# Patient Record
Sex: FEMALE | Race: BLACK OR AFRICAN AMERICAN | NOT HISPANIC OR LATINO | Employment: OTHER | URBAN - METROPOLITAN AREA
[De-identification: names, ages, dates, MRNs, and addresses within clinical notes are randomized per-mention and may not be internally consistent; named-entity substitution may affect disease eponyms.]

---

## 2017-09-10 ENCOUNTER — HOSPITAL ENCOUNTER (EMERGENCY)
Facility: HOSPITAL | Age: 77
Discharge: HOME | End: 2017-09-10
Attending: EMERGENCY MEDICINE
Payer: MEDICARE

## 2017-09-10 VITALS
TEMPERATURE: 99 F | RESPIRATION RATE: 18 BRPM | BODY MASS INDEX: 33.34 KG/M2 | HEART RATE: 75 BPM | SYSTOLIC BLOOD PRESSURE: 173 MMHG | WEIGHT: 220 LBS | DIASTOLIC BLOOD PRESSURE: 79 MMHG | HEIGHT: 68 IN | OXYGEN SATURATION: 97 %

## 2017-09-10 DIAGNOSIS — R09.81 SINUS CONGESTION: ICD-10-CM

## 2017-09-10 DIAGNOSIS — H92.01 ACUTE OTALGIA, RIGHT: Primary | ICD-10-CM

## 2017-09-10 DIAGNOSIS — R03.0 TRANSIENT HYPERTENSION: ICD-10-CM

## 2017-09-10 LAB
GLUCOSE SERPL-MCNC: 118 MG/DL (ref 70–110)
POCT GLUCOSE: 118 MG/DL (ref 70–110)

## 2017-09-10 PROCEDURE — 25000003 PHARM REV CODE 250: Performed by: EMERGENCY MEDICINE

## 2017-09-10 PROCEDURE — 99283 EMERGENCY DEPT VISIT LOW MDM: CPT | Mod: 25

## 2017-09-10 PROCEDURE — 82962 GLUCOSE BLOOD TEST: CPT

## 2017-09-10 PROCEDURE — 93010 ELECTROCARDIOGRAM REPORT: CPT | Mod: ,,, | Performed by: INTERNAL MEDICINE

## 2017-09-10 PROCEDURE — 93005 ELECTROCARDIOGRAM TRACING: CPT

## 2017-09-10 RX ORDER — HYDROCODONE BITARTRATE AND ACETAMINOPHEN 5; 325 MG/1; MG/1
1 TABLET ORAL
Status: DISCONTINUED | OUTPATIENT
Start: 2017-09-10 | End: 2017-09-11 | Stop reason: HOSPADM

## 2017-09-10 RX ORDER — AMOXICILLIN AND CLAVULANATE POTASSIUM 875; 125 MG/1; MG/1
1 TABLET, FILM COATED ORAL
Status: COMPLETED | OUTPATIENT
Start: 2017-09-10 | End: 2017-09-10

## 2017-09-10 RX ORDER — AMOXICILLIN AND CLAVULANATE POTASSIUM 875; 125 MG/1; MG/1
1 TABLET, FILM COATED ORAL 2 TIMES DAILY
Qty: 14 TABLET | Refills: 0 | Status: SHIPPED | OUTPATIENT
Start: 2017-09-10 | End: 2017-11-09

## 2017-09-10 RX ORDER — LOSARTAN POTASSIUM 50 MG/1
50 TABLET ORAL
Status: COMPLETED | OUTPATIENT
Start: 2017-09-10 | End: 2017-09-10

## 2017-09-10 RX ORDER — FLUTICASONE PROPIONATE 50 MCG
1 SPRAY, SUSPENSION (ML) NASAL 2 TIMES DAILY PRN
Qty: 15 G | Refills: 0 | Status: SHIPPED | OUTPATIENT
Start: 2017-09-10

## 2017-09-10 RX ADMIN — LOSARTAN POTASSIUM 50 MG: 50 TABLET, FILM COATED ORAL at 07:09

## 2017-09-10 RX ADMIN — AMOXICILLIN AND CLAVULANATE POTASSIUM 1 TABLET: 875; 125 TABLET, FILM COATED ORAL at 07:09

## 2017-09-10 NOTE — ED PROVIDER NOTES
Encounter Date: 9/10/2017       History     Chief Complaint   Patient presents with    Otalgia     right ear mainly but left ear is begining to hurt, began yesteray; hypertension -  states she took her BP med today     HPI   This is a 77 y.o. female who has a past medical history of Arthritis; Diabetes mellitus; and Hypertension.   The patient presents to the Emergency Department with bilateral ear pain x2 days, worse on the right.   Condition describes pain in the right ear, severe.  Symptoms are constant and worsening.  She also describes a whooshing sound in her right ear.  Patient previously had a tympanostomy tube removed a few weeks ago due to fluid in her ear.  Patient used some eardrops from previous condition today but did not help.  She has associated sinus congestion, mild dry cough.  Pt denies fever, chills, headache.   Symptoms are aggravated by laying down  Symptoms are relieved by nothing.   Patient has prior history of similar symptoms.     Pt also has noted high blood pressure. She took both BP meds this AM.  She is due for her p.m. dose.  She denies chest pain and shortness of breath, back pain, oliguria, hematuria.    Pt has a past surgical history that includes Hysterectomy and Eye surgery.    Review of patient's allergies indicates:  No Known Allergies  Past Medical History:   Diagnosis Date    Arthritis     Diabetes mellitus     Hypertension      Past Surgical History:   Procedure Laterality Date    EYE SURGERY      HYSTERECTOMY       Family History   Problem Relation Age of Onset    Cancer Sister     Breast cancer Sister     Cancer Brother     Hypertension Mother      Social History   Substance Use Topics    Smoking status: Never Smoker    Smokeless tobacco: Current User     Types: Snuff    Alcohol use No     Review of Systems   Constitutional: Negative for activity change and fever.   HENT: Positive for congestion, ear pain, rhinorrhea and sinus pressure. Negative for ear  discharge and sore throat.    Respiratory: Positive for cough (dry). Negative for shortness of breath.    Cardiovascular: Negative for chest pain.   Gastrointestinal: Negative for nausea and vomiting.   Genitourinary: Negative for decreased urine volume and dysuria.   Musculoskeletal: Negative for back pain.   Skin: Negative for rash.   Neurological: Negative for weakness and headaches.   Hematological: Does not bruise/bleed easily.       Physical Exam     Initial Vitals [09/10/17 1822]   BP Pulse Resp Temp SpO2   (!) 220/93 75 18 98.6 °F (37 °C) 97 %      MAP       135.33         Physical Exam    Nursing note and vitals reviewed.  Constitutional: She appears well-developed and well-nourished. She is not diaphoretic. No distress.   HENT:   Head: Normocephalic and atraumatic.   Mouth/Throat: Oropharynx is clear and moist.   Mild cerumen in the right external auditory meatus.  After cerumen removal, noted mild erythema to the right TM.  No air-fluid levels.  Mild opacity to the TM.  TM does not appear to be bulging.    Left external auditory meatus and TM appear to be within normal limits.   Eyes: Conjunctivae are normal. Pupils are equal, round, and reactive to light.   No pallor   Neck: Neck supple.   Cardiovascular: Normal rate, regular rhythm and intact distal pulses.   No murmur heard.  Pulmonary/Chest: Breath sounds normal. No respiratory distress. She has no wheezes. She has no rhonchi. She has no rales.   Abdominal: Soft. Bowel sounds are normal. She exhibits no distension. There is no tenderness. There is no guarding.   Musculoskeletal: Normal range of motion. She exhibits no edema or tenderness.   Neurological: She is alert and oriented to person, place, and time. She has normal strength.   Skin: Skin is warm and dry. No rash noted.   Psychiatric: She has a normal mood and affect.         ED Course   Procedures  Labs Reviewed   POCT GLUCOSE - Abnormal; Notable for the following:        Result Value    POCT  Glucose 118 (*)     All other components within normal limits   POCT GLUCOSE MONITORING CONTINUOUS             Medical Decision Making:   Initial Assessment:   This is an emergent evaluation of a 77 y.o.female patient with presentation of bilateral otalgia, worse on the right history of sinus infection.  It appears that the patient might have an early otitis media on the right versus serous otitis media.  I will try patient on an oral course of antibiotics.  We will also trial her on Flonase.     Patient's blood pressures also severely elevated.  We will recheck in the ED, assessed for any end organ damage including EKG and labwork as well as a screening chest x-ray.  I will give the patient's typical p.m. dose of losartan now.    Clinical Tests:   Lab Tests: Ordered and Reviewed  Radiological Study: Ordered and Reviewed  Medical Tests: Ordered and Reviewed              Attending Attestation:             Attending ED Notes:   Patient will be discharged to home without any further workup.  No need to assess for hypertensive emergency considering patient's blood pressure dropped without any intervention immediately upon arrival in the room.  Her initial elevated BP may have been due to whitecoat syndrome as well as pain.  Her pain resolved after cerumen removal from the right ear.    Patient will be discharged home on Augmentin and Flonase.  Follow-up with ENT in 1 week or return for any concerns.  Patient and family expressed understanding.          ED Course as of Sep 10 2115   Sun Sep 10, 2017   1843 BP: (!) 220/93 [NP]   1843 Temp: 98.6 °F (37 °C) [NP]   1843 Pulse: 75 [NP]   1843 Resp: 18 [NP]   1843 SpO2: 97 % [NP]   1921 Repeat BP 160s/70s.  Pain had resolved after I removed cerumen from the right ear.  Patient is declining Norco at this time.  [NP]      ED Course User Index  [NP] Alonzo Michelle MD     Clinical Impression:   The primary encounter diagnosis was Acute otalgia, right. Diagnoses of Transient  hypertension and Sinus congestion were also pertinent to this visit.    Disposition:   Disposition: Discharged  Condition: Stable                        Alonzo Michelle MD  09/10/17 0198

## 2017-09-11 NOTE — ED NOTES
Pt appears stable and in no apparent distress. Heart rate is stable and regular. All lung lobes are clear, equal, and audible.

## 2017-09-11 NOTE — ED NOTES
Pt states she still feels like she hears a thumping in her ear. States it has calmed down a little, but it is still present

## 2017-09-12 DIAGNOSIS — I10 ESSENTIAL HYPERTENSION, MALIGNANT: Primary | ICD-10-CM

## 2017-11-02 ENCOUNTER — TELEPHONE (OUTPATIENT)
Dept: PHARMACY | Facility: HOSPITAL | Age: 77
End: 2017-11-02

## 2017-11-02 NOTE — TELEPHONE ENCOUNTER
Ambulatory Care Clinical Pharmacist  Comprehensive Medication Review (CMR)/Medication Therapy Management (MTM)    Elina Mei is a 77 y.o. female who was contacted for completion of telephonic comprehensive medication review on 11/01/2017. The patient did not state any concerns regarding her therapy.    Recommendations to PCP for medication changes: None  Elina Mei's PMH consists of the following:   Past Medical History:   Diagnosis Date    Arthritis     Diabetes mellitus     Hypertension    .    Medications have been reviewed. Elina Mei is currently taking the following medications:  Current Outpatient Prescriptions   Medication Sig Dispense Refill    amlodipine (NORVASC) 10 MG tablet Take 10 mg by mouth once daily.      amoxicillin-clavulanate 875-125mg (AUGMENTIN) 875-125 mg per tablet Take 1 tablet by mouth 2 (two) times daily. 14 tablet 0    fluticasone (FLONASE) 50 mcg/actuation nasal spray 1 spray by Each Nare route 2 (two) times daily as needed (sinus congestion). 15 g 0    furosemide (LASIX) 40 MG tablet Take 40 mg by mouth once daily.       ibuprofen (ADVIL,MOTRIN) 600 MG tablet Take 1 tablet (600 mg total) by mouth every 8 (eight) hours as needed for Pain. 20 tablet 0    insulin glargine (LANTUS) 100 unit/mL injection Inject 35 Units into the skin every evening.      losartan (COZAAR) 50 MG tablet Take 50 mg by mouth once daily.      metformin (GLUCOPHAGE) 500 MG tablet Take 500 mg by mouth 2 (two) times daily with meals.      nitroGLYCERIN (NITROSTAT) 0.4 MG SL tablet Place 1 tablet (0.4 mg total) under the tongue every 5 (five) minutes as needed for Chest pain. 10 tablet 0    potassium chloride (KLOR-CON) 10 MEQ TbSR Take 10 mEq by mouth once daily.       No current facility-administered medications for this visit.      Of the medications above Ms. Mei is no longer taking: Augmentin, Motrin, or Flonase    Patient's most recent vital signs and labs are as follows:  Wt Readings from Last 3  Encounters:   09/10/17 99.8 kg (220 lb)   09/08/17 99.8 kg (220 lb 0.3 oz)   08/25/17 99.8 kg (220 lb 0.3 oz)     Temp Readings from Last 3 Encounters:   09/10/17 98.6 °F (37 °C)   07/19/15 98.2 °F (36.8 °C) (Oral)   12/24/13 98.9 °F (37.2 °C) (Oral)     BP Readings from Last 3 Encounters:   09/10/17 (!) 173/79   07/19/15 139/87   12/24/13 (!) 161/76     Pulse Readings from Last 3 Encounters:   09/10/17 75   07/19/15 78   12/24/13 74       Lab Results   Component Value Date    HGBA1C 9.0 (H) 04/02/2013     BMP  Lab Results   Component Value Date     12/23/2013    K 3.9 12/23/2013     12/23/2013    CO2 24 12/23/2013    BUN 14 12/23/2013    CREATININE 0.9 12/23/2013    CALCIUM 9.7 12/23/2013    ANIONGAP 11 12/23/2013    ESTGFRAFRICA >60 12/23/2013    EGFRNONAA >60 12/23/2013       Lab Results   Component Value Date    BUN 14 12/23/2013     Lab Results   Component Value Date    CHOL 249 (H) 04/01/2013     Lab Results   Component Value Date    HDL 37 (L) 04/01/2013     Lab Results   Component Value Date    LDLCALC 175.2 (H) 04/01/2013     Lab Results   Component Value Date    TRIG 184 (H) 04/01/2013     Lab Results   Component Value Date    CHOLHDL 14.9 (L) 04/01/2013       Medication Action Plan (MAP): Problems identified during CMR  1) Immunization(s) - Review immunizations required due to age and/or for chronic conditions based on latest ACIP guidelines, Tdap, Zoster, HPV, Influenza, and pneumococcal.  Elinaliza Mei   will follow-up with PCP to determine if Influenza, Pneumococcal and Zostavax immunizations are needed.    2) Goals - Provided Elina Mei with the following goals for management of chronic conditions.  Blood pressure (older than 61 yo) less than 150/90 mm Hg.  A1c (diabetes) less than 7%.  Total cholesterol less than 200 mg/dL.  LDL-C less than 100 mg/dL.  Triglycerides less than 150 mg/dL.    Andres Hardy, PharmD Candidate 2018 April LUIS ARMANDO Saleh, PharmD-Ambulatory Care Clinical Pharmacist  Population Health  11/02/2017

## 2017-11-09 ENCOUNTER — OFFICE VISIT (OUTPATIENT)
Dept: SURGERY | Facility: CLINIC | Age: 77
End: 2017-11-09
Payer: MEDICARE

## 2017-11-09 VITALS
HEIGHT: 68 IN | WEIGHT: 225.31 LBS | TEMPERATURE: 98 F | DIASTOLIC BLOOD PRESSURE: 72 MMHG | SYSTOLIC BLOOD PRESSURE: 140 MMHG | OXYGEN SATURATION: 96 % | BODY MASS INDEX: 34.15 KG/M2 | HEART RATE: 77 BPM

## 2017-11-09 DIAGNOSIS — N63.10 BREAST MASS, RIGHT: ICD-10-CM

## 2017-11-09 DIAGNOSIS — R92.1 BREAST CALCIFICATION, RIGHT: Primary | ICD-10-CM

## 2017-11-09 DIAGNOSIS — E66.9 OBESITY (BMI 30-39.9): ICD-10-CM

## 2017-11-09 DIAGNOSIS — R92.8 ABNORMAL MAMMOGRAM: ICD-10-CM

## 2017-11-09 PROCEDURE — 99205 OFFICE O/P NEW HI 60 MIN: CPT | Mod: S$GLB,,, | Performed by: SURGERY

## 2017-11-09 RX ORDER — TRAVOPROST 0.04 MG/ML
1 SOLUTION/ DROPS OPHTHALMIC NIGHTLY
Refills: 3 | COMMUNITY
Start: 2017-09-09 | End: 2018-07-20

## 2017-11-09 RX ORDER — INSULIN GLARGINE 100 [IU]/ML
INJECTION, SOLUTION SUBCUTANEOUS
Refills: 2 | COMMUNITY
Start: 2017-09-09

## 2017-11-09 RX ORDER — DORZOLAMIDE HYDROCHLORIDE AND TIMOLOL MALEATE 20; 5 MG/ML; MG/ML
1 SOLUTION/ DROPS OPHTHALMIC 2 TIMES DAILY
Refills: 6 | COMMUNITY
Start: 2017-09-09

## 2017-11-09 RX ORDER — ATORVASTATIN CALCIUM 10 MG/1
10 TABLET, FILM COATED ORAL DAILY
Refills: 0 | Status: ON HOLD | COMMUNITY
Start: 2017-08-09 | End: 2018-04-27 | Stop reason: HOSPADM

## 2017-11-09 NOTE — LETTER
November 10, 2017      Yoan Baron MD  [address]  [place] [address]           [facility]  [address], [address]  [facility] [address]  Phone: [phone]  Fax: [phone]          Patient: Elina Mei   MR Number: [id number]   YOB: 1940   Date of Visit: 11/9/2017       Dear Dr. Yoan Baron:    Thank you for referring Elina Mei to me for evaluation. Attached you will find relevant portions of my assessment and plan of care.    If you have questions, please do not hesitate to call me. I look forward to following Elina Mei along with you.    Sincerely,    Annette Medellin,     Enclosure  CC:  No Recipients    If you would like to receive this communication electronically, please contact [email or web address] or [phone] to request more information on [employer] access.    For providers and/or their staff who would like to refer a patient to [facility], please contact us through our one-stop-shop provider referral line, [facility] , at [phone].    If you feel you have received this communication in error or would no longer like to receive these types of communications, please e-mail [email or web address]

## 2017-11-09 NOTE — PROGRESS NOTES
Subjective:      Patient ID: Elina Mei is a 77 y.o. female.    Chief Complaint: Mass (Right Breast)  76yo female presents with her daughter for eval and treatmt of R breast calcs.  She underwent a screening mammo 2017 which was abN. A diagnostic mammo was subsequently ordered which revealed BIRADS 4 R breast calcs. Pt presents with her daughter and denies any sx. No pain, no palpable mass. No nipple retraction. She does perform regular self-breast exams. She denies nipple drainage. She has not noticed breast asymmetry. She has a sister that was in her 50's when dx'd with breast CA, sister had surgery (unknown if chemo/XRT) and subsequently  pt believes from the breast CA. Pt does not know that details.  No other c/o.  Age of menarche - 12  Age of menopause - surgical 41yo  Gavida 6 Para 5  Age at first pregnancy - 17yo  OCP use - 2yrs  Postmenopausal HRT - no  Breastfeeding - minimal      Past Medical History:   Diagnosis Date    Arthritis     Diabetes mellitus     Hypertension      Past Surgical History:   Procedure Laterality Date    EYE SURGERY      HYSTERECTOMY       Family History   Problem Relation Age of Onset    Cancer Sister     Breast cancer Sister     Cancer Brother     Hypertension Mother      Social History     Social History    Marital status:      Spouse name: N/A    Number of children: N/A    Years of education: N/A     Social History Main Topics    Smoking status: Never Smoker    Smokeless tobacco: Current User     Types: Snuff    Alcohol use No    Drug use: No    Sexual activity: Not Currently     Other Topics Concern    None     Social History Narrative    None       Current Outpatient Prescriptions   Medication Sig Dispense Refill    amlodipine (NORVASC) 10 MG tablet Take 10 mg by mouth once daily.      atorvastatin (LIPITOR) 10 MG tablet Take 10 mg by mouth once daily.  0    dorzolamide-timolol 2-0.5% (COSOPT) 22.3-6.8 mg/mL ophthalmic solution Place 1 drop  "into both eyes 2 (two) times daily.  6    fluticasone (FLONASE) 50 mcg/actuation nasal spray 1 spray by Each Nare route 2 (two) times daily as needed (sinus congestion). 15 g 0    furosemide (LASIX) 40 MG tablet Take 40 mg by mouth once daily.       ibuprofen (ADVIL,MOTRIN) 600 MG tablet Take 1 tablet (600 mg total) by mouth every 8 (eight) hours as needed for Pain. 20 tablet 0    insulin glargine (LANTUS) 100 unit/mL injection Inject 35 Units into the skin every evening.      KLOR-CON SPRINKLE 10 mEq CpSR Take 10 mEq by mouth once daily.  0    LANTUS SOLOSTAR 100 unit/mL (3 mL) InPn pen INJECT 35 UNIT(S) EVERY DAY BY SUBCUTANEOUS ROUTE.  2    losartan (COZAAR) 50 MG tablet Take 50 mg by mouth once daily.      metformin (GLUCOPHAGE) 500 MG tablet Take 500 mg by mouth 2 (two) times daily with meals.      potassium chloride (KLOR-CON) 10 MEQ TbSR Take 10 mEq by mouth once daily.      TRAVATAN Z 0.004 % Drop Place 1 drop into both eyes nightly.  3    nitroGLYCERIN (NITROSTAT) 0.4 MG SL tablet Place 1 tablet (0.4 mg total) under the tongue every 5 (five) minutes as needed for Chest pain. 10 tablet 0     No current facility-administered medications for this visit.      Review of patient's allergies indicates:  No Known Allergies    ROS:  All systems were reviewed and are negative, except that mentioned in the HPI.    Objective:     Vitals:    11/09/17 1144   BP: (!) 140/72   BP Location: Left arm   Patient Position: Sitting   BP Method: Large (Manual)   Pulse: 77   Temp: 98 °F (36.7 °C)   SpO2: 96%   Weight: 102.2 kg (225 lb 4.8 oz)   Height: 5' 8" (1.727 m)     Physical Exam   Constitutional: She is oriented to person, place, and time. She appears well-developed and well-nourished. No distress.   HENT:   Head: Normocephalic and atraumatic.   Eyes: EOM are normal. Pupils are equal, round, and reactive to light. No scleral icterus.   Neck: Normal range of motion. No JVD present.   Cardiovascular: Normal rate " and regular rhythm.    Pulmonary/Chest: Effort normal and breath sounds normal. No respiratory distress. Right breast exhibits no inverted nipple, no mass, no nipple discharge, no skin change and no tenderness. Left breast exhibits no inverted nipple, no mass, no nipple discharge, no skin change and no tenderness.   Dense breast tissue palpated in the 9 o'clock region of the R breast   Abdominal: Soft. Bowel sounds are normal. She exhibits no distension.   Musculoskeletal: Normal range of motion.   Lymphadenopathy:     She has no cervical adenopathy.     She has no axillary adenopathy.        Right: No supraclavicular adenopathy present.        Left: No supraclavicular adenopathy present.   Neurological: She is alert and oriented to person, place, and time. No cranial nerve deficit.   Skin: Skin is warm and dry. She is not diaphoretic.   Psychiatric: She has a normal mood and affect.       Lab Review: CBC:   Lab Results   Component Value Date    WBC 7.75 12/23/2013    RBC 4.08 12/23/2013    HGB 11.8 (L) 12/23/2013    HCT 37.4 12/23/2013     12/23/2013     BMP:   Lab Results   Component Value Date    GLU 87 12/23/2013     12/23/2013    K 3.9 12/23/2013     12/23/2013    CO2 24 12/23/2013    BUN 14 12/23/2013    CREATININE 0.9 12/23/2013    CALCIUM 9.7 12/23/2013     Lab Results   Component Value Date    ALT 5 (L) 12/23/2013    AST 13 12/23/2013    ALKPHOS 84 12/23/2013    BILITOT 0.4 12/23/2013       Diagnostics Review:  Mammo/US 9/8/17 images and reports were personally reviewed.  The report states:  Impression:  Right  Calcifications: Right breast calcifications at the 9 o'clock position.   BI-RADS Category:   Right: 4 - Suspicious  Overall: 4 - Suspicious  Recommendation:  Biopsy is recommended for the right breast.  U/S R breast - US Breast Right Limited  There are calcifications in a mass seen in the right breast at 9 o'clock.   Assessment:     1. Breast calcification, right    2. Abnormal  mammogram    3. Obesity (BMI 30-39.9)    4. Breast mass, right      Plan:   Discussed a variety of potential breast pathology with the patient and daughter. Next step is biopsy. It appears that this lesion is amendable to U/S guided bx as amass with calcs was noted on recent imaging.  Will discuss with IR. Will arrange that and f/u with pt after results are received. All of the patient's and her daughter's questions were answered.

## 2017-11-13 ENCOUNTER — TELEPHONE (OUTPATIENT)
Dept: RADIOLOGY | Facility: HOSPITAL | Age: 77
End: 2017-11-13

## 2017-11-15 ENCOUNTER — HOSPITAL ENCOUNTER (OUTPATIENT)
Dept: RADIOLOGY | Facility: HOSPITAL | Age: 77
Discharge: HOME | End: 2017-11-15
Attending: SURGERY
Payer: MEDICARE

## 2017-11-15 DIAGNOSIS — E66.9 OBESITY (BMI 30-39.9): ICD-10-CM

## 2017-11-15 DIAGNOSIS — R92.8 ABNORMAL FINDING ON BREAST IMAGING: ICD-10-CM

## 2017-11-15 DIAGNOSIS — N63.10 BREAST MASS, RIGHT: ICD-10-CM

## 2017-11-15 DIAGNOSIS — R92.1 BREAST CALCIFICATION, RIGHT: ICD-10-CM

## 2017-11-15 DIAGNOSIS — R92.8 ABNORMAL MAMMOGRAM: ICD-10-CM

## 2017-11-15 PROCEDURE — 88305 TISSUE EXAM BY PATHOLOGIST: CPT | Performed by: PATHOLOGY

## 2017-11-15 PROCEDURE — 19083 BX BREAST 1ST LESION US IMAG: CPT | Mod: RT,,, | Performed by: RADIOLOGY

## 2017-11-15 PROCEDURE — 88360 TUMOR IMMUNOHISTOCHEM/MANUAL: CPT | Mod: 26,,, | Performed by: PATHOLOGY

## 2017-11-15 PROCEDURE — 77065 DX MAMMO INCL CAD UNI: CPT | Mod: 26,RT,, | Performed by: RADIOLOGY

## 2017-11-15 PROCEDURE — 88305 TISSUE EXAM BY PATHOLOGIST: CPT | Mod: 26,,, | Performed by: PATHOLOGY

## 2017-11-15 PROCEDURE — 77065 DX MAMMO INCL CAD UNI: CPT | Mod: TC,RT

## 2017-11-15 PROCEDURE — A4648 IMPLANTABLE TISSUE MARKER: HCPCS

## 2017-11-15 PROCEDURE — 88360 TUMOR IMMUNOHISTOCHEM/MANUAL: CPT | Performed by: PATHOLOGY

## 2017-11-15 NOTE — DISCHARGE SUMMARY
Interventional Radiology Procedure Note    Procedure: right breast biopsy    Pre procedure diagnosis: right breast mass    Post procedure diagnosis: same    : MD Micah    Specimens removed: 5 14 gauge samples right breast    Estimated Blood Loss: 0 ml     Complications: none     Findings: right breast mass    Radiology Discharge Summary      Admit date: 11/15/2017 12:44 PM  Discharge date: November 15, 2017    Instructions Given to patient: YesVerbal    Diet: Regular    Activity:NO Restrictions    Medications on discharge (List): Refer to Discharge Medication List    Hospital Course: uneventul    Description of Condition on Discharge: Chief Complaint Resolved    Discharge Disposition: Home    Discharge Diagnosis: right breast mass

## 2017-11-15 NOTE — H&P
[facility]  History & Physical - Short Stay  Interventional Radiology    SUBJECTIVE:     Chief Complaint/Reason for Admission: right breast mass    History of Present Illness:  Elina Mei is a 77 y.o. female with a history of right breast mass.      OBJECTIVE:       Physical Exam:  Awake, alert and oriented  In no acute distress  Pe, eomi  No labored breathing  Moves extremities spontaneously      ASSESSMENT/PLAN:     Right breast mass.    Patient will undergo right breast biopsy.    Sedation Plan: lidocaine

## 2017-11-22 ENCOUNTER — OFFICE VISIT (OUTPATIENT)
Dept: SURGERY | Facility: CLINIC | Age: 77
End: 2017-11-22
Payer: MEDICARE

## 2017-11-22 VITALS
TEMPERATURE: 99 F | HEART RATE: 79 BPM | DIASTOLIC BLOOD PRESSURE: 64 MMHG | WEIGHT: 223.31 LBS | BODY MASS INDEX: 33.84 KG/M2 | HEIGHT: 68 IN | OXYGEN SATURATION: 97 % | SYSTOLIC BLOOD PRESSURE: 150 MMHG

## 2017-11-22 DIAGNOSIS — C50.811 MALIGNANT NEOPLASM OF OVERLAPPING SITES OF RIGHT BREAST IN FEMALE, ESTROGEN RECEPTOR POSITIVE: Primary | ICD-10-CM

## 2017-11-22 DIAGNOSIS — I10 ESSENTIAL HYPERTENSION: ICD-10-CM

## 2017-11-22 DIAGNOSIS — E66.9 OBESITY (BMI 30-39.9): ICD-10-CM

## 2017-11-22 DIAGNOSIS — Z17.0 MALIGNANT NEOPLASM OF OVERLAPPING SITES OF RIGHT BREAST IN FEMALE, ESTROGEN RECEPTOR POSITIVE: Primary | ICD-10-CM

## 2017-11-22 PROCEDURE — 99999 PR PBB SHADOW E&M-EST. PATIENT-LVL V: CPT | Mod: PBBFAC,,, | Performed by: SURGERY

## 2017-11-22 PROCEDURE — 99215 OFFICE O/P EST HI 40 MIN: CPT | Mod: S$GLB,,, | Performed by: SURGERY

## 2017-11-22 NOTE — PROGRESS NOTES
"Subjective:       Patient ID: Elina Mei is a 77 y.o. female.    Chief Complaint: Follow-up    HPI   76yo female presented 11/10/17 with her daughter for eval and treatmt of R breast calcs.  She underwent a screening mammo 2017 which was abN. A diagnostic mammo was subsequently ordered which revealed BIRADS 4 R breast calcs. Pt presents with her daughter and denies any sx. No pain, no palpable mass. No nipple retraction. She does perform regular self-breast exams. She denies nipple drainage. She has not noticed breast asymmetry. She has a sister that was in her 50's when dx'd with breast CA, sister had surgery (unknown if chemo/XRT) and subsequently  pt believes from the breast CA. Pt does not know that details.  No other c/o.  Age of menarche - 12  Age of menopause - surgical 41yo  Gavida 6 Para 5  Age at first pregnancy - 17yo  OCP use - 2yrs  Postmenopausal HRT - no  Breastfeeding - minimal    Interval history:  Pt presents for path results.  Pt underwent U/S guided bx of R breast mass 11/15/17. Path revealed Invasive mammary carcinoma, ductal NOS, Grade 2.  Estrogen receptor: Positive; strong nuclear staining over 95% tumor cells.  Progesterone receptor: Positive; weak, moderate and strong staining seen in over 90% tumor cells.  HER-2/juan: Negative (Stain score = 1+).  After review of her images, it appears that there was a 3yr delay in mammo btw  and .  Pt had diagnostic mammo and U/S 2017.  Biopsy was recommended.  Pt could not comment on the time lapse b/t her mammo and her clinic visit with me 2017.  There is a certified receipt in the media file confirming that mammo results sent 2017.  Pt presents with her daughter, Rochelle.  We discussed her newly diagnosed right breast CA. Pt became angry and tearful and states that this was a "death sentence." and that she wasn't interested in any treatment.  We subsequently had a very long conversation highlighting the many treatment options and " that they were usually well-tolerated. We discussed that a staging work-up is needed to answer more questions. Pt not currently interested in any additional work-up.     Review of Systems    All systems were reviewed and are negative, except that mentioned in the HPI.    Objective:      Physical Exam   Constitutional: She is oriented to person, place, and time. She appears well-developed and well-nourished.   Tearful after diagnosis discussed   HENT:   Head: Normocephalic and atraumatic.   Eyes: EOM are normal. Pupils are equal, round, and reactive to light.   Cardiovascular: Normal rate.    Pulmonary/Chest: Effort normal. No respiratory distress.   Neurological: She is alert and oriented to person, place, and time.   Psychiatric:   Tearful and angry at diagnosis         Findings:   Computer-aided detection was utilized in the interpretation of this examination.  Mammo Digital Diagnostic Right with CAD  The breast has scattered areas of fibroglandular density.   There are amorphous calcifications seen in the right breast at 9 o'clock.   US Breast Right Limited  There are calcifications in a mass seen in the right breast at 9 o'clock.   Impression:  Right  Calcifications: Right breast calcifications at the 9 o'clock position.   BI-RADS Category:   Right: 4 - Suspicious  Overall: 4 - Suspicious     11/15/17 Path:  Supplemental Diagnosis  Immunohistochemical stains for hormonal markers are completed on the breast tumor and reveal the following:  Estrogen receptor: Positive; strong nuclear staining over 95% tumor cells.  Progesterone receptor: Positive; weak, moderate and strong staining seen in over 90% tumor cells.  HER-2/juan: Negative (Stain score = 1+).  All immunohistochemical stains have satisfactory positive and negative controls.  [id number],ER,PGR  (Electronically Signed: 2017-11-21 09:29:18 )  Diagnosed by: Luciana Alvarado M.D.  FINAL PATHOLOGIC DIAGNOSIS  Right breast, core biopsy:  -Invasive mammary carcinoma,  "ductal NOS, Grade 2  Tubule score: 3  Nuclear score: 2  Mitotic score: 1 (5 per 10 HPF; field diameter: 0.5 mm)  -Greatest linear dimension of tumor: Approximately 10 mm  [id number],ER,PGR  Report    Lab Results   Component Value Date    WBC 7.75 12/23/2013    HGB 11.8 (L) 12/23/2013    HCT 37.4 12/23/2013    MCV 92 12/23/2013     12/23/2013     BMP  Lab Results   Component Value Date     12/23/2013    K 3.9 12/23/2013     12/23/2013    CO2 24 12/23/2013    BUN 14 12/23/2013    CREATININE 0.9 12/23/2013    CALCIUM 9.7 12/23/2013    ANIONGAP 11 12/23/2013    ESTGFRAFRICA >60 12/23/2013    EGFRNONAA >60 12/23/2013     Lab Results   Component Value Date    ALT 5 (L) 12/23/2013    AST 13 12/23/2013    ALKPHOS 84 12/23/2013    BILITOT 0.4 12/23/2013     Lab Results   Component Value Date    HGBA1C 9.0 (H) 04/02/2013       Assessment:       1. Malignant neoplasm of overlapping sites of right breast in female, estrogen receptor positive    2. Uncontrolled type 2 diabetes mellitus without complication, with long-term current use of insulin    3. Essential hypertension    4. Obesity (BMI 30-39.9)        Plan:   The pathology of the patient's disease, newly diagnosed breast cancer, was discussed at length. Written handouts were explained and given to the patient and her daughter.  Many options were discussed.  Positive encouragement was given and potential cure for her breast cancer was described as the goal, but staging would be needed to determine her treatment options and prognosis.  The prevalence of breast Ca was discussed.  The many treatment options for taylored to each patient was discussed.  The many advancements in the treatment of breast CA was discussed.  We discussed that most treatments are well-tolerated.  The patient was angry with the diagnosis and said this was a "death sentence," she was tearful and said that she was not interested in any treatment.  She declined physical exam.  Her daughter " JOE Byrd was present and very encouraging.  We had coordinated an appt with Dr. Solorio (heme/onc) for this afternoon but pt/daughter not ready for this so it was cancelled.  Pt not ready/interested in staging at this time, declines lab work, XR, US.  Labs, CXR, U/S right axilla will be ordered and available if/when pt is ready.  We will call her on Monday to schedule a f/u appt to discuss further.  The breast CA binder and multiple pt ed handouts were included re: breast CA treatment and surgeries.  All questions were answered.  At least one hour was spent in face-to-face education and counseling with patient and daughter.

## 2017-11-29 ENCOUNTER — TELEPHONE (OUTPATIENT)
Dept: SURGERY | Facility: CLINIC | Age: 77
End: 2017-11-29

## 2017-11-29 NOTE — TELEPHONE ENCOUNTER
Returned call to daughter, discussed her mother's diagnosis. Daughter states they would like a second opinion regarding her treatment options. She does not have a preference in who she sees. Scheduled for Wednesday 12/6 with Dr. Melo. Reviewed location of [facility] [place], gave daughter my direct number and encouraged her to call with any questions or concerns.

## 2017-11-29 NOTE — TELEPHONE ENCOUNTER
----- Message from Jesse Hernandez sent at 11/29/2017  3:05 PM CST -----  Contact: sarwat/daughter  [phone]//caller states that she needs to speak with nurse in ref to pt recently being diagnosed with cancer and needs to know what to do as far as what to do to set up something//please call/thank you

## 2017-11-29 NOTE — TELEPHONE ENCOUNTER
----- Message from Lisseth Shirley sent at 11/29/2017 12:38 PM CST -----  Contact: Self [phone]  Patient is calling to talk to nurse has personal questions. Please advice   11-29-17 5  Returned call, no answer, left message to call clinic.

## 2017-12-05 ENCOUNTER — OFFICE VISIT (OUTPATIENT)
Dept: SURGERY | Facility: CLINIC | Age: 77
End: 2017-12-05
Payer: MEDICARE

## 2017-12-05 VITALS — HEIGHT: 68 IN | BODY MASS INDEX: 33.88 KG/M2 | TEMPERATURE: 99 F | WEIGHT: 223.56 LBS

## 2017-12-05 DIAGNOSIS — C50.411 MALIGNANT NEOPLASM OF UPPER-OUTER QUADRANT OF RIGHT BREAST IN FEMALE, ESTROGEN RECEPTOR POSITIVE: Primary | ICD-10-CM

## 2017-12-05 DIAGNOSIS — Z17.0 MALIGNANT NEOPLASM OF UPPER-OUTER QUADRANT OF RIGHT BREAST IN FEMALE, ESTROGEN RECEPTOR POSITIVE: Primary | ICD-10-CM

## 2017-12-05 PROCEDURE — 99999 PR PBB SHADOW E&M-EST. PATIENT-LVL II: CPT | Mod: PBBFAC,,, | Performed by: SURGERY

## 2017-12-05 PROCEDURE — 99215 OFFICE O/P EST HI 40 MIN: CPT | Mod: S$GLB,,, | Performed by: SURGERY

## 2017-12-05 NOTE — PROGRESS NOTES
Breast Surgery  [facility]  Department of Surgery      REFERRING PROVIDER: Annette Medellin, DO  [address]   [address]  [place] [address]    Chief Complaint: Right breast cancer    Subjective:      Patient ID: Elina Mei is a 77 y.o. female who presents with a new diagnosis of right breast IDC grade II, ER+MS+H2N- at the 9OC position.  She denies a palpable mass.  She has undergone mammograms sporadically, not yearly.      Patient does not routinely do self breast exams.  Patient has not noted a change on breast exam.  Patient denies nipple discharge. Patient denies to previous breast biopsy. Patient denies a personal history of breast cancer.    Findings at that time were the following:   Tumor size: 2.5 cm   Tumor grade: II   Estrogen Receptor: +   Progesterone Receptor: +   Her-2 juan: -   Lymph node status: unknown   Lymphatic invasion: unknown     GYN History:  Age of menarche was 10. Age of menopause was 40s with hysterectomy.   Patient denies hormonal therapy. Patient is . Age of first live birth was 17. Patient did breast feed.    Past Medical History:   Diagnosis Date    Arthritis     Diabetes mellitus     Hypertension     Malignant neoplasm of upper-outer quadrant of right breast in female, estrogen receptor positive 2017     Past Surgical History:   Procedure Laterality Date    EYE SURGERY      HYSTERECTOMY       Current Outpatient Prescriptions on File Prior to Visit   Medication Sig Dispense Refill    amlodipine (NORVASC) 10 MG tablet Take 10 mg by mouth once daily.      atorvastatin (LIPITOR) 10 MG tablet Take 10 mg by mouth once daily.  0    dorzolamide-timolol 2-0.5% (COSOPT) 22.3-6.8 mg/mL ophthalmic solution Place 1 drop into both eyes 2 (two) times daily.  6    fluticasone (FLONASE) 50 mcg/actuation nasal spray 1 spray by Each Nare route 2 (two) times daily as needed (sinus congestion). 15 g 0    furosemide (LASIX) 40 MG tablet Take 40 mg by mouth once  daily.       ibuprofen (ADVIL,MOTRIN) 600 MG tablet Take 1 tablet (600 mg total) by mouth every 8 (eight) hours as needed for Pain. 20 tablet 0    insulin glargine (LANTUS) 100 unit/mL injection Inject 35 Units into the skin every evening.      LANTUS SOLOSTAR 100 unit/mL (3 mL) InPn pen INJECT 35 UNIT(S) EVERY DAY BY SUBCUTANEOUS ROUTE.  2    losartan (COZAAR) 50 MG tablet Take 50 mg by mouth once daily.      metformin (GLUCOPHAGE) 500 MG tablet Take 500 mg by mouth 2 (two) times daily with meals.      potassium chloride (KLOR-CON) 10 MEQ TbSR Take 10 mEq by mouth once daily.      TRAVATAN Z 0.004 % Drop Place 1 drop into both eyes nightly.  3    KLOR-CON SPRINKLE 10 mEq CpSR Take 10 mEq by mouth once daily.  0    nitroGLYCERIN (NITROSTAT) 0.4 MG SL tablet Place 1 tablet (0.4 mg total) under the tongue every 5 (five) minutes as needed for Chest pain. 10 tablet 0     No current facility-administered medications on file prior to visit.      Social History     Social History    Marital status:      Spouse name: N/A    Number of children: N/A    Years of education: N/A     Occupational History    Not on file.     Social History Main Topics    Smoking status: Never Smoker    Smokeless tobacco: Current User     Types: Snuff    Alcohol use No    Drug use: No    Sexual activity: Not Currently     Other Topics Concern    Not on file     Social History Narrative    No narrative on file     Family History   Problem Relation Age of Onset    Cancer Sister 75     Colon CA    Breast cancer Sister 55    Cancer Brother     Hypertension Mother         Review of Systems   Constitutional: Negative for appetite change, chills, fever and unexpected weight change.   HENT: Negative for facial swelling, postnasal drip and sore throat.    Eyes: Negative for redness and itching.   Respiratory: Negative for chest tightness and shortness of breath.    Cardiovascular: Negative for chest pain and palpitations.  "  Gastrointestinal: Negative for blood in stool, diarrhea, nausea and vomiting.   Genitourinary: Negative for difficulty urinating and dysuria.   Musculoskeletal: Negative for arthralgias and joint swelling.   Skin: Negative for rash and wound.   Neurological: Negative for dizziness and syncope.   Hematological: Negative for adenopathy.   Psychiatric/Behavioral: Negative for agitation. The patient is not nervous/anxious.      Objective:   Temp 98.6 °F (37 °C) (Oral)   Ht 5' 8" (1.727 m)   Wt 101.4 kg (223 lb 8.7 oz)   BMI 33.99 kg/m²     Physical Exam   Constitutional: She appears well-developed and well-nourished.   HENT:   Head: Normocephalic.   Eyes: No scleral icterus.   Neck: Neck supple. No tracheal deviation present.   Cardiovascular: Normal rate and regular rhythm.    Pulmonary/Chest: Breath sounds normal. No respiratory distress. Right breast exhibits no inverted nipple, no nipple discharge and no skin change. Left breast exhibits no inverted nipple, no mass, no nipple discharge and no skin change.       Abdominal: Soft. She exhibits no mass. There is no tenderness.   Musculoskeletal: She exhibits no edema.   Lymphadenopathy:     She has no cervical adenopathy.   Neurological: She is alert.   Skin: No rash noted. No erythema.     Psychiatric: She has a normal mood and affect.       Radiology review: Images personally reviewed by me in the clinic.   Mammogram:  The mass measures 2.5 x 1 cm in greatest dimension      Signed by Jigar Galdamez MD on 11/27/2017 10:01 AM   Narrative & Impression     Result:   Mammo Digital Diagnostic Right with CAD  US Breast Right Limited     History:  Patient is 77 y.o. and is seen for abnormal mammogram.           Films Compared:  Prior images (if available) were compared.     Findings:   Computer-aided detection was utilized in the interpretation of this examination.  Mammo Digital Diagnostic Right with CAD  The breast has scattered areas of fibroglandular " density.     There are amorphous calcifications seen in the right breast at 9 o'clock.      US Breast Right Limited  There are calcifications in a mass seen in the right breast at 9 o'clock.      Impression:  Right  Calcifications: Right breast calcifications at the 9 o'clock position.      BI-RADS Category:   Right: 4 - Suspicious  Overall: 4 - Suspicious     Recommendation:  Biopsy is recommended for the right breast.     The patient's estimated lifetime risk of breast cancer (to age 85) based on Tyrer-Cuzick - 7 risk assessment model is: Tyrer-Cuzick: 6.92 %. According to the American Cancer Society,  patients with a lifetime breast cancer risk of 20% or higher might benefit from supplemental screening tests.     PATH:    Estrogen receptor: Positive; strong nuclear staining over 95% tumor cells.  Progesterone receptor: Positive; weak, moderate and strong staining seen in over 90% tumor cells.  HER-2/juan: Negative (Stain score = 1+).  All immunohistochemical stains have satisfactory positive and negative controls.  [id number],ER,PGR  (Electronically Signed: 2017-11-21 09:29:18 )  Diagnosed by: Luciana Alvarado M.D.  FINAL PATHOLOGIC DIAGNOSIS  Right breast, core biopsy:  -Invasive mammary carcinoma, ductal NOS, Grade 2  Tubule score: 3  Nuclear score: 2  Mitotic score: 1 (5 per 10 HPF; field diameter: 0.5 mm)  -Greatest linear dimension of tumor: Approximately 10 mm    -DCIS also present  -Microcalcifications identified  -Special studies pending; results will be issued in an addendum    Assessment:       1. Malignant neoplasm of upper-outer quadrant of right breast in female, estrogen receptor positive        Plan:     Options for management were discussed with the patient and her family. We reviewed the existing data noting the equivalency of breast conserving surgery with radiation therapy and mastectomy. We also reviewed the guidelines of the National Comprehensive Cancer Network for Stage IIA breast carcinoma. We  discussed the need for lumpectomy margins to be negative for carcinoma, the necessity for postoperative radiation therapy after breast conservation in most cases, the possibility of a failed or false negative sentinel lymph node biopsy and the potential need for complete lymphadenectomy for a failed or positive sentinel lymph node biopsy were fully discussed. In the setting of mastectomy, delayed or immediate reconstruction options are available and were discussed.     In the setting of lumpectomy, radiation therapy would be recommended majority of the time.  The duration and treatment side effects were discussed with the patient.  This will coordinated with the radiation oncologist pending final pathology.    We also discussed the role of systemic therapy in the treatment of early stage breast cancer.  We discussed that this is based on tumor biology and denise status and will be determined based on final pathology.  We discussed that if the cancer is hormone positive, endocrine therapy would be recommended in most cases and its use can reduce the risk of recurrence as well as improve survival. Side effects of treatment were briefly discussed. We also discussed the potential role for chemotherapy based on a number of factors such as tumor phenotype (ER+ vs. triple negative vs. Rdk4nrx+) and this would be determined in coordination with the medical oncologist.    The patient, in consultation with her family, has agreed to undergo R axillary ultrasound and obtain the necessary pre-operative testing.  We will contact her PCP for clearance.  She is leaning towards partial mastectomy.  I am able to visualize her mass with ultrasound in clinic and could proceed with USG partial mastectomy with SLNB when she is ready.  She still needs a little more time to let her diagnosis sink in.  She is strongly opposed to mastectomy and to chemotherapy.  The operative risks of bleeding, infection, recurrence, scarring, and anesthetic  complications and the possibility of requiring further surgery were all noted and informed consent obtained.  Will schedule surgery at her convenience.  I will let Dr. Medellin know what we discussed and that the patient still has not committed to treatment, but seems to be becoming more alert to her situation.    Patient was educated on breast cancer, receptors, ultrasound localization lumpectomy, mastectomy, sentinel lymph node mapping and biopsy, axillary lymph node dissection, reconstruction, breast prosthesis with post-mastectomy bra and radiation therapy. Patient was given patient information binder including [facility] breast cancer treatment brochure.  All her questions were answered.    Total time spent with the patient: 65 minutes.  50 minutes of face to face consultation and 15 minutes of chart review and coordination of care.

## 2017-12-05 NOTE — Clinical Note
I saw Ms. Mei today.  She is still slow to accept her diagnosis, but I think will proceed with the axillary ultrasound and a pre-op workup.  She said she would call when she was ready to move forward, so still has no definitive plan.  I think it will just take her a little more time to have it sink in.  Thanks for giving me a heads up that she was coming!  I'll keep you posted if I hear back.  Thanks, Yumi

## 2017-12-05 NOTE — LETTER
December 5, 2017      Annette Medellin, DO  [address]   [address]  [facility] [address]           [facility]  [address]  [facility] [address]  Phone: [phone]  Fax: [phone]          Patient: Elina Mei   MR Number: [id number]   YOB: 1940   Date of Visit: 12/5/2017       Dear Dr. Annette Medellin:    Thank you for referring Elina Mei to me for evaluation. Attached you will find relevant portions of my assessment and plan of care.    If you have questions, please do not hesitate to call me. I look forward to following Elina Mei along with you.    Sincerely,    Yumi Melo MD    Enclosure  CC:  No Recipients    If you would like to receive this communication electronically, please contact [email or web address] or [phone] to request more information on thePlatform Link access.    For providers and/or their staff who would like to refer a patient to [facility], please contact us through our one-stop-shop provider referral line, [facility], at [phone].    If you feel you have received this communication in error or would no longer like to receive these types of communications, please e-mail [email or web address]

## 2018-01-09 ENCOUNTER — TELEPHONE (OUTPATIENT)
Dept: SURGERY | Facility: CLINIC | Age: 78
End: 2018-01-09

## 2018-01-09 NOTE — TELEPHONE ENCOUNTER
Call to pt to f/u with her to see what decision she has made for her surgery. Pt states she did not know we were waiting for her to call us. Appt for f/u scheduled 1/16/18 to schedule surgery with Dr Melo. Appt reminder mailed to pt.

## 2018-01-12 ENCOUNTER — TELEPHONE (OUTPATIENT)
Dept: SURGERY | Facility: CLINIC | Age: 78
End: 2018-01-12

## 2018-01-12 DIAGNOSIS — Z91.89 AT RISK FOR LYMPHEDEMA: Primary | ICD-10-CM

## 2018-01-12 NOTE — TELEPHONE ENCOUNTER
Called patient to see if she was interested in seeing PT on Tuesday when she comes for her Dr. Melo appointment. Patient is interested, she will have to make sure her ride can stay for the additional appointment. Scheduled for 3pm following her pm with Dr. Melo. Told patient I would check with her prior to her appointment with Dr. Melo to see if she can stay. Verbalized understanding

## 2018-01-16 ENCOUNTER — OFFICE VISIT (OUTPATIENT)
Dept: SURGERY | Facility: CLINIC | Age: 78
End: 2018-01-16
Payer: MEDICARE

## 2018-01-16 VITALS
HEIGHT: 68 IN | WEIGHT: 227.06 LBS | DIASTOLIC BLOOD PRESSURE: 84 MMHG | SYSTOLIC BLOOD PRESSURE: 179 MMHG | HEART RATE: 85 BPM | TEMPERATURE: 99 F | BODY MASS INDEX: 34.41 KG/M2

## 2018-01-16 DIAGNOSIS — Z17.0 MALIGNANT NEOPLASM OF UPPER-OUTER QUADRANT OF RIGHT BREAST IN FEMALE, ESTROGEN RECEPTOR POSITIVE: Primary | ICD-10-CM

## 2018-01-16 DIAGNOSIS — C50.411 MALIGNANT NEOPLASM OF UPPER-OUTER QUADRANT OF RIGHT BREAST IN FEMALE, ESTROGEN RECEPTOR POSITIVE: Primary | ICD-10-CM

## 2018-01-16 PROBLEM — Z91.89 AT RISK FOR LYMPHEDEMA: Status: ACTIVE | Noted: 2018-01-16

## 2018-01-16 PROCEDURE — 99215 OFFICE O/P EST HI 40 MIN: CPT | Mod: S$GLB,,, | Performed by: SURGERY

## 2018-01-16 PROCEDURE — 99999 PR PBB SHADOW E&M-EST. PATIENT-LVL III: CPT | Mod: PBBFAC,,, | Performed by: SURGERY

## 2018-01-16 NOTE — PROGRESS NOTES
Breast Surgery  [facility]  Department of Surgery      REFERRING PROVIDER: Dr. Medellin  Chief Complaint: Right breast cancer    Subjective:      Patient ID: Elina Mei is a 77 y.o. female who re-presents to clinic today for further discussion of surgical management of her right breast IDC. She previously presented to our clinic in early December with newly diagnosed right breast IDC grade II, ER+NH+H2N- at the 9OC position.  She denies a palpable mass.  She has undergone mammograms sporadically, not yearly.  She has been avoiding treatment up to this point.    Patient does not routinely do self breast exams.  Patient has not noted a change on breast exam.  Patient denies nipple discharge. Patient denies to previous breast biopsy. Patient denies a personal history of breast cancer.    Findings at that time were the following:   Tumor size: 2.5 cm   Tumor grade: II   Estrogen Receptor: +   Progesterone Receptor: +   Her-2 juan: -   Lymph node status: unknown   Lymphatic invasion: unknown     Since her prior presentation to clinic she denies any changes to her medical history. She denies any hospitalizations or changes in her medications. She reports she has met with her primary care physician as requested and obtained lab work and pre-op CXR and EKG prior to her visit today.     GYN History:  Age of menarche was 10. Age of menopause was 40s with hysterectomy.   Patient denies hormonal therapy. Patient is . Age of first live birth was 17. Patient did breast feed.    Past Medical History:   Diagnosis Date    Arthritis     Diabetes mellitus     Hypertension     Malignant neoplasm of upper-outer quadrant of right breast in female, estrogen receptor positive 2017     Past Surgical History:   Procedure Laterality Date    EYE SURGERY      HYSTERECTOMY       Current Outpatient Prescriptions on File Prior to Visit   Medication Sig Dispense Refill    amlodipine (NORVASC) 10 MG tablet Take 10 mg by mouth  once daily.      atorvastatin (LIPITOR) 10 MG tablet Take 10 mg by mouth once daily.  0    dorzolamide-timolol 2-0.5% (COSOPT) 22.3-6.8 mg/mL ophthalmic solution Place 1 drop into both eyes 2 (two) times daily.  6    fluticasone (FLONASE) 50 mcg/actuation nasal spray 1 spray by Each Nare route 2 (two) times daily as needed (sinus congestion). 15 g 0    furosemide (LASIX) 40 MG tablet Take 40 mg by mouth once daily.       ibuprofen (ADVIL,MOTRIN) 600 MG tablet Take 1 tablet (600 mg total) by mouth every 8 (eight) hours as needed for Pain. 20 tablet 0    insulin glargine (LANTUS) 100 unit/mL injection Inject 35 Units into the skin every evening.      KLOR-CON SPRINKLE 10 mEq CpSR Take 10 mEq by mouth once daily.  0    LANTUS SOLOSTAR 100 unit/mL (3 mL) InPn pen INJECT 35 UNIT(S) EVERY DAY BY SUBCUTANEOUS ROUTE.  2    losartan (COZAAR) 50 MG tablet Take 50 mg by mouth once daily.      metformin (GLUCOPHAGE) 500 MG tablet Take 500 mg by mouth 2 (two) times daily with meals.      potassium chloride (KLOR-CON) 10 MEQ TbSR Take 10 mEq by mouth once daily.      TRAVATAN Z 0.004 % Drop Place 1 drop into both eyes nightly.  3    nitroGLYCERIN (NITROSTAT) 0.4 MG SL tablet Place 1 tablet (0.4 mg total) under the tongue every 5 (five) minutes as needed for Chest pain. 10 tablet 0     No current facility-administered medications on file prior to visit.      Social History     Social History    Marital status:      Spouse name: N/A    Number of children: N/A    Years of education: N/A     Occupational History    Not on file.     Social History Main Topics    Smoking status: Never Smoker    Smokeless tobacco: Current User     Types: Snuff    Alcohol use No    Drug use: No    Sexual activity: Not Currently     Other Topics Concern    Not on file     Social History Narrative    No narrative on file     Family History   Problem Relation Age of Onset    Cancer Sister 75     Colon CA    Breast cancer  "Sister 55    Cancer Brother     Hypertension Mother         Review of Systems   Constitutional: Negative for appetite change, chills, fever and unexpected weight change.   HENT: Negative for facial swelling, postnasal drip and sore throat.    Eyes: Negative for redness and itching.   Respiratory: Negative for chest tightness and shortness of breath.    Cardiovascular: Negative for chest pain and palpitations.   Gastrointestinal: Negative for blood in stool, diarrhea, nausea and vomiting.   Genitourinary: Negative for difficulty urinating and dysuria.   Musculoskeletal: Negative for arthralgias and joint swelling.   Skin: Negative for rash and wound.   Neurological: Negative for dizziness and syncope.   Hematological: Negative for adenopathy.   Psychiatric/Behavioral: Negative for agitation. The patient is not nervous/anxious.      Objective:   BP (!) 179/84 (BP Location: Right arm, Patient Position: Sitting, BP Method: Small (Automatic))   Pulse 85   Temp 99 °F (37.2 °C)   Ht 5' 8" (1.727 m)   Wt 103 kg (227 lb 1.2 oz)   BMI 34.53 kg/m²     Physical Exam   Constitutional: She appears well-developed and well-nourished.   HENT:   Head: Normocephalic.   Eyes: No scleral icterus.   Neck: Neck supple. No tracheal deviation present.   Cardiovascular: Normal rate and regular rhythm.    Pulmonary/Chest: Breath sounds normal. No respiratory distress. Right breast exhibits no inverted nipple, no nipple discharge and no skin change. Left breast exhibits no inverted nipple, no mass, no nipple discharge and no skin change.       Abdominal: Soft. She exhibits no mass. There is no tenderness.   Musculoskeletal: She exhibits no edema.   Lymphadenopathy:     She has no cervical adenopathy.   Neurological: She is alert.   Skin: No rash noted. No erythema.     Psychiatric: She has a normal mood and affect.       Radiology review: Images personally reviewed by me in the clinic.   Mammogram:  The mass measures 2.5 x 1 cm in " greatest dimension      Signed by Jigar Galdamez MD on 11/27/2017 10:01 AM   Narrative & Impression     Result:   Mammo Digital Diagnostic Right with CAD  US Breast Right Limited     History:  Patient is 77 y.o. and is seen for abnormal mammogram.           Films Compared:  Prior images (if available) were compared.     Findings:   Computer-aided detection was utilized in the interpretation of this examination.  Mammo Digital Diagnostic Right with CAD  The breast has scattered areas of fibroglandular density.     There are amorphous calcifications seen in the right breast at 9 o'clock.      US Breast Right Limited  There are calcifications in a mass seen in the right breast at 9 o'clock.      Impression:  Right  Calcifications: Right breast calcifications at the 9 o'clock position.      BI-RADS Category:   Right: 4 - Suspicious  Overall: 4 - Suspicious     Recommendation:  Biopsy is recommended for the right breast.     The patient's estimated lifetime risk of breast cancer (to age 85) based on Tyrer-Cuzick - 7 risk assessment model is: Tyrer-Cuzick: 6.92 %. According to the American Cancer Society,  patients with a lifetime breast cancer risk of 20% or higher might benefit from supplemental screening tests.     PATH:    Estrogen receptor: Positive; strong nuclear staining over 95% tumor cells.  Progesterone receptor: Positive; weak, moderate and strong staining seen in over 90% tumor cells.  HER-2/juan: Negative (Stain score = 1+).  All immunohistochemical stains have satisfactory positive and negative controls.  [id number],ER,PGR  (Electronically Signed: 2017-11-21 09:29:18 )  Diagnosed by: Luciana Alvarado M.D.  FINAL PATHOLOGIC DIAGNOSIS  Right breast, core biopsy:  -Invasive mammary carcinoma, ductal NOS, Grade 2  Tubule score: 3  Nuclear score: 2  Mitotic score: 1 (5 per 10 HPF; field diameter: 0.5 mm)  -Greatest linear dimension of tumor: Approximately 10 mm    -DCIS also present  -Microcalcifications  identified  -Special studies pending; results will be issued in an addendum    Assessment:       76 y/o female with R breast cancer  Plan:     Options for management were discussed with the patient and her family. We reviewed the existing data noting the equivalency of breast conserving surgery with radiation therapy and mastectomy. We also reviewed the guidelines of the National Comprehensive Cancer Network for Stage IIA breast carcinoma. We discussed the need for lumpectomy margins to be negative for carcinoma, the necessity for postoperative radiation therapy after breast conservation in most cases, the possibility of a failed or false negative sentinel lymph node biopsy and the potential need for complete lymphadenectomy for a failed or positive sentinel lymph node biopsy were fully discussed. In the setting of mastectomy, delayed or immediate reconstruction options are available and were discussed.     In the setting of lumpectomy, radiation therapy would be recommended majority of the time.  The duration and treatment side effects were discussed with the patient.  This will coordinated with the radiation oncologist pending final pathology.    We also discussed the role of systemic therapy in the treatment of early stage breast cancer.  We discussed that this is based on tumor biology and denise status and will be determined based on final pathology.  We discussed that if the cancer is hormone positive, endocrine therapy would be recommended in most cases and its use can reduce the risk of recurrence as well as improve survival. Side effects of treatment were briefly discussed. We also discussed the potential role for chemotherapy based on a number of factors such as tumor phenotype (ER+ vs. triple negative vs. Ihr1mhi+) and this would be determined in coordination with the medical oncologist.    The patient, in consultation with her family, has agreed to undergo R axillary ultrasound and obtain the necessary  pre-operative testing.  We will contact her PCP for clearance.  She would like to proceed with partial mastectomy.  At her 12/5/17 clinic appointment the mass was visualized with ultrasound in clinic and thought that we could could proceed with USG partial mastectomy with SLNB when she is ready.  She is strongly opposed to mastectomy and to chemotherapy.  The operative risks of bleeding, infection, recurrence, scarring, and anesthetic complications and the possibility of requiring further surgery were all noted and informed consent obtained.  Will schedule surgery at her convenience.      Patient was educated on breast cancer, receptors, ultrasound localization lumpectomy, mastectomy, sentinel lymph node mapping and biopsy, axillary lymph node dissection, reconstruction, breast prosthesis with post-mastectomy bra and radiation therapy. Patient was given patient information binder including [facility] breast cancer treatment brochure.  All her questions were answered.    Total time spent with the patient: 65 minutes.  50 minutes of face to face consultation and 15 minutes of chart review and coordination of care.

## 2018-01-16 NOTE — Clinical Note
Hi!  Just wanted to let you know that Ms. Elina Mei is FINALLY going to have surgery.  We will just do a lumpectomy and SLNB.  She may not ever come for the radiation but at least the tumor will be out.    Thanks for sending her over! Yumi

## 2018-01-17 DIAGNOSIS — Z17.0 MALIGNANT NEOPLASM OF RIGHT BREAST IN FEMALE, ESTROGEN RECEPTOR POSITIVE, UNSPECIFIED SITE OF BREAST: Primary | ICD-10-CM

## 2018-01-17 DIAGNOSIS — C50.911 MALIGNANT NEOPLASM OF RIGHT BREAST IN FEMALE, ESTROGEN RECEPTOR POSITIVE, UNSPECIFIED SITE OF BREAST: Primary | ICD-10-CM

## 2018-01-24 ENCOUNTER — TELEPHONE (OUTPATIENT)
Dept: SURGERY | Facility: CLINIC | Age: 78
End: 2018-01-24

## 2018-01-24 NOTE — TELEPHONE ENCOUNTER
Reached pt this am after receiving message that voice mail box was full and unable to leave message. Daughter requests an arrival time for pt's surgery tomorrow 11:00 am or later due to an appt that the daughter has tomorrow as well. Will try to accommodate according to the OR schedule.

## 2018-01-24 NOTE — PRE-PROCEDURE INSTRUCTIONS
PreOp Instructions given:     - Verbal medication information (what to hold and what to take)   - NPO guidelines   - Arrival place directions given; time to be given the day before procedure by the   Surgeon's Office   - Bathing with antibacterial soap   - Don't wear any jewelry or bring any valuables AM of surgery   - No makeup or moisturizer to face   - No perfume/cologne, powder, lotions or aftershave     Pt. verbalized understanding.     Denies any PERSONAL  history of side effects or issues with anesthesia or sedation.    INSTRUCTIONS ALSO GIVEN PT'S DAUGHTER - MATILDE JON

## 2018-01-24 NOTE — TELEPHONE ENCOUNTER
----- Message from Yumi Melo MD sent at 1/23/2018  7:05 PM CST -----  Contact: Pt.'s Daughter        ----- Message -----  From: Brigido Baez MA  Sent: 1/23/2018   2:01 PM  To: Yumi Melo MD        ----- Message -----  From: Ivy Montgomery  Sent: 1/23/2018   1:55 PM  To: Christiana DECKER Staff    Caller states that she needs a call returned by a nurse in reference to rescheduling surgery date and time for her mother and also had some general questions. Please call Pt.'s Daughter  back @ [phone] Thanks :)

## 2018-01-24 NOTE — TELEPHONE ENCOUNTER
----- Message from Jennifer Wong sent at 1/24/2018  2:02 PM CST -----  Contact: maynor/daughter  Rochelle States that she needs a call returned by a nurse in reference to her mother being not being able to eat after 12 for surgery ,  Please call Rochelle @ [phone]. Thanks :)

## 2018-01-24 NOTE — TELEPHONE ENCOUNTER
Return call to pt's daughter Rochelle. Asks if pt still has to fast past midnight even though her arrival time is not until 1315. [employer] Rochelle that pt does need to be fasting past midnight. Rochelle says pt is a diabetic. Informed that case can be put to an earlier time, but Rochelle had requested to report no earlier than 11:00 am due to an appt Rochelle has tomorrow. Instructed Rochelle to let me know if she would like to change the surgery time. Rochelle to call back shortly with an answer.

## 2018-01-25 ENCOUNTER — ANESTHESIA (OUTPATIENT)
Dept: SURGERY | Facility: HOSPITAL | Age: 78
End: 2018-01-25
Payer: MEDICARE

## 2018-01-25 ENCOUNTER — HOSPITAL ENCOUNTER (OUTPATIENT)
Facility: HOSPITAL | Age: 78
Discharge: HOME | End: 2018-01-25
Attending: SURGERY | Admitting: SURGERY
Payer: MEDICARE

## 2018-01-25 ENCOUNTER — HOSPITAL ENCOUNTER (OUTPATIENT)
Dept: RADIOLOGY | Facility: HOSPITAL | Age: 78
Discharge: HOME | End: 2018-01-25
Attending: SURGERY | Admitting: SURGERY
Payer: MEDICARE

## 2018-01-25 ENCOUNTER — SURGERY (OUTPATIENT)
Age: 78
End: 2018-01-25

## 2018-01-25 ENCOUNTER — ANESTHESIA EVENT (OUTPATIENT)
Dept: SURGERY | Facility: HOSPITAL | Age: 78
End: 2018-01-25
Payer: MEDICARE

## 2018-01-25 VITALS
DIASTOLIC BLOOD PRESSURE: 62 MMHG | HEART RATE: 96 BPM | HEIGHT: 68 IN | TEMPERATURE: 99 F | SYSTOLIC BLOOD PRESSURE: 147 MMHG | RESPIRATION RATE: 16 BRPM | BODY MASS INDEX: 34.4 KG/M2 | OXYGEN SATURATION: 96 % | WEIGHT: 227 LBS

## 2018-01-25 DIAGNOSIS — Z17.0 MALIGNANT NEOPLASM OF RIGHT BREAST IN FEMALE, ESTROGEN RECEPTOR POSITIVE, UNSPECIFIED SITE OF BREAST: ICD-10-CM

## 2018-01-25 DIAGNOSIS — C50.911 MALIGNANT NEOPLASM OF RIGHT BREAST IN FEMALE, ESTROGEN RECEPTOR POSITIVE, UNSPECIFIED SITE OF BREAST: ICD-10-CM

## 2018-01-25 DIAGNOSIS — C50.911 BREAST CANCER, RIGHT: Primary | ICD-10-CM

## 2018-01-25 LAB
POCT GLUCOSE: 79 MG/DL (ref 70–110)
POCT GLUCOSE: 87 MG/DL (ref 70–110)

## 2018-01-25 PROCEDURE — 76098 X-RAY EXAM SURGICAL SPECIMEN: CPT | Mod: 26,,, | Performed by: SURGERY

## 2018-01-25 PROCEDURE — 71000033 HC RECOVERY, INTIAL HOUR: Performed by: SURGERY

## 2018-01-25 PROCEDURE — 64461 PVB THORACIC SINGLE INJ SITE: CPT | Mod: 59,RT,, | Performed by: ANESTHESIOLOGY

## 2018-01-25 PROCEDURE — 38525 BIOPSY/REMOVAL LYMPH NODES: CPT | Mod: 51,,, | Performed by: SURGERY

## 2018-01-25 PROCEDURE — 27200651 HC AIRWAY, LMA: Performed by: NURSE ANESTHETIST, CERTIFIED REGISTERED

## 2018-01-25 PROCEDURE — 76998 US GUIDE INTRAOP: CPT | Mod: 26,,, | Performed by: SURGERY

## 2018-01-25 PROCEDURE — D9220A PRA ANESTHESIA: Mod: ,,, | Performed by: ANESTHESIOLOGY

## 2018-01-25 PROCEDURE — 71000044 HC DOSC ROUTINE RECOVERY FIRST HOUR: Performed by: SURGERY

## 2018-01-25 PROCEDURE — 25000003 PHARM REV CODE 250: Performed by: SURGERY

## 2018-01-25 PROCEDURE — 38792 RA TRACER ID OF SENTINL NODE: CPT | Mod: TC

## 2018-01-25 PROCEDURE — 36000707: Performed by: SURGERY

## 2018-01-25 PROCEDURE — 19301 PARTIAL MASTECTOMY: CPT | Mod: RT,,, | Performed by: SURGERY

## 2018-01-25 PROCEDURE — 88342 IMHCHEM/IMCYTCHM 1ST ANTB: CPT | Mod: 59 | Performed by: PATHOLOGY

## 2018-01-25 PROCEDURE — 38792 RA TRACER ID OF SENTINL NODE: CPT | Mod: RT,,, | Performed by: RADIOLOGY

## 2018-01-25 PROCEDURE — 27200671 HC STIMUCATH NEEDLE/ CATHETER: Performed by: STUDENT IN AN ORGANIZED HEALTH CARE EDUCATION/TRAINING PROGRAM

## 2018-01-25 PROCEDURE — 63600175 PHARM REV CODE 636 W HCPCS: Performed by: NURSE ANESTHETIST, CERTIFIED REGISTERED

## 2018-01-25 PROCEDURE — 37000008 HC ANESTHESIA 1ST 15 MINUTES: Performed by: SURGERY

## 2018-01-25 PROCEDURE — 82962 GLUCOSE BLOOD TEST: CPT | Performed by: SURGERY

## 2018-01-25 PROCEDURE — 63600175 PHARM REV CODE 636 W HCPCS: Performed by: STUDENT IN AN ORGANIZED HEALTH CARE EDUCATION/TRAINING PROGRAM

## 2018-01-25 PROCEDURE — 76942 ECHO GUIDE FOR BIOPSY: CPT | Performed by: ANESTHESIOLOGY

## 2018-01-25 PROCEDURE — 37000009 HC ANESTHESIA EA ADD 15 MINS: Performed by: SURGERY

## 2018-01-25 PROCEDURE — 25000003 PHARM REV CODE 250: Performed by: NURSE ANESTHETIST, CERTIFIED REGISTERED

## 2018-01-25 PROCEDURE — C1729 CATH, DRAINAGE: HCPCS | Performed by: SURGERY

## 2018-01-25 PROCEDURE — 88305 TISSUE EXAM BY PATHOLOGIST: CPT | Mod: 59 | Performed by: PATHOLOGY

## 2018-01-25 PROCEDURE — 71000015 HC POSTOP RECOV 1ST HR: Performed by: SURGERY

## 2018-01-25 PROCEDURE — 63600175 PHARM REV CODE 636 W HCPCS: Performed by: SURGERY

## 2018-01-25 PROCEDURE — 36000706: Performed by: SURGERY

## 2018-01-25 RX ORDER — MEPERIDINE HYDROCHLORIDE 50 MG/ML
12.5 INJECTION INTRAMUSCULAR; INTRAVENOUS; SUBCUTANEOUS ONCE AS NEEDED
Status: DISCONTINUED | OUTPATIENT
Start: 2018-01-25 | End: 2018-01-25 | Stop reason: HOSPADM

## 2018-01-25 RX ORDER — FENTANYL CITRATE 50 UG/ML
25 INJECTION, SOLUTION INTRAMUSCULAR; INTRAVENOUS EVERY 5 MIN PRN
Status: DISCONTINUED | OUTPATIENT
Start: 2018-01-25 | End: 2018-01-25 | Stop reason: HOSPADM

## 2018-01-25 RX ORDER — CEFAZOLIN SODIUM 1 G/3ML
2 INJECTION, POWDER, FOR SOLUTION INTRAMUSCULAR; INTRAVENOUS
Status: COMPLETED | OUTPATIENT
Start: 2018-01-25 | End: 2018-01-25

## 2018-01-25 RX ORDER — PHENYLEPHRINE HYDROCHLORIDE 10 MG/ML
INJECTION INTRAVENOUS
Status: DISCONTINUED | OUTPATIENT
Start: 2018-01-25 | End: 2018-01-25

## 2018-01-25 RX ORDER — SODIUM CHLORIDE 9 MG/ML
INJECTION, SOLUTION INTRAVENOUS CONTINUOUS PRN
Status: DISCONTINUED | OUTPATIENT
Start: 2018-01-25 | End: 2018-01-25

## 2018-01-25 RX ORDER — SODIUM CHLORIDE 0.9 % (FLUSH) 0.9 %
3 SYRINGE (ML) INJECTION
Status: DISCONTINUED | OUTPATIENT
Start: 2018-01-25 | End: 2018-01-25 | Stop reason: HOSPADM

## 2018-01-25 RX ORDER — OXYCODONE AND ACETAMINOPHEN 5; 325 MG/1; MG/1
1 TABLET ORAL EVERY 4 HOURS PRN
Status: DISCONTINUED | OUTPATIENT
Start: 2018-01-25 | End: 2018-01-25 | Stop reason: HOSPADM

## 2018-01-25 RX ORDER — FENTANYL CITRATE 50 UG/ML
INJECTION, SOLUTION INTRAMUSCULAR; INTRAVENOUS
Status: DISCONTINUED | OUTPATIENT
Start: 2018-01-25 | End: 2018-01-25

## 2018-01-25 RX ORDER — LIDOCAINE HCL/PF 100 MG/5ML
SYRINGE (ML) INTRAVENOUS
Status: DISCONTINUED | OUTPATIENT
Start: 2018-01-25 | End: 2018-01-25

## 2018-01-25 RX ORDER — OXYCODONE AND ACETAMINOPHEN 5; 325 MG/1; MG/1
1 TABLET ORAL EVERY 4 HOURS PRN
Qty: 41 TABLET | Refills: 0 | Status: SHIPPED | OUTPATIENT
Start: 2018-01-25 | End: 2018-03-20

## 2018-01-25 RX ORDER — AMOXICILLIN 250 MG
1 CAPSULE ORAL DAILY
COMMUNITY
Start: 2018-01-25 | End: 2018-03-20

## 2018-01-25 RX ORDER — MIDAZOLAM HYDROCHLORIDE 1 MG/ML
INJECTION, SOLUTION INTRAMUSCULAR; INTRAVENOUS
Status: DISCONTINUED | OUTPATIENT
Start: 2018-01-25 | End: 2018-01-25

## 2018-01-25 RX ORDER — PROPOFOL 10 MG/ML
VIAL (ML) INTRAVENOUS
Status: DISCONTINUED | OUTPATIENT
Start: 2018-01-25 | End: 2018-01-25

## 2018-01-25 RX ORDER — DEXAMETHASONE SODIUM PHOSPHATE 4 MG/ML
INJECTION, SOLUTION INTRA-ARTICULAR; INTRALESIONAL; INTRAMUSCULAR; INTRAVENOUS; SOFT TISSUE
Status: DISCONTINUED | OUTPATIENT
Start: 2018-01-25 | End: 2018-01-25

## 2018-01-25 RX ORDER — LIDOCAINE HYDROCHLORIDE 10 MG/ML
1 INJECTION, SOLUTION EPIDURAL; INFILTRATION; INTRACAUDAL; PERINEURAL ONCE
Status: DISCONTINUED | OUTPATIENT
Start: 2018-01-25 | End: 2018-01-25 | Stop reason: HOSPADM

## 2018-01-25 RX ADMIN — CEFAZOLIN 2 G: 330 INJECTION, POWDER, FOR SOLUTION INTRAMUSCULAR; INTRAVENOUS at 04:01

## 2018-01-25 RX ADMIN — FENTANYL CITRATE 25 MCG: 50 INJECTION, SOLUTION INTRAMUSCULAR; INTRAVENOUS at 02:01

## 2018-01-25 RX ADMIN — LIDOCAINE HYDROCHLORIDE 100 MG: 20 INJECTION, SOLUTION INTRAVENOUS at 04:01

## 2018-01-25 RX ADMIN — OXYCODONE HYDROCHLORIDE AND ACETAMINOPHEN 1 TABLET: 5; 325 TABLET ORAL at 07:01

## 2018-01-25 RX ADMIN — SODIUM CHLORIDE: 0.9 INJECTION, SOLUTION INTRAVENOUS at 03:01

## 2018-01-25 RX ADMIN — PHENYLEPHRINE HYDROCHLORIDE 200 MCG: 10 INJECTION INTRAVENOUS at 04:01

## 2018-01-25 RX ADMIN — SODIUM CHLORIDE, SODIUM GLUCONATE, SODIUM ACETATE, POTASSIUM CHLORIDE, MAGNESIUM CHLORIDE, SODIUM PHOSPHATE, DIBASIC, AND POTASSIUM PHOSPHATE: .53; .5; .37; .037; .03; .012; .00082 INJECTION, SOLUTION INTRAVENOUS at 04:01

## 2018-01-25 RX ADMIN — MIDAZOLAM HYDROCHLORIDE 1 MG: 1 INJECTION, SOLUTION INTRAMUSCULAR; INTRAVENOUS at 03:01

## 2018-01-25 RX ADMIN — DEXAMETHASONE SODIUM PHOSPHATE 4 MG: 4 INJECTION, SOLUTION INTRAMUSCULAR; INTRAVENOUS at 04:01

## 2018-01-25 RX ADMIN — FENTANYL CITRATE 25 MCG: 50 INJECTION, SOLUTION INTRAMUSCULAR; INTRAVENOUS at 04:01

## 2018-01-25 RX ADMIN — PROPOFOL 170 MG: 10 INJECTION, EMULSION INTRAVENOUS at 04:01

## 2018-01-25 RX ADMIN — PHENYLEPHRINE HYDROCHLORIDE 100 MCG: 10 INJECTION INTRAVENOUS at 04:01

## 2018-01-25 RX ADMIN — PHENYLEPHRINE HYDROCHLORIDE 200 MCG: 10 INJECTION INTRAVENOUS at 05:01

## 2018-01-25 NOTE — ANESTHESIA PREPROCEDURE EVALUATION
01/25/2018  Pre-operative evaluation for Procedure(s) (LRB):  MASTECTOMY-PARTIAL U/S guided (Right)  INJECTION-NODE-SENTINEL (Right)  BIOPSY-LYMPH NODE-SENTINEL (Right)  DISSECTION-LYMPH NODE-AXILLARY (Right)    Elina Mei is a 77 y.o. female with PMHx significant for HTN, Type II DM, and breast cancer who will undergo the above procedure.    Patient Active Problem List   Diagnosis    HTN (hypertension)    DM (diabetes mellitus)    Glaucoma (increased eye pressure)    Chest pain syndrome    Anemia    Malignant neoplasm of upper-outer quadrant of right breast in female, estrogen receptor positive    At risk for lymphedema    Breast cancer, right       Review of patient's allergies indicates:  No Known Allergies    No current facility-administered medications on file prior to encounter.      Current Outpatient Prescriptions on File Prior to Encounter   Medication Sig Dispense Refill    amlodipine (NORVASC) 10 MG tablet Take 10 mg by mouth once daily.      dorzolamide-timolol 2-0.5% (COSOPT) 22.3-6.8 mg/mL ophthalmic solution Place 1 drop into both eyes 2 (two) times daily.  6    fluticasone (FLONASE) 50 mcg/actuation nasal spray 1 spray by Each Nare route 2 (two) times daily as needed (sinus congestion). 15 g 0    furosemide (LASIX) 40 MG tablet Take 40 mg by mouth once daily.       KLOR-CON SPRINKLE 10 mEq CpSR Take 10 mEq by mouth once daily.  0    LANTUS SOLOSTAR 100 unit/mL (3 mL) InPn pen INJECT 35 UNIT(S) EVERY EVENING BY SUBCUTANEOUS ROUTE.  2    losartan (COZAAR) 50 MG tablet Take 50 mg by mouth once daily.      metformin (GLUCOPHAGE) 500 MG tablet Take 500 mg by mouth 2 (two) times daily with meals.      TRAVATAN Z 0.004 % Drop Place 1 drop into both eyes nightly.  3    atorvastatin (LIPITOR) 10 MG tablet Take 10 mg by mouth once daily.  0    nitroGLYCERIN (NITROSTAT) 0.4 MG SL tablet  Place 1 tablet (0.4 mg total) under the tongue every 5 (five) minutes as needed for Chest pain. 10 tablet 0    potassium chloride (KLOR-CON) 10 MEQ TbSR Take 10 mEq by mouth once daily.         Past Surgical History:   Procedure Laterality Date    EYE SURGERY      HYSTERECTOMY         Social History     Social History    Marital status:      Spouse name: N/A    Number of children: N/A    Years of education: N/A     Occupational History    Not on file.     Social History Main Topics    Smoking status: Never Smoker    Smokeless tobacco: Current User     Types: Snuff    Alcohol use No    Drug use: No    Sexual activity: Not Currently     Other Topics Concern    Not on file     Social History Narrative    No narrative on file         Vital Signs Range (Last 24H):  Temp:  [37.1 °C (98.7 °F)]   Pulse:  [70-78]   Resp:  [12-16]   BP: (104-187)/(38-76)   SpO2:  [98 %]         Diagnostic Studies:      EKG:  Sinus rhythm with 1st degree A-V block  Right bundle branch block  Left axis deviation  Moderate voltage criteria for LVH, may be normal variant  Possible Lateral infarct ,age undetermined  Abnormal ECG  When compared with ECG of 23-DEC-2013 13:54,  Borderline criteria for Lateral infarct are now Present  Confirmed by Brett CYR, [facility] ([id number]) on 9/12/2017 2:53:06     2D Echo:  CONCLUSIONS     1 - Concentric hypertrophy.     2 - Hyperdynamic left ventricular function (EF 66%).       Anesthesia Evaluation    I have reviewed the Patient Summary Reports.    I have reviewed the Nursing Notes.   I have reviewed the Medications.     Review of Systems  Anesthesia Hx:  Denies Family Hx of Anesthesia complications.   Denies Personal Hx of Anesthesia complications.   Hematology/Oncology:        Current/Recent Cancer. Breast   Cardiovascular:   Hypertension    Pulmonary:   Denies Asthma.  Denies Shortness of breath.    Hepatic/GI:  Hepatic/GI Normal    Neurological:  Neurology Normal    Endocrine:   Diabetes, type  2        Physical Exam  General:  Well nourished    Airway/Jaw/Neck:  Airway Findings: Mouth Opening: Normal Tongue: Normal  General Airway Assessment: Adult  Mallampati: III  Improves to II with phonation.  TM Distance: Normal, at least 6 cm  Jaw/Neck Findings:  Neck ROM: Normal ROM      Dental:  Dental Findings: In tact   Chest/Lungs:  Chest/Lungs Findings: Clear to auscultation, Normal Respiratory Rate     Heart/Vascular:  Heart Findings: Rate: Normal  Rhythm: Regular Rhythm        Mental Status:  Mental Status Findings:  Cooperative, Alert and Oriented         Anesthesia Plan  Type of Anesthesia, risks & benefits discussed:  Anesthesia Type:  general, regional  Patient's Preference:   Intra-op Monitoring Plan: standard ASA monitors  Intra-op Monitoring Plan Comments:   Post Op Pain Control Plan: multimodal analgesia, IV/PO Opioids PRN and per primary service following discharge from PACU  Post Op Pain Control Plan Comments:   Induction:   IV  Beta Blocker:  Patient is not currently on a Beta-Blocker (No further documentation required).       Informed Consent: Patient understands risks and agrees with Anesthesia plan.  Questions answered. Anesthesia consent signed with patient.  ASA Score: 2     Day of Surgery Review of History & Physical:    H&P update referred to the surgeon.         Ready For Surgery From Anesthesia Perspective.

## 2018-01-25 NOTE — ANESTHESIA PROCEDURE NOTES
RIGHT Erector Spinae Plane Single Injection Block    Patient location during procedure: pre-op   Block not for primary anesthetic.  Reason for block: at surgeon's request and post-op pain management   Post-op Pain Location: RIGHT partial mastectomy  Start time: 1/25/2018 2:43 PM  Timeout: 1/25/2018 2:42 PM   End time: 1/25/2018 2:53 PM  Staffing  Anesthesiologist: LIGIA SOTO  Resident/CRNA: YONI WOODRUFF  Performed: anesthesiologist   Preanesthetic Checklist  Completed: patient identified, site marked, surgical consent, pre-op evaluation, timeout performed, IV checked, risks and benefits discussed and monitors and equipment checked  Peripheral Block  Patient position: sitting  Prep: ChloraPrep  Patient monitoring: heart rate, cardiac monitor, continuous pulse ox, continuous capnometry and frequent blood pressure checks  Anesthesia block type: Erector Spinae Plane Block.  Laterality: right  Injection technique: single shot  Needle  Needle type: Tuohy   Needle gauge: 17 G  Needle length: 3.5 in  Needle localization: anatomical landmarks and ultrasound guidance   -ultrasound image captured on disc.  Assessment  Injection assessment: negative aspiration, negative parasthesia and local visualized surrounding nerve  Paresthesia pain: none  Heart rate change: no  Slow fractionated injection: yes  Medications:  Bolus administered: 40 mL of 0.375 ropivacaine  Additional Notes  Patient tolerated well.  See [facility] RN record for vitals.

## 2018-01-25 NOTE — H&P (VIEW-ONLY)
Breast Surgery  [facility]  Department of Surgery      REFERRING PROVIDER: Dr. Medellin  Chief Complaint: Right breast cancer    Subjective:      Patient ID: Elina Mei is a 77 y.o. female who re-presents to clinic today for further discussion of surgical management of her right breast IDC. 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  She denies a palpable mass.  She has undergone mammograms sporadically, not yearly.  She has been avoiding treatment up to this point.    Patient does not routinely do self breast exams.  Patient has not noted a change on breast exam.  Patient denies nipple discharge. Patient denies to previous breast biopsy. Patient denies a personal history of breast cancer.    Findings at that time were the following:   Tumor size: 2.5 cm   Tumor grade: II   Estrogen Receptor: +   Progesterone Receptor: +   Her-2 juan: -   Lymph node status: unknown   Lymphatic invasion: unknown     Since her prior presentation to clinic she denies any changes to her medical history. She denies any hospitalizations or changes in her medications. She reports she has met with her primary care physician as requested and obtained lab work and pre-op CXR and EKG prior to her visit today.     GYN History:  Age of menarche was 10. Age of menopause was 40s with hysterectomy.   Patient denies hormonal therapy. Patient is . Age of first live birth was 17. Patient did breast feed.    Past Medical History:   Diagnosis Date    Arthritis     Diabetes mellitus     Hypertension     Malignant neoplasm of upper-outer quadrant of right breast in female, estrogen receptor positive 2017     Past Surgical History:   Procedure Laterality Date    EYE SURGERY      HYSTERECTOMY       Current Outpatient Prescriptions on File Prior to Visit   Medication Sig Dispense Refill    amlodipine (NORVASC) 10 MG tablet Take 10 mg by mouth  once daily.      atorvastatin (LIPITOR) 10 MG tablet Take 10 mg by mouth once daily.  0    dorzolamide-timolol 2-0.5% (COSOPT) 22.3-6.8 mg/mL ophthalmic solution Place 1 drop into both eyes 2 (two) times daily.  6    fluticasone (FLONASE) 50 mcg/actuation nasal spray 1 spray by Each Nare route 2 (two) times daily as needed (sinus congestion). 15 g 0    furosemide (LASIX) 40 MG tablet Take 40 mg by mouth once daily.       ibuprofen (ADVIL,MOTRIN) 600 MG tablet Take 1 tablet (600 mg total) by mouth every 8 (eight) hours as needed for Pain. 20 tablet 0    insulin glargine (LANTUS) 100 unit/mL injection Inject 35 Units into the skin every evening.      KLOR-CON SPRINKLE 10 mEq CpSR Take 10 mEq by mouth once daily.  0    LANTUS SOLOSTAR 100 unit/mL (3 mL) InPn pen INJECT 35 UNIT(S) EVERY DAY BY SUBCUTANEOUS ROUTE.  2    losartan (COZAAR) 50 MG tablet Take 50 mg by mouth once daily.      metformin (GLUCOPHAGE) 500 MG tablet Take 500 mg by mouth 2 (two) times daily with meals.      potassium chloride (KLOR-CON) 10 MEQ TbSR Take 10 mEq by mouth once daily.      TRAVATAN Z 0.004 % Drop Place 1 drop into both eyes nightly.  3    nitroGLYCERIN (NITROSTAT) 0.4 MG SL tablet Place 1 tablet (0.4 mg total) under the tongue every 5 (five) minutes as needed for Chest pain. 10 tablet 0     No current facility-administered medications on file prior to visit.      Social History     Social History    Marital status:      Spouse name: N/A    Number of children: N/A    Years of education: N/A     Occupational History    Not on file.     Social History Main Topics    Smoking status: Never Smoker    Smokeless tobacco: Current User     Types: Snuff    Alcohol use No    Drug use: No    Sexual activity: Not Currently     Other Topics Concern    Not on file     Social History Narrative    No narrative on file     Family History   Problem Relation Age of Onset    Cancer Sister 75     Colon CA    Breast cancer  "Sister 55    Cancer Brother     Hypertension Mother         Review of Systems   Constitutional: Negative for appetite change, chills, fever and unexpected weight change.   HENT: Negative for facial swelling, postnasal drip and sore throat.    Eyes: Negative for redness and itching.   Respiratory: Negative for chest tightness and shortness of breath.    Cardiovascular: Negative for chest pain and palpitations.   Gastrointestinal: Negative for blood in stool, diarrhea, nausea and vomiting.   Genitourinary: Negative for difficulty urinating and dysuria.   Musculoskeletal: Negative for arthralgias and joint swelling.   Skin: Negative for rash and wound.   Neurological: Negative for dizziness and syncope.   Hematological: Negative for adenopathy.   Psychiatric/Behavioral: Negative for agitation. The patient is not nervous/anxious.      Objective:   BP (!) 179/84 (BP Location: Right arm, Patient Position: Sitting, BP Method: Small (Automatic))   Pulse 85   Temp 99 °F (37.2 °C)   Ht 5' 8" (1.727 m)   Wt 103 kg (227 lb 1.2 oz)   BMI 34.53 kg/m²     Physical Exam   Constitutional: She appears well-developed and well-nourished.   HENT:   Head: Normocephalic.   Eyes: No scleral icterus.   Neck: Neck supple. No tracheal deviation present.   Cardiovascular: Normal rate and regular rhythm.    Pulmonary/Chest: Breath sounds normal. No respiratory distress. Right breast exhibits no inverted nipple, no nipple discharge and no skin change. Left breast exhibits no inverted nipple, no mass, no nipple discharge and no skin change.       Abdominal: Soft. She exhibits no mass. There is no tenderness.   Musculoskeletal: She exhibits no edema.   Lymphadenopathy:     She has no cervical adenopathy.   Neurological: She is alert.   Skin: No rash noted. No erythema.     Psychiatric: She has a normal mood and affect.       Radiology review: Images personally reviewed by me in the clinic.   Mammogram:  The mass measures 2.5 x 1 cm in " greatest dimension      Signed by Jigar Galdamez MD on 11/27/2017 10:01 AM   Narrative & Impression     Result:   Mammo Digital Diagnostic Right with CAD  US Breast Right Limited     History:  Patient is 77 y.o. and is seen for abnormal mammogram.           Films Compared:  Prior images (if available) were compared.     Findings:   Computer-aided detection was utilized in the interpretation of this examination.  Mammo Digital Diagnostic Right with CAD  The breast has scattered areas of fibroglandular density.     There are amorphous calcifications seen in the right breast at 9 o'clock.      US Breast Right Limited  There are calcifications in a mass seen in the right breast at 9 o'clock.      Impression:  Right  Calcifications: Right breast calcifications at the 9 o'clock position.      BI-RADS Category:   Right: 4 - Suspicious  Overall: 4 - Suspicious     Recommendation:  Biopsy is recommended for the right breast.     The patient's estimated lifetime risk of breast cancer (to age 85) based on Tyrer-Cuzick - 7 risk assessment model is: Tyrer-Cuzick: 6.92 %. According to the American Cancer Society,  patients with a lifetime breast cancer risk of 20% or higher might benefit from supplemental screening tests.     PATH:    Estrogen receptor: Positive; strong nuclear staining over 95% tumor cells.  Progesterone receptor: Positive; weak, moderate and strong staining seen in over 90% tumor cells.  HER-2/juan: Negative (Stain score = 1+).  All immunohistochemical stains have satisfactory positive and negative controls.  [id number],PGR  (Electronically Signed: 2017-11-21 09:29:18 )  Diagnosed by: Luciana Alvarado M.D.  FINAL PATHOLOGIC DIAGNOSIS  Right breast, core biopsy:  -Invasive mammary carcinoma, ductal NOS, Grade 2  Tubule score: 3  Nuclear score: 2  Mitotic score: 1 (5 per 10 HPF; field diameter: 0.5 mm)  -Greatest linear dimension of tumor: Approximately 10 mm    -DCIS also present  -Microcalcifications  identified  -Special studies pending; results will be issued in an addendum    Assessment:       76 y/o female with R breast cancer  Plan:     Options for management were discussed with the patient and her family. We reviewed the existing data noting the equivalency of breast conserving surgery with radiation therapy and mastectomy. We also reviewed the guidelines of the National Comprehensive Cancer Network for Stage IIA breast carcinoma. We discussed the need for lumpectomy margins to be negative for carcinoma, the necessity for postoperative radiation therapy after breast conservation in most cases, the possibility of a failed or false negative sentinel lymph node biopsy and the potential need for complete lymphadenectomy for a failed or positive sentinel lymph node biopsy were fully discussed. In the setting of mastectomy, delayed or immediate reconstruction options are available and were discussed.     In the setting of lumpectomy, radiation therapy would be recommended majority of the time.  The duration and treatment side effects were discussed with the patient.  This will coordinated with the radiation oncologist pending final pathology.    We also discussed the role of systemic therapy in the treatment of early stage breast cancer.  We discussed that this is based on tumor biology and denise status and will be determined based on final pathology.  We discussed that if the cancer is hormone positive, endocrine therapy would be recommended in most cases and its use can reduce the risk of recurrence as well as improve survival. Side effects of treatment were briefly discussed. We also discussed the potential role for chemotherapy based on a number of factors such as tumor phenotype (ER+ vs. triple negative vs. Ejl8orq+) and this would be determined in coordination with the medical oncologist.    The patient, in consultation with her family, has agreed to undergo R axillary ultrasound and obtain the necessary  pre-operative testing.  We will contact her PCP for clearance.  She would like to proceed with partial mastectomy.  At her 12/5/17 clinic appointment the mass was visualized with ultrasound in clinic and thought that we could could proceed with USG partial mastectomy with SLNB when she is ready.  She is strongly opposed to mastectomy and to chemotherapy.  The operative risks of bleeding, infection, recurrence, scarring, and anesthetic complications and the possibility of requiring further surgery were all noted and informed consent obtained.  Will schedule surgery at her convenience.      Patient was educated on breast cancer, receptors, ultrasound localization lumpectomy, mastectomy, sentinel lymph node mapping and biopsy, axillary lymph node dissection, reconstruction, breast prosthesis with post-mastectomy bra and radiation therapy. Patient was given patient information binder including [facility] breast cancer treatment brochure.  All her questions were answered.    Total time spent with the patient: 65 minutes.  50 minutes of face to face consultation and 15 minutes of chart review and coordination of care.

## 2018-01-25 NOTE — PLAN OF CARE
1.) Right Ultrasound Guided Lumpectomy; Green=Inferior; Blue=Superior; Orange=Lateral; Yellow=Anterior; Black=Posterior; Red=Medial-to Pathology-Permanent.  2.) New Posterior Margin; Ink marks the new margin-Permanent.  3.) New Inferior Margin; Ink marks the new margin-Permanent.  4.) New Medial Margin; Ink marks the new margin-Permanent.  5.) Right Axillary [place] Node; Hot; 1100-Permanent.

## 2018-01-26 PROCEDURE — 88307 TISSUE EXAM BY PATHOLOGIST: CPT | Mod: 26,,, | Performed by: PATHOLOGY

## 2018-01-26 PROCEDURE — 88342 IMHCHEM/IMCYTCHM 1ST ANTB: CPT | Performed by: PATHOLOGY

## 2018-01-26 PROCEDURE — 88305 TISSUE EXAM BY PATHOLOGIST: CPT | Mod: 26,,, | Performed by: PATHOLOGY

## 2018-01-26 PROCEDURE — 88305 TISSUE EXAM BY PATHOLOGIST: CPT | Performed by: PATHOLOGY

## 2018-01-26 PROCEDURE — 88342 IMHCHEM/IMCYTCHM 1ST ANTB: CPT | Mod: 26,,, | Performed by: PATHOLOGY

## 2018-01-26 NOTE — ANESTHESIA POSTPROCEDURE EVALUATION
"Anesthesia Post Evaluation    Patient: Elina Mei    Procedure(s) Performed: Procedure(s) (LRB):  MASTECTOMY-PARTIAL U/S guided (Right)  INJECTION-NODE-SENTINEL (Right)  BIOPSY-LYMPH NODE-SENTINEL (Right)  DISSECTION-LYMPH NODE-AXILLARY (Right)    Final Anesthesia Type: general  Patient location during evaluation: PACU  Patient participation: Yes- Able to Participate  Level of consciousness: awake and alert  Post-procedure vital signs: reviewed and stable  Pain management: adequate  Airway patency: patent  PONV status at discharge: No PONV  Anesthetic complications: no      Cardiovascular status: blood pressure returned to baseline  Respiratory status: unassisted and spontaneous ventilation  Hydration status: euvolemic  Follow-up not needed.  Comments: I evaluated the patient prior to PACU d/c        Visit Vitals  BP (!) 147/62   Pulse 96   Temp 37.4 °C (99.3 °F) (Skin)   Resp 16   Ht 5' 8" (1.727 m)   Wt 103 kg (227 lb)   SpO2 96%   BMI 34.52 kg/m²       Pain/Ronny Score: Pain Assessment Performed: Yes (1/25/2018  7:30 PM)  Presence of Pain: denies (1/25/2018  7:30 PM)  Pain Rating Prior to Med Admin: 5 (1/25/2018  7:10 PM)  Ronny Score: 10 (1/25/2018  7:30 PM)      "

## 2018-01-26 NOTE — PLAN OF CARE
Discharge instructions reviewed with pt, and daughters. Paper prescriptions given. Understanding verbalized. Complaints of pain relieved with PRN medication. Able to tolerate PO intake. Transported by RN to car.

## 2018-01-26 NOTE — DISCHARGE INSTRUCTIONS
The following are post-operative instructions that will help you to recover from your surgery.  Please read over these instructions carefully and contact us if we can answer any of your questions or concerns.    Dressing/breast binder (surgi-bra)  A surgical bra may be placed around your chest after your surgery.  If you are given the bra, please wear it as close to 24 hours a day as possible until your post-operative clinic appointment.  If the elastic around the bra irritates your skin, you may wear a soft t-shirt underneath the bra.  You may go without wearing the bra long enough to bath, to launder and dry the bra.  If the bra is extremely uncomfortable, you may wear a supportive sports bra instead after 2 days.  You may shower after 2 days.  Do not take a tub bath and do not soak the surgical site. Please do not remove the white strips of tape (steri-strips) that cover your incision.  They will be removed at your clinic visit.    Activity   You should be able to return to your regular activities 2 days after your surgery.  However, do not engage in strenuous activities in which you use your upper body such as:  golf, tennis, aerobics, washing windows, raking the yard, mopping, vacuuming, heavy lifting (e.g children) until you are seen for your follow-up appointment in clinic.    Medication for pain  You may find that over the counter pain medications may be sufficient for your pain.  You will be given a prescription for pain medication for more severe pain.  You should not drive or operate machinery while taking these.  Please take narcotics with food.  Narcotics can cause, or worse, constipation.  You will need to increase your fluid intake, eat high fiber foods (such as fruits and bran) and make sure that you are up and walking. You may need to take an over the counter stool softener for constipation.    Please report the following:   Temperature greater than 101 degrees   Discharge or bad odor from the  wound   Excessive bleeding, such as bloody dressing or extreme bruising   Redness at incision and/or drain sites   Swelling or buildup of fluid around incision  

## 2018-01-26 NOTE — DISCHARGE SUMMARY
[facility]  Brief Operative Note     SUMMARY     Surgery Date: 1/25/2018     Surgeon(s) and Role:     * Yumi Melo MD - Primary     * Maty Vergara MD - Resident - Assisting        Pre-op Diagnosis:  Malignant neoplasm of right breast in female, estrogen receptor positive, unspecified site of breast [C50.911, Z17.0]    Post-op Diagnosis:  Post-Op Diagnosis Codes:     * Malignant neoplasm of right breast in female, estrogen receptor positive, unspecified site of breast [C50.911, Z17.0]    Procedure(s) (LRB):  MASTECTOMY-PARTIAL U/S guided (Right)  INJECTION-NODE-SENTINEL (Right)  BIOPSY-LYMPH NODE-SENTINEL (Right)  Ultrasound guidance of right breast mass  Surgeon interpretation of specimen radiograph    Anesthesia: General LMA and Regional    Description of the findings of the procedure: right US guided lumptectomy with additional posterior, inferior, and medial margins, right sentinel lymph node biopsy    Findings/Key Components: see op note    Estimated Blood Loss:minimal         Specimens:   Specimen (12h ago through future)    Start     Ordered    01/25/18 1731  Specimen to Pathology - Surgery  Once     Comments:  1.) Right Ultrasound Guided Lumpectomy; Green=Inferior; Blue=Superior; Orange=Lateral; Yellow=Anterior; Black=Posterior; Red=Medial-to Pathology-Permanent.2.) New Posterior Margin; Ink marks the new margin-Permanent.3.) New Inferior Margin; Ink marks the new margin-Permanent.4.) New Medial Margin; Ink marks the new margin-Permanent.5.) Right Axillary [place] Node; Hot; 1100-Permanent.      01/25/18 1731          Discharge Note    SUMMARY     Admit Date: 1/25/2018    Discharge Date and Time:  01/25/2018 6:02 PM    Hospital Course (synopsis of major diagnoses, care, treatment, and services provided during the course of the hospital stay): 76 yo F here for outpatient procedure. She tolerated the procedure well and was discharged to home.     Final Diagnosis: Post-Op Diagnosis  Codes:     * Malignant neoplasm of right breast in female, estrogen receptor positive, unspecified site of breast [C50.911, Z17.0]    Disposition: Home or Self Care    Follow Up/Patient Instructions:     Medications:  Reconciled Home Medications:   Current Discharge Medication List      START taking these medications    Details   oxyCODONE-acetaminophen (PERCOCET) 5-325 mg per tablet Take 1 tablet by mouth every 4 (four) hours as needed.  Qty: 41 tablet, Refills: 0      senna-docusate 8.6-50 mg (PERICOLACE) 8.6-50 mg per tablet Take 1 tablet by mouth once daily.         CONTINUE these medications which have NOT CHANGED    Details   amlodipine (NORVASC) 10 MG tablet Take 10 mg by mouth once daily.      dorzolamide-timolol 2-0.5% (COSOPT) 22.3-6.8 mg/mL ophthalmic solution Place 1 drop into both eyes 2 (two) times daily.  Refills: 6      fluticasone (FLONASE) 50 mcg/actuation nasal spray 1 spray by Each Nare route 2 (two) times daily as needed (sinus congestion).  Qty: 15 g, Refills: 0      furosemide (LASIX) 40 MG tablet Take 40 mg by mouth once daily.       KLOR-CON SPRINKLE 10 mEq CpSR Take 10 mEq by mouth once daily.  Refills: 0      LANTUS SOLOSTAR 100 unit/mL (3 mL) InPn pen INJECT 35 UNIT(S) EVERY EVENING BY SUBCUTANEOUS ROUTE.  Refills: 2      losartan (COZAAR) 50 MG tablet Take 50 mg by mouth once daily.      metformin (GLUCOPHAGE) 500 MG tablet Take 500 mg by mouth 2 (two) times daily with meals.      TRAVATAN Z 0.004 % Drop Place 1 drop into both eyes nightly.  Refills: 3      atorvastatin (LIPITOR) 10 MG tablet Take 10 mg by mouth once daily.  Refills: 0      nitroGLYCERIN (NITROSTAT) 0.4 MG SL tablet Place 1 tablet (0.4 mg total) under the tongue every 5 (five) minutes as needed for Chest pain.  Qty: 10 tablet, Refills: 0      potassium chloride (KLOR-CON) 10 MEQ TbSR Take 10 mEq by mouth once daily.             Discharge Procedure Orders  Other restrictions (specify):   Order Comments: Wear surgical bra  or other compressive bra without padding or wire, with fluffs against incision.   Ok to shower tomorrow (1/26).  No driving while still taking pain medications daily.   Take a stool softener while taking pain medications daily.     Notify your health care provider if you experience any of the following:  temperature >100.4     Notify your health care provider if you experience any of the following:  persistent nausea and vomiting or diarrhea     Notify your health care provider if you experience any of the following:  severe uncontrolled pain     Notify your health care provider if you experience any of the following:  redness, tenderness, or signs of infection (pain, swelling, redness, odor or green/yellow discharge around incision site)     Notify your health care provider if you experience any of the following:  worsening rash     Notify your health care provider if you experience any of the following:  increased confusion or weakness     No dressing needed       Follow-up Information     Yumi Melo MD In 2 weeks.    Specialties:  General Surgery, Breast Surgery  Contact information:  [facility] [address]  [facility] [address]  [phone]

## 2018-01-26 NOTE — TRANSFER OF CARE
"Anesthesia Transfer of Care Note    Patient: Elina Mei    Procedure(s) Performed: Procedure(s) (LRB):  MASTECTOMY-PARTIAL U/S guided (Right)  INJECTION-NODE-SENTINEL (Right)  BIOPSY-LYMPH NODE-SENTINEL (Right)  DISSECTION-LYMPH NODE-AXILLARY (Right)    Patient location: [facility]    Anesthesia Type: general    Transport from OR: Transported from OR on 6-10 L/min O2 by face mask with adequate spontaneous ventilation    Post pain: adequate analgesia    Post assessment: no apparent anesthetic complications    Post vital signs: stable    Level of consciousness: awake, alert and lethargic    Nausea/Vomiting: no nausea/vomiting    Complications: none    Transfer of care protocol was followed      Last vitals:   Visit Vitals  BP (!) 163/69   Pulse 73   Temp 36.7 °C (98.1 °F) (Skin)   Resp 16   Ht 5' 8" (1.727 m)   Wt 103 kg (227 lb)   SpO2 100%   BMI 34.52 kg/m²     "

## 2018-01-27 NOTE — OP NOTE
DATE OF PROCEDURE: 01/25/2018    SURGEON: Yumi Melo M.D.    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 right breast ultrasound localization partial mastectomy (lumpectomy) with excision for clear margins   2. right axillary deep sentinel lymph node biopsy   3. injection of right breast with technetium-labeled radiocolloid for sentinel lymph node identification  4. Identification of deep sentinel lymph node   5. Ultrasound localization of breast mass  6. Surgeon interpretation of specimen radiograph    PROCEDURE IN DETAIL:   The patient underwent informed consent.  The films were reviewed.    The patient then underwent paravertebral block in the preop holding area. Following the block, the right breast was injected in the subareolar region with the technetium-labeled radiocolloid.     She was brought to the Operating Room and placed in the supine position. Anesthesia with general was administered.   The right breast, anterior chest, right arm and axilla were then prepped and draped in a sterile fashion.     Using the gamma probe, activity was noted and localized in the right axilla.  We made a small transverse inferior axillary incision over the area of activity. We then dissected down through the clavipectoral fascia using electrocautery, identifying a hot afferent lymphatic channel coming from the upper outer quadrant axillary tail of [place] breast tissue to a level 1 sentinel node, which was excised. It was dissected circumferentially with electrocautery. The lymph node was labeled as specimen #1 for permanent sectioning, hot and not blue with an ex vivo count of 1100. A total of 1 axillary sentinel lymph nodes were removed.  The probe was placed in the cavity and there was no significant residual background radioactivity or blue dye  noted in the axilla indicating adequate and appropriate sentinel lymph node biopsy. The cavity was then palpated and 0 further palpable nodes were noted. Any additional palpable nodes were sent to pathology for permanent section.       We then achieved hemostasis and irrigation was performed.  The incision was then closed with an interuppted 3-0 vicryl deep dermal followed by a running 4-0 vicryl subcuticular.    Next, we turned our attention to the right breast itself. Ultrasound was used to identify the area of interest with the biopsy clip noted within.  This area was localized and marked using ultrasound guidance.  An incision was made in the lower outer quadrant of the right breast over the anticipated tumor. The specimen was dissected circumferentially around the cancer.  We did not  dissect all the way down to the underlying pectoralis fascia. The ultrasound localization lumpectomy specimen was inked on the back table using green ink inferiorly, blue ink superiorly, orange ink laterally, yellow ink anteriorly, black ink posteriorly, and the red ink medially.  It was then fixed with acetic acid and submitted for specimen radiograph, which confirmed the clip and area of interest within the specimen. Given the appearance and location of the lesion, additional margins were taken including posterior, inferior, and medial.       Within the lumpectomy cavity, hemostasis was achieved with cautery. The wound was irrigated until clear. There was no evidence of bleeding. It was closed in multiple layers with deep dermal and subcutaneous interrupted Vicryl sutures and a running 4-0 vicryl subcuticular skin closure.    Dermabond was applied. Sterile fluff gauze was placed and a post-procedure bra was placed. She tolerated the procedure well without complication and was turned over to Anesthesia for transport to the recovery area in a satisfactory condition. All specimens were sent to Pathology for permanent  sectioning.    ESTIMATED BLOOD LOSS: 15ml    SPECIMENS:   1.) Right Ultrasound Guided Lumpectomy; Green=Inferior; Blue=Superior; Orange=Lateral; Yellow=Anterior; Black=Posterior; Red=Medial-to Pathology-Permanent.  2.) New Posterior Margin; Ink marks the new margin-Permanent.  3.) New Inferior Margin; Ink marks the new margin-Permanent.  4.) New Medial Margin; Ink marks the new margin-Permanent.  5.) Right Axillary [place] Node; Hot; 1100-Permanent.      COMPLICATIONS: none    DISPOSITION:  PACU--hemodynamically stable    ATTESTATION:  I was present and scrubbed for the entire procedure.

## 2018-02-06 ENCOUNTER — OFFICE VISIT (OUTPATIENT)
Dept: SURGERY | Facility: CLINIC | Age: 78
End: 2018-02-06
Payer: MEDICARE

## 2018-02-06 ENCOUNTER — TELEPHONE (OUTPATIENT)
Dept: HEMATOLOGY/ONCOLOGY | Facility: CLINIC | Age: 78
End: 2018-02-06

## 2018-02-06 VITALS
TEMPERATURE: 99 F | HEIGHT: 68 IN | SYSTOLIC BLOOD PRESSURE: 155 MMHG | DIASTOLIC BLOOD PRESSURE: 72 MMHG | BODY MASS INDEX: 34.48 KG/M2 | HEART RATE: 72 BPM | WEIGHT: 227.5 LBS

## 2018-02-06 DIAGNOSIS — Z17.0 MALIGNANT NEOPLASM OF UPPER-OUTER QUADRANT OF RIGHT BREAST IN FEMALE, ESTROGEN RECEPTOR POSITIVE: Primary | ICD-10-CM

## 2018-02-06 DIAGNOSIS — C50.411 MALIGNANT NEOPLASM OF UPPER-OUTER QUADRANT OF RIGHT BREAST IN FEMALE, ESTROGEN RECEPTOR POSITIVE: Primary | ICD-10-CM

## 2018-02-06 PROCEDURE — 99024 POSTOP FOLLOW-UP VISIT: CPT | Mod: S$GLB,,, | Performed by: SURGERY

## 2018-02-06 PROCEDURE — 99999 PR PBB SHADOW E&M-EST. PATIENT-LVL III: CPT | Mod: PBBFAC,,, | Performed by: SURGERY

## 2018-02-06 NOTE — TELEPHONE ENCOUNTER
[2/6/2018 2:35 PM] Yuan Huynh:     Dr Melo has a pt that wants to see Dr Espinal for her breast cancer. MRN [id number]. Thanks,Yuan  Pt wanted female doc

## 2018-02-10 NOTE — PROGRESS NOTES
Breast Surgery  [facility]  Department of Surgery      REFERRING PROVIDER: Dr. Medellin  Chief Complaint: Right breast cancer    Subjective:      Patient ID: Elina Mei is a 77 y.o. female who returns after R USG lumpectomy and SLNB on 18    She presented to clinic right breast IDC. She previously presented to our clinic in early December with newly diagnosed right breast IDC grade II, ER+NH+H2N- at the 9OC position.  She denies a palpable mass.  She has undergone mammograms sporadically, not yearly.  She has been avoiding treatment up to this point.  She had refused treatment up to this point.    Patient does not routinely do self breast exams.  Patient has not noted a change on breast exam.  Patient denies nipple discharge. Patient denies to previous breast biopsy. Patient denies a personal history of breast cancer.    Findings at that time were the following:   Tumor size: 2.5 cm   Tumor grade: II   Estrogen Receptor: +   Progesterone Receptor: +   Her-2 juan: -   Lymph node status: unknown   Lymphatic invasion: unknown     Since her prior presentation to clinic she denies any changes to her medical history. She denies any hospitalizations or changes in her medications. She reports she has met with her primary care physician as requested and obtained lab work and pre-op CXR and EKG prior to her visit today.     GYN History:  Age of menarche was 10. Age of menopause was 40s with hysterectomy.   Patient denies hormonal therapy. Patient is . Age of first live birth was 17. Patient did breast feed.    Past Medical History:   Diagnosis Date    Arthritis     Diabetes mellitus     Hypertension     Malignant neoplasm of upper-outer quadrant of right breast in female, estrogen receptor positive 2017     Past Surgical History:   Procedure Laterality Date    EYE SURGERY      HYSTERECTOMY       Current Outpatient Prescriptions on File Prior to Visit   Medication Sig Dispense Refill    amlodipine  (NORVASC) 10 MG tablet Take 10 mg by mouth once daily.      atorvastatin (LIPITOR) 10 MG tablet Take 10 mg by mouth once daily.  0    dorzolamide-timolol 2-0.5% (COSOPT) 22.3-6.8 mg/mL ophthalmic solution Place 1 drop into both eyes 2 (two) times daily.  6    fluticasone (FLONASE) 50 mcg/actuation nasal spray 1 spray by Each Nare route 2 (two) times daily as needed (sinus congestion). 15 g 0    furosemide (LASIX) 40 MG tablet Take 40 mg by mouth once daily.       KLOR-CON SPRINKLE 10 mEq CpSR Take 10 mEq by mouth once daily.  0    LANTUS SOLOSTAR 100 unit/mL (3 mL) InPn pen INJECT 35 UNIT(S) EVERY EVENING BY SUBCUTANEOUS ROUTE.  2    losartan (COZAAR) 50 MG tablet Take 50 mg by mouth once daily.      metformin (GLUCOPHAGE) 500 MG tablet Take 500 mg by mouth 2 (two) times daily with meals.      potassium chloride (KLOR-CON) 10 MEQ TbSR Take 10 mEq by mouth once daily.      senna-docusate 8.6-50 mg (PERICOLACE) 8.6-50 mg per tablet Take 1 tablet by mouth once daily.      TRAVATAN Z 0.004 % Drop Place 1 drop into both eyes nightly.  3    nitroGLYCERIN (NITROSTAT) 0.4 MG SL tablet Place 1 tablet (0.4 mg total) under the tongue every 5 (five) minutes as needed for Chest pain. 10 tablet 0    oxyCODONE-acetaminophen (PERCOCET) 5-325 mg per tablet Take 1 tablet by mouth every 4 (four) hours as needed. 41 tablet 0     No current facility-administered medications on file prior to visit.      Social History     Social History    Marital status:      Spouse name: N/A    Number of children: N/A    Years of education: N/A     Occupational History    Not on file.     Social History Main Topics    Smoking status: Never Smoker    Smokeless tobacco: Current User     Types: Snuff    Alcohol use No    Drug use: No    Sexual activity: Not Currently     Other Topics Concern    Not on file     Social History Narrative    No narrative on file     Family History   Problem Relation Age of Onset    Cancer  "Sister 75     Colon CA    Breast cancer Sister 55    Cancer Brother     Hypertension Mother         Review of Systems   Constitutional: Negative for appetite change, chills, fever and unexpected weight change.   HENT: Negative for facial swelling, postnasal drip and sore throat.    Eyes: Negative for redness and itching.   Respiratory: Negative for chest tightness and shortness of breath.    Cardiovascular: Negative for chest pain and palpitations.   Gastrointestinal: Negative for blood in stool, diarrhea, nausea and vomiting.   Genitourinary: Negative for difficulty urinating and dysuria.   Musculoskeletal: Negative for arthralgias and joint swelling.   Skin: Negative for rash and wound.   Neurological: Negative for dizziness and syncope.   Hematological: Negative for adenopathy.   Psychiatric/Behavioral: Negative for agitation. The patient is not nervous/anxious.      Objective:   BP (!) 155/72 (BP Location: Left arm, Patient Position: Sitting, BP Method: Small (Automatic))   Pulse 72   Temp 98.8 °F (37.1 °C)   Ht 5' 8" (1.727 m)   Wt 103.2 kg (227 lb 8.2 oz)   BMI 34.59 kg/m²     Physical Exam   Constitutional: She appears well-developed and well-nourished.   HENT:   Head: Normocephalic.   Eyes: No scleral icterus.   Neck: Neck supple. No tracheal deviation present.   Cardiovascular: Normal rate and regular rhythm.    Pulmonary/Chest: Breath sounds normal. No respiratory distress. Right breast exhibits no inverted nipple, no nipple discharge and no skin change. Left breast exhibits no inverted nipple, no mass, no nipple discharge and no skin change.       Abdominal: Soft. She exhibits no mass. There is no tenderness.   Musculoskeletal: She exhibits no edema.   Lymphadenopathy:     She has no cervical adenopathy.   Neurological: She is alert.   Skin: No rash noted. No erythema.     Psychiatric: She has a normal mood and affect.           PATH:  FINAL PATHOLOGIC DIAGNOSIS  1. Right breast, ultrasound-guided " lumpectomy:  - Invasive ductal carcinoma, moderately differentiated, 1.5 cm.  - DCIS.  - Please see CAP synoptic report below.  2. New posterior margin, excision:  - Benign breast tissue with focal microcalcifications.  - Negative for malignancy or atypia.  3. New inferior margin, excision:  - Ductal carcinoma in situ (DCIS), low to intermediate nuclear grade, cribriform and micropapillary type, at  least 1.6 cm.  - Margin uninvolved, < 0.1 mm from the new margin.  4. New medial margin, excision:  - Benign breast tissue.  - Negative for malignancy or atypia.  5. Right axillary sentinel node, hot, 1100, dissection:  - Two lymph nodes, negative for carcinoma (0/2).    Note: All sentinel lymph node tissue was examined at three levels with routine (H&E) stains and with three epithelial  immunostains (AE1/AE3, Cam5.2, WSK). Positive and negative controls reacted appropriately.  SURGICAL PATHOLOGY CANCER CASE SUMMARY- Breast  Procedure: Excision (less than a total mastectomy).  Specimen laterality: Right.  Tumor size: 15 mm (greatest dimension).  Histologic type: Invasive ductal carcinoma.  Histiologic grade: Grade 2 (glandular/tubular differentiation- 3, nuclear pleomorphism- 2, mitotic rate- 1)  Tumor focality: Single focus  Ductal carcinoma in situ: Present, negative for extensive intraductal component (EIC). See note.  Size of DCIS: ~4.0 cm.  - Number of blocks with DCIS: 10.  - Number of blocks examined: 16.  Architectural patterns: Micropapillary and cribriform types.  Nuclear grade: Low to intermediate.  Necrosis: Not present.  Note: The size (extent) of DCIS is an estimation of the volume of breast tissue occupied by DCIS. DCIS is  surrounding the carcinoma but is not within the invasive component. Though it does not strictly fulfill the criteria for  EIC, DCIS spans nearly the entire lumpectomy specimen.  Margins (including new margins)-  Uninvolved by invasive carcinoma, distance from closest margin (superior)  is 5 mm (part 1).  Uninvolved by DCIS, distance from closest margins are < 0.1 mm (part 3, inferior) and < 1 mm (part 1,  anterior).  Treatment effect: No known presurgical therapy.  Number of sentinel lymph nodes examined: 2.  Total number of lymph nodes examined (sentinel and non-sentinel): 2.  Number of lymph nodes involved: 0.  Pathologic staging (pTNM, AJCC 8th edition): pT1c pN0.  Ancillary studies- (Performed on biopsy specimen [id number])  ER: Positive (strong, >95% of tumor cell nuclei).  NJ: Positive (spectrum of weak, moderate and strong, >90% of tumor cell nuclei)  HER2: Negative (1+).  All stains have satisfactory positive and negative controls.    Assessment:       78 y/o female with R breast cancer  Plan:     We discussed re-excision due to close DCIS margins, but her invasive component is clear by 5mm.    She would really like to avoid additional surgery if possible  We will discuss at tumor board.  She will also need referral to med on and rad onc for adjuvant treatment.      All her questions were answered.

## 2018-02-20 ENCOUNTER — TUMOR BOARD CONFERENCE (OUTPATIENT)
Dept: SURGERY | Facility: CLINIC | Age: 78
End: 2018-02-20

## 2018-02-20 NOTE — PROGRESS NOTES
Interdisciplinary Breast Cancer Conference    Elina Mei    Female    Date Presented to Tumor Board: 02/20/18    HOSPTIAL/CLINIC PRESENTING: [facility] [address]    TUMOR SITE: RIGHT    TUMOR SITE: UOQ (9:00 position)    Presenter: Maty Ordonez Eric Luk (on behalf of Yumi Melo MD)    Reason For Consultation: Initial Presentation    Specialties Present: Medical Oncology;Surgical Oncology;Radiation Oncology;Navigation;Research;Radiology;Physical / Occupational Therapy    Patient Status: a current patient    Treatment to Date: Surgical Intervention(s) (lumpectomy with SLNb)    Clinical Trial Eligibility: None available    Estrogen Receptor Status: Positive    Progesterone Status: Positive    Her2/JENNIFER Status: Negative    Invasive ductal carcinoma, grade 2, pT1cN0, 2 negative lymph nodes  DCIS is <0.1mm from inferior and <1mm from anterior margin  Stage IA per AJCC 8th ed. pathologic prognostic staging system      RECOMMENDED PLAN: Surgery;Radiation;Endocrine Therapy   Patient was not interested in second surgery, but would likely go back if it was strongly recommended. She has been difficult to bring back for follow up.     Optimal treatment would be re-excision, radiation, and endocrine therapy.     UNSTAGEABLE: No         PRESENTATION AT CANCER CONFERENCE: [facility]

## 2018-02-22 ENCOUNTER — OFFICE VISIT (OUTPATIENT)
Dept: SURGERY | Facility: CLINIC | Age: 78
End: 2018-02-22
Payer: MEDICARE

## 2018-02-22 VITALS
HEIGHT: 68 IN | BODY MASS INDEX: 34.08 KG/M2 | RESPIRATION RATE: 18 BRPM | TEMPERATURE: 98 F | HEART RATE: 76 BPM | WEIGHT: 224.88 LBS | DIASTOLIC BLOOD PRESSURE: 73 MMHG | SYSTOLIC BLOOD PRESSURE: 149 MMHG

## 2018-02-22 DIAGNOSIS — Z17.0 MALIGNANT NEOPLASM OF UPPER-INNER QUADRANT OF RIGHT BREAST IN FEMALE, ESTROGEN RECEPTOR POSITIVE: ICD-10-CM

## 2018-02-22 DIAGNOSIS — C50.211 MALIGNANT NEOPLASM OF UPPER-INNER QUADRANT OF RIGHT BREAST IN FEMALE, ESTROGEN RECEPTOR POSITIVE: ICD-10-CM

## 2018-02-22 DIAGNOSIS — Z17.0 MALIGNANT NEOPLASM OF UPPER-OUTER QUADRANT OF RIGHT BREAST IN FEMALE, ESTROGEN RECEPTOR POSITIVE: Primary | ICD-10-CM

## 2018-02-22 DIAGNOSIS — C50.411 MALIGNANT NEOPLASM OF UPPER-OUTER QUADRANT OF RIGHT BREAST IN FEMALE, ESTROGEN RECEPTOR POSITIVE: Primary | ICD-10-CM

## 2018-02-22 PROCEDURE — 1159F MED LIST DOCD IN RCRD: CPT | Mod: S$GLB,,, | Performed by: RADIOLOGY

## 2018-02-22 PROCEDURE — 3008F BODY MASS INDEX DOCD: CPT | Mod: S$GLB,,, | Performed by: RADIOLOGY

## 2018-02-22 PROCEDURE — 99999 PR PBB SHADOW E&M-EST. PATIENT-LVL III: CPT | Mod: PBBFAC,,, | Performed by: RADIOLOGY

## 2018-02-22 PROCEDURE — 99204 OFFICE O/P NEW MOD 45 MIN: CPT | Mod: S$GLB,,, | Performed by: RADIOLOGY

## 2018-02-22 PROCEDURE — 1126F AMNT PAIN NOTED NONE PRSNT: CPT | Mod: S$GLB,,, | Performed by: RADIOLOGY

## 2018-02-22 NOTE — PROGRESS NOTES
REFERRING PHYSICIAN:   Yumi Melo M.D.    DIAGNOSIS:    p T1 CN 0 M0, stage IA, infiltrating ductal carcinoma of the right breast    HISTORY OF PRESENT ILLNESS:   Ms. Mei is a 77-year-old female with multiple comorbidities who was recently diagnosed with right breast cancer after an abnormal mammogram in 2017.  This revealed amorphous calcifications in the right breast at 9:00 measuring approximately 2.5 x 1 cm.  A biopsy on November 15, 2017 revealed grade 2, invasive mammary carcinoma with associated DCIS.  This lesion is ER positive (over 95%), TN positive (over 90%), and HER-2 negative.  On 2018, she underwent lumpectomy and sentinel node biopsy.  The pathology revealed the right breast with 1.5 cm invasive ductal carcinoma, grade 2, with approximately 4 cm DCIS, with extensive intraductal component.  The closest margin is inferiorly at less than 0.1 mm from DCIS and anteriorly at 1 mm.  The closest margin from invasive carcinoma is superiorly at 5 mm.  2 out of 2 sentinel lymph nodes are negative for involvement.  She is here today for recommendations regarding further treatment.    Ms. Mei has multiple comorbidities including diabetes and coronary artery disease.  She has hearing loss and lives with her daughter.  She is here today with her granddaughter.  She does not want to undergo further surgery.  She has mild tenderness of the right breast at the incision site.  She denies fever or skin erythema.  She also denies weight loss or night sweats.    REVIEW OF SYSTEMS:  As above.  In addition, patient denies headaches, visual problems, dizziness, chest pain, shortness of breath, cough, nausea, vomiting, diarrhea, or any new bony pains. Patient also denies easy bruising, skin rashes, or numbness or tingling.    GYN HISTORY:   Menarche at age 10.  In her 40s after hysterectomy.  .  She denies hormone replacement therapy.    ECO-2    PAST MEDICAL HISTORY:  Past Medical History:    Diagnosis Date    Arthritis     Diabetes mellitus     Hypertension     Malignant neoplasm of upper-outer quadrant of right breast in female, estrogen receptor positive 12/5/2017       PAST SURGICAL HISTORY:  Past Surgical History:   Procedure Laterality Date    EYE SURGERY      HYSTERECTOMY         ALLERGIES:   Review of patient's allergies indicates:  No Known Allergies    MEDICATIONS:  Current Outpatient Prescriptions   Medication Sig    amlodipine (NORVASC) 10 MG tablet Take 10 mg by mouth once daily.    atorvastatin (LIPITOR) 10 MG tablet Take 10 mg by mouth once daily.    dorzolamide-timolol 2-0.5% (COSOPT) 22.3-6.8 mg/mL ophthalmic solution Place 1 drop into both eyes 2 (two) times daily.    fluticasone (FLONASE) 50 mcg/actuation nasal spray 1 spray by Each Nare route 2 (two) times daily as needed (sinus congestion).    furosemide (LASIX) 40 MG tablet Take 40 mg by mouth once daily.     KLOR-CON SPRINKLE 10 mEq CpSR Take 10 mEq by mouth once daily.    LANTUS SOLOSTAR 100 unit/mL (3 mL) InPn pen INJECT 35 UNIT(S) EVERY EVENING BY SUBCUTANEOUS ROUTE.    losartan (COZAAR) 50 MG tablet Take 50 mg by mouth once daily.    metformin (GLUCOPHAGE) 500 MG tablet Take 500 mg by mouth 2 (two) times daily with meals.    potassium chloride (KLOR-CON) 10 MEQ TbSR Take 10 mEq by mouth once daily.    senna-docusate 8.6-50 mg (PERICOLACE) 8.6-50 mg per tablet Take 1 tablet by mouth once daily.    TRAVATAN Z 0.004 % Drop Place 1 drop into both eyes nightly.    nitroGLYCERIN (NITROSTAT) 0.4 MG SL tablet Place 1 tablet (0.4 mg total) under the tongue every 5 (five) minutes as needed for Chest pain.    oxyCODONE-acetaminophen (PERCOCET) 5-325 mg per tablet Take 1 tablet by mouth every 4 (four) hours as needed.     No current facility-administered medications for this visit.        SOCIAL HISTORY:  Social History     Social History    Marital status:      Spouse name: N/A    Number of children: N/A     Years of education: N/A     Occupational History    Not on file.     Social History Main Topics    Smoking status: Never Smoker    Smokeless tobacco: Current User     Types: Snuff    Alcohol use No    Drug use: No    Sexual activity: Not Currently     Other Topics Concern    Not on file     Social History Narrative    No narrative on file       FAMILY HISTORY:  Family History   Problem Relation Age of Onset    Cancer Sister 75     Colon CA    Breast cancer Sister 55    Cancer Brother     Hypertension Mother          PHYSICAL EXAMINATION:  Vitals:    02/22/18 0859   BP: (!) 149/73   Pulse: 76   Resp: 18   Temp: 97.9 °F (36.6 °C)     GENERAL: Patient is alert and oriented, in no acute distress.  HEENT:Extraocular muscles are intact.  Oropharynx is clear without lesions.  There is no cervical or supraclavicular lymphadenopathy palpated.  No thyromegaly noted.  HEART: Regular rate and rhythm.  LUNGS: Clear to auscultation bilaterally.  BREAST EXAM: The scar secondary to lumpectomy is noted in the 9:00 position of the right breast.  There is also a scar in the right axilla secondary to sentinel node biopsy.  No abnormal masses palpated in the right breast or right axilla.  The left breast and left axilla are also without palpable masses.  ABDOMEN:Soft, nontender, nondistended, without hepatosplenomegaly.  Normoactive bowel sounds.  EXTREMITIES: No clubbing, cyanosis, or edema.  NEUROLOGICAL: Cranial nerve II through XII grossly intact.  Sensation is intact.  Strength is 5 out of 5 in the upper and lower extremities bilaterally.       ASSESSMENT:   This is a 77-year-old female with p T1 CN 0 M0, stage IA, grade 2 infiltrating ductal carcinoma of the right breast whonderwent lumpectomy and sentinel node biopsy on January 26, 2018 with a 1.5 cm invasive ductal carcinoma in the setting of 4 cm of DCIS,  with inferior margin at less than 0.1 mm and 1 mm anteriorly from DCIS.  This lesion is ER/AZ positive and  HER-2 negative.    PLAN:   After discussion the multidisciplinary breast conference and review of the available pathology and radiological studies, Ms. Mei is noted to have invasive carcinoma in the setting of  DCIS with presence of extensive intraductal component over an area of 4 cm.  She is noted to have very close margins from the DCIS inferiorly and anteriorly.  The recommendation from the tumor board is to undergo reexcision to obtain negative margins followed by postoperative radiation to reduce her chance of recurrence.  This was discussed in detail with the patient and her granddaughter who is here today.  The risks, benefits, and side effects of each were also discussed in detail.  She does not want to undergo further surgery.  Therefore, I recommend whole breast irradiation for a total dose of 4200 cGy +1000 cGy boost.  She is also recommended to follow-up with medical oncology regarding endocrine therapy.      The risks, benefits, and side effects of radiation were explained in detail to the patient and her granddaughter.  All questions were answered and informed consent was signed.  I plan to see the patient back for radiation planning CTIn approximately 3 weeks.    Psychosocial Distress screening score of Distress Score: 5 noted and reviewed. No intervention indicated.    I spent approximately 60 minutes reviewing the available records and evaluating the patient, out of which over 50% of the time was spent face to face with the patient in counseling and coordinating this patient's care.

## 2018-02-22 NOTE — PATIENT INSTRUCTIONS
Radiation Therapy Treatment  Radiation therapy can help you in your fight against cancer. It begins with a session to discuss treatment with your doctor. If you and your doctor decide on radiation, you will return for a simulation. The simulation is a planning session that helps the doctor target your cancer. He or she will design a radiation plan to protect normal tissues. When the simulation and plan are completed, you will begin your daily treatments. Treatment is usually once daily Monday to Friday. It takes less than a half an hour. Sometimes you may need radiation twice a day, with usually 6 hours between treatments. After the course of radiation is complete, you will be scheduled for follow-up appointments. This is to make sure the cancer is under control. The follow-ups will also make sure that any side effects from the treatment are taken care of.  Radiation therapy uses high-energy X-rays to kill cancer cells.   Your treatment planning visit: The simulation  Your radiation therapy team uses a special machine called a simulator to map out your treatment. The simulator is usually an X-ray machine (fluoroscopy), CT scanner, MRI scanner, or PET-CT scanner machine. Laser lights act as guides to help position your body accurately. During this visit:  · The team figures out the best position for your body. They make notes in your chart so youll be placed the same way each time.  · You may use special devices to keep your body correctly positioned and still during treatment. These may include molds, masks, rests, and blocks.  · The team makes ink marks on your skin. These will help you get in the same position for each treatment. Tiny permanent tattoos may also be used.   · Markers such as metal balls or wires may be put on or in your body. Sometime these are taped to the skin to help with the imaging process. These work with the X-rays to position your body. The markers are removed when the visit is  over.  After the team has the imaging and data, the information is sent into the computer planning system. Your doctor and the team of physicists and dosimetrists design a treatment field. The field will best target your cancer and how it might spread. It will also help limit radiation to nearby normal tissues.  Your treatments  Each treatment usually takes 10 to 30 minutes. You may need to change into a hospital gown. The radiation therapist puts you in the correct position on the treatment table, then leaves the room. Sometimes you may need more imaging before each treatment. The machine may take digital X-rays or a CT scan to help make sure you are lined up correctly. During treatment, lie as still as you can and breathe normally. You will hear noises coming from the machine. You can talk to the radiation therapist, who watches you from the control room on a TV monitor. After treatment, the therapist will help you off the table. You can then get dressed and go back to your normal activities.  Date Last Reviewed: 1/14/2016 © 2000-2017 [employer]. [address] [place] [address]. All rights reserved. This information is not intended as a substitute for professional medical care. Always follow your healthcare professional's instructions.        Discharge Instructions for Radiation Therapy  Radiation therapy uses high-energy X-rays to kill cancer cells and help you in your fight against cancer. Radiation destroys cancer cells gradually, over time. The goal of therapy is to focus on and kill as many cancer cells as possible. Radiation can also damage or kill some of the normal cells that are closest to the tumor. Damaged normal cells can repair themselves, often within a few days.  Caring for your skin  You should ask your healthcare provider for specific products that he or she recommends for washing and bathing. In general, use a mild nondetergent soap and warm (not hot) water to clean the  "area receiving radiation. Pat the region dry rather than rubbing.  Your healthcare provider may give you products to moisturize the skin and prevent infection. The goal is to prevent cracks or breaks in the skin that may be sensitive from treatment:   · Dont be surprised if your treatment causes skin redness, and a type of "sunburn" over time. Some radiation treatments can cause this.   · Ask your therapy team what lotion to use. Also ask for directions about when and how to apply it.  · Avoid prolonged or direct sunlight on the treated area. Ask your therapy team about using a sunscreen. You do not have to avoid going outside altogether, but must take appropriate precautions.  · Dont remove ink marks unless your radiation therapist says its OK. Dont scrub or use soap on the marks when you wash. Let water run over them and pat them dry.  · Protect your skin from heat or cold. Avoid hot tubs, saunas, heating pads, or ice packs.  · Avoid clothing that causes friction or rubbing on the skin.  Fighting fatigue  Radiation therapy may cause you to feel tired. Your body is working hard to heal and repair itself. To feel better, try these things:  · Do light exercise each day. Take short walks.  · Plan tasks for the times when you tend to have the most energy. Ask for help when you need it.  · Relax before you go to bed. This will help you sleep better. Try reading or listening to soothing music.  Coping with appetite changes  Here are ways to cope:  · Tell your therapy team if you find it hard to eat or you have no appetite. You may be referred to a nutritionist to help you with meal planning.  · Radiation to certain internal sites can cause nausea, depending on the location of treatment. This can affect your appetite. Think of healthy eating as part of your treatment. Try these tips:  ¨ Eat slowly.  ¨ Eat small meals several times a day.  ¨ Eat more food when youre feeling better.  ¨ Ask others to keep you company " when you eat.  ¨ Stock up on easy-to-prepare foods.  ¨ Eat foods high in protein and calories. Your healthcare provider may recommend liquid meal supplements.  ¨ Drink plenty of water and other fluids.  ¨ Ask your healthcare provider before taking any vitamins or over-the-counter supplements. Such products are not regulated by the FDA and can sometimes interfere with your treatments.   Dealing with other side effects  Here are suggestions to deal with other side effects:   · Be prepared for hair loss in the area being treated. The hair loss may be permanent. Be sure to discuss this with your healthcare provider.  · Sip cool water if your mouth or throat becomes dry or sore. Ice chips may also help.  · Tell your healthcare provider if you have diarrhea or constipation. You may be given a special diet.  · If you have trouble swallowing liquids, tell your healthcare provider.  Follow-up  Make a follow-up appointment as directed by your healthcare provider.     When to call your healthcare provider  Call your healthcare provider right away if you have any of the following:  · Unexpected headaches  · Trouble concentrating  · Ongoing fatigue  · Wheezing, shortness of breath, or trouble breathing  · Pain that doesnt go away, especially if its always in the same place  · New or unusual lumps, bumps, or swelling  · Dizziness or lightheadedness  · Unusual rashes, bruises, or bleeding  · Fever of 100.4°F (38°C) or higher, or chills  · Nausea and vomiting  · Diarrhea that doesnt improve with time  · Skin breakdown; significant pain due to skin irritation   Date Last Reviewed: 1/13/2016  © 6732-2900 [employer]. [address] [place] [address], [place] [address]. All rights reserved. This information is not intended as a substitute for professional medical care. Always follow your healthcare professional's instructions.        Radiation Therapy: Managing Short-Term Side Effects     Take short walks daily.   Radiation  therapy uses high-energy X-rays or particles to kill cancer cells. Some normal cells can also be affected. This causes side effects such as dry skin, tiredness (fatigue), or changes in your appetite. Most side effects go away when your radiation therapy is over.  Having side effects of radiation therapy does not mean that your cancer is getting worse or that therapy isnt working.   Caring for your skin  Skin problems may happen where your body gets radiation. Your skin may become dry, itchy, red, and peeling. It may darken in that spot, like a tan. To care for your skin:  · Dont scrub on the treatment area. Clean that area of the skin every day. Use warm water and mild soap, or as your healthcare provider advises. Pat the skin afterward or let it air dry.  · Ask your therapy team what lotion to use and when to use it.  · Keep the treated area out of the sun. Ask your team about using a sunscreen.  · Don't remove ink marks unless your radiation therapist says you can. Dont scrub the marks when you wash. Let water run over them and pat them dry.  · Protect your skin from heat or cold. Avoid hot tubs, saunas, hot pads, and ice packs.  · Wear soft, loose clothing to avoid rubbing skin.  Fighting tiredness  The cancer itself or the radiation therapy may cause you to feel tired. Your body is working hard to heal and repair itself. To feel better:  · Try light exercise each day. Take short walks.  · Plan tasks for the times when you tend to have the most energy. Ask for help when you need it.  · Relax before you go to bed to sleep better. Try reading or listening to soothing music.  · Be sure to let your cancer care team know if you continue to have fatigue that is not getting better. They may be able to offer ways to help.   Coping with appetite changes  Tell your therapy team if you find it hard to eat or have no appetite. You may need to see a nutritionist. This is a healthcare provider with special training in meal  planning. To keep your strength up, you need to eat well and maintain your weight. Think of healthy eating as part of your treatment. Try these tips:  · Eat slowly.  · Eat small meals several times a day.  · Eat more food when youre feeling better, even if it is not mealtime.  · Ask others to keep you company when you eat.  · Stock up on easy-to-prepare foods.  · Eat foods high in protein and calories.  · Drink plenty of water and other fluids.  · Ask your healthcare provider before taking any vitamins.  Site-specific side effects  These side effects include the following:   · You may lose hair in the area being treated. The hair often grows back after treatment.  · Your mouth or throat can become dry or sore if your head or neck is being treated. Sip cool water to help ease discomfort.  · Nausea and bowel changes can happen with radiation to the pelvic region. Tell your healthcare provider if you have nausea, diarrhea, or constipation. You may need to take medicine or follow a special diet.  Talk with your healthcare team  Radiation therapy can also have other side effects, including some that might not show up until years later. Be sure to talk with your healthcare team about what to expect with the type of radiation therapy you are getting, including when you should call them with concerns.   Date Last Reviewed: 5/1/2016  © 2864-4748 [employer]. [address] [place] [address], [place] [address]. All rights reserved. This information is not intended as a substitute for professional medical care. Always follow your healthcare professional's instructions.      Return for ct/sim in 2-3 weeks.

## 2018-02-23 ENCOUNTER — TELEPHONE (OUTPATIENT)
Dept: RADIATION ONCOLOGY | Facility: CLINIC | Age: 78
End: 2018-02-23

## 2018-02-23 NOTE — TELEPHONE ENCOUNTER
Spoke to the patient about transportation to her Radiation treatments on request from Sandrita Lin RN in Radiation Oncology: Provided the patient with the contact information to the transportation services through her insurance ([employer], [phone]). She was aware of their services and informed that they instructed  her to make arrangements 3 days in advance. The patient's first appointment for radiation is scheduled for 3/19/18. She agreed to call the  and to make arrangements. The patient also have contact information for me and agreed to call as needed.

## 2018-02-26 ENCOUNTER — DOCUMENTATION ONLY (OUTPATIENT)
Dept: RADIATION ONCOLOGY | Facility: CLINIC | Age: 78
End: 2018-02-26

## 2018-02-26 ENCOUNTER — TELEPHONE (OUTPATIENT)
Dept: RADIATION ONCOLOGY | Facility: CLINIC | Age: 78
End: 2018-02-26

## 2018-02-26 NOTE — TREATMENT PLAN
Return phone call to Ms. Vi Mei ([phone]), patient's daughter-in-law, who requested further clarification of recommendations for further treatment.  As discussed with the patient and her granddaughter during consultation, given the multiple very close margins, the tumor board recommendation is to undergo further reexcision to obtain negative margins which may be lumpectomy or mastectomy.  If she undergoes further reexcision, she will be recommended to undergo postoperative radiation to the whole breast as discussed during the consultation.  If she requires mastectomy, depending on pathology, a final recommendation can be made regarding postoperative radiation.  The risks, benefits, and side effects of each were also discussed in detail with the daughter-in-law.  All of her questions were answered.  She plans to discuss further with Dr. Melo prior to making a final decision.  She would like to keep the appointment for treatment planning CT as is.  She will call back if there is any change in the treatment plan.

## 2018-03-13 ENCOUNTER — TELEPHONE (OUTPATIENT)
Dept: SURGERY | Facility: CLINIC | Age: 78
End: 2018-03-13

## 2018-03-13 NOTE — TELEPHONE ENCOUNTER
----- Message from Jessie Crain sent at 3/13/2018  1:04 PM CDT -----  Contact: pt   Pt would like to be called back regarding speaking with nurse about upcoming and previous radiation appt.     Please call pt daughter Kenzie Padilla back because pt is not able to hear the phone ring every time. Pt daughter can be reached at [phone].

## 2018-03-13 NOTE — TELEPHONE ENCOUNTER
Returned the patient's daughter call regarding the message below.  She did not answer, message left for the patient to contact our office regarding the message.  Yuan Huynh RN notified of this information.

## 2018-03-20 ENCOUNTER — OFFICE VISIT (OUTPATIENT)
Dept: SURGERY | Facility: CLINIC | Age: 78
End: 2018-03-20
Payer: MEDICARE

## 2018-03-20 VITALS
TEMPERATURE: 98 F | DIASTOLIC BLOOD PRESSURE: 91 MMHG | WEIGHT: 224.63 LBS | HEART RATE: 79 BPM | BODY MASS INDEX: 34.04 KG/M2 | HEIGHT: 68 IN | SYSTOLIC BLOOD PRESSURE: 201 MMHG

## 2018-03-20 DIAGNOSIS — Z17.0 MALIGNANT NEOPLASM OF UPPER-OUTER QUADRANT OF RIGHT BREAST IN FEMALE, ESTROGEN RECEPTOR POSITIVE: Primary | ICD-10-CM

## 2018-03-20 DIAGNOSIS — C50.411 MALIGNANT NEOPLASM OF UPPER-OUTER QUADRANT OF RIGHT BREAST IN FEMALE, ESTROGEN RECEPTOR POSITIVE: Primary | ICD-10-CM

## 2018-03-20 PROCEDURE — 99024 POSTOP FOLLOW-UP VISIT: CPT | Mod: S$GLB,,, | Performed by: SURGERY

## 2018-03-20 PROCEDURE — 99999 PR PBB SHADOW E&M-EST. PATIENT-LVL III: CPT | Mod: PBBFAC,,, | Performed by: SURGERY

## 2018-03-20 NOTE — PROGRESS NOTES
Breast Surgery  [facility]  Department of Surgery      REFERRING PROVIDER: Dr. Medellin  Chief Complaint: Right breast cancer    Subjective:      Patient ID: Elina Mei is a 77 y.o. female who returns after R USG lumpectomy and SLNB on 1/26/18.     She presented to clinic right breast IDC. She previously presented to our clinic in early December with newly diagnosed right breast IDC grade II, ER+NY+H2N- at the 9OC position.  She denies a palpable mass.  She has undergone mammograms sporadically, not yearly.  She has been avoiding treatment up to this point.  She had refused treatment up to this point.    Interval history: Pathology reported Invasive ductal carcinoma, moderately differentiated, 1.5 cm, and DCIS. 2/2 lymph nodes negative for metastasis. Inferior margin with DCIS < 0.1 mm. Anterior margin with <1mm. Case was discussed at tumor board on 2/20 and was recommended for patient to have re-excision followed by radiation and endocrine therapy. Patient has met with Dr. Ko recently and she recommended re-excision followed by whole breast irradiation for a total dose of 4200 cGy +1000 cGy boost. Patient and family wanted to discuss options prior to making decision.    Patient does not routinely do self breast exams.  Patient has not noted a change on breast exam.  Patient denies nipple discharge. Patient denies to previous breast biopsy. Patient denies a personal history of breast cancer.    Findings at that time were the following:   Tumor size: 2.5 cm   Tumor grade: II   Estrogen Receptor: +   Progesterone Receptor: +   Her-2 juan: -   Lymph node status: unknown   Lymphatic invasion: unknown     Since her prior presentation to clinic she denies any changes to her medical history. She denies any hospitalizations or changes in her medications. She reports she has met with her primary care physician as requested and obtained lab work and pre-op CXR and EKG prior to her visit today.     GYN  History:  Age of menarche was 10. Age of menopause was 40s with hysterectomy.   Patient denies hormonal therapy. Patient is . Age of first live birth was 17. Patient did breast feed.    Past Medical History:   Diagnosis Date    Arthritis     Diabetes mellitus     Hypertension     Malignant neoplasm of upper-outer quadrant of right breast in female, estrogen receptor positive 2017     Past Surgical History:   Procedure Laterality Date    EYE SURGERY      HYSTERECTOMY       Current Outpatient Prescriptions on File Prior to Visit   Medication Sig Dispense Refill    amlodipine (NORVASC) 10 MG tablet Take 10 mg by mouth once daily.      atorvastatin (LIPITOR) 10 MG tablet Take 10 mg by mouth once daily.  0    dorzolamide-timolol 2-0.5% (COSOPT) 22.3-6.8 mg/mL ophthalmic solution Place 1 drop into both eyes 2 (two) times daily.  6    fluticasone (FLONASE) 50 mcg/actuation nasal spray 1 spray by Each Nare route 2 (two) times daily as needed (sinus congestion). 15 g 0    furosemide (LASIX) 40 MG tablet Take 40 mg by mouth once daily.       KLOR-CON SPRINKLE 10 mEq CpSR Take 10 mEq by mouth once daily.  0    LANTUS SOLOSTAR 100 unit/mL (3 mL) InPn pen INJECT 35 UNIT(S) EVERY EVENING BY SUBCUTANEOUS ROUTE.  2    losartan (COZAAR) 50 MG tablet Take 50 mg by mouth once daily.      metformin (GLUCOPHAGE) 500 MG tablet Take 500 mg by mouth 2 (two) times daily with meals.      potassium chloride (KLOR-CON) 10 MEQ TbSR Take 10 mEq by mouth once daily.      TRAVATAN Z 0.004 % Drop Place 1 drop into both eyes nightly.  3    nitroGLYCERIN (NITROSTAT) 0.4 MG SL tablet Place 1 tablet (0.4 mg total) under the tongue every 5 (five) minutes as needed for Chest pain. 10 tablet 0    [DISCONTINUED] oxyCODONE-acetaminophen (PERCOCET) 5-325 mg per tablet Take 1 tablet by mouth every 4 (four) hours as needed. 41 tablet 0    [DISCONTINUED] senna-docusate 8.6-50 mg (PERICOLACE) 8.6-50 mg per tablet Take 1 tablet by  "mouth once daily.       No current facility-administered medications on file prior to visit.      Social History     Social History    Marital status:      Spouse name: N/A    Number of children: N/A    Years of education: N/A     Occupational History    Not on file.     Social History Main Topics    Smoking status: Never Smoker    Smokeless tobacco: Current User     Types: Snuff    Alcohol use No    Drug use: No    Sexual activity: Not Currently     Other Topics Concern    Not on file     Social History Narrative    No narrative on file     Family History   Problem Relation Age of Onset    Cancer Sister 75     Colon CA    Breast cancer Sister 55    Cancer Brother     Hypertension Mother         Review of Systems   Constitutional: Negative for appetite change, chills, fever and unexpected weight change.   HENT: Negative for facial swelling, postnasal drip and sore throat.    Eyes: Negative for redness and itching.   Respiratory: Negative for chest tightness and shortness of breath.    Cardiovascular: Negative for chest pain and palpitations.   Gastrointestinal: Negative for blood in stool, diarrhea, nausea and vomiting.   Genitourinary: Negative for difficulty urinating and dysuria.   Musculoskeletal: Negative for arthralgias and joint swelling.   Skin: Negative for rash and wound.   Neurological: Negative for dizziness and syncope.   Hematological: Negative for adenopathy.   Psychiatric/Behavioral: Negative for agitation. The patient is not nervous/anxious.      Objective:   BP (!) 201/91 (BP Location: Left arm, Patient Position: Sitting, BP Method: Large (Automatic))   Pulse 79   Temp 98 °F (36.7 °C) (Oral)   Ht 5' 8" (1.727 m)   Wt 101.9 kg (224 lb 9.6 oz)   BMI 34.15 kg/m²     Physical Exam   Constitutional: She appears well-developed and well-nourished.   HENT:   Head: Normocephalic.   Eyes: No scleral icterus.   Neck: Neck supple. No tracheal deviation present.   Cardiovascular: " Normal rate and regular rhythm.    Pulmonary/Chest: Breath sounds normal. No respiratory distress. Right breast exhibits no inverted nipple, no nipple discharge and no skin change. Left breast exhibits no inverted nipple, no mass, no nipple discharge and no skin change.       Abdominal: Soft. She exhibits no mass. There is no tenderness.   Musculoskeletal: She exhibits no edema.   Lymphadenopathy:     She has no cervical adenopathy.   Neurological: She is alert.   Skin: No rash noted. No erythema.     Psychiatric: She has a normal mood and affect.           PATH:  FINAL PATHOLOGIC DIAGNOSIS  1. Right breast, ultrasound-guided lumpectomy:  - Invasive ductal carcinoma, moderately differentiated, 1.5 cm.  - DCIS.  - Please see CAP synoptic report below.  2. New posterior margin, excision:  - Benign breast tissue with focal microcalcifications.  - Negative for malignancy or atypia.  3. New inferior margin, excision:  - Ductal carcinoma in situ (DCIS), low to intermediate nuclear grade, cribriform and micropapillary type, at  least 1.6 cm.  - Margin uninvolved, < 0.1 mm from the new margin.  4. New medial margin, excision:  - Benign breast tissue.  - Negative for malignancy or atypia.  5. Right axillary sentinel node, hot, 1100, dissection:  - Two lymph nodes, negative for carcinoma (0/2).    Note: All sentinel lymph node tissue was examined at three levels with routine (H&E) stains and with three epithelial  immunostains (AE1/AE3, Cam5.2, WSK). Positive and negative controls reacted appropriately.  SURGICAL PATHOLOGY CANCER CASE SUMMARY- Breast  Procedure: Excision (less than a total mastectomy).  Specimen laterality: Right.  Tumor size: 15 mm (greatest dimension).  Histologic type: Invasive ductal carcinoma.  Histiologic grade: Grade 2 (glandular/tubular differentiation- 3, nuclear pleomorphism- 2, mitotic rate- 1)  Tumor focality: Single focus  Ductal carcinoma in situ: Present, negative for extensive intraductal  component (EIC). See note.  Size of DCIS: ~4.0 cm.  - Number of blocks with DCIS: 10.  - Number of blocks examined: 16.  Architectural patterns: Micropapillary and cribriform types.  Nuclear grade: Low to intermediate.  Necrosis: Not present.  Note: The size (extent) of DCIS is an estimation of the volume of breast tissue occupied by DCIS. DCIS is  surrounding the carcinoma but is not within the invasive component. Though it does not strictly fulfill the criteria for  EIC, DCIS spans nearly the entire lumpectomy specimen.  Margins (including new margins)-  Uninvolved by invasive carcinoma, distance from closest margin (superior) is 5 mm (part 1).  Uninvolved by DCIS, distance from closest margins are < 0.1 mm (part 3, inferior) and < 1 mm (part 1,  anterior).  Treatment effect: No known presurgical therapy.  Number of sentinel lymph nodes examined: 2.  Total number of lymph nodes examined (sentinel and non-sentinel): 2.  Number of lymph nodes involved: 0.  Pathologic staging (pTNM, AJCC 8th edition): pT1c pN0.  Ancillary studies- (Performed on biopsy specimen [id number])  ER: Positive (strong, >95% of tumor cell nuclei).  UT: Positive (spectrum of weak, moderate and strong, >90% of tumor cell nuclei)  HER2: Negative (1+).  All stains have satisfactory positive and negative controls.    Assessment:       78 y/o female with R breast cancer, completely excised but margins close for DCIS  Plan:     We had a long discussion about re-excision vs mastectomy vs proceeding with radiation.  She is having a difficult time with this decision.  After much debate, she would rather avoid radiation altogether if possible.    She and her family had time to discuss these options and have decided to proceed with mastectomy.  She understands that with no further treatment, she has a higher risk of recurrence.

## 2018-03-20 NOTE — LETTER
March 26, 2018        Lolis Ko MD  [address]  [place] [address]             [facility]  [address]  [facility] [address]  Phone: [phone]  Fax: [phone]   Patient: Elina Mei   MR Number: [id number]   YOB: 1940   Date of Visit: 3/20/2018       Dear Dr. Ko:    Thank you for referring Elina Mei to me for evaluation. Attached you will find relevant portions of my assessment and plan of care.    If you have questions, please do not hesitate to call me. I look forward to following Elina Mei along with you.    Sincerely,      Yumi Melo MD            CC  No Recipients    Enclosure

## 2018-03-28 ENCOUNTER — ANESTHESIA EVENT (OUTPATIENT)
Dept: SURGERY | Facility: HOSPITAL | Age: 78
End: 2018-03-28
Payer: MEDICARE

## 2018-03-28 ENCOUNTER — TELEPHONE (OUTPATIENT)
Dept: SURGERY | Facility: CLINIC | Age: 78
End: 2018-03-28

## 2018-03-29 ENCOUNTER — SURGERY (OUTPATIENT)
Age: 78
End: 2018-03-29

## 2018-03-29 ENCOUNTER — ANESTHESIA (OUTPATIENT)
Dept: SURGERY | Facility: HOSPITAL | Age: 78
End: 2018-03-29
Payer: MEDICARE

## 2018-03-29 ENCOUNTER — HOSPITAL ENCOUNTER (OUTPATIENT)
Facility: HOSPITAL | Age: 78
Discharge: HOME | End: 2018-03-30
Attending: SURGERY | Admitting: SURGERY
Payer: MEDICARE

## 2018-03-29 DIAGNOSIS — C50.919 BREAST CANCER: ICD-10-CM

## 2018-03-29 LAB
POCT GLUCOSE: 142 MG/DL (ref 70–110)
POCT GLUCOSE: 157 MG/DL (ref 70–110)

## 2018-03-29 PROCEDURE — 63600175 PHARM REV CODE 636 W HCPCS: Performed by: ANESTHESIOLOGY

## 2018-03-29 PROCEDURE — 25000003 PHARM REV CODE 250: Performed by: ANESTHESIOLOGY

## 2018-03-29 PROCEDURE — 25000003 PHARM REV CODE 250

## 2018-03-29 PROCEDURE — 88307 TISSUE EXAM BY PATHOLOGIST: CPT | Mod: 26,,, | Performed by: PATHOLOGY

## 2018-03-29 PROCEDURE — 37000009 HC ANESTHESIA EA ADD 15 MINS: Performed by: SURGERY

## 2018-03-29 PROCEDURE — 88307 TISSUE EXAM BY PATHOLOGIST: CPT | Performed by: PATHOLOGY

## 2018-03-29 PROCEDURE — 27200750 HC INSULATED NEEDLE/ STIMUPLEX: Performed by: ANESTHESIOLOGY

## 2018-03-29 PROCEDURE — 37000008 HC ANESTHESIA 1ST 15 MINUTES: Performed by: SURGERY

## 2018-03-29 PROCEDURE — 94761 N-INVAS EAR/PLS OXIMETRY MLT: CPT

## 2018-03-29 PROCEDURE — 19303 MAST SIMPLE COMPLETE: CPT | Mod: 58,RT,, | Performed by: SURGERY

## 2018-03-29 PROCEDURE — 76942 ECHO GUIDE FOR BIOPSY: CPT | Performed by: ANESTHESIOLOGY

## 2018-03-29 PROCEDURE — 63600175 PHARM REV CODE 636 W HCPCS: Performed by: STUDENT IN AN ORGANIZED HEALTH CARE EDUCATION/TRAINING PROGRAM

## 2018-03-29 PROCEDURE — 27000221 HC OXYGEN, UP TO 24 HOURS

## 2018-03-29 PROCEDURE — 82962 GLUCOSE BLOOD TEST: CPT | Performed by: SURGERY

## 2018-03-29 PROCEDURE — 71000033 HC RECOVERY, INTIAL HOUR: Performed by: SURGERY

## 2018-03-29 PROCEDURE — 25000003 PHARM REV CODE 250: Performed by: STUDENT IN AN ORGANIZED HEALTH CARE EDUCATION/TRAINING PROGRAM

## 2018-03-29 PROCEDURE — 36000707: Performed by: SURGERY

## 2018-03-29 PROCEDURE — D9220A PRA ANESTHESIA: Mod: ,,, | Performed by: ANESTHESIOLOGY

## 2018-03-29 PROCEDURE — 71000039 HC RECOVERY, EACH ADD'L HOUR: Performed by: SURGERY

## 2018-03-29 PROCEDURE — 36000706: Performed by: SURGERY

## 2018-03-29 PROCEDURE — 64461 PVB THORACIC SINGLE INJ SITE: CPT | Mod: 59,RT,, | Performed by: ANESTHESIOLOGY

## 2018-03-29 RX ORDER — MIDAZOLAM HYDROCHLORIDE 1 MG/ML
2 INJECTION INTRAMUSCULAR; INTRAVENOUS EVERY 5 MIN PRN
Status: DISCONTINUED | OUTPATIENT
Start: 2018-03-29 | End: 2018-03-29

## 2018-03-29 RX ORDER — FENTANYL CITRATE 50 UG/ML
25 INJECTION, SOLUTION INTRAMUSCULAR; INTRAVENOUS EVERY 5 MIN PRN
Status: DISCONTINUED | OUTPATIENT
Start: 2018-03-29 | End: 2018-03-29 | Stop reason: HOSPADM

## 2018-03-29 RX ORDER — GLYCOPYRROLATE 0.2 MG/ML
INJECTION INTRAMUSCULAR; INTRAVENOUS
Status: DISCONTINUED | OUTPATIENT
Start: 2018-03-29 | End: 2018-03-29

## 2018-03-29 RX ORDER — DIPHENHYDRAMINE HYDROCHLORIDE 50 MG/ML
25 INJECTION INTRAMUSCULAR; INTRAVENOUS EVERY 4 HOURS PRN
Status: DISCONTINUED | OUTPATIENT
Start: 2018-03-29 | End: 2018-03-30 | Stop reason: HOSPADM

## 2018-03-29 RX ORDER — HYDROCODONE BITARTRATE AND ACETAMINOPHEN 10; 325 MG/1; MG/1
1 TABLET ORAL EVERY 4 HOURS PRN
Status: DISCONTINUED | OUTPATIENT
Start: 2018-03-29 | End: 2018-03-30 | Stop reason: HOSPADM

## 2018-03-29 RX ORDER — CEFAZOLIN SODIUM 1 G/3ML
2 INJECTION, POWDER, FOR SOLUTION INTRAMUSCULAR; INTRAVENOUS
Status: COMPLETED | OUTPATIENT
Start: 2018-03-29 | End: 2018-03-29

## 2018-03-29 RX ORDER — MORPHINE SULFATE 2 MG/ML
2 INJECTION, SOLUTION INTRAMUSCULAR; INTRAVENOUS EVERY 4 HOURS PRN
Status: DISCONTINUED | OUTPATIENT
Start: 2018-03-29 | End: 2018-03-30 | Stop reason: HOSPADM

## 2018-03-29 RX ORDER — LIDOCAINE HYDROCHLORIDE 10 MG/ML
1 INJECTION, SOLUTION EPIDURAL; INFILTRATION; INTRACAUDAL; PERINEURAL ONCE
Status: DISCONTINUED | OUTPATIENT
Start: 2018-03-29 | End: 2018-03-30 | Stop reason: HOSPADM

## 2018-03-29 RX ORDER — ONDANSETRON 4 MG/1
4 TABLET, ORALLY DISINTEGRATING ORAL ONCE
Status: COMPLETED | OUTPATIENT
Start: 2018-03-29 | End: 2018-03-29

## 2018-03-29 RX ORDER — NEOSTIGMINE METHYLSULFATE 1 MG/ML
INJECTION, SOLUTION INTRAVENOUS
Status: DISCONTINUED | OUTPATIENT
Start: 2018-03-29 | End: 2018-03-29

## 2018-03-29 RX ORDER — ACETAMINOPHEN 325 MG/1
650 TABLET ORAL EVERY 8 HOURS PRN
Status: DISCONTINUED | OUTPATIENT
Start: 2018-03-29 | End: 2018-03-30 | Stop reason: HOSPADM

## 2018-03-29 RX ORDER — HYDROCODONE BITARTRATE AND ACETAMINOPHEN 5; 325 MG/1; MG/1
1 TABLET ORAL EVERY 4 HOURS PRN
Status: DISCONTINUED | OUTPATIENT
Start: 2018-03-29 | End: 2018-03-30 | Stop reason: HOSPADM

## 2018-03-29 RX ORDER — ROCURONIUM BROMIDE 10 MG/ML
INJECTION, SOLUTION INTRAVENOUS
Status: DISCONTINUED | OUTPATIENT
Start: 2018-03-29 | End: 2018-03-29

## 2018-03-29 RX ORDER — LIDOCAINE HCL/PF 100 MG/5ML
SYRINGE (ML) INTRAVENOUS
Status: DISCONTINUED | OUTPATIENT
Start: 2018-03-29 | End: 2018-03-29

## 2018-03-29 RX ORDER — PHENYLEPHRINE HYDROCHLORIDE 10 MG/ML
INJECTION INTRAVENOUS
Status: DISCONTINUED | OUTPATIENT
Start: 2018-03-29 | End: 2018-03-29

## 2018-03-29 RX ORDER — PROPOFOL 10 MG/ML
VIAL (ML) INTRAVENOUS
Status: DISCONTINUED | OUTPATIENT
Start: 2018-03-29 | End: 2018-03-29

## 2018-03-29 RX ORDER — HYDROCODONE BITARTRATE AND ACETAMINOPHEN 10; 325 MG/1; MG/1
TABLET ORAL
Status: COMPLETED
Start: 2018-03-29 | End: 2018-03-29

## 2018-03-29 RX ORDER — FUROSEMIDE 40 MG/1
40 TABLET ORAL DAILY
Status: DISCONTINUED | OUTPATIENT
Start: 2018-03-29 | End: 2018-03-30 | Stop reason: HOSPADM

## 2018-03-29 RX ORDER — SODIUM CHLORIDE 0.9 % (FLUSH) 0.9 %
3 SYRINGE (ML) INJECTION
Status: DISCONTINUED | OUTPATIENT
Start: 2018-03-29 | End: 2018-03-30 | Stop reason: HOSPADM

## 2018-03-29 RX ORDER — ACETAMINOPHEN 10 MG/ML
INJECTION, SOLUTION INTRAVENOUS
Status: DISCONTINUED | OUTPATIENT
Start: 2018-03-29 | End: 2018-03-29

## 2018-03-29 RX ORDER — ONDANSETRON 2 MG/ML
INJECTION INTRAMUSCULAR; INTRAVENOUS
Status: DISCONTINUED | OUTPATIENT
Start: 2018-03-29 | End: 2018-03-29

## 2018-03-29 RX ORDER — ONDANSETRON 8 MG/1
8 TABLET, ORALLY DISINTEGRATING ORAL EVERY 8 HOURS PRN
Status: DISCONTINUED | OUTPATIENT
Start: 2018-03-29 | End: 2018-03-30 | Stop reason: HOSPADM

## 2018-03-29 RX ORDER — RAMELTEON 8 MG/1
8 TABLET ORAL NIGHTLY PRN
Status: DISCONTINUED | OUTPATIENT
Start: 2018-03-29 | End: 2018-03-30 | Stop reason: HOSPADM

## 2018-03-29 RX ORDER — SODIUM CHLORIDE 0.9 % (FLUSH) 0.9 %
3 SYRINGE (ML) INJECTION
Status: DISCONTINUED | OUTPATIENT
Start: 2018-03-29 | End: 2018-03-29

## 2018-03-29 RX ORDER — SODIUM CHLORIDE 9 MG/ML
INJECTION, SOLUTION INTRAVENOUS CONTINUOUS PRN
Status: DISCONTINUED | OUTPATIENT
Start: 2018-03-29 | End: 2018-03-29

## 2018-03-29 RX ORDER — FENTANYL CITRATE 50 UG/ML
100 INJECTION, SOLUTION INTRAMUSCULAR; INTRAVENOUS EVERY 5 MIN PRN
Status: COMPLETED | OUTPATIENT
Start: 2018-03-29 | End: 2018-03-29

## 2018-03-29 RX ADMIN — CEFAZOLIN 2 G: 330 INJECTION, POWDER, FOR SOLUTION INTRAMUSCULAR; INTRAVENOUS at 09:03

## 2018-03-29 RX ADMIN — LIDOCAINE HYDROCHLORIDE 100 MG: 20 INJECTION, SOLUTION INTRAVENOUS at 09:03

## 2018-03-29 RX ADMIN — FENTANYL CITRATE 50 MCG: 50 INJECTION, SOLUTION INTRAMUSCULAR; INTRAVENOUS at 09:03

## 2018-03-29 RX ADMIN — SODIUM CHLORIDE: 0.9 INJECTION, SOLUTION INTRAVENOUS at 09:03

## 2018-03-29 RX ADMIN — NEOSTIGMINE METHYLSULFATE 5 MG: 1 INJECTION INTRAVENOUS at 11:03

## 2018-03-29 RX ADMIN — HYDROCODONE BITARTRATE AND ACETAMINOPHEN 1 TABLET: 10; 325 TABLET ORAL at 02:03

## 2018-03-29 RX ADMIN — PROPOFOL 30 MG: 10 INJECTION, EMULSION INTRAVENOUS at 10:03

## 2018-03-29 RX ADMIN — FENTANYL CITRATE 50 MCG: 50 INJECTION, SOLUTION INTRAMUSCULAR; INTRAVENOUS at 10:03

## 2018-03-29 RX ADMIN — ONDANSETRON 4 MG: 4 TABLET, ORALLY DISINTEGRATING ORAL at 02:03

## 2018-03-29 RX ADMIN — PROPOFOL 120 MG: 10 INJECTION, EMULSION INTRAVENOUS at 09:03

## 2018-03-29 RX ADMIN — PHENYLEPHRINE HYDROCHLORIDE 100 MCG: 10 INJECTION INTRAVENOUS at 09:03

## 2018-03-29 RX ADMIN — MIDAZOLAM HYDROCHLORIDE 1 MG: 1 INJECTION, SOLUTION INTRAMUSCULAR; INTRAVENOUS at 09:03

## 2018-03-29 RX ADMIN — ROCURONIUM BROMIDE 50 MG: 10 INJECTION, SOLUTION INTRAVENOUS at 09:03

## 2018-03-29 RX ADMIN — Medication 2 MG: at 04:03

## 2018-03-29 RX ADMIN — SODIUM CHLORIDE, SODIUM GLUCONATE, SODIUM ACETATE, POTASSIUM CHLORIDE, MAGNESIUM CHLORIDE, SODIUM PHOSPHATE, DIBASIC, AND POTASSIUM PHOSPHATE: .53; .5; .37; .037; .03; .012; .00082 INJECTION, SOLUTION INTRAVENOUS at 11:03

## 2018-03-29 RX ADMIN — GLYCOPYRROLATE 0.6 MG: 0.2 INJECTION, SOLUTION INTRAMUSCULAR; INTRAVENOUS at 11:03

## 2018-03-29 RX ADMIN — HYDROCODONE BITARTRATE AND ACETAMINOPHEN 1 TABLET: 10; 325 TABLET ORAL at 12:03

## 2018-03-29 RX ADMIN — ACETAMINOPHEN 1000 MG: 10 INJECTION, SOLUTION INTRAVENOUS at 09:03

## 2018-03-29 RX ADMIN — ONDANSETRON 4 MG: 2 INJECTION INTRAMUSCULAR; INTRAVENOUS at 11:03

## 2018-03-29 RX ADMIN — FENTANYL CITRATE 25 MCG: 50 INJECTION, SOLUTION INTRAMUSCULAR; INTRAVENOUS at 11:03

## 2018-03-29 RX ADMIN — FUROSEMIDE 40 MG: 40 TABLET ORAL at 02:03

## 2018-03-29 RX ADMIN — PHENYLEPHRINE HYDROCHLORIDE 100 MCG: 10 INJECTION INTRAVENOUS at 11:03

## 2018-03-29 NOTE — H&P (VIEW-ONLY)
Breast Surgery  [facility]  Department of Surgery      REFERRING PROVIDER: Dr. Medellin  Chief Complaint: Right breast cancer    Subjective:      Patient ID: Elina Mei is a 77 y.o. female who returns after R USG lumpectomy and SLNB on 1/26/18.     She presented to clinic right breast IDC. She previously presented to our clinic in early December with newly diagnosed right breast IDC grade II, ER+IA+H2N- at the 9OC position.  She denies a palpable mass.  She has undergone mammograms sporadically, not yearly.  She has been avoiding treatment up to this point.  She had refused treatment up to this point.    Interval history: Pathology reported Invasive ductal carcinoma, moderately differentiated, 1.5 cm, and DCIS. 2/2 lymph nodes negative for metastasis. Inferior margin with DCIS < 0.1 mm. Anterior margin with <1mm. Case was discussed at tumor board on 2/20 and was recommended for patient to have re-excision followed by radiation and endocrine therapy. Patient has met with Dr. Ko recently and she recommended re-excision followed by whole breast irradiation for a total dose of 4200 cGy +1000 cGy boost. Patient and family wanted to discuss options prior to making decision.    Patient does not routinely do self breast exams.  Patient has not noted a change on breast exam.  Patient denies nipple discharge. Patient denies to previous breast biopsy. Patient denies a personal history of breast cancer.    Findings at that time were the following:   Tumor size: 2.5 cm   Tumor grade: II   Estrogen Receptor: +   Progesterone Receptor: +   Her-2 juan: -   Lymph node status: unknown   Lymphatic invasion: unknown     Since her prior presentation to clinic she denies any changes to her medical history. She denies any hospitalizations or changes in her medications. She reports she has met with her primary care physician as requested and obtained lab work and pre-op CXR and EKG prior to her visit today.     GYN  History:  Age of menarche was 10. Age of menopause was 40s with hysterectomy.   Patient denies hormonal therapy. Patient is . Age of first live birth was 17. Patient did breast feed.    Past Medical History:   Diagnosis Date    Arthritis     Diabetes mellitus     Hypertension     Malignant neoplasm of upper-outer quadrant of right breast in female, estrogen receptor positive 2017     Past Surgical History:   Procedure Laterality Date    EYE SURGERY      HYSTERECTOMY       Current Outpatient Prescriptions on File Prior to Visit   Medication Sig Dispense Refill    amlodipine (NORVASC) 10 MG tablet Take 10 mg by mouth once daily.      atorvastatin (LIPITOR) 10 MG tablet Take 10 mg by mouth once daily.  0    dorzolamide-timolol 2-0.5% (COSOPT) 22.3-6.8 mg/mL ophthalmic solution Place 1 drop into both eyes 2 (two) times daily.  6    fluticasone (FLONASE) 50 mcg/actuation nasal spray 1 spray by Each Nare route 2 (two) times daily as needed (sinus congestion). 15 g 0    furosemide (LASIX) 40 MG tablet Take 40 mg by mouth once daily.       KLOR-CON SPRINKLE 10 mEq CpSR Take 10 mEq by mouth once daily.  0    LANTUS SOLOSTAR 100 unit/mL (3 mL) InPn pen INJECT 35 UNIT(S) EVERY EVENING BY SUBCUTANEOUS ROUTE.  2    losartan (COZAAR) 50 MG tablet Take 50 mg by mouth once daily.      metformin (GLUCOPHAGE) 500 MG tablet Take 500 mg by mouth 2 (two) times daily with meals.      potassium chloride (KLOR-CON) 10 MEQ TbSR Take 10 mEq by mouth once daily.      TRAVATAN Z 0.004 % Drop Place 1 drop into both eyes nightly.  3    nitroGLYCERIN (NITROSTAT) 0.4 MG SL tablet Place 1 tablet (0.4 mg total) under the tongue every 5 (five) minutes as needed for Chest pain. 10 tablet 0    [DISCONTINUED] oxyCODONE-acetaminophen (PERCOCET) 5-325 mg per tablet Take 1 tablet by mouth every 4 (four) hours as needed. 41 tablet 0    [DISCONTINUED] senna-docusate 8.6-50 mg (PERICOLACE) 8.6-50 mg per tablet Take 1 tablet by  "mouth once daily.       No current facility-administered medications on file prior to visit.      Social History     Social History    Marital status:      Spouse name: N/A    Number of children: N/A    Years of education: N/A     Occupational History    Not on file.     Social History Main Topics    Smoking status: Never Smoker    Smokeless tobacco: Current User     Types: Snuff    Alcohol use No    Drug use: No    Sexual activity: Not Currently     Other Topics Concern    Not on file     Social History Narrative    No narrative on file     Family History   Problem Relation Age of Onset    Cancer Sister 75     Colon CA    Breast cancer Sister 55    Cancer Brother     Hypertension Mother         Review of Systems   Constitutional: Negative for appetite change, chills, fever and unexpected weight change.   HENT: Negative for facial swelling, postnasal drip and sore throat.    Eyes: Negative for redness and itching.   Respiratory: Negative for chest tightness and shortness of breath.    Cardiovascular: Negative for chest pain and palpitations.   Gastrointestinal: Negative for blood in stool, diarrhea, nausea and vomiting.   Genitourinary: Negative for difficulty urinating and dysuria.   Musculoskeletal: Negative for arthralgias and joint swelling.   Skin: Negative for rash and wound.   Neurological: Negative for dizziness and syncope.   Hematological: Negative for adenopathy.   Psychiatric/Behavioral: Negative for agitation. The patient is not nervous/anxious.      Objective:   BP (!) 201/91 (BP Location: Left arm, Patient Position: Sitting, BP Method: Large (Automatic))   Pulse 79   Temp 98 °F (36.7 °C) (Oral)   Ht 5' 8" (1.727 m)   Wt 101.9 kg (224 lb 9.6 oz)   BMI 34.15 kg/m²     Physical Exam   Constitutional: She appears well-developed and well-nourished.   HENT:   Head: Normocephalic.   Eyes: No scleral icterus.   Neck: Neck supple. No tracheal deviation present.   Cardiovascular: " Normal rate and regular rhythm.    Pulmonary/Chest: Breath sounds normal. No respiratory distress. Right breast exhibits no inverted nipple, no nipple discharge and no skin change. Left breast exhibits no inverted nipple, no mass, no nipple discharge and no skin change.       Abdominal: Soft. She exhibits no mass. There is no tenderness.   Musculoskeletal: She exhibits no edema.   Lymphadenopathy:     She has no cervical adenopathy.   Neurological: She is alert.   Skin: No rash noted. No erythema.     Psychiatric: She has a normal mood and affect.           PATH:  FINAL PATHOLOGIC DIAGNOSIS  1. Right breast, ultrasound-guided lumpectomy:  - Invasive ductal carcinoma, moderately differentiated, 1.5 cm.  - DCIS.  - Please see CAP synoptic report below.  2. New posterior margin, excision:  - Benign breast tissue with focal microcalcifications.  - Negative for malignancy or atypia.  3. New inferior margin, excision:  - Ductal carcinoma in situ (DCIS), low to intermediate nuclear grade, cribriform and micropapillary type, at  least 1.6 cm.  - Margin uninvolved, < 0.1 mm from the new margin.  4. New medial margin, excision:  - Benign breast tissue.  - Negative for malignancy or atypia.  5. Right axillary sentinel node, hot, 1100, dissection:  - Two lymph nodes, negative for carcinoma (0/2).    Note: All sentinel lymph node tissue was examined at three levels with routine (H&E) stains and with three epithelial  immunostains (AE1/AE3, Cam5.2, WSK). Positive and negative controls reacted appropriately.  SURGICAL PATHOLOGY CANCER CASE SUMMARY- Breast  Procedure: Excision (less than a total mastectomy).  Specimen laterality: Right.  Tumor size: 15 mm (greatest dimension).  Histologic type: Invasive ductal carcinoma.  Histiologic grade: Grade 2 (glandular/tubular differentiation- 3, nuclear pleomorphism- 2, mitotic rate- 1)  Tumor focality: Single focus  Ductal carcinoma in situ: Present, negative for extensive intraductal  component (EIC). See note.  Size of DCIS: ~4.0 cm.  - Number of blocks with DCIS: 10.  - Number of blocks examined: 16.  Architectural patterns: Micropapillary and cribriform types.  Nuclear grade: Low to intermediate.  Necrosis: Not present.  Note: The size (extent) of DCIS is an estimation of the volume of breast tissue occupied by DCIS. DCIS is  surrounding the carcinoma but is not within the invasive component. Though it does not strictly fulfill the criteria for  EIC, DCIS spans nearly the entire lumpectomy specimen.  Margins (including new margins)-  Uninvolved by invasive carcinoma, distance from closest margin (superior) is 5 mm (part 1).  Uninvolved by DCIS, distance from closest margins are < 0.1 mm (part 3, inferior) and < 1 mm (part 1,  anterior).  Treatment effect: No known presurgical therapy.  Number of sentinel lymph nodes examined: 2.  Total number of lymph nodes examined (sentinel and non-sentinel): 2.  Number of lymph nodes involved: 0.  Pathologic staging (pTNM, AJCC 8th edition): pT1c pN0.  Ancillary studies- (Performed on biopsy specimen [id number])  ER: Positive (strong, >95% of tumor cell nuclei).  IN: Positive (spectrum of weak, moderate and strong, >90% of tumor cell nuclei)  HER2: Negative (1+).  All stains have satisfactory positive and negative controls.    Assessment:       76 y/o female with R breast cancer, completely excised but margins close for DCIS  Plan:     We had a long discussion about re-excision vs mastectomy vs proceeding with radiation.  She is having a difficult time with this decision.  After much debate, she would rather avoid radiation altogether if possible.    She and her family had time to discuss these options and have decided to proceed with mastectomy.  She understands that with no further treatment, she has a higher risk of recurrence.

## 2018-03-29 NOTE — NURSING TRANSFER
Nursing Transfer Note      3/29/2018     Transfer to 542 a    Transfer via hospital bed    Transfer with is, scd's on    Transported by RN PCT    Medicines sent: none    Chart send with patient yes    Notified: lauren notified by phone of room #    Patient reassessed at 3/29/18 8373

## 2018-03-29 NOTE — ANESTHESIA PROCEDURE NOTES
R Erector Spinae    Patient location during procedure: pre-op   Block not for primary anesthetic.  Reason for block: at surgeon's request and post-op pain management   Post-op Pain Location: R breast pain  Start time: 3/29/2018 9:05 AM  Timeout: 3/29/2018 9:03 AM   End time: 3/29/2018 9:12 AM  Staffing  Anesthesiologist: KRZYSZTOF NGUYEN  Resident/CRNA: BRADLEY MORENO  Preanesthetic Checklist  Completed: patient identified, site marked, surgical consent, pre-op evaluation, timeout performed, IV checked, risks and benefits discussed and monitors and equipment checked  Peripheral Block  Patient position: sitting  Prep: ChloraPrep  Patient monitoring: heart rate, cardiac monitor, continuous pulse ox, continuous capnometry and frequent blood pressure checks  Anesthesia block type: Erector Spinae Plane Block.  Laterality: right  Injection technique: single shot  Needle  Needle type: Tuohy   Needle gauge: 17 G  Needle length: 3.5 in  Needle localization: anatomical landmarks and ultrasound guidance   -ultrasound image captured on disc.  Assessment  Injection assessment: negative aspiration, negative parasthesia and local visualized surrounding nerve  Paresthesia pain: none  Heart rate change: no  Slow fractionated injection: yes  Medications:  Bolus administered: 30 mL of 0.5 ropivacaine  Epinephrine added: 3.75 mcg/mL (1/300,000)  Additional Notes  Patient tolerated well.  [facility]

## 2018-03-29 NOTE — NURSING TRANSFER
Nursing Transfer Note      3/29/2018     Transfer To: 542 A    Transfer via bed    Transfer with 2L O2 per NC    Transported by PCT    Medicines sent: none    Chart send with patient: Yes    Notified: family at bedside for 1400 visit    Patient reassessed at: 3/29/2018 2:15 PM

## 2018-03-29 NOTE — OP NOTE
Operative Note     3/29/2018    PRE-OP DIAGNOSIS: Malignant neoplasm of upper-outer quadrant of right breast in female, estrogen receptor positive [C50.411, Z17.0]      POST-OP DIAGNOSIS: Post-Op Diagnosis Codes:     * Malignant neoplasm of upper-outer quadrant of right breast in female, estrogen receptor positive [C50.411, Z17.0]    Procedure(s):  MASTECTOMY 23 hr stay     SURGEON: Surgeon(s) and Role:     * Yumi Melo MD - Primary    ANESTHESIA: General     OPERATIVE FINDINGS: No palpable abnormality.  Healthy appearing flaps at the completion of mastectomy.    INDICATION FOR PROCEDURE: This patient presents with a history of invasive carcinoma of the right breast    PROCEDURE IN DETAIL:  Elina Mei is a 77 y.o. female brought to the operating room for definitive surgery of positive margins after lumpectomy for invasive carcinoma of the right breast.  The patient has elected to undergo right simple mastectomy. The patient was informed of the possible risks and complications of the procedure, including but not limited to anesthetic risks, bleeding, infection, and need for additional surgery.  The patient concurred with the proposed plan, and has given informed consent.  The site of surgery was properly noted/marked in the preoperative holding area.    The patient was then brought to the operating room and placed in the supine position with both upper extremities extended.  regional and general anesthesia were administered.  Perioperative antibiotics were administered consisting of Ancef and a time out was performed confirming the patient, site, and procedure.  The right chest and axilla was then prepped and draped in the usual sterile fashion.    We then turned our attention to the right breast where an elliptical incision was fashioned to incorporate the nipple areolar complex with an extension laterally in an junior wing fashion.  The incision was made with a 10-blade and extended through the subcutaneous  tissues with Bovie electrocautery.  Skin flaps were raised to the clavicle superiorly.  We then proceeded to raise the remainder of the flaps to the lateral border of the sternum medially, to the inframammary fold inferiorly, and to the anterior border of the latissimus dorsi muscle laterally. The breast tissue was sharply excised off the chest wall taking care to incorporate the pectoralis fascia while leaving the serratus fascia behind.  The resulting mastectomy specimen was marked using a short stitch superiorly and long stitch laterally.  The breast was sent to pathology for permanent evaluation.      The operative field was irrigated with normal saline and all bleeding points were secured with Bovie electrocautery.  A 19 Fr daniele drain was placed under the mastectomy flap. The incision was closed using an interrupted 3-0 vicryl deep dermal stitch followed by a running 4-0 vicryl subcuticular.      Dermabond was applied. A post surgical bra was placed on the patient. At the end of the operation, all sponge, instrument, and needle counts x 2 were correct.    ESTIMATED BLOOD LOSS: less than 50 mL    SPECIMENS:   Specimens     Start     Ordered    03/29/18 1144  Specimen to Pathology - Surgery  Once      03/29/18 1144    03/29/18 1104  Specimen to Pathology - Surgery  Once      03/29/18 1120           COMPLICATIONS: none    DISPOSITION: PACU - hemodynamically stable.    ATTESTATION:   I was present and scrubbed for the entire procedure.

## 2018-03-29 NOTE — ANESTHESIA PREPROCEDURE EVALUATION
Pre-operative evaluation for Procedure(s) (LRB):  MASTECTOMY 23 hr stay (Right)    Elina Mei is a 77 y.o. female       Patient Active Problem List   Diagnosis    HTN (hypertension)    DM (diabetes mellitus)    Glaucoma (increased eye pressure)    Chest pain syndrome    Anemia    Malignant neoplasm of upper-outer quadrant of right breast in female, estrogen receptor positive    At risk for lymphedema    Breast cancer, right       Review of patient's allergies indicates:  No Known Allergies     No current facility-administered medications on file prior to encounter.      Current Outpatient Prescriptions on File Prior to Encounter   Medication Sig Dispense Refill    amlodipine (NORVASC) 10 MG tablet Take 10 mg by mouth once daily.      atorvastatin (LIPITOR) 10 MG tablet Take 10 mg by mouth once daily.  0    dorzolamide-timolol 2-0.5% (COSOPT) 22.3-6.8 mg/mL ophthalmic solution Place 1 drop into both eyes 2 (two) times daily.  6    fluticasone (FLONASE) 50 mcg/actuation nasal spray 1 spray by Each Nare route 2 (two) times daily as needed (sinus congestion). 15 g 0    furosemide (LASIX) 40 MG tablet Take 40 mg by mouth once daily.       KLOR-CON SPRINKLE 10 mEq CpSR Take 10 mEq by mouth once daily.  0    LANTUS SOLOSTAR 100 unit/mL (3 mL) InPn pen INJECT 35 UNIT(S) EVERY EVENING BY SUBCUTANEOUS ROUTE.  2    losartan (COZAAR) 50 MG tablet Take 50 mg by mouth once daily.      metformin (GLUCOPHAGE) 500 MG tablet Take 500 mg by mouth 2 (two) times daily with meals.      nitroGLYCERIN (NITROSTAT) 0.4 MG SL tablet Place 1 tablet (0.4 mg total) under the tongue every 5 (five) minutes as needed for Chest pain. 10 tablet 0    potassium chloride (KLOR-CON) 10 MEQ TbSR Take 10 mEq by mouth once daily.      TRAVATAN Z 0.004 % Drop Place 1 drop into both eyes nightly.  3       Past Surgical History:   Procedure Laterality Date    EYE SURGERY      HYSTERECTOMY         Social History     Social History     Marital status:      Spouse name: N/A    Number of children: N/A    Years of education: N/A     Occupational History    Not on file.     Social History Main Topics    Smoking status: Never Smoker    Smokeless tobacco: Current User     Types: Snuff    Alcohol use No    Drug use: No    Sexual activity: Not Currently     Other Topics Concern    Not on file     Social History Narrative    No narrative on file         Vital Signs Range (Last 24H):         CBC:   Lab Results   Component Value Date    WBC 6.02 12/20/2017    HGB 12.1 12/20/2017    HCT 39.2 12/20/2017    MCV 93 12/20/2017     12/20/2017       CMP:   CMP  Sodium   Date Value Ref Range Status   12/20/2017 145 136 - 145 mmol/L Final     Potassium   Date Value Ref Range Status   12/20/2017 4.0 3.5 - 5.1 mmol/L Final     Chloride   Date Value Ref Range Status   12/20/2017 108 95 - 110 mmol/L Final     CO2   Date Value Ref Range Status   12/20/2017 30 (H) 23 - 29 mmol/L Final     Glucose   Date Value Ref Range Status   12/20/2017 86 70 - 110 mg/dL Final     BUN, Bld   Date Value Ref Range Status   12/20/2017 14 7 - 17 mg/dL Final     Creatinine   Date Value Ref Range Status   12/20/2017 1.12 0.50 - 1.40 mg/dL Final     Calcium   Date Value Ref Range Status   12/20/2017 9.6 8.7 - 10.5 mg/dL Final     Total Protein   Date Value Ref Range Status   12/20/2017 8.2 6.0 - 8.4 g/dL Final     Albumin   Date Value Ref Range Status   12/20/2017 4.1 3.5 - 5.2 g/dL Final     Total Bilirubin   Date Value Ref Range Status   12/20/2017 0.6 0.1 - 1.0 mg/dL Final     Comment:     For infants and newborns, interpretation of results should be based  on gestational age, weight and in agreement with clinical  observations.  Premature Infant recommended reference ranges:  Up to 24 hours.............<8.0 mg/dL  Up to 48 hours............<12.0 mg/dL  3-5 days..................<15.0 mg/dL  6-29 days.................<15.0 mg/dL       Alkaline Phosphatase   Date Value  Ref Range Status   12/20/2017 72 38 - 126 U/L Final     AST   Date Value Ref Range Status   12/20/2017 15 15 - 46 U/L Final     ALT   Date Value Ref Range Status   12/20/2017 17 10 - 44 U/L Final     Anion Gap   Date Value Ref Range Status   12/20/2017 7 (L) 8 - 16 mmol/L Final     eGFR if    Date Value Ref Range Status   12/20/2017 54.0 (A) >60 mL/min/1.73 m^2 Final     eGFR if non    Date Value Ref Range Status   12/20/2017 46.8 (A) >60 mL/min/1.73 m^2 Final     Comment:     Calculation used to obtain the estimated glomerular filtration  rate (eGFR) is the CKD-EPI equation.          INR  No results for input(s): PT, INR, PROTIME, APTT in the last 72 hours.        Diagnostic Studies:      EKG:      SEP-2017 18:41:00 EKG  System-K-ER ROUTINE RETRIEVAL  Sinus rhythm with 1st degree A-V block  Right bundle branch block  Left axis deviation  Moderate voltage criteria for LVH, may be normal variant  Possible Lateral infarct ,age undetermined  Abnormal ECG  When compared with ECG of 23-DEC-2013 13:54,  Borderline criteria for Lateral infarct are now Present  Confirmed by Sly West MD ([id number]) on 9/12/2017 2:53:06 PM  25mm/s 10mm/mV 40Hz 9.0.4 12SL 239 TRESSA: 1  Electronically signed by: Sly West MD      Anesthesia Evaluation    I have reviewed the Patient Summary Reports.    I have reviewed the Nursing Notes.      Review of Systems  Anesthesia Hx:  History of prior surgery of interest to airway management or planning: Denies Family Hx of Anesthesia complications.   Denies Personal Hx of Anesthesia complications.   Cardiovascular:   Hypertension    Endocrine:   Diabetes        Physical Exam  General:  Well nourished    Airway/Jaw/Neck:  Airway Findings: Mouth Opening: Normal Tongue: Normal  General Airway Assessment: Adult  Mallampati: III  Improves to III with phonation.  TM Distance: Normal, at least 6 cm  Jaw/Neck Findings:  Neck ROM: Normal ROM      Dental:  Dental  Findings: In tact   Chest/Lungs:  Chest/Lungs Findings: Clear to auscultation, Normal Respiratory Rate     Heart/Vascular:  Heart Findings: Rate: Normal  Rhythm: Regular Rhythm        Mental Status:  Mental Status Findings:  Cooperative, Alert and Oriented         Anesthesia Plan  Type of Anesthesia, risks & benefits discussed:  Anesthesia Type:  general  Patient's Preference:   Intra-op Monitoring Plan:   Intra-op Monitoring Plan Comments:   Post Op Pain Control Plan:   Post Op Pain Control Plan Comments:   Induction:   IV  Beta Blocker:  Patient is not currently on a Beta-Blocker (No further documentation required).       Informed Consent:  Anesthesia consent signed with patient.  ASA Score: 3     Day of Surgery Review of History & Physical:    H&P update referred to the surgeon.         Ready For Surgery From Anesthesia Perspective.

## 2018-03-29 NOTE — TRANSFER OF CARE
"Anesthesia Transfer of Care Note    Patient: Elina Mei    Procedure(s) Performed: Procedure(s) (LRB):  MASTECTOMY 23 hr stay (Right)    Patient location: PACU    Anesthesia Type: general    Transport from OR: Transported from OR on 6-10 L/min O2 by face mask with adequate spontaneous ventilation    Post pain: adequate analgesia    Post assessment: no apparent anesthetic complications    Post vital signs: stable    Level of consciousness: awake    Nausea/Vomiting: no nausea/vomiting    Complications: none    Transfer of care protocol was followed      Last vitals:   Visit Vitals  BP (!) 163/73   Pulse 68   Temp 36.3 °C (97.3 °F) (Temporal)   Resp (!) 24   Ht 5' 8" (1.727 m)   Wt 101.6 kg (224 lb)   SpO2 96%   BMI 34.06 kg/m²     "

## 2018-03-30 VITALS
TEMPERATURE: 98 F | DIASTOLIC BLOOD PRESSURE: 74 MMHG | HEIGHT: 68 IN | SYSTOLIC BLOOD PRESSURE: 165 MMHG | OXYGEN SATURATION: 95 % | RESPIRATION RATE: 16 BRPM | HEART RATE: 77 BPM | WEIGHT: 224 LBS | BODY MASS INDEX: 33.95 KG/M2

## 2018-03-30 LAB
ESTIMATED AVG GLUCOSE: 154 MG/DL
HBA1C MFR BLD HPLC: 7 %

## 2018-03-30 PROCEDURE — 25000003 PHARM REV CODE 250: Performed by: STUDENT IN AN ORGANIZED HEALTH CARE EDUCATION/TRAINING PROGRAM

## 2018-03-30 PROCEDURE — 94761 N-INVAS EAR/PLS OXIMETRY MLT: CPT

## 2018-03-30 PROCEDURE — 83036 HEMOGLOBIN GLYCOSYLATED A1C: CPT

## 2018-03-30 PROCEDURE — 36415 COLL VENOUS BLD VENIPUNCTURE: CPT

## 2018-03-30 RX ORDER — HYDROCODONE BITARTRATE AND ACETAMINOPHEN 5; 325 MG/1; MG/1
1-2 TABLET ORAL EVERY 6 HOURS PRN
Qty: 30 TABLET | Refills: 0 | Status: SHIPPED | OUTPATIENT
Start: 2018-03-30 | End: 2018-03-30

## 2018-03-30 RX ORDER — HYDROCODONE BITARTRATE AND ACETAMINOPHEN 5; 325 MG/1; MG/1
1-2 TABLET ORAL EVERY 6 HOURS PRN
Qty: 30 TABLET | Refills: 0 | Status: SHIPPED | OUTPATIENT
Start: 2018-03-30 | End: 2018-07-20

## 2018-03-30 RX ADMIN — HYDROCODONE BITARTRATE AND ACETAMINOPHEN 1 TABLET: 5; 325 TABLET ORAL at 05:03

## 2018-03-30 RX ADMIN — FUROSEMIDE 40 MG: 40 TABLET ORAL at 08:03

## 2018-03-30 NOTE — HOSPITAL COURSE
Pt underwent right mastectomy on 3/29/18. See operative note for procedure details. Tolerated procedure well. Post-operative course was uncomplicated. All post-operative milestones were met without delay. She was tolerating diet, ambulating, voiding spontaneously, and her pain was well controlled on PRN pain medications. Patient was instructed and educated on discharge instructions including drain care.   Incision on examination POD#1 was intact. No erythema, drainage or bleeding. No hematoma. Drain place with ss output.   She will be discharged to home in good condition on POD#1.

## 2018-03-30 NOTE — HPI
Elina Mei is a 77 y.o. female who had right breast IDC, grade II, ER+VA+H2N- at the 9OC position.  She underwent R USG lumpectomy and SLNB on 1/26/18. Pathology results showed close DCIS margins, but her invasive component is clear by 5mm. Patient elected to have total right mastectomy on 3/29/18.

## 2018-03-30 NOTE — DISCHARGE SUMMARY
[facility]  General Surgery  Discharge Summary      Patient Name: Elina Mei  MRN: [id number]  Admission Date: 3/29/2018  Hospital Length of Stay: 0 days  Discharge Date and Time:  03/30/2018 7:28 AM  Attending Physician: Yumi Melo MD   Discharging Provider: Sabrina Chau MD  Primary Care Provider: Laurence Baron MD     HPI:   Elina Mei is a 77 y.o. 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   She underwent R USG lumpectomy and SLNB on 1/26/18. Pathology results showed close DCIS margins, but her invasive component is clear by 5mm. Patient elected to have total right mastectomy on 3/29/18.         Procedure(s) (LRB):  MASTECTOMY 23 hr stay (Right)      Indwelling Lines/Drains at time of discharge:   Lines/Drains/Airways     Drain                 Closed/Suction Drain 03/29/18 1101 Right Chest Bulb 19 Fr. less than 1 day              Hospital Course: Pt underwent right mastectomy on 3/29/18. See operative note for procedure details. Tolerated procedure well. Post-operative course was uncomplicated. All post-operative milestones were met without delay. She was tolerating diet, ambulating, voiding spontaneously, and her pain was well controlled on PRN pain medications. Patient was instructed and educated on discharge instructions including drain care.   Incision on examination POD#1 was intact. No erythema, drainage or bleeding. No hematoma. Drain place with ss output.   She will be discharged to home in good condition on POD#1.      Pending Diagnostic Studies:     None        Final Active Diagnoses:    Diagnosis Date Noted POA    Breast cancer [C50.919] 03/29/2018 Yes      Problems Resolved During this Admission:    Diagnosis Date Noted Date Resolved POA      Discharged Condition: good    Disposition: Home or Self Care    Follow Up:  Follow-up Information     Yumi Melo MD In 10 days.    Specialties:  General Surgery, Breast Surgery  Contact  information:  [address]  [facility] [address]  [phone]                 Patient Instructions:     Activity as tolerated     Other restrictions (specify):   Order Comments: POSTOPERATIVE INSTRUCTIONS FOLLOWING   MASTECTOMY AND/OR AXILLARY LYMPH NODE DISSECTION    The following are post-operative instructions that will help you to recover from your surgery.  Please read over these instructions carefully and contact us if we can answer any of your questions or concerns.    Post-op care/Dressing/breast binder (surgi-bra)  A surgical bra may be placed around your chest after your surgery.  If you are given the bra, please wear it for the first 48 hours after surgery. After 48 hours you can remove your surgical bra and dressing to shower/cleanse the chest wall with antibacterial soap and warm water. Do not take a tub bath and do not soak the surgical site for at least 2 weeks.     The final pathology report will be available approximately 7-10 days after your surgery.  Our office will call you with your pathology report when it becomes available.    If blue dye was used to locate your sentinel lymph nodes, your urine and stool may be blue-green in color for 1 or 2 days.    Dr. Gerardo patients: please wear the surgical bra as close to 24 hours a day as possible until your post-operative clinic appointment.  If the elastic around the bra irritates your skin, you may wear a soft t-shirt underneath the bra. You may shower AFTER the drains are removed.  Please sponge bathe until then. Please do not remove the white strips of tape (steri-strips) that cover your incision.  They will be removed at your clinic visit. You may go without wearing the bra long enough to bath, to launder and dry the bra. If you have fluffy filler placed inside the bra, the filler should be removed whenever the bra is taken off. Please reinsert the fluffy filler, or insert the new soft filler, under the bra when you put the bra back on.  If  the bra is extremely uncomfortable, you may wear a supportive sports bra instead after 2 days.    Activity   You will be able to do much of your own personal care, such as bathing, dressing, preparing simple meals, etc.  A short walk each day will help with your recovery  You may find that you need to take rest breaks between activities, but you should not need to stay in bed for prolonged periods of time during the day. A good rule during this time is to listen to your body, do what is comfortable, and stop and rest when your feel tired.  If it hurts, dont do it.  Return to taking your daily medications as prescribed  Please avoid activities that require moderate to heavy lifting (grocery shopping) or pushing/pulling (vacuuming) and repetitive motions (such as washing windows). Do not lift anything heavier than a gallon of milk.  Following a lymph node dissection, dont avoid using your arm, but dont exercise your arm until after your first post-operative visit.  At your first post-op visit, you will be given arm exercises to regain movement and flexibility.  You may be referred to physical therapy if needed.  You may restart driving when you are no longer on narcotics and you feel safe turning the wheel and stopping quickly.  You will need to be out of work approximately 2-6 weeks depending on your particular surgery and how well you are recovering.  We will evaluate how you are doing at the first post-op appointment.  This is a good time to ask when you may return to work and what activities you may do.    Medication for pain  You will be given a prescription for pain medication. You should not drive or operate machinery while taking these.  Please take prescription pain medication (narcotics) with food.  Narcotics can cause, or worsen, constipation.  You will need to increase your fluid intake, eat high fiber foods (such as fruits and bran) and make sure that you are up and walking. You may need to take an  over the counter stool softener for constipation.  Short term use of an icepack may be helpful to decrease discomfort and swelling, particularly to the armpit after lymph node surgery.  A small pillow positioned in the armpit may also decrease discomfort after lymph node surgery.  If you are given a prescription for antibiotics, take them as prescribed.    How to care for your Drain(s)  Wash handsSTRIP or milk the drainage tube as it comes out of your body toward the bulb.   Beginning where the drain comes out of your body, hold drainage tubing with one hand and with the other, stretch and release tubing an inch at time while moving downward with both hands toward the bulb.  Do this 2-3 times before emptying the bulb.  Remove the stopper from the bulbs port  (drainage port)  Pour the drainage in the measuring cup provided by the nurse  Flatten/squeeze the bulb to create a vacuum and replace the stopper before letting go of the bulb.  Record the date, time and amount of drainage in ccs (not ounces) each time bulb is emptied. If you have more than one drain, record each separately.  Discard the drainage into the toilet after measuring and then wash hands.  Empty bulbs 2-3 times/day or as needed if it fills up before 8 hours.  Remember to bring the output record with you to your doctors appointment.    Please report the following:  Temperature greater than 101 degrees  Discharge or bad odor from the wound  Excessive bleeding, such as saturated bloody dressing or extreme bruising  Redness at incision and/or drain sites  Swelling or buildup of fluid around incision  Persistent fevers, chills, nausea, vomiting, or diarrhea    Additional information  Your surgeon will see you approximately 2 weeks following your surgery.  If this follow-up appointment has not been made, please call the office.    If you have any questions or problems, please call my office or my nurse.    Dr. Ryanne Melo Dr.  Rodrick Wilson, RN Yuan Huynh, RN Yuan Huynh, RN Gladys Herrmann, RN  [phone]    Ivy Alicea PA-C [phone]  Sharri Julio, [phone]    After hours and on weekends, you may call the main [employer] line at [phone] and ask to have the general surgery resident paged or have me paged      Lymphedema Risk Reduction    Lymphedema is a swelling of a part of the body, caused by an insufficient lymphatic system and an accumulation of fluid in the bodys tissues.  Lymphedema may occur when normal drainage of fluid is disrupted, such as an infection, injury, cancer, scar tissue, or removal of lymph nodes.    If you had a full axillary lymph node dissection procedure, you may be at greater risk for lymphedema.     For those patients having a sentinel lymph node biopsy, these risks may be smaller and the recommendations are provided for your review and consideration.    The following list contains recommendations for reducing your risk of developing lymphedema.    Skin Careavoid trauma/injury to reduce infection risk  Keep the hand and arm on the side of surgery clean and dry  Pay attention to nail care and do not cut cuticles  Avoid punctures, such as injections and blood draws from you on the side of your surgery  Wear gloves while doing activities that may cause skin injury (washing dishes, gardening, etc.)  If scratches or punctures occur, wash area with soap and water, and observe for signs of infections (redness, drainage, swelling)  If a rash, itching, redness, pain, increased skin temperatures, fever, or flu-like symptoms occur, contact your physician immediately for early treatment of a possible infection  Activity/Lifestyle  Gradually build up the duration and intensity of any activity or exercise  Take frequent rest periods during activity to allow for arm recovery  Monitor your arm and upper body during and  after activity for any change in size, shape, tissue, texture, soreness, heaviness, or firmness  Avoid constriction of your arm on the side of your surgery  Avoid having blood pressure taken on the arm on the side of your surgery  Wear loose fitting jewelry and clothing  Be careful not to rest a heavy purse, luggage, or grocery bags on that arm  When you return to wearing a bra, make sure that it is well fitted and not too tight     Notify your health care provider if you experience any of the following:  increased confusion or weakness     Notify your health care provider if you experience any of the following:  persistent dizziness, light-headedness, or visual disturbances     Notify your health care provider if you experience any of the following:  worsening rash     Notify your health care provider if you experience any of the following:  severe persistent headache     Notify your health care provider if you experience any of the following:  difficulty breathing or increased cough     Notify your health care provider if you experience any of the following:  redness, tenderness, or signs of infection (pain, swelling, redness, odor or green/yellow discharge around incision site)     Notify your health care provider if you experience any of the following:  severe uncontrolled pain     Notify your health care provider if you experience any of the following:  persistent nausea and vomiting or diarrhea     Notify your health care provider if you experience any of the following:  temperature >100.4     No dressing needed       Medications:  Reconciled Home Medications:      Medication List      START taking these medications    hydrocodone-acetaminophen 5-325mg 5-325 mg per tablet  Commonly known as:  NORCO  Take 1-2 tablets by mouth every 6 (six) hours as needed for Pain.        CONTINUE taking these medications    amLODIPine 10 MG tablet  Commonly known as:  NORVASC     atorvastatin 10 MG tablet  Commonly known as:   LIPITOR     dorzolamide-timolol 2-0.5% 22.3-6.8 mg/mL ophthalmic solution  Commonly known as:  COSOPT     fluticasone 50 mcg/actuation nasal spray  Commonly known as:  FLONASE  1 spray by Each Nare route 2 (two) times daily as needed (sinus congestion).     furosemide 40 MG tablet  Commonly known as:  LASIX     LANTUS SOLOSTAR U-100 INSULIN 100 unit/mL (3 mL) Inpn pen  Generic drug:  insulin glargine     losartan 50 MG tablet  Commonly known as:  COZAAR     metFORMIN 500 MG tablet  Commonly known as:  GLUCOPHAGE     nitroGLYCERIN 0.4 MG SL tablet  Commonly known as:  NITROSTAT  Place 1 tablet (0.4 mg total) under the tongue every 5 (five) minutes as needed for Chest pain.     * potassium chloride 10 MEQ Tbsr  Commonly known as:  KLOR-CON     * KLOR-CON SPRINKLE 10 MEQ Cpsr  Generic drug:  potassium chloride     TRAVATAN Z 0.004 % Drop  Generic drug:  travoprost        * This list has 2 medication(s) that are the same as other medications prescribed for you. Read the directions carefully, and ask your doctor or other care provider to review them with you.               Where to Get Your Medications      You can get these medications from any pharmacy    Bring a paper prescription for each of these medications  · hydrocodone-acetaminophen 5-325mg 5-325 mg per tablet       Time spent on the discharge of patient: 20 minutes    Sabrina Chau MD  General Surgery  [facility]

## 2018-03-30 NOTE — PLAN OF CARE
Problem: Patient Care Overview  Goal: Plan of Care Review  Outcome: Ongoing (interventions implemented as appropriate)  Patient AAOx4. No complaints of pain. VS stable, afrebrile. Neurovascular intact. Skin intact, with exception of right upper chest. Free from falls. Frequent rounds made for safety, pain and comfort. Bed at lowest position, call light within reach, side rails up x2.

## 2018-03-30 NOTE — NURSING
Pt discharged to home via wheelchair. Pt given prescriptions. Drain care teaching and copy of discharge home instructions. Pt denies pain at the this time. Pt educated on signs and symptoms of when to call the doctor. All belongings with the patient. Pt verbalized understanding of all discharge instructions.

## 2018-04-06 NOTE — ANESTHESIA POSTPROCEDURE EVALUATION
"Anesthesia Post Evaluation    Patient: Elina Mei    Procedure(s) Performed: Procedure(s) (LRB):  MASTECTOMY 23 hr stay (Right)    Final Anesthesia Type: general  Patient location during evaluation: PACU  Patient participation: Yes- Able to Participate  Level of consciousness: awake and alert  Post-procedure vital signs: reviewed and stable  Pain management: adequate  PONV status at discharge: No PONV  Anesthetic complications: no      Cardiovascular status: blood pressure returned to baseline  Respiratory status: unassisted  Hydration status: euvolemic  Follow-up not needed.        Visit Vitals  BP (!) 165/74 (BP Location: Left arm, Patient Position: Lying)   Pulse 77   Temp 36.8 °C (98.2 °F) (Oral)   Resp 16   Ht 5' 8" (1.727 m)   Wt 101.6 kg (224 lb)   SpO2 95%   BMI 34.06 kg/m²       Pain/Ronny Score: No Data Recorded      "

## 2018-04-06 NOTE — ANESTHESIA RELEASE NOTE
"Anesthesia Release from PACU Note    Patient: Elina Mei    Procedure(s) Performed: Procedure(s) (LRB):  MASTECTOMY 23 hr stay (Right)    Anesthesia type: general    Post pain: Adequate analgesia    Post assessment: no apparent anesthetic complications    Last Vitals:   Visit Vitals  BP (!) 165/74 (BP Location: Left arm, Patient Position: Lying)   Pulse 77   Temp 36.8 °C (98.2 °F) (Oral)   Resp 16   Ht 5' 8" (1.727 m)   Wt 101.6 kg (224 lb)   SpO2 95%   BMI 34.06 kg/m²       Post vital signs: stable    Level of consciousness: awake    Nausea/Vomiting: no nausea/no vomiting    Complications: none    Airway Patency: patent    Respiratory: unassisted    Cardiovascular: stable and blood pressure at baseline    Hydration: euvolemic     "

## 2018-04-16 ENCOUNTER — TELEPHONE (OUTPATIENT)
Dept: SURGERY | Facility: CLINIC | Age: 78
End: 2018-04-16

## 2018-04-16 ENCOUNTER — OFFICE VISIT (OUTPATIENT)
Dept: SURGERY | Facility: CLINIC | Age: 78
End: 2018-04-16
Payer: MEDICARE

## 2018-04-16 DIAGNOSIS — Z17.0 MALIGNANT NEOPLASM OF UPPER-OUTER QUADRANT OF RIGHT BREAST IN FEMALE, ESTROGEN RECEPTOR POSITIVE: Primary | ICD-10-CM

## 2018-04-16 DIAGNOSIS — C50.411 MALIGNANT NEOPLASM OF UPPER-OUTER QUADRANT OF RIGHT BREAST IN FEMALE, ESTROGEN RECEPTOR POSITIVE: Primary | ICD-10-CM

## 2018-04-16 PROCEDURE — 99024 POSTOP FOLLOW-UP VISIT: CPT | Mod: S$GLB,,, | Performed by: SURGERY

## 2018-04-16 NOTE — TELEPHONE ENCOUNTER
Return call to pt.Informed that I spoke with her daughter when I could not reach pt about having pt come in for appt today. Appt scheduled at 3:45 pm at the [facility] location.

## 2018-04-16 NOTE — Clinical Note
Mini,   Ms Mei had some residual DCIS on her mastectomy specimen so I'm glad you were persistent with her to go back to the OR.  She is healing nicely and will no longer need XRT.  Thanks! Yumi

## 2018-04-16 NOTE — PROGRESS NOTES
Breast Surgery  Department of Surgery      REFERRING PROVIDER: Dr. Medellin  Chief Complaint: Right breast cancer    Subjective:      Patient ID: Elina Mei is a 78 y.o. female who  had right breast IDC, grade II, ER+AR+H2N- at the 9OC position.  She underwent R USG lumpectomy and SLNB on 1/26/18. Pathology results showed close DCIS margins, but her invasive component is clear by 5mm. Patient elected to undergo total right mastectomy on 3/29/18. She tolerated the procedure well and presents today for post operative check. She and daughter state she is doing well and pain is controlled. She still has a drain in place but unsure of how much it is putting out daily. Endorses slight discomfort and numbness under her right arm.     Interval history: Pathology reported Invasive ductal carcinoma, moderately differentiated, 1.5 cm, and DCIS. 2/2 lymph nodes negative for metastasis. Inferior margin with DCIS < 0.1 mm. Anterior margin with <1mm. Case was discussed at tumor board on 2/20 and was recommended for patient to have re-excision followed by radiation and endocrine therapy. Patient has met with Dr. Ko recently and she recommended re-excision followed by whole breast irradiation for a total dose of 4200 cGy +1000 cGy boost. Patient and family wanted to discuss options prior to making decision.    Patient does not routinely do self breast exams.  Patient has not noted a change on breast exam.  Patient denies nipple discharge. Patient denies to previous breast biopsy. Patient denies a personal history of breast cancer.    Findings at that time were the following:   Tumor size: 2.5 cm   Tumor grade: II   Estrogen Receptor: +   Progesterone Receptor: +   Her-2 juan: -   Lymph node status: unknown   Lymphatic invasion: unknown     Since her prior presentation to clinic she denies any changes to her medical history. She denies any hospitalizations or changes in her medications. She reports she has met with her primary  care physician as requested and obtained lab work and pre-op CXR and EKG prior to her visit today.     GYN History:  Age of menarche was 10. Age of menopause was 40s with hysterectomy.   Patient denies hormonal therapy. Patient is . Age of first live birth was 17. Patient did breast feed.    Past Medical History:   Diagnosis Date    Arthritis     Diabetes mellitus     Hypertension     Malignant neoplasm of upper-outer quadrant of right breast in female, estrogen receptor positive 2017     Past Surgical History:   Procedure Laterality Date    EYE SURGERY      HYSTERECTOMY       Current Outpatient Prescriptions on File Prior to Visit   Medication Sig Dispense Refill    amlodipine (NORVASC) 10 MG tablet Take 10 mg by mouth once daily.      atorvastatin (LIPITOR) 10 MG tablet Take 10 mg by mouth once daily.  0    dorzolamide-timolol 2-0.5% (COSOPT) 22.3-6.8 mg/mL ophthalmic solution Place 1 drop into both eyes 2 (two) times daily.  6    fluticasone (FLONASE) 50 mcg/actuation nasal spray 1 spray by Each Nare route 2 (two) times daily as needed (sinus congestion). 15 g 0    furosemide (LASIX) 40 MG tablet Take 40 mg by mouth once daily.       hydrocodone-acetaminophen 5-325mg (NORCO) 5-325 mg per tablet Take 1-2 tablets by mouth every 6 (six) hours as needed for Pain. 30 tablet 0    KLOR-CON SPRINKLE 10 mEq CpSR Take 10 mEq by mouth once daily.  0    LANTUS SOLOSTAR 100 unit/mL (3 mL) InPn pen INJECT 35 UNIT(S) EVERY EVENING BY SUBCUTANEOUS ROUTE.  2    losartan (COZAAR) 50 MG tablet Take 50 mg by mouth once daily.      metformin (GLUCOPHAGE) 500 MG tablet Take 500 mg by mouth 2 (two) times daily with meals.      nitroGLYCERIN (NITROSTAT) 0.4 MG SL tablet Place 1 tablet (0.4 mg total) under the tongue every 5 (five) minutes as needed for Chest pain. 10 tablet 0    potassium chloride (KLOR-CON) 10 MEQ TbSR Take 10 mEq by mouth once daily.      TRAVATAN Z 0.004 % Drop Place 1 drop into both eyes  nightly.  3     No current facility-administered medications on file prior to visit.      Social History     Social History    Marital status:      Spouse name: N/A    Number of children: N/A    Years of education: N/A     Occupational History    Not on file.     Social History Main Topics    Smoking status: Never Smoker    Smokeless tobacco: Current User     Types: Snuff    Alcohol use No    Drug use: No    Sexual activity: Not Currently     Other Topics Concern    Not on file     Social History Narrative    No narrative on file     Family History   Problem Relation Age of Onset    Cancer Sister 75     Colon CA    Breast cancer Sister 55    Cancer Brother     Hypertension Mother         Review of Systems   Constitutional: Negative for appetite change, chills, fever and unexpected weight change.   HENT: Negative for facial swelling, postnasal drip and sore throat.    Eyes: Negative for redness and itching.   Respiratory: Negative for chest tightness and shortness of breath.    Cardiovascular: Negative for chest pain and palpitations.   Gastrointestinal: Negative for blood in stool, diarrhea, nausea and vomiting.   Genitourinary: Negative for difficulty urinating and dysuria.   Musculoskeletal: Negative for arthralgias and joint swelling.   Skin: Negative for rash and wound.   Neurological: Negative for dizziness and syncope.   Hematological: Negative for adenopathy.   Psychiatric/Behavioral: Negative for agitation. The patient is not nervous/anxious.      Objective:   There were no vitals taken for this visit.    Physical Exam   Constitutional: She appears well-developed and well-nourished.   HENT:   Head: Normocephalic.   Eyes: No scleral icterus.   Neck: Neck supple. No tracheal deviation present.   Cardiovascular: Normal rate and regular rhythm.    Pulmonary/Chest: Breath sounds normal. No respiratory distress. Right breast exhibits no inverted nipple, no mass, no nipple discharge and no  skin change. Left breast exhibits no inverted nipple, no mass, no nipple discharge and no skin change.       Abdominal: Soft. She exhibits no mass. There is no tenderness.   Musculoskeletal: She exhibits no edema.   Lymphadenopathy:     She has no cervical adenopathy.   Neurological: She is alert.   Skin: No rash noted. No erythema.     Psychiatric: She has a normal mood and affect.           PATH:  FINAL PATHOLOGIC DIAGNOSIS  1. Right breast, ultrasound-guided lumpectomy:  - Invasive ductal carcinoma, moderately differentiated, 1.5 cm.  - DCIS.  - Please see CAP synoptic report below.  2. New posterior margin, excision:  - Benign breast tissue with focal microcalcifications.  - Negative for malignancy or atypia.  3. New inferior margin, excision:  - Ductal carcinoma in situ (DCIS), low to intermediate nuclear grade, cribriform and micropapillary type, at  least 1.6 cm.  - Margin uninvolved, < 0.1 mm from the new margin.  4. New medial margin, excision:  - Benign breast tissue.  - Negative for malignancy or atypia.  5. Right axillary sentinel node, hot, 1100, dissection:  - Two lymph nodes, negative for carcinoma (0/2).    Note: All sentinel lymph node tissue was examined at three levels with routine (H&E) stains and with three epithelial  immunostains (AE1/AE3, Cam5.2, WSK). Positive and negative controls reacted appropriately.  SURGICAL PATHOLOGY CANCER CASE SUMMARY- Breast  Procedure: Excision (less than a total mastectomy).  Specimen laterality: Right.  Tumor size: 15 mm (greatest dimension).  Histologic type: Invasive ductal carcinoma.  Histiologic grade: Grade 2 (glandular/tubular differentiation- 3, nuclear pleomorphism- 2, mitotic rate- 1)  Tumor focality: Single focus  Ductal carcinoma in situ: Present, negative for extensive intraductal component (EIC). See note.  Size of DCIS: ~4.0 cm.  - Number of blocks with DCIS: 10.  - Number of blocks examined: 16.  Architectural patterns: Micropapillary and  cribriform types.  Nuclear grade: Low to intermediate.  Necrosis: Not present.  Note: The size (extent) of DCIS is an estimation of the volume of breast tissue occupied by DCIS. DCIS is  surrounding the carcinoma but is not within the invasive component. Though it does not strictly fulfill the criteria for  EIC, DCIS spans nearly the entire lumpectomy specimen.  Margins (including new margins)-  Uninvolved by invasive carcinoma, distance from closest margin (superior) is 5 mm (part 1).  Uninvolved by DCIS, distance from closest margins are < 0.1 mm (part 3, inferior) and < 1 mm (part 1,  anterior).  Treatment effect: No known presurgical therapy.  Number of sentinel lymph nodes examined: 2.  Total number of lymph nodes examined (sentinel and non-sentinel): 2.  Number of lymph nodes involved: 0.  Pathologic staging (pTNM, AJCC 8th edition): pT1c pN0.  Ancillary studies- (Performed on biopsy specimen [id number])  ER: Positive (strong, >95% of tumor cell nuclei).  GA: Positive (spectrum of weak, moderate and strong, >90% of tumor cell nuclei)  HER2: Negative (1+).  All stains have satisfactory positive and negative controls.    Assessment:       76 y/o female with R breast cancer, completely excised but margins close for DCIS, now s/p right mastectomy on 3/29/18  Plan:     Patient instructed on how to measure and record drain output. She is to record output tonight and tomorrow, and then call clinic tomorrow afternoon. If minimal drain output, will take out drain on Wednesday 4/18. If output still remains high, will likely remove drain on Friday 4/20.   Otherwise patient is doing well. She no longer needs to follow up with radiation oncology. Would like for her to meet with medical oncology to discuss hormonal therapy.

## 2018-04-16 NOTE — TELEPHONE ENCOUNTER
Call to pt's daughter after pt's voice mail box was full and unable to leave a message. Spoke w/daughter Rochelle about coming in today for an appt. Appt scheduled for 3:45 pm. Informed appt at [facility] location.Rochelle will call back if unable to bring pt since it's appt at different location.

## 2018-04-16 NOTE — TELEPHONE ENCOUNTER
----- Message from Yumi Melo MD sent at 4/13/2018  5:41 PM CDT -----  Needs post op appointment.  Still has drain in.  Could also come Monday if can get her there

## 2018-04-22 PROBLEM — C50.919 BREAST CANCER: Status: RESOLVED | Noted: 2018-03-29 | Resolved: 2018-04-22

## 2018-04-22 PROBLEM — C50.911 BREAST CANCER, RIGHT: Status: RESOLVED | Noted: 2018-01-25 | Resolved: 2018-04-22

## 2018-04-26 ENCOUNTER — HOSPITAL ENCOUNTER (INPATIENT)
Facility: HOSPITAL | Age: 78
LOS: 1 days | Discharge: HOME HEALTH | DRG: 065 | End: 2018-04-27
Attending: EMERGENCY MEDICINE | Admitting: PSYCHIATRY & NEUROLOGY
Payer: MEDICARE

## 2018-04-26 ENCOUNTER — TELEPHONE (OUTPATIENT)
Dept: HEMATOLOGY/ONCOLOGY | Facility: CLINIC | Age: 78
End: 2018-04-26

## 2018-04-26 DIAGNOSIS — I63.412 EMBOLIC STROKE INVOLVING LEFT MIDDLE CEREBRAL ARTERY: ICD-10-CM

## 2018-04-26 DIAGNOSIS — R53.1 ACUTE RIGHT-SIDED WEAKNESS: ICD-10-CM

## 2018-04-26 DIAGNOSIS — I63.9 STROKE: ICD-10-CM

## 2018-04-26 LAB
ALBUMIN SERPL BCP-MCNC: 3.2 G/DL
ALP SERPL-CCNC: 90 U/L
ALT SERPL W/O P-5'-P-CCNC: 5 U/L
ANION GAP SERPL CALC-SCNC: 9 MMOL/L
AST SERPL-CCNC: 12 U/L
BASOPHILS # BLD AUTO: 0.04 K/UL
BASOPHILS NFR BLD: 0.6 %
BILIRUB SERPL-MCNC: 0.5 MG/DL
BUN SERPL-MCNC: 13 MG/DL
CALCIUM SERPL-MCNC: 9.2 MG/DL
CHLORIDE SERPL-SCNC: 108 MMOL/L
CHOLEST SERPL-MCNC: 231 MG/DL
CHOLEST/HDLC SERPL: 5.1 {RATIO}
CO2 SERPL-SCNC: 25 MMOL/L
CREAT SERPL-MCNC: 1.1 MG/DL
CREAT SERPL-MCNC: 1.2 MG/DL (ref 0.5–1.4)
DIFFERENTIAL METHOD: ABNORMAL
EOSINOPHIL # BLD AUTO: 0.2 K/UL
EOSINOPHIL NFR BLD: 2.9 %
ERYTHROCYTE [DISTWIDTH] IN BLOOD BY AUTOMATED COUNT: 12.7 %
EST. GFR  (AFRICAN AMERICAN): 54.8 ML/MIN/1.73 M^2
EST. GFR  (NON AFRICAN AMERICAN): 47.5 ML/MIN/1.73 M^2
ESTIMATED PA SYSTOLIC PRESSURE: 38.52
GLUCOSE SERPL-MCNC: 153 MG/DL
HCT VFR BLD AUTO: 37.7 %
HDLC SERPL-MCNC: 45 MG/DL
HDLC SERPL: 19.5 %
HGB BLD-MCNC: 11.6 G/DL
IMM GRANULOCYTES # BLD AUTO: 0.02 K/UL
IMM GRANULOCYTES NFR BLD AUTO: 0.3 %
INR PPP: 0.9
LDLC SERPL CALC-MCNC: 160.2 MG/DL
LYMPHOCYTES # BLD AUTO: 1.5 K/UL
LYMPHOCYTES NFR BLD: 22.9 %
MCH RBC QN AUTO: 28.7 PG
MCHC RBC AUTO-ENTMCNC: 30.8 G/DL
MCV RBC AUTO: 93 FL
MONOCYTES # BLD AUTO: 0.5 K/UL
MONOCYTES NFR BLD: 7.4 %
NEUTROPHILS # BLD AUTO: 4.4 K/UL
NEUTROPHILS NFR BLD: 65.9 %
NONHDLC SERPL-MCNC: 186 MG/DL
NRBC BLD-RTO: 0 /100 WBC
PLATELET # BLD AUTO: 270 K/UL
PMV BLD AUTO: 10.6 FL
POC PTINR: 0.9 (ref 0.9–1.2)
POC PTWBT: 11.1 SEC (ref 9.7–14.3)
POCT GLUCOSE: 114 MG/DL (ref 70–110)
POCT GLUCOSE: 141 MG/DL (ref 70–110)
POTASSIUM SERPL-SCNC: 3.5 MMOL/L
PROT SERPL-MCNC: 7.7 G/DL
PROTHROMBIN TIME: 9.5 SEC
RBC # BLD AUTO: 4.04 M/UL
RETIRED EF AND QEF - SEE NOTES: 65 (ref 55–65)
SAMPLE: NORMAL
SAMPLE: NORMAL
SODIUM SERPL-SCNC: 142 MMOL/L
TRICUSPID VALVE REGURGITATION: NORMAL
TRIGL SERPL-MCNC: 129 MG/DL
TSH SERPL DL<=0.005 MIU/L-ACNC: 1.43 UIU/ML
WBC # BLD AUTO: 6.6 K/UL

## 2018-04-26 PROCEDURE — 93005 ELECTROCARDIOGRAM TRACING: CPT

## 2018-04-26 PROCEDURE — 84443 ASSAY THYROID STIM HORMONE: CPT

## 2018-04-26 PROCEDURE — 93306 TTE W/DOPPLER COMPLETE: CPT

## 2018-04-26 PROCEDURE — 63600175 PHARM REV CODE 636 W HCPCS: Performed by: PSYCHIATRY & NEUROLOGY

## 2018-04-26 PROCEDURE — G8996 SWALLOW CURRENT STATUS: HCPCS | Mod: CJ

## 2018-04-26 PROCEDURE — 82565 ASSAY OF CREATININE: CPT

## 2018-04-26 PROCEDURE — 85025 COMPLETE CBC W/AUTO DIFF WBC: CPT

## 2018-04-26 PROCEDURE — 85610 PROTHROMBIN TIME: CPT

## 2018-04-26 PROCEDURE — 82962 GLUCOSE BLOOD TEST: CPT

## 2018-04-26 PROCEDURE — 20600001 HC STEP DOWN PRIVATE ROOM

## 2018-04-26 PROCEDURE — 99223 1ST HOSP IP/OBS HIGH 75: CPT | Mod: ,,, | Performed by: PSYCHIATRY & NEUROLOGY

## 2018-04-26 PROCEDURE — 93306 TTE W/DOPPLER COMPLETE: CPT | Mod: 26,,, | Performed by: INTERNAL MEDICINE

## 2018-04-26 PROCEDURE — 92610 EVALUATE SWALLOWING FUNCTION: CPT

## 2018-04-26 PROCEDURE — 80061 LIPID PANEL: CPT

## 2018-04-26 PROCEDURE — 99291 CRITICAL CARE FIRST HOUR: CPT | Mod: 25

## 2018-04-26 PROCEDURE — 99291 CRITICAL CARE FIRST HOUR: CPT | Mod: ,,, | Performed by: EMERGENCY MEDICINE

## 2018-04-26 PROCEDURE — A4216 STERILE WATER/SALINE, 10 ML: HCPCS | Performed by: PSYCHIATRY & NEUROLOGY

## 2018-04-26 PROCEDURE — 25000003 PHARM REV CODE 250: Performed by: PSYCHIATRY & NEUROLOGY

## 2018-04-26 PROCEDURE — G8997 SWALLOW GOAL STATUS: HCPCS | Mod: CI

## 2018-04-26 PROCEDURE — 93010 ELECTROCARDIOGRAM REPORT: CPT | Mod: ,,, | Performed by: INTERNAL MEDICINE

## 2018-04-26 PROCEDURE — 80053 COMPREHEN METABOLIC PANEL: CPT

## 2018-04-26 RX ORDER — ATORVASTATIN CALCIUM 10 MG/1
10 TABLET, FILM COATED ORAL DAILY
Status: DISCONTINUED | OUTPATIENT
Start: 2018-04-26 | End: 2018-04-26

## 2018-04-26 RX ORDER — SODIUM CHLORIDE 0.9 % (FLUSH) 0.9 %
3 SYRINGE (ML) INJECTION EVERY 8 HOURS
Status: DISCONTINUED | OUTPATIENT
Start: 2018-04-26 | End: 2018-04-27 | Stop reason: HOSPADM

## 2018-04-26 RX ORDER — HEPARIN SODIUM 5000 [USP'U]/ML
5000 INJECTION, SOLUTION INTRAVENOUS; SUBCUTANEOUS EVERY 8 HOURS
Status: DISCONTINUED | OUTPATIENT
Start: 2018-04-26 | End: 2018-04-27 | Stop reason: HOSPADM

## 2018-04-26 RX ORDER — ASPIRIN 81 MG/1
81 TABLET ORAL DAILY
Status: DISCONTINUED | OUTPATIENT
Start: 2018-04-26 | End: 2018-04-27 | Stop reason: HOSPADM

## 2018-04-26 RX ORDER — ATORVASTATIN CALCIUM 20 MG/1
40 TABLET, FILM COATED ORAL DAILY
Status: DISCONTINUED | OUTPATIENT
Start: 2018-04-26 | End: 2018-04-27 | Stop reason: HOSPADM

## 2018-04-26 RX ORDER — SODIUM CHLORIDE 9 MG/ML
INJECTION, SOLUTION INTRAVENOUS CONTINUOUS
Status: DISCONTINUED | OUTPATIENT
Start: 2018-04-26 | End: 2018-04-27 | Stop reason: HOSPADM

## 2018-04-26 RX ADMIN — Medication 3 ML: at 02:04

## 2018-04-26 RX ADMIN — ASPIRIN 81 MG: 81 TABLET, COATED ORAL at 12:04

## 2018-04-26 RX ADMIN — ATORVASTATIN CALCIUM 40 MG: 20 TABLET, FILM COATED ORAL at 04:04

## 2018-04-26 RX ADMIN — SODIUM CHLORIDE: 0.9 INJECTION, SOLUTION INTRAVENOUS at 12:04

## 2018-04-26 RX ADMIN — HEPARIN SODIUM 5000 UNITS: 5000 INJECTION, SOLUTION INTRAVENOUS; SUBCUTANEOUS at 04:04

## 2018-04-26 RX ADMIN — Medication 3 ML: at 09:04

## 2018-04-26 RX ADMIN — HEPARIN SODIUM 5000 UNITS: 5000 INJECTION, SOLUTION INTRAVENOUS; SUBCUTANEOUS at 09:04

## 2018-04-26 NOTE — PT/OT/SLP EVAL
"Speech Language Pathology Evaluation  Bedside Swallow    Patient Name:  Elina Mei   MRN:  [id number]  Admitting Diagnosis: <principal problem not specified>    Recommendations:                 General Recommendations:  Dysphagia therapy, Speech language evaluation and Cognitive-linguistic evaluation  Diet recommendations:  Dental Soft, Thin   Aspiration Precautions: Check for pocketing/oral residue, HOB to 90 degrees and Standard aspiration precautions   General Precautions: Standard, aspiration, fall, dental soft  Communication strategies:  none    History:     Past Medical History:   Diagnosis Date    Acute right-sided weakness 4/26/2018    Arthritis     Diabetes mellitus     Hypertension     Malignant neoplasm of upper-outer quadrant of right breast in female, estrogen receptor positive 12/5/2017    Stroke 4/26/2018       Past Surgical History:   Procedure Laterality Date    EYE SURGERY      HYSTERECTOMY         Social History: Patient lives with daughter and grandson    Prior diet: regular/thin      Subjective     " It feels the same to me" re: swallowing  Patient goals: to go home    Pain/Comfort:  · Pain Rating 1: 0/10  · Pain Rating Post-Intervention 1: 0/10    Objective:     Oral Musculature Evaluation  · Oral Musculature: right weakness  · Dentition: scattered dentition, teeth in poor condition  · Secretion Management: right corner drooling  · Mucosal Quality: adequate  · Mandibular Strength and Mobility: WFL  · Oral Labial Strength and Mobility: impaired pursing, impaired retraction, impaired seal  · Lingual Strength and Mobility: functional strength, functional protrusion  · Buccal Strength and Mobility: impaired  · Volitional Cough: strong  · Voice Prior to PO Intake: cler    Bedside Swallow Eval:   Consistencies Assessed:  · Thin liquids teaspoon and cup  · Solids cracker     Oral Phase:   · Anterior loss  · Prolonged mastication    Pharyngeal Phase:   · no overt clinical signs/symptoms of " pharyngeal dysphagia    Compensatory Strategies  · None    Treatment: white board updated. Reviewed s/s of aspiration and swallowing precautions. Discussed changing diet to dental soft due to right sided weakness with pt, family and nursing. Transport arrived to take pt to MRI so speech,language eval was not initiated.     Assessment:     Elina Mei is a 78 y.o. female with an SLP diagnosis of Dysphagia.  She presents with oral dysphagia and dysarthria.     Goals:    SLP Goals        Problem: SLP Goal    Goal Priority Disciplines Outcome   SLP Goal     SLP    Description:  Goals to be met 5/3  1 pt will tolerate soft diet and thin liquids  2 pt will participate in speech lang eval                     Plan:     · Patient to be seen:  4 x/week   · Plan of Care expires:     · Plan of Care reviewed with:  patient, family   · SLP Follow-Up:  Yes       Discharge recommendations:  other (see comments) (tbd)       Time Tracking:     SLP Treatment Date:   04/26/18  Speech Start Time:  1316  Speech Stop Time:  1327     Speech Total Time (min):  11 min    Billable Minutes: Eval Swallow and Oral Function 11    MARYLOU Best, CCC-SLP  04/26/2018

## 2018-04-26 NOTE — TELEPHONE ENCOUNTER
Returned call to pt daughter.   Pt daughter stated that pt was on way to appt and started having stroke like symptoms and is now in ER.   Informed pt daughter would cancel appt, apologized and expressed sympathy for pt, and that navigator would be in touch with a new appt once all settles down.   Pt daughter verbalized understanding.     Message fwd to Mela Faith.           ----- Message from Carmen King sent at 4/26/2018  9:24 AM CDT -----  Contact: Pt Daughter   Pt melataylor called to cancel appt 4/26 pt is in the ER at this time  Callback#[phone]  Thank You  DAY King

## 2018-04-26 NOTE — CONSULTS
Inpatient consult to Physical Medicine Rehab  Consult performed by: LEATHA AGARWAL  Consult ordered by: ANGELES JOHNSON  Reason for consult: assess rehab needs      Reviewed patient history and current admission.  Rehab team following.  Full consult to follow.    DANA Coulter, FNP-C  Physical Medicine & Rehabilitation   04/26/2018  Spectralink: [id number]

## 2018-04-26 NOTE — ASSESSMENT & PLAN NOTE
79 y/o right handed female with HTN, DM, arthritis, right breat cancer s/p total mastectomy on 3/29/18,  who presents to the ED via EMS with a complaint of right sided facial droop, slurred speech, and R sided weakness mainly the right arm that started at 8:30 AM.   CT head negative for acute abnormality  Likely acute left subcortical infarct but unfortunately no a suitable candidate for treatment with iv alteplase due to right mastectomy just 3 weeks ago.  Doubt LVO.  Admit to the stroke service and complete stroke work up.  ASA, atorvastatin. PT/OT/speech consults.

## 2018-04-26 NOTE — ED NOTES
[id number] tele monitor called to WAR room.  Pt to be transported to [facility] via ED tech.

## 2018-04-26 NOTE — ED TRIAGE NOTES
Pt arrived via EMS from home for neurological evaluation. Pt developed slurred speech, R facial droop, RUE & RLE drift, RUE decreased sensation at 0830 this morning. Pt is alert and oriented x3.

## 2018-04-26 NOTE — SUBJECTIVE & OBJECTIVE
Past Medical History:   Diagnosis Date    Arthritis     Diabetes mellitus     Hypertension     Malignant neoplasm of upper-outer quadrant of right breast in female, estrogen receptor positive 12/5/2017    Stroke 4/26/2018     Past Surgical History:   Procedure Laterality Date    EYE SURGERY      HYSTERECTOMY       Family History   Problem Relation Age of Onset    Cancer Sister 75     Colon CA    Breast cancer Sister 55    Cancer Brother     Hypertension Mother      Social History   Substance Use Topics    Smoking status: Never Smoker    Smokeless tobacco: Current User     Types: Snuff    Alcohol use No     Review of patient's allergies indicates:  No Known Allergies    Medications: I have reviewed the current medication administration record.    Prescriptions Prior to Admission   Medication Sig Dispense Refill Last Dose    amlodipine (NORVASC) 10 MG tablet Take 10 mg by mouth once daily.   Unknown at Unknown time    atorvastatin (LIPITOR) 10 MG tablet Take 10 mg by mouth once daily.  0 Unknown at Unknown time    dorzolamide-timolol 2-0.5% (COSOPT) 22.3-6.8 mg/mL ophthalmic solution Place 1 drop into both eyes 2 (two) times daily.  6 Unknown at Unknown time    fluticasone (FLONASE) 50 mcg/actuation nasal spray 1 spray by Each Nare route 2 (two) times daily as needed (sinus congestion). 15 g 0 Unknown at Unknown time    furosemide (LASIX) 40 MG tablet Take 40 mg by mouth once daily.    Unknown at Unknown time    hydrocodone-acetaminophen 5-325mg (NORCO) 5-325 mg per tablet Take 1-2 tablets by mouth every 6 (six) hours as needed for Pain. 30 tablet 0 Unknown at Unknown time    KLOR-CON SPRINKLE 10 mEq CpSR Take 10 mEq by mouth once daily.  0 Unknown at Unknown time    LANTUS SOLOSTAR 100 unit/mL (3 mL) InPn pen INJECT 35 UNIT(S) EVERY EVENING BY SUBCUTANEOUS ROUTE.  2 Unknown at Unknown time    losartan (COZAAR) 50 MG tablet Take 50 mg by mouth once daily.   Unknown at Unknown time     metformin (GLUCOPHAGE) 500 MG tablet Take 500 mg by mouth 2 (two) times daily with meals.   Unknown at Unknown time    nitroGLYCERIN (NITROSTAT) 0.4 MG SL tablet Place 1 tablet (0.4 mg total) under the tongue every 5 (five) minutes as needed for Chest pain. 10 tablet 0 Unknown at Unknown time    potassium chloride (KLOR-CON) 10 MEQ TbSR Take 10 mEq by mouth once daily.   Unknown at Unknown time    TRAVATAN Z 0.004 % Drop Place 1 drop into both eyes nightly.  3 Unknown at Unknown time       Review of Systems   Constitutional: Negative for chills, diaphoresis and fever.   HENT: Negative for hearing loss, tinnitus and trouble swallowing.    Eyes: Positive for photophobia. Negative for visual disturbance.   Respiratory: Negative for chest tightness and shortness of breath.    Cardiovascular: Negative for chest pain and palpitations.   Gastrointestinal: Negative for abdominal pain and vomiting.   Endocrine: Negative for cold intolerance and polyuria.   Genitourinary: Negative for hematuria.   Musculoskeletal: Negative for neck pain and neck stiffness.   Skin: Negative for rash.   Allergic/Immunologic: Negative for immunocompromised state.   Neurological: Positive for speech difficulty, weakness and numbness. Negative for dizziness and headaches.   Hematological: Does not bruise/bleed easily.   Psychiatric/Behavioral: Negative for agitation, behavioral problems and confusion.     Objective:     Vital Signs (Most Recent):  Pulse: 90 (04/26/18 1200)  Resp: 16 (04/26/18 1200)  BP: (!) 180/90 (04/26/18 1200)  SpO2: (!) 92 % (04/26/18 1200)    Vital Signs Range (Last 24H):  Pulse:  [75-90]   Resp:  [11-18]   BP: (180-198)/(79-90)   SpO2:  [92 %-98 %]     Physical Exam   Constitutional: She is oriented to person, place, and time. She appears well-developed and well-nourished.   HENT:   Head: Normocephalic and atraumatic.   Eyes: EOM are normal. Pupils are equal, round, and reactive to light.   Neck: Normal range of motion.  Neck supple.   Cardiovascular: Normal rate and regular rhythm.    Pulmonary/Chest: Effort normal. No respiratory distress.   Abdominal: Soft. She exhibits no mass.   Genitourinary:   Genitourinary Comments: No performed     Musculoskeletal: Normal range of motion. She exhibits no deformity.   Neurological: She is alert and oriented to person, place, and time. A cranial nerve deficit is present. No sensory deficit. Coordination normal.   Skin: No rash noted. She is not diaphoretic. No erythema.   Psychiatric: She has a normal mood and affect. Her behavior is normal.       Neurological Exam:   LOC: alert  Attention Span: Good   Language: No aphasia  Articulation: Dysarthria  Orientation: Person, Place, Time   Visual Fields: Full  EOM (CN III, IV, VI): Full/intact  Pupils (CN II, III): PERRL  Facial Sensation (CN V): Normal  Facial Movement (CN VII): Lower facial weakness on the Right  Gag Reflex: present  Reflexes: 2+ throughout  Motor: Arm left  Normal 5/5  Leg left  Normal 5/5  Arm right  Paresis: 4/5  Leg right Paresis: 4/5  Cebellar: No evidence of appendicular or axial ataxia  Sensation: Intact to light touch, temperature and vibration  Tone: Normal tone throughout      Laboratory:  CMP:   Recent Labs  Lab 04/26/18  0959   CALCIUM 9.2   ALBUMIN 3.2*   PROT 7.7      K 3.5   CO2 25      BUN 13   CREATININE 1.1   ALKPHOS 90   ALT 5*   AST 12   BILITOT 0.5     CBC:   Recent Labs  Lab 04/26/18  0959   WBC 6.60   RBC 4.04   HGB 11.6*   HCT 37.7      MCV 93   MCH 28.7   MCHC 30.8*     Lipid Panel:   Recent Labs  Lab 04/26/18  0959   CHOL 231*   LDLCALC 160.2*   HDL 45   TRIG 129     Coagulation:   Recent Labs  Lab 04/26/18  0959   INR 0.9     Hgb A1C: No results for input(s): HGBA1C in the last 168 hours.  TSH:   Recent Labs  Lab 04/26/18  0959   TSH 1.426       Diagnostic Results:      Brain imaging:  CT HEAD 4/26/18: no acute abnormality    Vessel Imaging:  None    Cardiac Evaluation:   NSR on  cardiac monitor at bedside

## 2018-04-26 NOTE — HPI
Mrs Elina Mei is a 77 y/o right handed female with HTN, DM, arthritis, right breat cancer s/p total mastectomy on 3/29/18,  who presents to the ED via EMS with a complaint of right sided facial droop, slurred speech, and R sided weakness mainly the right arm that started at 8:30 AM. She was last seen normal by her family when she woke up this morning. Per EMS she was able to stand and support herself as well as to sit up and turn.  Mrs Mei was getting ready to left the house for a medical appointment when the aforementioned symptoms began.  She reports that the right arm and leg are improving.  Denies HA, vertigo, double vision, trouble swallowing, or visual disturbances.  CT head was personally reviewed and showed no acute abnormality.

## 2018-04-26 NOTE — ED NOTES
Patient identifiers verified and correct for Elina Mei.   LOC: The patient is awake, alert and aware of environment with an appropriate affect, the patient is oriented x 3 . Pt has slurred speech.  APPEARANCE: Patient appears comfortable and in no acute distress, patient is clean and well groomed.  SKIN: The skin is warm and dry, color consistent with ethnicity, patient has normal skin turgor and moist mucus membranes, skin intact, no breakdown or bruising noted.   MUSCULOSKELETAL: Patient moving all extremities spontaneously, no swelling noted.  RESPIRATORY: Airway is open and patent, respirations are spontaneous, patient has a normal effort and rate, no accessory muscle use noted, pt placed on continuous pulse ox with O2 sats noted at 97% on room air.  CARDIAC: Pt placed on cardiac monitor. Patient has a normal rate and regular rhythm, no edema noted, capillary refill < 3 seconds.   GASTRO: Soft and non tender to palpation, no distention noted, normoactive bowel sounds present in all four quadrants. Pt states bowel movements have been regular.  : Pt denies any pain or frequency with urination.  NEURO: Pt opens eyes spontaneously, behavior appropriate to situation, follows commands, pt has right facial droop and RUE numbness.

## 2018-04-26 NOTE — CONSULTS
Food & Nutrition  Education      Consult received for stroke pathway s/p stroke.  Do  not  feel  it  is  appropriate  to  enforce  Cardiac diet  restrictions  given  the  patient's recent dx of right breast cancer s/p total mastectomy on 3/29/18. If  MD  adamant  for  pt  to  be  on  restricted  diet,  please  re-consult.  All  questions  and  concerns  answered.          Follow-Up: 1x week

## 2018-04-26 NOTE — ED PROVIDER NOTES
Encounter Date: 4/26/2018    SCRIBE #1 NOTE: I, Анна Patton, am scribing for, and in the presence of,  Dr. Gunn. I have scribed the entire note.       History     Chief Complaint   Patient presents with    Aphasia     slurred speech and right facial droop, last known well 0830 this am     Time patient was seen by the provider: 9:30 AM      The patient is a 78 y.o. female with co-morbidities including: HTN, DM, and arthritis who presents to the ED via EMS with a complaint of right sided facial droop, slurred speech, and RUE weakness that onset at 8:30 AM. She was last seen normal by her family when she woke up this morning. Per EMS she was able to stand and support herself as well as to sit up and turn. She has no hx of stroke.       The history is provided by the patient, medical records and the EMS personnel.     Review of patient's allergies indicates:  No Known Allergies  Past Medical History:   Diagnosis Date    Acute right-sided weakness 4/26/2018    Arthritis     Diabetes mellitus     Hypertension     Malignant neoplasm of upper-outer quadrant of right breast in female, estrogen receptor positive 12/5/2017    Stroke 4/26/2018     Past Surgical History:   Procedure Laterality Date    EYE SURGERY      HYSTERECTOMY       Family History   Problem Relation Age of Onset    Cancer Sister 75     Colon CA    Breast cancer Sister 55    Cancer Brother     Hypertension Mother      Social History   Substance Use Topics    Smoking status: Never Smoker    Smokeless tobacco: Current User     Types: Snuff    Alcohol use No     Review of Systems   Constitutional: Negative for fever.   HENT: Negative for sore throat.    Eyes: Negative for visual disturbance.   Respiratory: Negative for shortness of breath.    Cardiovascular: Negative for chest pain.   Gastrointestinal: Negative for nausea.   Genitourinary: Negative for dysuria.   Musculoskeletal: Negative for back pain.   Skin: Negative for rash.    Neurological: Positive for facial asymmetry (right sided droop), speech difficulty and weakness (RUE).       Physical Exam     Initial Vitals   BP Pulse Resp Temp SpO2   -- -- -- -- --      MAP       --         Physical Exam    Nursing note and vitals reviewed.  Constitutional: She appears well-developed and well-nourished. She is not diaphoretic. No distress.   HENT:   Head: Normocephalic and atraumatic.   Mouth/Throat: Oropharynx is clear and moist.   Eyes: EOM are normal. Pupils are equal, round, and reactive to light.   Neck: Normal range of motion. Neck supple. No JVD present.   Cardiovascular: Normal rate, regular rhythm, normal heart sounds and intact distal pulses.   Pulmonary/Chest: Breath sounds normal. No respiratory distress. She has no wheezes. She has no rhonchi. She has no rales.   Abdominal: Soft. She exhibits no distension. There is no tenderness.   Musculoskeletal: Normal range of motion. She exhibits no edema.   Lymphadenopathy:     She has no cervical adenopathy.   Neurological:   Right sided facial droop. Decreased sensation and hand  of RUE. Slight drifting of BLE.    Skin: Skin is warm and dry.         ED Course   Procedures  Labs Reviewed   CBC W/ AUTO DIFFERENTIAL - Abnormal; Notable for the following:        Result Value    Hemoglobin 11.6 (*)     MCHC 30.8 (*)     All other components within normal limits   COMPREHENSIVE METABOLIC PANEL - Abnormal; Notable for the following:     Glucose 153 (*)     Albumin 3.2 (*)     ALT 5 (*)     eGFR if  54.8 (*)     eGFR if non  47.5 (*)     All other components within normal limits   LIPID PANEL - Abnormal; Notable for the following:     Cholesterol 231 (*)     LDL Cholesterol 160.2 (*)     HDL/Chol Ratio 19.5 (*)     Total Cholesterol/HDL Ratio 5.1 (*)     All other components within normal limits   PROTIME-INR   TSH   URINALYSIS   POCT GLUCOSE   ISTAT CREATININE   ISTAT PROCEDURE     EKG Readings: (Independently  Interpreted)   SR at 79 bpm with bifascicular block       X-Rays:   Independently Interpreted Readings:   Chest X-Ray: No acute process   Head CT: Focal hyper density of the left M2 segment of the MCA concerning for emboli.      Medical Decision Making:   History:   Old Medical Records: I decided to obtain old medical records.  Initial Assessment:   This is an emergent evaluation of a 78 y.o. female brought in by EMS for evaluation of right facial droop, slurred speech, and right arm weakness.  Differential Diagnosis:    My initial differential diagnoses include, but are not limited to: CVA, TIA, intracranial hemorrhage, intracranial mass, hypoglycemia.  Independently Interpreted Test(s):   I have ordered and independently interpreted X-rays - see prior notes.  I have ordered and independently interpreted EKG Reading(s) - see prior notes  Clinical Tests:   Lab Tests: Ordered and Reviewed  Radiological Study: Ordered and Reviewed  Medical Tests: Ordered and Reviewed  ED Management:   Stroke code initiated on pt's arrival. Orders placed. Case discussed with vascular neurology who will evaluate the pt.     9:48 AM   Pt is back from CT scan. Vascular neurology is at bedside. Because of her recent mastectomy surgery, pt is not a tPA candidate. Pt will be admitted to vascular for further evaluation.       Other:   I have discussed this case with another health care provider.            Scribe Attestation:   Scribe #1: I performed the above scribed service and the documentation accurately describes the services I performed. I attest to the accuracy of the note.    Attending Attestation:         Attending Critical Care:   Critical Care Times:   ==============================================================  · Total Critical Care Time - exclusive of procedural time: 35 minutes.  ==============================================================  Critical care was necessary to treat or prevent imminent or life-threatening  deterioration of the following conditions: CNS failure.   Critical care was time spent personally by me on the following activities: examination of patient, review of old charts, ordering lab, x-rays, and/or EKG, evaluation of patient's response to treatment, discussion with consultants and re-evaluation of patient's conition.   Critical Care Condition: potentially life-threatening     Physician Attestation for Riki:      Comments: I, Dr. Kari Gunn, personally performed the services described in this documentation. All medical record entries made by the scribe were at my direction and in my presence.  I have reviewed the chart and agree that the record reflects my personal performance and is accurate and complete. Kari Gunn MD.                 Clinical Impression:   The encounter diagnosis was Stroke.    Disposition:   Disposition: Admitted                        Kari Gunn MD  04/26/18 8993

## 2018-04-26 NOTE — CONSULTS
[facility]  Vascular Neurology  Comprehensive Stroke Center  Consult Note    Consults  Assessment/Plan:     Patient is a 78 y.o. year old female with:    Acute right-sided weakness    79 y/o right handed female with HTN, DM, arthritis, right breat cancer s/p total mastectomy on 3/29/18,  who presents to the ED via EMS with a complaint of right sided facial droop, slurred speech, and R sided weakness mainly the right arm that started at 8:30 AM.   CT head negative for acute abnormality  Likely acute left subcortical infarct but unfortunately no a suitable candidate for treatment with iv alteplase due to right mastectomy just 3 weeks ago and continuous motor improvement while in the ED.  Doubt LVO.  Admit to the stroke service and complete stroke work up.  ASA, atorvastatin. PT/OT/speech consults.              STROKE DOCUMENTATION     Acute Stroke Times   Last Known Normal Date: 04/26/18  Last Known Normal Time: 0830  Symptom Onset Date: 04/26/18  Symptom Onset Time: 0830  Stroke Team Called Date: 04/26/18  Stroke Team Called Time: 0924  Stroke Team Arrival Date: 04/26/18  Stroke Team Arrival Time: 0929  CT Interpretation Time: 0938  Decision to Treat Time for Alteplase:  (No russ aleplase candidae)  Decision to Treat Time for IR:  (No IR candidate)    NIH Scale:  Interval: baseline (upon arrival/admit)  1a. Level Of Consciousness: 0-->Alert: keenly responsive  1b. LOC Questions: 0-->Answers both questions correctly  1c. LOC Commands: 0-->Performs both tasks correctly  2. Best Gaze: 0-->Normal  3. Visual: 0-->No visual loss  4. Facial Palsy: 2-->Partial paralysis (total or near-total paralysis of lower face)  5a. Motor Arm, Left: 0-->No drift: limb holds 90 (or 45) degrees for full 10 secs  5b. Motor Arm, Right: 1-->Drift: limb holds 90 (or 45) degrees, but drifts down before full 10 secs: does not hit bed or other support  6a. Motor Leg, Left: 1-->Drift: leg falls by the end of the 5-sec period but  does not hit bed  6b. Motor Leg, Right: 0-->No drift: leg holds 30 degree position for full 5 secs  7. Limb Ataxia: 0-->Absent  8. Sensory: 0-->Normal: no sensory loss  9. Best Language: 0-->No aphasia: normal  10. Dysarthria: 1-->Mild-to-moderate dysarthria: patient slurs at least some words and, at worst, can be understood with some difficulty  11. Extinction and Inattention (formerly Neglect): 0-->No abnormality  Total (NIH Stroke Scale): 5    Modified Forsan Score: 0  Haskins Coma Scale:    ABCD2 Score:    [id number] Score:   HAS -BLED Score:   ICH Score:   Hunt & Diaz Classification:       Thrombolysis Candidate? No, radical mastectomy on 3/29/18      Interventional Revascularization Candidate?   Is the patient eligible for mechanical endovascular reperfusion (PATEL)?  No; No significant neurological deficit      Hemorrhagic change of an Ischemic Stroke: Does this patient have an ischemic stroke with hemorrhagic changes? No     Subjective:     History of Present Illness:    Mrs Elina Mei is a 77 y/o right handed female with HTN, DM, arthritis, right breat cancer s/p total mastectomy on 3/29/18,  who presents to the ED via EMS with a complaint of right sided facial droop, slurred speech, and R sided weakness mainly the right arm that started at 8:30 AM. She was last seen normal by her family when she woke up this morning. Per EMS she was able to stand and support herself as well as to sit up and turn.  Mrs Mei was getting ready to left the house for a medical appointment when the aforementioned symptoms began.  She reports that the right arm and leg are improving.  Denies HA, vertigo, double vision, trouble swallowing, or visual disturbances.  CT head was personally reviewed and showed no acute abnormality.           Past Medical History:   Diagnosis Date    Arthritis     Diabetes mellitus     Hypertension     Malignant neoplasm of upper-outer quadrant of right breast in female, estrogen receptor positive  12/5/2017    Stroke 4/26/2018     Past Surgical History:   Procedure Laterality Date    EYE SURGERY      HYSTERECTOMY       Family History   Problem Relation Age of Onset    Cancer Sister 75     Colon CA    Breast cancer Sister 55    Cancer Brother     Hypertension Mother      Social History   Substance Use Topics    Smoking status: Never Smoker    Smokeless tobacco: Current User     Types: Snuff    Alcohol use No     Review of patient's allergies indicates:  No Known Allergies    Medications: I have reviewed the current medication administration record.    Prescriptions Prior to Admission   Medication Sig Dispense Refill Last Dose    amlodipine (NORVASC) 10 MG tablet Take 10 mg by mouth once daily.   Unknown at Unknown time    atorvastatin (LIPITOR) 10 MG tablet Take 10 mg by mouth once daily.  0 Unknown at Unknown time    dorzolamide-timolol 2-0.5% (COSOPT) 22.3-6.8 mg/mL ophthalmic solution Place 1 drop into both eyes 2 (two) times daily.  6 Unknown at Unknown time    fluticasone (FLONASE) 50 mcg/actuation nasal spray 1 spray by Each Nare route 2 (two) times daily as needed (sinus congestion). 15 g 0 Unknown at Unknown time    furosemide (LASIX) 40 MG tablet Take 40 mg by mouth once daily.    Unknown at Unknown time    hydrocodone-acetaminophen 5-325mg (NORCO) 5-325 mg per tablet Take 1-2 tablets by mouth every 6 (six) hours as needed for Pain. 30 tablet 0 Unknown at Unknown time    KLOR-CON SPRINKLE 10 mEq CpSR Take 10 mEq by mouth once daily.  0 Unknown at Unknown time    LANTUS SOLOSTAR 100 unit/mL (3 mL) InPn pen INJECT 35 UNIT(S) EVERY EVENING BY SUBCUTANEOUS ROUTE.  2 Unknown at Unknown time    losartan (COZAAR) 50 MG tablet Take 50 mg by mouth once daily.   Unknown at Unknown time    metformin (GLUCOPHAGE) 500 MG tablet Take 500 mg by mouth 2 (two) times daily with meals.   Unknown at Unknown time    nitroGLYCERIN (NITROSTAT) 0.4 MG SL tablet Place 1 tablet (0.4 mg total) under the  tongue every 5 (five) minutes as needed for Chest pain. 10 tablet 0 Unknown at Unknown time    potassium chloride (KLOR-CON) 10 MEQ TbSR Take 10 mEq by mouth once daily.   Unknown at Unknown time    TRAVATAN Z 0.004 % Drop Place 1 drop into both eyes nightly.  3 Unknown at Unknown time       Review of Systems   Constitutional: Negative for chills, diaphoresis and fever.   HENT: Negative for hearing loss, tinnitus and trouble swallowing.    Eyes: Positive for photophobia. Negative for visual disturbance.   Respiratory: Negative for chest tightness and shortness of breath.    Cardiovascular: Negative for chest pain and palpitations.   Gastrointestinal: Negative for abdominal pain and vomiting.   Endocrine: Negative for cold intolerance and polyuria.   Genitourinary: Negative for hematuria.   Musculoskeletal: Negative for neck pain and neck stiffness.   Skin: Negative for rash.   Allergic/Immunologic: Negative for immunocompromised state.   Neurological: Positive for speech difficulty, weakness and numbness. Negative for dizziness and headaches.   Hematological: Does not bruise/bleed easily.   Psychiatric/Behavioral: Negative for agitation, behavioral problems and confusion.     Objective:     Vital Signs (Most Recent):  Pulse: 90 (04/26/18 1200)  Resp: 16 (04/26/18 1200)  BP: (!) 180/90 (04/26/18 1200)  SpO2: (!) 92 % (04/26/18 1200)    Vital Signs Range (Last 24H):  Pulse:  [75-90]   Resp:  [11-18]   BP: (180-198)/(79-90)   SpO2:  [92 %-98 %]     Physical Exam   Constitutional: She is oriented to person, place, and time. She appears well-developed and well-nourished.   HENT:   Head: Normocephalic and atraumatic.   Eyes: EOM are normal. Pupils are equal, round, and reactive to light.   Neck: Normal range of motion. Neck supple.   Cardiovascular: Normal rate and regular rhythm.    Pulmonary/Chest: Effort normal. No respiratory distress.   Abdominal: Soft. She exhibits no mass.   Genitourinary:   Genitourinary  Comments: No performed     Musculoskeletal: Normal range of motion. She exhibits no deformity.   Neurological: She is alert and oriented to person, place, and time. A cranial nerve deficit is present. No sensory deficit. Coordination normal.   Skin: No rash noted. She is not diaphoretic. No erythema.   Psychiatric: She has a normal mood and affect. Her behavior is normal.       Neurological Exam:   LOC: alert  Attention Span: Good   Language: No aphasia  Articulation: Dysarthria  Orientation: Person, Place, Time   Visual Fields: Full  EOM (CN III, IV, VI): Full/intact  Pupils (CN II, III): PERRL  Facial Sensation (CN V): Normal  Facial Movement (CN VII): Lower facial weakness on the Right  Gag Reflex: present  Reflexes: 2+ throughout  Motor: Arm left  Normal 5/5  Leg left  Normal 5/5  Arm right  Paresis: 4/5  Leg right Paresis: 4/5  Cebellar: No evidence of appendicular or axial ataxia  Sensation: Intact to light touch, temperature and vibration  Tone: Normal tone throughout      Laboratory:  CMP:   Recent Labs  Lab 04/26/18  0959   CALCIUM 9.2   ALBUMIN 3.2*   PROT 7.7      K 3.5   CO2 25      BUN 13   CREATININE 1.1   ALKPHOS 90   ALT 5*   AST 12   BILITOT 0.5     CBC:   Recent Labs  Lab 04/26/18  0959   WBC 6.60   RBC 4.04   HGB 11.6*   HCT 37.7      MCV 93   MCH 28.7   MCHC 30.8*     Lipid Panel:   Recent Labs  Lab 04/26/18  0959   CHOL 231*   LDLCALC 160.2*   HDL 45   TRIG 129     Coagulation:   Recent Labs  Lab 04/26/18  0959   INR 0.9     Hgb A1C: No results for input(s): HGBA1C in the last 168 hours.  TSH:   Recent Labs  Lab 04/26/18  0959   TSH 1.426       Diagnostic Results:      Brain imaging:  CT HEAD 4/26/18: no acute abnormality    Vessel Imaging:  None    Cardiac Evaluation:   NSR on cardiac monitor at bedside        Jesus Nicholas MD  [facility]  Department of Vascular Neurology   [facility]

## 2018-04-26 NOTE — PROGRESS NOTES
Two patient identifiers used, allergies reviewed, Pt.Transferred to MRI scanning table,centralized Telemetry notified (Oscar) of portable monitoring removed for scan,  portable telemetry monitoring removed, pt. placed on MRI compatible telemetry monitoring system, no S/S of discomfort, will continue to monitor throughout scan, WAR room will be notified once patient is placed back on portable telemetry.

## 2018-04-26 NOTE — PLAN OF CARE
Problem: SLP Goal  Goal: SLP Goal  Swallow eval completed. Pt safe for dental soft diet and thin liquids. Upright for all meals. Strict aspiration precautions.     MARYLOU Best, CCC-SLP  4/26/2018

## 2018-04-26 NOTE — PROGRESS NOTES
Received to room 704. Oriented to room, call light, fall risk precautions. Instructed not to get OOB without assistance of staff. Assisted to BR. Ambulates well with stand-by assist.

## 2018-04-27 ENCOUNTER — TELEPHONE (OUTPATIENT)
Dept: NEUROLOGY | Facility: CLINIC | Age: 78
End: 2018-04-27

## 2018-04-27 ENCOUNTER — DOCUMENTATION ONLY (OUTPATIENT)
Dept: SURGERY | Facility: CLINIC | Age: 78
End: 2018-04-27

## 2018-04-27 VITALS
RESPIRATION RATE: 18 BRPM | SYSTOLIC BLOOD PRESSURE: 192 MMHG | HEART RATE: 79 BPM | BODY MASS INDEX: 34.1 KG/M2 | DIASTOLIC BLOOD PRESSURE: 85 MMHG | WEIGHT: 225 LBS | TEMPERATURE: 99 F | OXYGEN SATURATION: 95 % | HEIGHT: 68 IN

## 2018-04-27 PROBLEM — I67.1 ANEURYSM, CAROTID ARTERY, INTERNAL: Status: ACTIVE | Noted: 2018-04-27

## 2018-04-27 LAB
ALBUMIN SERPL BCP-MCNC: 3 G/DL
ALP SERPL-CCNC: 87 U/L
ALT SERPL W/O P-5'-P-CCNC: 6 U/L
ANION GAP SERPL CALC-SCNC: 8 MMOL/L
APTT BLDCRRT: 27.3 SEC
AST SERPL-CCNC: 11 U/L
BASOPHILS # BLD AUTO: 0.03 K/UL
BASOPHILS NFR BLD: 0.5 %
BILIRUB SERPL-MCNC: 0.7 MG/DL
BUN SERPL-MCNC: 10 MG/DL
CALCIUM SERPL-MCNC: 9.1 MG/DL
CHLORIDE SERPL-SCNC: 109 MMOL/L
CK MB SERPL-MCNC: 1.8 NG/ML
CK MB SERPL-RTO: 3 %
CK SERPL-CCNC: 61 U/L
CO2 SERPL-SCNC: 26 MMOL/L
CREAT SERPL-MCNC: 1 MG/DL
DIFFERENTIAL METHOD: ABNORMAL
EOSINOPHIL # BLD AUTO: 0.2 K/UL
EOSINOPHIL NFR BLD: 2.9 %
ERYTHROCYTE [DISTWIDTH] IN BLOOD BY AUTOMATED COUNT: 12.7 %
EST. GFR  (AFRICAN AMERICAN): >60 ML/MIN/1.73 M^2
EST. GFR  (NON AFRICAN AMERICAN): 53.3 ML/MIN/1.73 M^2
GLUCOSE SERPL-MCNC: 131 MG/DL
HCT VFR BLD AUTO: 35.1 %
HGB BLD-MCNC: 11 G/DL
IMM GRANULOCYTES # BLD AUTO: 0.01 K/UL
IMM GRANULOCYTES NFR BLD AUTO: 0.2 %
INR PPP: 0.9
LYMPHOCYTES # BLD AUTO: 1.7 K/UL
LYMPHOCYTES NFR BLD: 28 %
MAGNESIUM SERPL-MCNC: 1.8 MG/DL
MCH RBC QN AUTO: 29.1 PG
MCHC RBC AUTO-ENTMCNC: 31.3 G/DL
MCV RBC AUTO: 93 FL
MONOCYTES # BLD AUTO: 0.6 K/UL
MONOCYTES NFR BLD: 9.8 %
NEUTROPHILS # BLD AUTO: 3.5 K/UL
NEUTROPHILS NFR BLD: 58.6 %
NRBC BLD-RTO: 0 /100 WBC
PHOSPHATE SERPL-MCNC: 3.5 MG/DL
PLATELET # BLD AUTO: 252 K/UL
PMV BLD AUTO: 10.8 FL
POCT GLUCOSE: 134 MG/DL (ref 70–110)
POTASSIUM SERPL-SCNC: 3.5 MMOL/L
PROT SERPL-MCNC: 7 G/DL
PROTHROMBIN TIME: 9.8 SEC
RBC # BLD AUTO: 3.78 M/UL
SODIUM SERPL-SCNC: 143 MMOL/L
TROPONIN I SERPL DL<=0.01 NG/ML-MCNC: 0.02 NG/ML
WBC # BLD AUTO: 5.92 K/UL

## 2018-04-27 PROCEDURE — 97166 OT EVAL MOD COMPLEX 45 MIN: CPT

## 2018-04-27 PROCEDURE — 36415 COLL VENOUS BLD VENIPUNCTURE: CPT

## 2018-04-27 PROCEDURE — 85025 COMPLETE CBC W/AUTO DIFF WBC: CPT

## 2018-04-27 PROCEDURE — 99222 1ST HOSP IP/OBS MODERATE 55: CPT | Mod: ,,, | Performed by: NURSE PRACTITIONER

## 2018-04-27 PROCEDURE — 82550 ASSAY OF CK (CPK): CPT

## 2018-04-27 PROCEDURE — 80053 COMPREHEN METABOLIC PANEL: CPT

## 2018-04-27 PROCEDURE — 84484 ASSAY OF TROPONIN QUANT: CPT

## 2018-04-27 PROCEDURE — A4216 STERILE WATER/SALINE, 10 ML: HCPCS | Performed by: PSYCHIATRY & NEUROLOGY

## 2018-04-27 PROCEDURE — 85730 THROMBOPLASTIN TIME PARTIAL: CPT

## 2018-04-27 PROCEDURE — 97530 THERAPEUTIC ACTIVITIES: CPT

## 2018-04-27 PROCEDURE — 84100 ASSAY OF PHOSPHORUS: CPT

## 2018-04-27 PROCEDURE — G8989 SELF CARE D/C STATUS: HCPCS | Mod: CL

## 2018-04-27 PROCEDURE — 85610 PROTHROMBIN TIME: CPT

## 2018-04-27 PROCEDURE — 97535 SELF CARE MNGMENT TRAINING: CPT

## 2018-04-27 PROCEDURE — 83735 ASSAY OF MAGNESIUM: CPT

## 2018-04-27 PROCEDURE — G8987 SELF CARE CURRENT STATUS: HCPCS | Mod: CL

## 2018-04-27 PROCEDURE — 63600175 PHARM REV CODE 636 W HCPCS: Performed by: PSYCHIATRY & NEUROLOGY

## 2018-04-27 PROCEDURE — 99233 SBSQ HOSP IP/OBS HIGH 50: CPT | Mod: GC,,, | Performed by: PSYCHIATRY & NEUROLOGY

## 2018-04-27 PROCEDURE — 82553 CREATINE MB FRACTION: CPT

## 2018-04-27 PROCEDURE — 92523 SPEECH SOUND LANG COMPREHEN: CPT

## 2018-04-27 PROCEDURE — G8988 SELF CARE GOAL STATUS: HCPCS | Mod: CJ

## 2018-04-27 PROCEDURE — 25000003 PHARM REV CODE 250: Performed by: PSYCHIATRY & NEUROLOGY

## 2018-04-27 RX ORDER — ASPIRIN 81 MG/1
81 TABLET ORAL DAILY
Qty: 30 TABLET | Refills: 1 | Status: ON HOLD | OUTPATIENT
Start: 2018-04-28 | End: 2020-01-01

## 2018-04-27 RX ORDER — ATORVASTATIN CALCIUM 40 MG/1
40 TABLET, FILM COATED ORAL DAILY
Qty: 30 TABLET | Refills: 1 | Status: SHIPPED | OUTPATIENT
Start: 2018-04-28 | End: 2019-04-28

## 2018-04-27 RX ADMIN — ATORVASTATIN CALCIUM 40 MG: 20 TABLET, FILM COATED ORAL at 08:04

## 2018-04-27 RX ADMIN — Medication 3 ML: at 06:04

## 2018-04-27 RX ADMIN — HEPARIN SODIUM 5000 UNITS: 5000 INJECTION, SOLUTION INTRAVENOUS; SUBCUTANEOUS at 05:04

## 2018-04-27 RX ADMIN — Medication 3 ML: at 02:04

## 2018-04-27 RX ADMIN — HEPARIN SODIUM 5000 UNITS: 5000 INJECTION, SOLUTION INTRAVENOUS; SUBCUTANEOUS at 02:04

## 2018-04-27 RX ADMIN — ASPIRIN 81 MG: 81 TABLET, COATED ORAL at 08:04

## 2018-04-27 NOTE — PLAN OF CARE
Problem: Occupational Therapy Goal  Goal: Occupational Therapy Goal  Goals set 4/27 to be addressed for 7 days with expiration date, 5/4:  Patient will increase functional independence with ADLs by performing:    Patient will demonstrate rolling to the right with modified independence.  Not met   Patient will demonstrate rolling to the left with modified independence.   Not met  Patient will demonstrate supine -sit with modified independence.   Not met  Patient will demonstrate stand pivot transfers with modified independence.   Not met  Patient will demonstrate grooming while standing with supervision.   Not met  Patient will demonstrate upper body dressing with supervision.   Not met  Patient will demonstrate lower body dressing with supervision.   Not met  Patient will demonstrate toileting with supervision.   Not met  Patient will demonstrate bathing while seated EOB with supervision.   Not met  Patient's family / caregiver will demonstrate independence and safety with assisting patient with self-care skills and functional mobility.     Not met  Patient and/or patient's family will verbalize understanding of stroke prevention guidelines, personal risk factors and stroke warning signs via teachback method.  Not met         OT evaluation completed.  SWETHA Griffith  4/27/2018

## 2018-04-27 NOTE — HOSPITAL COURSE
4/27/18 - Patient significantly improving, needs only for home health.     Ms Mei admitted for stroke work up, unable to be treated with tpa due to recent mastectomy, no LVO.   Noted to have significant improvement while inpatient, teams recommending home health. She does have multiple family members available to help her with needs.     Etiology unclear at this time, will follow up in clinic, patient to continue home meds at discharge.   Will increase atorvatatin to 40 mg (previously on 10)     Inpatient acute stroke work up completed and patient is stable for discharge.  Please see imaging and discharge medication list below.

## 2018-04-27 NOTE — PLAN OF CARE
PCP:Laurence Baron MD    Extended Emergency Contact Information  Primary Emergency Contact: Rochelle Mei   [facility]  Home Phone: [phone]  Relation: Daughter  Secondary Emergency Contact: Radhames Padilla   [place]  Mobile Phone: [phone]  Relation: Daughter    [id number] - [place] - [id number] [facility]  [id number] [facility]  [place] [address]  Phone: [phone] Fax: [phone]    Payor: [facility] MCARE / Plan: [facility] MEDICARE COMPLETE / Product Type: Medicare Advantage /     Patient was using a rollator/straight cane and live with her daughter. PT/OT/SLP are recommending home health. Cm will continue to follow. Patient was hospitalized less than 30 days ago at [facility] for a mastectomy. This hospitalization was for stroke and  is unrelated to the previous admit.     04/27/18 1020   Discharge Assessment   Assessment Type Discharge Planning Assessment   Confirmed/corrected address and phone number on facesheet? Yes   Assessment information obtained from? Patient   Expected Length of Stay (days) 1   Communicated expected length of stay with patient/caregiver yes   Prior to hospitilization cognitive status: Alert/Oriented   Prior to hospitalization functional status: Assistive Equipment   Current cognitive status: Alert/Oriented   Current Functional Status: Assistive Equipment   Facility Arrived From: Home   Lives With child(paramjit), adult   Able to Return to Prior Arrangements yes   Is patient able to care for self after discharge? Yes   Who are your caregiver(s) and their phone number(s)? Rochelle Mei (daughter) [phone], Radhames Padilla (daughter) [phone]   Patient's perception of discharge disposition home or selfcare;home health   Readmission Within The Last 30 Days no previous admission in last 30 days   Patient currently being followed by outpatient case management? No   Patient currently receives any other outside agency  services? No   Equipment Currently Used at Home rollator;cane, straight   Do you have any problems affording any of your prescribed medications? No   Is the patient taking medications as prescribed? yes   Does the patient have transportation home? Yes   Transportation Available car;family or friend will provide   Does the patient receive services at the [facility]? No   Discharge Plan A Home with family;Home Health   Discharge Plan B Home with family   Patient/Family In Agreement With Plan yes

## 2018-04-27 NOTE — SUBJECTIVE & OBJECTIVE
Past Medical History:   Diagnosis Date    Acute right-sided weakness 4/26/2018    Arthritis     Diabetes mellitus     Hypertension     Malignant neoplasm of upper-outer quadrant of right breast in female, estrogen receptor positive 12/5/2017    Stroke 4/26/2018     Past Surgical History:   Procedure Laterality Date    EYE SURGERY      HYSTERECTOMY       Review of patient's allergies indicates:  No Known Allergies    Scheduled Medications:    aspirin  81 mg Oral Daily    atorvastatin  40 mg Oral Daily    heparin (porcine)  5,000 Units Subcutaneous Q8H    sodium chloride 0.9%  3 mL Intravenous Q8H       PRN Medications: sodium chloride 0.9%    Family History     Problem Relation (Age of Onset)    Breast cancer Sister (55)    Cancer Sister (75), Brother    Hypertension Mother        Social History Main Topics    Smoking status: Never Smoker    Smokeless tobacco: Current User     Types: Snuff    Alcohol use No    Drug use: No    Sexual activity: Not Currently     Review of Systems   Constitutional: Positive for activity change. Negative for chills, fatigue and fever.   HENT: Positive for drooling. Negative for hearing loss, trouble swallowing and voice change.    Eyes: Negative for pain and visual disturbance.   Respiratory: Negative for cough, shortness of breath and wheezing.    Cardiovascular: Negative for chest pain and palpitations.   Gastrointestinal: Negative for abdominal distention, nausea and vomiting.   Genitourinary: Negative for difficulty urinating and flank pain.   Musculoskeletal: Positive for arthralgias and gait problem. Negative for back pain, myalgias and neck pain.   Skin: Positive for wound (surgical site). Negative for rash.   Neurological: Positive for speech difficulty, weakness and numbness. Negative for dizziness and headaches.   Psychiatric/Behavioral: Negative for agitation and hallucinations. The patient is not nervous/anxious.      Objective:     Vital Signs (Most  Recent):  Temp: 98.5 °F (36.9 °C) (18 0410)  Pulse: 84 (18 0900)  Resp: 18 (18 1606)  BP: (!) 192/85 (18 0800)  SpO2: 95 % (18 0800)    Vital Signs (24h Range):  Temp:  [97.6 °F (36.4 °C)-98.5 °F (36.9 °C)] 98.5 °F (36.9 °C)  Pulse:  [73-90] 84  Resp:  [11-18] 18  SpO2:  [88 %-96 %] 95 %  BP: (138-204)/(62-93) 192/85     Body mass index is 34.21 kg/m².    Physical Exam   Constitutional: She is oriented to person, place, and time. She appears well-developed and well-nourished. No distress.   HENT:   Head: Normocephalic and atraumatic.   Right Ear: External ear normal.   Left Ear: External ear normal.   Nose: Nose normal.   Eyes: Right eye exhibits no discharge. Left eye exhibits no discharge. No scleral icterus.   Neck: Normal range of motion.   Cardiovascular: Normal rate, regular rhythm and intact distal pulses.    Pulmonary/Chest: Effort normal. No respiratory distress. She has no wheezes. Right breast exhibits tenderness (surgical incision CDI with drain in place).   Abdominal: Soft. She exhibits no distension. There is no tenderness.   Musculoskeletal: Normal range of motion. She exhibits no edema or tenderness.   Neurological: She is alert and oriented to person, place, and time. She exhibits normal muscle tone.   -  Mental Status:  AAOx3.  Follows commands.  Answers correct age and .  Recent and remote memory intact.  Recalls 3/3 objects.  Counts to 10 appropriately.  Names 4/4 seasons correctly.     -  Speech and language:  no aphasia, + dysarthria.    -  Facial movement (CN VII): facial droop, + drooling  -  Motor:  Mild R pronator drift. RUE: 4/5.  LUE: 5/5.  RLE: 5/5.  LLE: 5/5.  -  Tone:  Normal.   -  Sensory:  Intact to light touch and pin prick on left, decreased on right.    Skin: Skin is warm and dry. No rash noted.   Psychiatric: She has a normal mood and affect. Her behavior is normal. Thought content normal. Her speech is slurred.   Vitals reviewed.    NEUROLOGICAL  EXAMINATION:     MENTAL STATUS   Oriented to person, place, and time.   Speech: slurred       Diagnostic Results:   Labs: Reviewed  X-Ray: Reviewed  CT: Reviewed  MRI: Reviewed  MRA: Reviewed

## 2018-04-27 NOTE — PLAN OF CARE
[facility]    HOME HEALTH ORDERS  FACE TO FACE ENCOUNTER    Patient Name: Elina Mei  YOB: 1940    PCP: Laurence Baron MD   PCP Address: [address] [place] [address]  PCP Phone Number: [phone]  PCP Fax: [phone]    Encounter Date: 04/27/2018    Admit to Home Health    Diagnoses:  Active Hospital Problems    Diagnosis  POA    *Embolic stroke involving left middle cerebral artery [I63.412]  Yes    Aneurysm, carotid artery, internal [I67.1]  Unknown    Acute right-sided weakness [M62.89]  Yes    HTN (hypertension) [I10]  Yes    DM (diabetes mellitus) [E11.9]  Yes      Resolved Hospital Problems    Diagnosis Date Resolved POA   No resolved problems to display.       No future appointments.  Follow-up Information     [facility].    Specialty:  Neurology  Why:  The office will call you to schedule an appt in 4-6 weeks. If the office does not call you by 5/4/18 please call to schedule an appt.  Contact information:  [address]  [facility] [address] [phone]  Additional information:  7th Floor           V VIRIDIANA Duke MD On 5/8/2018.    Specialty:  Internal Medicine  Why:  appt at 1:30 pm  Contact information:  [address] [facility] [address]  [facility] [place] [place] [address] [phone]                     I have seen and examined this patient face to face today. My clinical findings that support the need for the home health skilled services and home bound status are the following:  Weakness/numbness causing balance and gait disturbance due to Stroke making it taxing to leave home.    Allergies:Review of patient's allergies indicates:  No Known Allergies    Diet recommendations:  Dental Soft, Thin     Activities: activity as tolerated    Nursing:   SN to complete comprehensive assessment including routine vital signs. Instruct on disease process and s/s of complications to report to MD. Review/verify medication list  sent home with the patient at time of discharge  and instruct patient/caregiver as needed. Frequency may be adjusted depending on start of care date.    Notify MD if SBP > 160 or < 90; DBP > 90 or < 50; HR > 120 or < 50; Temp > 101; Other:   Any acute neurologic changes       CONSULTS:    Physical Therapy to evaluate and treat. Evaluate for home safety and equipment needs; Establish/upgrade home exercise program. Perform / instruct on therapeutic exercises, gait training, transfer training, and Range of Motion.  Occupational Therapy to evaluate and treat. Evaluate home environment for safety and equipment needs. Perform/Instruct on transfers, ADL training, ROM, and therapeutic exercises.  Speech Therapy  to evaluate and treat for  Language, Swallowing and Cognition.    MISCELLANEOUS CARE:  N/A    WOUND CARE ORDERS  as needed      Medications: Review discharge medications with patient and family and provide education.      Current Discharge Medication List      START taking these medications    Details   aspirin (ECOTRIN) 81 MG EC tablet Take 1 tablet (81 mg total) by mouth once daily.  Qty: 30 tablet, Refills: 1         CONTINUE these medications which have NOT CHANGED    Details   amlodipine (NORVASC) 10 MG tablet Take 10 mg by mouth once daily.      atorvastatin (LIPITOR) 40 MG tablet Take 40 mg by mouth once daily.  Refills: 0      dorzolamide-timolol 2-0.5% (COSOPT) 22.3-6.8 mg/mL ophthalmic solution Place 1 drop into both eyes 2 (two) times daily.  Refills: 6      fluticasone (FLONASE) 50 mcg/actuation nasal spray 1 spray by Each Nare route 2 (two) times daily as needed (sinus congestion).  Qty: 15 g, Refills: 0      furosemide (LASIX) 40 MG tablet Take 40 mg by mouth once daily.       hydrocodone-acetaminophen 5-325mg (NORCO) 5-325 mg per tablet Take 1-2 tablets by mouth every 6 (six) hours as needed for Pain.  Qty: 30 tablet, Refills: 0             LANTUS SOLOSTAR 100 unit/mL (3 mL) InPn pen INJECT 35 UNIT(S)  EVERY EVENING BY SUBCUTANEOUS ROUTE.  Refills: 2      losartan (COZAAR) 50 MG tablet Take 50 mg by mouth once daily.      metformin (GLUCOPHAGE) 500 MG tablet Take 500 mg by mouth 2 (two) times daily with meals.      nitroGLYCERIN (NITROSTAT) 0.4 MG SL tablet Place 1 tablet (0.4 mg total) under the tongue every 5 (five) minutes as needed for Chest pain.  Qty: 10 tablet, Refills: 0      potassium chloride (KLOR-CON) 10 MEQ TbSR Take 10 mEq by mouth once daily.             TRAVATAN Z 0.004 % Drop Place 1 drop into both eyes nightly.  Refills: 3             I certify that this patient is confined to her home and needs physical therapy, speech therapy and occupational therapy.        Yun Coleman PA-C  Vascular Neurology  [phone]

## 2018-04-27 NOTE — PT/OT/SLP EVAL
Physical Therapy Evaluation    Patient Name:  Elina Mei   MRN:  [id number]    Recommendations:     Discharge Recommendations:  home with home health (and 24 hr assist)   Discharge Equipment Recommendations: none   Barriers to discharge: None, family states they can provide 24 hr assist      Plan:     During this hospitalization, patient to be seen 3 x/week to address the above listed problems via gait training, therapeutic activities, therapeutic exercises, neuromuscular re-education  · Plan of Care Expires:  05/27/18   Plan of Care Reviewed with: patient, daughter, family      History:     Past Medical History:   Diagnosis Date    Acute right-sided weakness 4/26/2018    Arthritis     Diabetes mellitus     Hypertension     Malignant neoplasm of upper-outer quadrant of right breast in female, estrogen receptor positive 12/5/2017    Stroke 4/26/2018       Past Surgical History:   Procedure Laterality Date    EYE SURGERY      HYSTERECTOMY         Subjective     Communicated with RN prior to session.     Patient comments/goals: dtr: I work 3 days a week, but I am home the other 4 days.  My brother is home all the time.  He doesn't work.   Pain/Comfort:  · Pain Rating 1: 0/10  · Pain Rating Post-Intervention 1: 0/10    Living Environment:  Pt lives in [place] with her dtr and son in a 1 story home with one step to enter.  She was SBA with mobility prior to admit and owns a RW and cane (did not use).  She recently underwent R total mastectomy on 3/29/2018 and still has LOREE drain in place.  She does not drive at baseline. Patient has the following equipment:  (owns RW and cane).      Objective:     Patient found with: peripheral IV, telemetry, LOREE drain     General Precautions: Standard, aspiration, fall (R limb alert)   Patient was found up in bathroom with family and RN present.  PT observed patient ambulating from the bathroom to the chair with SBA,   ~ 15 feet.    PHYSICAL EXAMINATION  Cognitive Function:  -  Oriented to: person, place, and situation   - Level of Alertness: awake, slow to answer questions,   - Follows Commands/attention: Follows one-step commands  - Communication: dysarthria  - Safety awareness/insight to disability: impaired  Musculoskeletal System  Upper Extremities:   ROM: RUE limited d/t recent mastectomy   Strength: RUE hemiparesis  Lower Extremities:  ROM: WFL  Strength:  Muscle Group R LE L LE Comments   Hip flexion  4/5 5/5       Knee flexion  5/5 5/5       Knee ext. 5/5 5/5    Ankle DF 5/5 5/5    Ankle PF 5/5 5/5    Neuromuscular System:  - Sensation: impaired RUE and RLE  - Coordination: NT  Posture and gross symmetry: rounded shoulders  Vision:  NT    FUNCTIONAL MOBILITY ASSESSMENT:  Bed Mobility: performed with HOB NT     Transfers:  - Sit <> stand transfer: SBA   - Bed <> chair transfer: CGA  - Toilet transfer: NT    Gait:   Gait x 20 feet f/b 75 feet with CGA for balance in the hallway  - Patient demonstrated no acute abnormalities with gait.  She demonstrates a wide TONIO with bilateral toeing out and minimal instability d/t fatigue with walking in the gotti.      THERAPEUTIC ACTIVITIES AND EXERCISES:  Therapist educated patient and family on the role of PT, POC, and therapy recommendations of  therapy.  Therapist discussed the patients current mobility status and level of assistance with patient and family.  PT reinforced RUE precautions: elevation, no BP, no sticks.  Therapist answered questions to patient/familys satisfaction within scope of practice.  White board updated to reflect current level of assistance.      Patient left sitting up in chair with all lines intact, call button in reach and family  present.       AM-PAC 6 CLICK MOBILITY  Total Score:21     GOALS:    Physical Therapy Goals        Problem: Physical Therapy Goal    Goal Priority Disciplines Outcome Goal Variances Interventions   Physical Therapy Goal     PT/OT, PT Ongoing (interventions implemented as appropriate)      Description:    Goals to be met by 5/10/2018    1. Pt will perform supine to sit with SBA.  2. Pt will perform sit to supine with SBA.  3. Pt will perform bed <> chair transfers with independence and no AD.  4. Pt will perform gait x 150 feet with SBA and no AD.  5. Pt and/or family with verbalize appropriate positioning with RUE elevated and proper alignment.                     Assessment:     Elina Mei is a 78 y.o. female admitted with a medical diagnosis of Embolic stroke involving left middle cerebral artery.  She was SBA at home and recovering from recent mastectomy prior to admit. She now presents with the following impairments/functional limitations:  impaired balance, impaired endurance, impaired self care skills, impaired functional mobilty, impaired cognition, gait instability, decreased upper extremity function, impaired fine motor.  She presents near her baseline mobility status with no acute changes in balance or gait pattern.  She does have minimal instability during gait when fatigued.  She would benefit from acute skilled PT services to progress mobility and balance and prepare for safe d/c home.      Rehab Prognosis:  good; patient would benefit from acute skilled PT services to address these deficits and reach maximum level of function.      Recent Surgery: * No surgery found *      Clinical Decision Making:   COMPLEXITY OF PT EXAMINATION:  HISTORY  - Comorbidities that affect the PT plan of care or the patient's ability to participate in/progress with therapy:  1. R total mastectomy 3/29/2018  2. Arthritis  3. DM  4. HTN  - Personal Factors:   1. Status of current condition.  2. Patient's home situation (environment and family support).  EXAMINATION  - Body Systems:  1. Communication ability, affect, cognition, language, and learning style: the assessment of the ability to make needs known, consciousness, orientation, expected emotional /behavioral responses, and learning  preferences  2. Neuromuscular system: a general assessment of gross coordinated movement (eg, balance, gait, locomotion, transfers, and transitions) and motor function (motor control and motor learning)  3. Musculoskeletal system: the assessment of gross symmetry, gross range of motion, gross strength, height, and weight  - Activity or participation limitations:   Status of current condition  CLINICAL PRESENTATION: Stable and/or uncomplicated  LEVEL OF COMPLEXITY: Low Complexity - no personal factors or comorbidities that impact the plan of care; examination addressing 1-2 body structures and functions, activity limitations, and/or participation restrictions; and clinical presentation with stable or uncomplicated characteristics.    Time Tracking:     PT Received On: 04/27/18  PT Start Time: 0940     PT Stop Time: 0959  PT Total Time (min): 19 min     Billable Minutes: Evaluation 19      Giovana Stacy, PT  04/27/2018

## 2018-04-27 NOTE — ASSESSMENT & PLAN NOTE
-  2/2 stroke  Recommendations  -  PT and OT evaluate and treat  -  Monitor for shoulder pain and subluxation  · Arm trough or lapboard use while sitting  · Stretching shoulder depressors/internal rotators  · Avoid pulling on affected arm  -  Monitor for spasticity  · Initiate stretching program  · Use splints/casting/bracing to help preserve ROM  -  Monitor for skin breakdown and pressure ulcers

## 2018-04-27 NOTE — PLAN OF CARE
Problem: SLP Goal  Goal: SLP Goal  Goals to be met 5/3  1 pt will tolerate soft diet and thin liquids  2 pt will participate in speech lang eval    Outcome: Ongoing (interventions implemented as appropriate)  Tolerating dental soft diet with thin liquids.    Arminda Ricketts MA/[facility]-SLP  Speech Language Pathologist  Pager [phone]  4/27/2018

## 2018-04-27 NOTE — PROGRESS NOTES
[facility]  Vascular Neurology  Comprehensive Stroke Center  Progress Note    Assessment/Plan:     * Embolic stroke involving left middle cerebral artery    79 y/o right handed female with HTN, DM, arthritis, right breat cancer s/p total mastectomy on 3/29/18,  who presents to the ED via EMS with a complaint of right sided facial droop, slurred speech, and R sided weakness mainly the right arm that started at 8:30 AM  Antithrombotics for secondary stroke prevention: Antiplatelets: Aspirin: 81 mg daily    Statins for secondary stroke prevention and hyperlipidemia, if present:   Statins: Atorvastatin- 40 mg daily    Aggressive risk factor modification: HTN, DM, Diet, Exercise, Obesity, malignancy     Rehab efforts: PT/OT/SLP to evaluate and treat - recommending home health     Diagnostics ordered/pending: none     VTE prophylaxis: Heparin 5000 units SQ every 8 hours    BP parameters:  Can normalize at discharge             Aneurysm, carotid artery, internal    5mm R ICA - likely incidental, discussed with patient and family and Dr Cristopher Fierro intervention at this time.         Acute right-sided weakness    Significantly improved.   Therapy teams evaluated- home health           DM (diabetes mellitus)    Stroke risk factor, resume home meds - A1c 7.0        HTN (hypertension)    Stroke risk factor   24 hour range - 138-204/62-93  Resume home meds on dc              4/27/18 - Patient significantly improving, needs only for home health.     STROKE DOCUMENTATION   Acute Stroke Times   Last Known Normal Date: 04/26/18  Last Known Normal Time: 0830  Symptom Onset Date: 04/26/18  Symptom Onset Time: 0830  Stroke Team Called Date: 04/26/18  Stroke Team Called Time: 0924  Stroke Team Arrival Date: 04/26/18  Stroke Team Arrival Time: 0929  CT Interpretation Time: 0938  Decision to Treat Time for Alteplase:  (No russ aleplase candidae)  Decision to Treat Time for IR:  (No IR candidate)    NIH Scale:  1a. Level  Of Consciousness: 0-->Alert: keenly responsive  1b. LOC Questions: 0-->Answers both questions correctly  1c. LOC Commands: 0-->Performs both tasks correctly  2. Best Gaze: 0-->Normal  3. Visual: 0-->No visual loss  4. Facial Palsy: 1-->Minor paralysis (flattened nasolabial fold, asymmetry on smiling)  5a. Motor Arm, Left: 0-->No drift: limb holds 90 (or 45) degrees for full 10 secs  5b. Motor Arm, Right: 1-->Drift: limb holds 90 (or 45) degrees, but drifts down before full 10 secs: does not hit bed or other support  6a. Motor Leg, Left: 0-->No drift: leg holds 30 degree position for full 5 secs  6b. Motor Leg, Right: 0-->No drift: leg holds 30 degree position for full 5 secs  7. Limb Ataxia: 0-->Absent  8. Sensory: 1-->Mild-to-moderate sensory loss: patient feels pinprick is less sharp or is dull on the affected side: or there is a loss of superficial pain with pinprick, but patient is aware of being touched  9. Best Language: 0-->No aphasia: normal  10. Dysarthria: 1-->Mild-to-moderate dysarthria: patient slurs at least some words and, at worst, can be understood with some difficulty  11. Extinction and Inattention (formerly Neglect): 0-->No abnormality  Total (NIH Stroke Scale): 4       Modified Ramesh Score: 0  Joselyn Coma Scale:    ABCD2 Score:    [id number] Score:   HAS -BLED Score:   ICH Score:   Hunt & Diaz Classification:      Hemorrhagic change of an Ischemic Stroke: Does this patient have an ischemic stroke with hemorrhagic changes? No     Neurologic Chief Complaint: L MCA stroke     Subjective:     Interval History: Patient is seen for follow-up neurological assessment and treatment recommendations: improving overnight, no events, family at bedside     HPI, Past Medical, Family, and Social History remains the same as documented in the initial encounter.     Review of Systems   Constitutional: Negative for fever.   Eyes: Negative for visual disturbance.   Neurological: Positive for weakness and  numbness.   Psychiatric/Behavioral: Negative for behavioral problems and confusion.     Scheduled Meds:   aspirin  81 mg Oral Daily    atorvastatin  40 mg Oral Daily    heparin (porcine)  5,000 Units Subcutaneous Q8H    sodium chloride 0.9%  3 mL Intravenous Q8H     Continuous Infusions:   sodium chloride 0.9% 75 mL/hr at 04/26/18 1206    sodium chloride 0.9%       PRN Meds:sodium chloride 0.9%    Objective:     Vital Signs (Most Recent):  Temp: 98.5 °F (36.9 °C) (04/27/18 0410)  Pulse: 84 (04/27/18 0900)  Resp: 18 (04/26/18 1606)  BP: (!) 192/85 (04/27/18 0800)  SpO2: 95 % (04/27/18 0800)  BP Location: Right arm    Vital Signs Range (Last 24H):  Temp:  [97.6 °F (36.4 °C)-98.5 °F (36.9 °C)]   Pulse:  [73-85]   Resp:  [16-18]   BP: (138-204)/(62-93)   SpO2:  [88 %-95 %]   BP Location: Right arm    Physical Exam   Constitutional: She is oriented to person, place, and time. She appears well-developed and well-nourished.   HENT:   Head: Normocephalic and atraumatic.   Cardiovascular: Normal rate.    Pulmonary/Chest: Effort normal.   Neurological: She is alert and oriented to person, place, and time.   Nursing note and vitals reviewed.      Neurological Exam:   LOC: alert  Attention Span: Good   Language: No aphasia  Articulation: Dysarthria  Visual Fields: Full  EOM (CN III, IV, VI): Full/intact  Facial Movement (CN VII): Upper facial weakness on the Right  Motor: Arm left  Normal 5/5  Leg left  Normal 5/5  Arm right  Paresis 4/5, extensor weakness through wrist   Leg right Normal 5/5  Tone: Normal tone throughout    Laboratory:  CMP:   Recent Labs  Lab 04/27/18  0407   CALCIUM 9.1   ALBUMIN 3.0*   PROT 7.0      K 3.5   CO2 26      BUN 10   CREATININE 1.0   ALKPHOS 87   ALT 6*   AST 11   BILITOT 0.7     CBC:   Recent Labs  Lab 04/27/18  0407   WBC 5.92   RBC 3.78*   HGB 11.0*   HCT 35.1*      MCV 93   MCH 29.1   MCHC 31.3*     Lipid Panel:   Recent Labs  Lab 04/26/18  0959   CHOL 231*   LDLCALC  160.2*   HDL 45   TRIG 129     Coagulation:   Recent Labs  Lab 04/27/18  0407   INR 0.9   APTT 27.3     Platelet Aggregation Study: No results for input(s): PLTAGG, PLTAGINTERP, PLTAGREGLACO, ADPPLTAGGREG in the last 168 hours.  Hgb A1C: No results for input(s): HGBA1C in the last 168 hours.  TSH:   Recent Labs  Lab 04/26/18  0959   TSH 1.426       Diagnostic Results   MRI brain 4/26/18    Small cortical areas of signal abnormality left MCA distribution most compatible with hyperacute infarcts.  No evidence for significant mass effect or hemorrhagic conversion.    Clinical correlation and follow-up advised    Please see MRA report for further details.    MRA   MRA head: Motion limited examination.    High-grade stenosis or occlusion left mid M2 segmental branch of the MCA corresponding to calcifications seen on CT    Irregularity of the proximal right M2 segmental branch of the MCA which is likely artifactual from motion    Approximately 5 mm right supraclinoid ICA aneurysm projected superiorly    Irregularity and narrowing of the proximal basilar artery concerning for stenosis or underlying fenestration    MRA neck:    Unremarkable motion limited MRA of the neck as detailed above without evidence for significant focal stenosis or occlusion    CT head   Age-appropriate generalized cerebral volume loss with focal hyperdensity along the left mid M2 segmental branch of the MCA with additional punctate regions of calcification within the left frontal parietal and occipital sulci which are concerning for calcified emboli overall age indeterminate.    There is no evidence for acute intracranial hemorrhage or sulcal effacement to suggest large territory recent infarction.  Clinical correlation and further evaluation with MRI as warranted if patient compatible.    Please see above for additional details.    Echo 4/27/18  Normal LA     1 - Normal left ventricular systolic function (EF 60-65%).     2 - Indeterminate LV  diastolic function.     3 - Normal right ventricular systolic function .     4 - The estimated PA systolic pressure is 39 mmHg.     5 - Trivial to mild tricuspid regurgitation.     6 - Concentric hypertrophy.     7 - No wall motion abnormalities.       ARNOLD Thomas  [facility]  Department of Vascular Neurology   [facility]

## 2018-04-27 NOTE — CONSULTS
[facility]  Physical Medicine & Rehab  Consult Note    Patient Name: Elina Mei  MRN: [id number]  Admission Date: 4/26/2018  Hospital Length of Stay: 1 days  Attending Physician: Delfino Chau MD     Inpatient consult to Physical Medicine & Rehabilitation  Consult performed by: Oriana Chanel NP  Consult requested by:  Delfino Chau MD    Collaborating Physician: Sanjuanita Barba MD  Reason for Consult:  assess rehabilitation needs    Consults  Subjective:     Principal Problem: Embolic stroke involving left middle cerebral artery    HPI: Elina Mei is an 78-year-old female with PMHx of HTN, HLD, DMII, arthritis, and R breast cancer s/p total mastectomy on 3/29/18 (drain currently in place).  Patient presented to [facility] on 4/26/18 with new onset right sided weakness, facial droop, and slurred speech.  On arrival, CTH without acute pathology; however, not tPA candidate 2/2 recent mastectomy.  MRI brain consistent with L MCA infarct.  MRA brain and neck revealed L mid M2 high-grade stenosis.      Functional History: Patient lives in [place] with family (daughter, son, and grandson) in a single story home with one step/threshold to enter.  Prior to admission, she was mod(I) with ADLs and mobility.  Utilized cane for longer distances, (I) shorter distances.  Since mastectomy in March, she has required assistance with tub/shower transfers and bathing.  She is right handed.  DME: RW, cane.    Hospital Course:   4/26/18:  Evaluated by SLP.  Found to have dysphagia.  SLP recommendation: dental soft diet and thin liquids.  4/27/18:  Evaluated by PT and OT.  Bed mobility SV.  Sit to stand SBA and transfers SBA-CGA.  Ambulated 20 ft and 75 ft CGA.  UBD modA and LBD modA.  Toileting, feeding, and grooming modA.    Past Medical History:   Diagnosis Date    Acute right-sided weakness 4/26/2018    Arthritis     Diabetes mellitus     Hypertension     Malignant neoplasm of upper-outer quadrant of right breast  in female, estrogen receptor positive 12/5/2017    Stroke 4/26/2018     Past Surgical History:   Procedure Laterality Date    EYE SURGERY      HYSTERECTOMY       Review of patient's allergies indicates:  No Known Allergies    Scheduled Medications:    aspirin  81 mg Oral Daily    atorvastatin  40 mg Oral Daily    heparin (porcine)  5,000 Units Subcutaneous Q8H    sodium chloride 0.9%  3 mL Intravenous Q8H       PRN Medications: sodium chloride 0.9%    Family History     Problem Relation (Age of Onset)    Breast cancer Sister (55)    Cancer Sister (75), Brother    Hypertension Mother        Social History Main Topics    Smoking status: Never Smoker    Smokeless tobacco: Current User     Types: Snuff    Alcohol use No    Drug use: No    Sexual activity: Not Currently     Review of Systems   Constitutional: Positive for activity change. Negative for chills, fatigue and fever.   HENT: Positive for drooling. Negative for hearing loss, trouble swallowing and voice change.    Eyes: Negative for pain and visual disturbance.   Respiratory: Negative for cough, shortness of breath and wheezing.    Cardiovascular: Negative for chest pain and palpitations.   Gastrointestinal: Negative for abdominal distention, nausea and vomiting.   Genitourinary: Negative for difficulty urinating and flank pain.   Musculoskeletal: Positive for arthralgias and gait problem. Negative for back pain, myalgias and neck pain.   Skin: Positive for wound (surgical site). Negative for rash.   Neurological: Positive for speech difficulty, weakness and numbness. Negative for dizziness and headaches.   Psychiatric/Behavioral: Negative for agitation and hallucinations. The patient is not nervous/anxious.      Objective:     Vital Signs (Most Recent):  Temp: 98.5 °F (36.9 °C) (04/27/18 0410)  Pulse: 84 (04/27/18 0900)  Resp: 18 (04/26/18 1606)  BP: (!) 192/85 (04/27/18 0800)  SpO2: 95 % (04/27/18 0800)    Vital Signs (24h Range):  Temp:  [97.6 °F  (36.4 °C)-98.5 °F (36.9 °C)] 98.5 °F (36.9 °C)  Pulse:  [73-90] 84  Resp:  [11-18] 18  SpO2:  [88 %-96 %] 95 %  BP: (138-204)/(62-93) 192/85     Body mass index is 34.21 kg/m².    Physical Exam   Constitutional: She is oriented to person, place, and time. She appears well-developed and well-nourished. No distress.   HENT:   Head: Normocephalic and atraumatic.   Right Ear: External ear normal.   Left Ear: External ear normal.   Nose: Nose normal.   Eyes: Right eye exhibits no discharge. Left eye exhibits no discharge. No scleral icterus.   Neck: Normal range of motion.   Cardiovascular: Normal rate, regular rhythm and intact distal pulses.    Pulmonary/Chest: Effort normal. No respiratory distress. She has no wheezes. Right breast exhibits tenderness (surgical incision CDI with drain in place).   Abdominal: Soft. She exhibits no distension. There is no tenderness.   Musculoskeletal: Normal range of motion. She exhibits no edema or tenderness.   Neurological: She is alert and oriented to person, place, and time. She exhibits normal muscle tone.   -  Mental Status:  AAOx3.  Follows commands.  Answers correct age and .  Recent and remote memory intact.  Recalls 3/3 objects.  Counts to 10 appropriately.  Names 4/4 seasons correctly.     -  Speech and language:  no aphasia, + dysarthria.    -  Facial movement (CN VII): facial droop, + drooling  -  Motor:  Mild R pronator drift. RUE: 4/5.  LUE: 5/5.  RLE: 5/5.  LLE: 5/5.  -  Tone:  Normal.   -  Sensory:  Intact to light touch and pin prick on left, decreased on right.    Skin: Skin is warm and dry. No rash noted.   Psychiatric: She has a normal mood and affect. Her behavior is normal. Thought content normal. Her speech is slurred.   Vitals reviewed.    Diagnostic Results:   Labs: Reviewed  X-Ray: Reviewed  CT: Reviewed  MRI: Reviewed  MRA: Reviewed    Assessment/Plan:     * Embolic stroke involving left middle cerebral artery    -  Presented with new onset right sided  weakness, facial droop, and slurred speech  -  CTH without acute pathology--> not tPA candidate 2/2 recent mastectomy   -  MRI brain consistent with L MCA infarct  -  MRA brain and neck revealed L mid M2 high-grade stenosis  See hospital course for functional, cognitive/speech/language, and nutrition/swallow status.      Recommendations  -  Encourage mobility, OOB in chair, and early ambulation as appropriate   -  PT/OT evaluate and treat  -  SLP speech and cognitive evaluate and treat  -  Monitor mood and sleep disturbances  -  Establish consistent sleep-wake cycle  -  Monitor for bowel and bladder dysfunction  -  Monitor for shoulder pain and subluxation  -  Monitor for spasticity  -  Monitor for and prevent skin breakdown and pressure ulcers  · Early mobility, repositioning/weight shifting every 20-30 minutes when sitting, turn patient every 2 hours, proper mattress/overlay and chair cushioning, pressure relief/heel protector boots  -  DVT prophylaxis:  KANDI, SCD        Acute right-sided weakness    -  2/2 stroke  Recommendations  -  PT and OT evaluate and treat  -  Monitor for shoulder pain and subluxation  · Arm trough or lapboard use while sitting  · Stretching shoulder depressors/internal rotators  · Avoid pulling on affected arm  -  Monitor for spasticity  · Initiate stretching program  · Use splints/casting/bracing to help preserve ROM  -  Monitor for skin breakdown and pressure ulcers        Patient good inpatient rehab candidate.  Day 1 admission, will follow progress and discuss with rehab team for final rehab recommendation.    Thank you for your consult.     MONTY Coulter  Department of Physical Medicine & Rehab  [facility]

## 2018-04-27 NOTE — DISCHARGE SUMMARY
[facility]  Vascular Neurology  Comprehensive Stroke Center  Discharge Summary     Summary:     Admit Date: 4/26/2018  9:27 AM    Discharge Date and Time: 4/27/2018  4:37 PM    Attending Physician: Jesus Nicholas MD      Discharge Provider: ARNOLD Thomas    History of Present Illness:   Mrs Elina Mei is a 77 y/o right handed female with HTN, DM, arthritis, right breat cancer s/p total mastectomy on 3/29/18,  who presents to the ED via EMS with a complaint of right sided facial droop, slurred speech, and R sided weakness mainly the right arm that started at 8:30 AM. She was last seen normal by her family when she woke up this morning. Per EMS she was able to stand and support herself as well as to sit up and turn.  Mrs Mei was getting ready to left the house for a medical appointment when the aforementioned symptoms began.  She reports that the right arm and leg are improving.  Denies HA, vertigo, double vision, trouble swallowing, or visual disturbances.  CT head was personally reviewed and showed no acute abnormality.       Hospital Course (synopsis of major diagnoses, care, treatment, and services provided during the course of the hospital stay): 4/27/18 - Patient significantly improving, needs only for home health.     Ms Mei admitted for stroke work up, unable to be treated with tpa due to recent mastectomy, no LVO.   Noted to have significant improvement while inpatient, teams recommending home health. She does have multiple family members available to help her with needs.     Etiology unclear at this time, will follow up in clinic, patient to continue home meds at discharge.   Will increase atorvatatin to 40 mg (previously on 10)     Inpatient acute stroke work up completed and patient is stable for discharge.  Please see imaging and discharge medication list below.        STROKE DOCUMENTATION   Acute Stroke Times   Last Known Normal Date: 04/26/18  Last Known Normal Time:  0830  Symptom Onset Date: 04/26/18  Symptom Onset Time: 0830  Stroke Team Called Date: 04/26/18  Stroke Team Called Time: 0924  Stroke Team Arrival Date: 04/26/18  Stroke Team Arrival Time: 0929  CT Interpretation Time: 0938  Decision to Treat Time for Alteplase:  (No russ aleplase candidae)  Decision to Treat Time for IR:  (No IR candidate)     NIH Scale:  1a. Level Of Consciousness: 0-->Alert: keenly responsive  1b. LOC Questions: 0-->Answers both questions correctly  1c. LOC Commands: 0-->Performs both tasks correctly  2. Best Gaze: 0-->Normal  3. Visual: 0-->No visual loss  4. Facial Palsy: 1-->Minor paralysis (flattened nasolabial fold, asymmetry on smiling)  5a. Motor Arm, Left: 0-->No drift: limb holds 90 (or 45) degrees for full 10 secs  5b. Motor Arm, Right: 1-->Drift: limb holds 90 (or 45) degrees, but drifts down before full 10 secs: does not hit bed or other support  6a. Motor Leg, Left: 0-->No drift: leg holds 30 degree position for full 5 secs  6b. Motor Leg, Right: 0-->No drift: leg holds 30 degree position for full 5 secs  7. Limb Ataxia: 0-->Absent  8. Sensory: 1-->Mild-to-moderate sensory loss: patient feels pinprick is less sharp or is dull on the affected side: or there is a loss of superficial pain with pinprick, but patient is aware of being touched  9. Best Language: 0-->No aphasia: normal  10. Dysarthria: 1-->Mild-to-moderate dysarthria: patient slurs at least some words and, at worst, can be understood with some difficulty  11. Extinction and Inattention (formerly Neglect): 0-->No abnormality  Total (NIH Stroke Scale): 4        Modified Talala Score: 2  [place] Coma Scale:    ABCD2 Score:    [id number] Score:   HAS -BLED Score:   ICH Score:   Hunt & Diaz Classification:       Assessment/Plan:     Diagnostic Results:  MRI brain 4/26/18    Small cortical areas of signal abnormality left MCA distribution most compatible with hyperacute infarcts.  No evidence for significant mass effect or  hemorrhagic conversion.    Clinical correlation and follow-up advised    Please see MRA report for further details.     MRA   MRA head: Motion limited examination.    High-grade stenosis or occlusion left mid M2 segmental branch of the MCA corresponding to calcifications seen on CT    Irregularity of the proximal right M2 segmental branch of the MCA which is likely artifactual from motion    Approximately 5 mm right supraclinoid ICA aneurysm projected superiorly    Irregularity and narrowing of the proximal basilar artery concerning for stenosis or underlying fenestration    MRA neck:    Unremarkable motion limited MRA of the neck as detailed above without evidence for significant focal stenosis or occlusion     CT head   Age-appropriate generalized cerebral volume loss with focal hyperdensity along the left mid M2 segmental branch of the MCA with additional punctate regions of calcification within the left frontal parietal and occipital sulci which are concerning for calcified emboli overall age indeterminate.    There is no evidence for acute intracranial hemorrhage or sulcal effacement to suggest large territory recent infarction.  Clinical correlation and further evaluation with MRI as warranted if patient compatible.    Please see above for additional details.     Echo 4/27/18  Normal LA     1 - Normal left ventricular systolic function (EF 60-65%).     2 - Indeterminate LV diastolic function.     3 - Normal right ventricular systolic function .     4 - The estimated PA systolic pressure is 39 mmHg.     5 - Trivial to mild tricuspid regurgitation.     6 - Concentric hypertrophy.     7 - No wall motion abnormalities.        Interventions: None    Complications: None    Disposition: Home or Self Care    Final Active Diagnoses:    Diagnosis Date Noted POA    PRINCIPAL PROBLEM:  Embolic stroke involving left middle cerebral artery [I63.412] 04/26/2018 Yes    Aneurysm, carotid artery, internal [I67.1] 04/27/2018  Unknown    Acute right-sided weakness [M62.89] 04/26/2018 Yes    HTN (hypertension) [I10] 04/01/2013 Yes    DM (diabetes mellitus) [E11.9] 04/01/2013 Yes      Problems Resolved During this Admission:    Diagnosis Date Noted Date Resolved POA     * Embolic stroke involving left middle cerebral artery    79 y/o right handed female with HTN, DM, arthritis, right breat cancer s/p total mastectomy on 3/29/18,  who presents to the ED via EMS with a complaint of right sided facial droop, slurred speech, and R sided weakness mainly the right arm that started at 8:30 AM    Antithrombotics for secondary stroke prevention: Antiplatelets: Aspirin: 81 mg daily    Statins for secondary stroke prevention and hyperlipidemia, if present:   Statins: Atorvastatin- 40 mg daily    Aggressive risk factor modification: HTN, DM, Diet, Exercise, Obesity, malignancy     Rehab efforts: PT/OT/SLP to evaluate and treat - recommending home health     Diagnostics ordered/pending: none     VTE prophylaxis: Heparin 5000 units SQ every 8 hours    BP parameters:  Can normalize at discharge             Aneurysm, carotid artery, internal    5mm R ICA - likely incidental, discussed with patient and family and Dr Nicholas  No intervention at this time.         Acute right-sided weakness    Significantly improved.   Therapy teams evaluated- home health           DM (diabetes mellitus)    Stroke risk factor, resume home meds - A1c 7.0        HTN (hypertension)    Stroke risk factor   24 hour range - 138-204/62-93  Resume home meds on dc             Recommendations:     Post-discharge complication risks: Falls    Stroke Education given to: patient and family    Follow-up in Stroke Clinic in 4-6 weeks  PCP In one week     Discharge Plan:  Antithrombotics: Aspirin 81mg  Statin: Atorvastatin 10mg increased to 40 mg  Aggresive risk factor modification:  Hypertension  Diabetes  High Cholesterol  Diet  Exercise  Obesity  malignancy    Follow Up:  Follow-up  Information     [facility].    Specialty:  Neurology  Why:  The office will call you to schedule an appt in 4-6 weeks. If the office does not call you by 5/4/18 please call to schedule an appt.  Contact information:  [facility]  [facility] [address] [phone]  Additional information:  7th Floor           SHORTY Duke MD On 5/8/2018.    Specialty:  Internal Medicine  Why:  appt at 1:30 pm  Contact information:  [facility]  [address]  [facility] [address]  [phone]                   Patient Instructions:   No discharge procedures on file.    Medications:  Reconciled Home Medications:      Medication List      START taking these medications    aspirin 81 MG EC tablet  Commonly known as:  ECOTRIN  Take 1 tablet (81 mg total) by mouth once daily.  Start taking on:  4/28/2018        CHANGE how you take these medications    atorvastatin 40 MG tablet  Commonly known as:  LIPITOR  Take 1 tablet (40 mg total) by mouth once daily.  Start taking on:  4/28/2018  What changed:  · medication strength  · how much to take     potassium chloride 10 MEQ Tbsr  Commonly known as:  KLOR-CON  Take 10 mEq by mouth once daily.  What changed:  Another medication with the same name was removed. Continue taking this medication, and follow the directions you see here.        CONTINUE taking these medications    amLODIPine 10 MG tablet  Commonly known as:  NORVASC  Take 10 mg by mouth once daily.     dorzolamide-timolol 2-0.5% 22.3-6.8 mg/mL ophthalmic solution  Commonly known as:  COSOPT  Place 1 drop into both eyes 2 (two) times daily.     fluticasone 50 mcg/actuation nasal spray  Commonly known as:  FLONASE  1 spray by Each Nare route 2 (two) times daily as needed (sinus congestion).     furosemide 40 MG tablet  Commonly known as:  LASIX  Take 40 mg by mouth once daily.     hydrocodone-acetaminophen 5-325mg 5-325 mg per tablet  Commonly known as:  NORCO  Take 1-2 tablets by  mouth every 6 (six) hours as needed for Pain.     LANTUS SOLOSTAR U-100 INSULIN 100 unit/mL (3 mL) Inpn pen  Generic drug:  insulin glargine  INJECT 35 UNIT(S) EVERY EVENING BY SUBCUTANEOUS ROUTE.     losartan 50 MG tablet  Commonly known as:  COZAAR  Take 50 mg by mouth once daily.     metFORMIN 500 MG tablet  Commonly known as:  GLUCOPHAGE  Take 500 mg by mouth 2 (two) times daily with meals.     nitroGLYCERIN 0.4 MG SL tablet  Commonly known as:  NITROSTAT  Place 1 tablet (0.4 mg total) under the tongue every 5 (five) minutes as needed for Chest pain.     TRAVATAN Z 0.004 % Drop  Generic drug:  travoprost  Place 1 drop into both eyes nightly.            ARNOLD Thomas  [facility]  Department of Vascular Neurology   [facility]

## 2018-04-27 NOTE — PLAN OF CARE
04/27/18 1352   Final Note   Assessment Type Final Discharge Note   Discharge Disposition Home-Health     Patient is discharged to home with [facility] today. Sw to arrange HH. Cm made PCP appt with Dr. Romero on 5/8/18 at 1:30 pm. Cm sent in basket message for Vascular Neurology for hospital follow up in 4-6 weeks. Patient's family will provide transportation home.

## 2018-04-27 NOTE — PT/OT/SLP EVAL
"Occupational Therapy   Evaluation    Name: Elina Mei  MRN: [id number]  Admitting Diagnosis:  Embolic stroke involving left middle cerebral artery      Recommendations:     Discharge Recommendations: rehabilitation facility  Discharge Equipment Recommendations:  shower chair  Barriers to discharge:  Inaccessible home environment, Decreased caregiver support    History:     Occupational Profile:  Patient resides in [place] with dtg (works), grandson (senior in high school) and son (works) in one story home with one step to enter, no rail.  Patient is right handed.  PTA patient independent with ADLs, not driving.  DME:  Rolling walker (reports not using); single point cane.  Primarily takes sponge baths.  Hobbies:  Cooking and cleaning up.  Roles/Responsibilities:  Mother, grandmother.     Past Medical History:   Diagnosis Date    Acute right-sided weakness 4/26/2018    Arthritis     Diabetes mellitus     Hypertension     Malignant neoplasm of upper-outer quadrant of right breast in female, estrogen receptor positive 12/5/2017    Stroke 4/26/2018       Past Surgical History:   Procedure Laterality Date    EYE SURGERY      HYSTERECTOMY         Subjective     Patient: "My right arm ain't right."  Dtg:  "She was getting ready to go to the doctor and my sister in law said she looked disoriented and she kept dropping things from her right hand."  Communicated with: Nurse prior to session.  Pain/Comfort:  · Pain Rating 1: 0/10  · Pain Rating Post-Intervention 1: 0/10    Patients cultural, spiritual, Christianity conflicts given the current situation: Taoism    Objective:     Patient found with: telemetry, bed alarm, peripheral IV  Family present at end of the session.  General Precautions: Standard, aspiration, fall, dental soft   Orthopedic Precautions:N/A   Braces: N/A     Occupational Performance:    Bed Mobility:    · Patient completed Rolling/Turning to Left with  supervision  · Patient completed Rolling/Turning to " Right with supervision  · Patient completed Scooting/Bridging with supervision  · Patient completed Supine to Sit with supervision  · Patient completed Sit to Supine with supervision    Functional Mobility/Transfers:  · Patient completed Sit <> Stand Transfer with stand by assistance  with  no assistive device   · Patient completed Bed <> Chair Transfer using Stand Pivot technique with stand by assistance with no assistive device    Activities of Daily Living:  · Feeding:  moderate assistance with use of right UE  · Grooming: moderate assistance while standing with use of right UE  · UB Dressing: moderate assistance while seated EOB  · LB Dressing: moderate assistance while seated EOB   · Toileting: moderate assistance with managing clothing on right    Cognitive/Visual Perceptual:  Cognitive/Psychosocial Skills:     -       Oriented to: Person, Place, Time and Situation   -       Follows Commands/attention:Follows two-step commands  -       Communication: dysarthria  -       Memory: Impaired STM  -       Safety awareness/insight to disability: impaired   -       Mood/Affect/Coping skills/emotional control: Appropriate to situation    Physical Exam:  Postural examination/scapula alignment:    -       Rounded shoulders  Skin integrity: Visible skin intact  Edema:  None noted  Sensation: Decreased light touch, right UE  Upper Extremity Range of Motion:     -       Right Upper Extremity: WNL  -       Left Upper Extremity: WNL  Upper Extremity Strength:    -       Right Upper Extremity: 4/5  -       Left Upper Extremity: 4/5  Poor coordination (gross and fine motor): right UE.    Patient left supine with all lines intact and call button in reach    AMPAC 6 Click:  AMPAC Total Score: 12    Treatment & Education:  Patient/ Family education provided for stroke warning signs, prevention guidelines and personal risk factors.  Patient/ Family verbalizing understanding via teach back method.   Patient education provided on  role of OT and need for rehab upon discharge.  Patient education provided on hemiplegic dressing technique, right UE weight bearing / positioning, postural control, and transfers.  Patient and family instructed on need to call for assistance for toileting needs and when getting up.  Continued education, patient/ family training recommended.  Patient alert and oriented x 3; able to follow 4/4 one step commands.  Patient attentive and interactive throughout the session.  Patient able to identify 5/5 body parts.  Able to name 5/5 objects.  Able to sequence 7/7 days of the week and 12/12 months of the year.  Drooling noted throughout the session.  Patient's functional status and disposition recommendation discussed with stroke team in daily rounds.  White board updated in patient's room.  OT asked if there were any other questions; patient/ family had no further questions.       Education:    Assessment:     Elina Mei is a 78 y.o. female with a medical diagnosis of Embolic stroke involving left middle cerebral artery.  She presents with performance deficits affecting function are weakness, impaired endurance, impaired sensation, gait instability, impaired cognition, decreased lower extremity function, decreased upper extremity function, impaired functional mobilty, impaired self care skills, impaired balance, decreased coordination, decreased safety awareness, impaired fine motor, impaired coordination, decreased ROM.   These performance deficits have resulted in activity limitations including but not limited to: bed mobility, transfers, ascending/ descending stairs, walking short and long distances, walking around obstacles, transitional movement patterns (kneeling, bending); eating, upper body dressing, lower body dressing, brushing teeth, toileting, bathing, carrying objects, writing, shopping, and meal preparation.   Patient's role as independent caretaker for self has been affected. Patient will benefit from  "skilled OT services to maximize level of independence with self-care skills and functional mobility.  Will benefit from rehab.    Rehab Prognosis: Good; patient would benefit from acute skilled OT services to address these deficits and reach maximum level of function.         Clinical Decision Makin.  OT Mod:  "Pt evaluation falls under moderate complexity for evaluation coding due to identification of 3-5 performance deficits noted as stated above. Eval required Min/Mod assistance to complete on this date and detailed assessment(s) were utilized. Moreover, an expanded review of history and occupational profile obtained with additional review of cognitive, physical and psychosocial hx."     Plan:     Patient to be seen 4 x/week to address the above listed problems via self-care/home management, neuromuscular re-education, cognitive retraining, sensory integration, therapeutic activities, therapeutic exercises  · Plan of Care Expires: 18  · Plan of Care Reviewed with: patient, daughter    This Plan of care has been discussed with the patient who was involved in its development and understands and is in agreement with the identified goals and treatment plan    GOALS:    Occupational Therapy Goals        Problem: Occupational Therapy Goal    Goal Priority Disciplines Outcome Interventions   Occupational Therapy Goal     OT, PT/OT     Description:  Goals set  to be addressed for 7 days with expiration date, :  Patient will increase functional independence with ADLs by performing:    Patient will demonstrate rolling to the right with modified independence.  Not met   Patient will demonstrate rolling to the left with modified independence.   Not met  Patient will demonstrate supine -sit with modified independence.   Not met  Patient will demonstrate stand pivot transfers with modified independence.   Not met  Patient will demonstrate grooming while standing with supervision.   Not met  Patient will " demonstrate upper body dressing with supervision.   Not met  Patient will demonstrate lower body dressing with supervision.   Not met  Patient will demonstrate toileting with supervision.   Not met  Patient will demonstrate bathing while seated EOB with supervision.   Not met  Patient's family / caregiver will demonstrate independence and safety with assisting patient with self-care skills and functional mobility.     Not met  Patient and/or patient's family will verbalize understanding of stroke prevention guidelines, personal risk factors and stroke warning signs via teachback method.  Not met                           Time Tracking:     OT Date of Treatment: 04/27/18  OT Start Time: 0607  OT Stop Time: 0647  OT Total Time (min): 40 min    Billable Minutes:Evaluation 16  Self Care/Home Management 14  Therapeutic Activity 10    SWETHA Griffith  4/27/2018

## 2018-04-27 NOTE — ASSESSMENT & PLAN NOTE
77 y/o right handed female with HTN, DM, arthritis, right breat cancer s/p total mastectomy on 3/29/18,  who presents to the ED via EMS with a complaint of right sided facial droop, slurred speech, and R sided weakness mainly the right arm that started at 8:30 AM    Antithrombotics for secondary stroke prevention: Antiplatelets: Aspirin: 81 mg daily    Statins for secondary stroke prevention and hyperlipidemia, if present:   Statins: Atorvastatin- 40 mg daily    Aggressive risk factor modification: HTN, DM, Diet, Exercise, Obesity, malignancy     Rehab efforts: PT/OT/SLP to evaluate and treat - recommending home health     Diagnostics ordered/pending: none     VTE prophylaxis: Heparin 5000 units SQ every 8 hours    BP parameters:  Can normalize at discharge

## 2018-04-27 NOTE — PT/OT/SLP EVAL
"Speech Language Pathology Evaluation  Cognitive Communication    Patient Name:  Elina Mei   MRN:  [id number]  Admitting Diagnosis: Embolic stroke involving left middle cerebral artery    Recommendations:     Recommendations:                General Recommendations:  Dysphagia therapy and Speech/language therapy  Diet recommendations:  Dental Soft, Thin   Aspiration Precautions: Standard aspiration precautions   General Precautions: Standard, fall  Communication strategies:  none    History:     Past Medical History:   Diagnosis Date    Acute right-sided weakness 4/26/2018    Arthritis     Diabetes mellitus     Hypertension     Malignant neoplasm of upper-outer quadrant of right breast in female, estrogen receptor positive 12/5/2017    Stroke 4/26/2018       Past Surgical History:   Procedure Laterality Date    EYE SURGERY      HYSTERECTOMY         Social History: Patient lives with family.      Prior diet: regular      Subjective     "My memory is good" per pt  Patient goals: get better     Pain/Comfort:  · Pain Rating 1: 0/10    Objective:   Cognitive Status:    Pt. was oriented x3 with good recall of recent and remote temporal and general information.  Immediate/delayed verbal recall was wfl with pt. Repeating 6 digits and 5 words without difficulty.    Responses to hypothetical verbal problem solving tasks were accurate and complete.  Pt. Compared and contrasted objects and generated multiple solutions to problems.  Thought organization and categorization skills were wfl with pt. Naming 14 animals given one minute when 15-20 are wnl.  Pt. Responded to functional math and time calculations with 100% accuracy and sequenced 4 words presented auditorily on 2/2 trials.        Receptive Language:   Comprehension:   Pt. Responded to complex yes/no questions and multi step commands with 100% accuracy and no delays in responding.        Pragmatics:    wfl    Expressive Language:  Verbal:    Verbal language skills were " wfl with no evidence of aphasia.  Pt. Expressed their thoughts coherently in conversation with no evidence of confusion or word finding deficits          Motor Speech:  Mild dysarthria with imprecise articulation noted.  Speech was intelligible in conversation     Voice:   wfl        Assessment:   Elina Mei is a 78 y.o. female with an SLP diagnosis of Dysphagia and Dysarthria.    Goals:    SLP Goals        Problem: SLP Goal    Goal Priority Disciplines Outcome   SLP Goal     SLP Ongoing (interventions implemented as appropriate)   Description:  Goals to be met 5/10  1 pt will tolerate soft diet and thin liquids/met  2 pt will participate in speech lang eval  /met  3.  Perform ome x10 with min cues  4.  Recall speech strategies with no cues  5.  Assess reading, writing and skills                    Plan:   · Patient to be seen:  3 x/week   · Plan of Care expires:  05/25/18  · Plan of Care reviewed with:  patient, family   · SLP Follow-Up:  Yes       Discharge recommendations:  Discharge Facility/Level Of Care Needs: home health speech therapy       Time Tracking:   SLP Treatment Date:   04/27/18  Speech Start Time:  1035  Speech Stop Time:  1052     Speech Total Time (min):  17 min    Billable Minutes: Ramya 17     Arminda Ricketts MA, CCC-SLP  04/27/2018

## 2018-04-27 NOTE — HPI
Elina Mei is an 78-year-old female with PMHx of HTN, HLD, DMII, arthritis, and R breast cancer s/p total mastectomy on 3/29/18 (drain currently in place).  [place] [facility] with new onset right sided weakness, facial droop, and slurred speech.  On arrival, CTH without acute pathology; however, not tPA candidate 2/2 recent mastectomy.  MRI brain consistent with L MCA infarct.  MRA brain and neck revealed L mid M2 high-grade stenosis.      Functional History: Patient lives in [place] with family (daughter, son, and grandson) in a single story home with one step/threshold to enter.  Prior to admission, she was mod(I) with ADLs and mobility.  Utilized cane for longer distances, (I) shorter distances.  Since mastectomy in March, she has required assistance with tub/shower transfers and bathing.  She is right handed.  DME: RW, cane.

## 2018-04-27 NOTE — HOSPITAL COURSE
4/26/18:  Evaluated by SLP.  Found to have dysphagia.  SLP recommendation: dental soft diet and thin liquids.  4/27/18:  Evaluated by PT and OT.  Bed mobility SV.  Sit to stand SBA and transfers SBA-CGA.  Ambulated 20 ft and 75 ft CGA.  UBD modA and LBD modA.  Toileting, feeding, and grooming modA.

## 2018-04-27 NOTE — ASSESSMENT & PLAN NOTE
-  Presented with new onset right sided weakness, facial droop, and slurred speech  -  CTH without acute pathology--> not tPA candidate 2/2 recent mastectomy   -  MRI brain consistent with L MCA infarct  -  MRA brain and neck revealed L mid M2 high-grade stenosis  See hospital course for functional, cognitive/speech/language, and nutrition/swallow status.      Recommendations  -  Encourage mobility, OOB in chair, and early ambulation as appropriate   -  PT/OT evaluate and treat  -  SLP speech and cognitive evaluate and treat  -  Monitor mood and sleep disturbances  -  Establish consistent sleep-wake cycle  -  Monitor for bowel and bladder dysfunction  -  Monitor for shoulder pain and subluxation  -  Monitor for spasticity  -  Monitor for and prevent skin breakdown and pressure ulcers  · Early mobility, repositioning/weight shifting every 20-30 minutes when sitting, turn patient every 2 hours, proper mattress/overlay and chair cushioning, pressure relief/heel protector boots  -  DVT prophylaxis:  KANDI, SCD

## 2018-04-27 NOTE — PLAN OF CARE
Problem: Patient Care Overview  Goal: Plan of Care Review  Plan of care reviewed with pt. Pt voiced understanding. Pt AAOx3. Pt denies any c/o during the shift. No apparent distress noted. Fall precautions maintained. Bed in lowest position, bed alarm on and locked. Call light within reach and advised to call for assistance. Side rails x 2 and slip resistant socks on at this time. Pt family c/o not seeing the MD throughout the day, I called the team they said they would send someone to speak to them, no one came. It was escalated to the charge nurse, finally got in touch with an MD who came to speak to the family. NS @ 75 mL cont. Pt on telemetry running normal sinus rhythm. No true red alarms or ectopy throughout the shift. Will continue to monitor.

## 2018-04-27 NOTE — PT/OT/SLP DISCHARGE
Occupational Therapy Discharge Summary    Elina Mei  MRN: [id number]   Principal Problem: Embolic stroke involving left middle cerebral artery      Patient Discharged from acute Occupational Therapy on 4/27.  Please refer to prior OT note dated 4/27 for functional status.    Assessment:      Patient appropriate for care in another setting.    Objective:     GOALS:    Occupational Therapy Goals        Problem: Occupational Therapy Goal    Goal Priority Disciplines Outcome Interventions   Occupational Therapy Goal     OT, PT/OT     Description:  Goals set 4/27 to be addressed for 7 days with expiration date, 5/4:  Patient will increase functional independence with ADLs by performing:    Patient will demonstrate rolling to the right with modified independence.  Not met   Patient will demonstrate rolling to the left with modified independence.   Not met  Patient will demonstrate supine -sit with modified independence.   Not met  Patient will demonstrate stand pivot transfers with modified independence.   Not met  Patient will demonstrate grooming while standing with supervision.   Not met  Patient will demonstrate upper body dressing with supervision.   Not met  Patient will demonstrate lower body dressing with supervision.   Not met  Patient will demonstrate toileting with supervision.   Not met  Patient will demonstrate bathing while seated EOB with supervision.   Not met  Patient's family / caregiver will demonstrate independence and safety with assisting patient with self-care skills and functional mobility.     Not met  Patient and/or patient's family will verbalize understanding of stroke prevention guidelines, personal risk factors and stroke warning signs via teachback method.  Not met                           Reasons for Discontinuation of Therapy Services  Transfer to alternate level of care.      Plan:     Patient Discharged to: home, per chart review  SWETHA Griffith  4/27/2018

## 2018-04-27 NOTE — ASSESSMENT & PLAN NOTE
5mm R ICA - likely incidental, discussed with patient and family and Dr Nicholas  No intervention at this time.

## 2018-04-27 NOTE — TELEPHONE ENCOUNTER
----- Message from Tiki Olivera RN sent at 4/27/2018 11:34 AM CDT -----  Contact: Tiki Bahena  Please schedule a hospital follow up appt in 4-6 weeks. The patient was seen by Cristopher while in patient. Please call the patient with date and time of appt

## 2018-04-27 NOTE — PROGRESS NOTES
Received word from Dr. Espinal's office that patient did not make appt due to stroke/hospital admission. Will follow patient's admission process and reschedule as needed

## 2018-04-27 NOTE — SUBJECTIVE & OBJECTIVE
Neurologic Chief Complaint: L MCA stroke     Subjective:     Interval History: Patient is seen for follow-up neurological assessment and treatment recommendations: improving overnight, no events, family at bedside     HPI, Past Medical, Family, and Social History remains the same as documented in the initial encounter.     Review of Systems   Constitutional: Negative for fever.   Eyes: Negative for visual disturbance.   Neurological: Positive for weakness and numbness.   Psychiatric/Behavioral: Negative for behavioral problems and confusion.     Scheduled Meds:   aspirin  81 mg Oral Daily    atorvastatin  40 mg Oral Daily    heparin (porcine)  5,000 Units Subcutaneous Q8H    sodium chloride 0.9%  3 mL Intravenous Q8H     Continuous Infusions:   sodium chloride 0.9% 75 mL/hr at 04/26/18 1206    sodium chloride 0.9%       PRN Meds:sodium chloride 0.9%    Objective:     Vital Signs (Most Recent):  Temp: 98.5 °F (36.9 °C) (04/27/18 0410)  Pulse: 84 (04/27/18 0900)  Resp: 18 (04/26/18 1606)  BP: (!) 192/85 (04/27/18 0800)  SpO2: 95 % (04/27/18 0800)  BP Location: Right arm    Vital Signs Range (Last 24H):  Temp:  [97.6 °F (36.4 °C)-98.5 °F (36.9 °C)]   Pulse:  [73-85]   Resp:  [16-18]   BP: (138-204)/(62-93)   SpO2:  [88 %-95 %]   BP Location: Right arm    Physical Exam   Constitutional: She is oriented to person, place, and time. She appears well-developed and well-nourished.   HENT:   Head: Normocephalic and atraumatic.   Cardiovascular: Normal rate.    Pulmonary/Chest: Effort normal.   Neurological: She is alert and oriented to person, place, and time.   Nursing note and vitals reviewed.      Neurological Exam:   LOC: alert  Attention Span: Good   Language: No aphasia  Articulation: Dysarthria  Visual Fields: Full  EOM (CN III, IV, VI): Full/intact  Facial Movement (CN VII): Upper facial weakness on the Right  Motor: Arm left  Normal 5/5  Leg left  Normal 5/5  Arm right  Paresis 4/5, extensor weakness through  wrist   Leg right Normal 5/5  Tone: Normal tone throughout    Laboratory:  CMP:   Recent Labs  Lab 04/27/18  0407   CALCIUM 9.1   ALBUMIN 3.0*   PROT 7.0      K 3.5   CO2 26      BUN 10   CREATININE 1.0   ALKPHOS 87   ALT 6*   AST 11   BILITOT 0.7     CBC:   Recent Labs  Lab 04/27/18  0407   WBC 5.92   RBC 3.78*   HGB 11.0*   HCT 35.1*      MCV 93   MCH 29.1   MCHC 31.3*     Lipid Panel:   Recent Labs  Lab 04/26/18  0959   CHOL 231*   LDLCALC 160.2*   HDL 45   TRIG 129     Coagulation:   Recent Labs  Lab 04/27/18  0407   INR 0.9   APTT 27.3     Platelet Aggregation Study: No results for input(s): PLTAGG, PLTAGINTERP, PLTAGREGLACO, ADPPLTAGGREG in the last 168 hours.  Hgb A1C: No results for input(s): HGBA1C in the last 168 hours.  TSH:   Recent Labs  Lab 04/26/18  0959   TSH 1.426       Diagnostic Results   MRI brain 4/26/18    Small cortical areas of signal abnormality left MCA distribution most compatible with hyperacute infarcts.  No evidence for significant mass effect or hemorrhagic conversion.    Clinical correlation and follow-up advised    Please see MRA report for further details.    MRA   MRA head: Motion limited examination.    High-grade stenosis or occlusion left mid M2 segmental branch of the MCA corresponding to calcifications seen on CT    Irregularity of the proximal right M2 segmental branch of the MCA which is likely artifactual from motion    Approximately 5 mm right supraclinoid ICA aneurysm projected superiorly    Irregularity and narrowing of the proximal basilar artery concerning for stenosis or underlying fenestration    MRA neck:    Unremarkable motion limited MRA of the neck as detailed above without evidence for significant focal stenosis or occlusion    CT head   Age-appropriate generalized cerebral volume loss with focal hyperdensity along the left mid M2 segmental branch of the MCA with additional punctate regions of calcification within the left frontal parietal and  occipital sulci which are concerning for calcified emboli overall age indeterminate.    There is no evidence for acute intracranial hemorrhage or sulcal effacement to suggest large territory recent infarction.  Clinical correlation and further evaluation with MRI as warranted if patient compatible.    Please see above for additional details.    Echo 4/27/18  Normal LA     1 - Normal left ventricular systolic function (EF 60-65%).     2 - Indeterminate LV diastolic function.     3 - Normal right ventricular systolic function .     4 - The estimated PA systolic pressure is 39 mmHg.     5 - Trivial to mild tricuspid regurgitation.     6 - Concentric hypertrophy.     7 - No wall motion abnormalities.

## 2018-04-30 ENCOUNTER — TELEPHONE (OUTPATIENT)
Dept: SURGERY | Facility: CLINIC | Age: 78
End: 2018-04-30

## 2018-04-30 NOTE — PT/OT/SLP DISCHARGE
Physical Therapy Discharge Summary    Name: Elina Mei  MRN: [id number]   Principal Problem: Embolic stroke involving left middle cerebral artery     Patient Discharged from acute Physical Therapy on 4/27/2018.  Please refer to prior PT noted date on 4/27/2018 for functional status.     Assessment:     Patient appropriate for care in another setting.    Objective:     GOALS:    Physical Therapy Goals     Not on file          Multidisciplinary Problems (Resolved)        Problem: Physical Therapy Goal    Goal Priority Disciplines Outcome Goal Variances Interventions   Physical Therapy Goal   (Resolved)     PT/OT, PT Outcome(s) achieved     Description:    Goals to be met by 5/10/2018    1. Pt will perform supine to sit with SBA.  2. Pt will perform sit to supine with SBA.  3. Pt will perform bed <> chair transfers with independence and no AD.  4. Pt will perform gait x 150 feet with SBA and no AD.  5. Pt and/or family with verbalize appropriate positioning with RUE elevated and proper alignment.                       Reasons for Discontinuation of Therapy Services  Transfer to alternate level of care.      Plan:     Patient Discharged to: Home with Home Health Service.    Giovana Stacy, PT  4/30/2018

## 2018-04-30 NOTE — TELEPHONE ENCOUNTER
"Called patient's daughter today to follow up on patient's recent hospitalization. Per Rochelle patient is doing "pretty good" and resting at home. Told Rochelle that I would reschedule their appt with Dr. Espinal for a few weeks from now, to give her mom time to fully recover. Rochelle verbalized understanding  "

## 2018-05-01 ENCOUNTER — PATIENT OUTREACH (OUTPATIENT)
Dept: ADMINISTRATIVE | Facility: CLINIC | Age: 78
End: 2018-05-01

## 2018-05-01 NOTE — PROGRESS NOTES
C3 nurse attempted to contact patient. The following occurred:   C3 nurse attempted to contact Elina Mei  for a TCC post hospital discharge follow up call. The patient's daughter is unable to conduct the call @ this time. She requested a callback.    The patient has a scheduled [facility] appointment with Dr. Romero on 5\8 @ 1330.

## 2018-05-01 NOTE — PROGRESS NOTES
Please forward this important TCC information to your provider in order to maximize the post discharge care delivery of this patient.    C3 nurse spoke with the daughter of Elina Mei  for a TCC post hospital discharge follow up call. The patient has a scheduled [facility] appointment with Dr. Romero on 5\8 @ 1337.  Respectfully,  Ivy Sims RN  [facility]    [email or web address]       Please do not reply to this message, as this inbox is not routinely monitored.

## 2018-05-01 NOTE — PATIENT INSTRUCTIONS
Discharge Instructions for Stroke  You have been diagnosed with a stroke, or with a TIA (transient ischemic attack). Or you have been identified as having a high risk for stroke. During a stroke, blood stops flowing to part of your brain. This can damage areas in the brain that control other parts of the body. Symptoms after a stroke depend on which part of the brain has been affected.  Stroke risk factors  Once youve had a stroke, youre at greater risk for another one. Listed below are some other factors that can increase your risk for a stroke:  · High blood pressure  · High cholesterol  · Cigarette or cigar smoking  · Diabetes  · Carotid or other artery disease  · Atrial fibrillation, atrial flutter, or other heart disease  · Not being physically active  · Obesity  · Certain blood disorders (such as sickle cell anemia)  · Excessive alcohol use  · Abuse of street drugs  · Race  · Gender  · Family history of stroke  · Diet high in salty, fried, or greasy foods  Changes in daily living  Doing your regular tasks may be difficult after youve had a stroke, but you can learn new ways to manage your daily activities. In fact, doing daily activities may help you to regain muscle strength and bring back function to affected limbs. Be patient, give yourself time to adjust, and appreciate the progress you make.  Daily activities  You may be at risk of falling. Make changes to your home to help you walk more easily. A therapist will decide if you need an assistive device to walk safely.  You may need to see an occupational therapist or physical therapist to learn new ways of doing things. For example, you may need to make adjustments when bathing or dressing:  Tips for showering or bathing  · Test the water temperature with a hand or foot that was not affected by the stroke.  · Use grab bars, a shower seat, a hand-held showerhead, and a long-handled brush.  Tips for getting dressed  · Dress while sitting, starting with  the affected side or limb.  · Wear shirts that pull easily over your head. Wear pants or skirts with elastic waistbands.  · Use zippers with loops attached to the pull tabs.  Lifestyle changes  · Take your medicines exactly as directed. Dont skip doses.  · Begin an exercise program. Ask your provider how to get started. Also ask how much activity you should try to get on a daily or weekly basis. You can benefit from simple activities such as walking or gardening.  · Limit alcohol intake. Men should have no more than 2 alcoholic drinks a day. Women should limit themselves to 1 alcoholic drink per day.  · Know your cholesterol level. Follow your providers recommendations about how to keep cholesterol under control.  · If you are a smoker, quit now. Join a stop-smoking program to improve your chances of success. Ask your provider about medicines or other methods to help you quit.  · Learn stress management techniques to help you deal with stress in your home and work life.  Diet  Your healthcare provider will give you information on dietary changes that you may need to make, based on your situation. Your provider may recommend that you see a registered dietitian for help with diet changes. Changes may include:  · Reducing fat and cholesterol intake  · Reducing salt (sodium) intake, especially if you have high blood pressure  · Eating more fresh vegetables and fruits  · Eating more lean proteins, such as fish, poultry, and beans and peas (legumes)  · Eating less red meat and processed meats  · Using low-fat dairy products  · Limiting vegetable oils and nut oils  · Limiting sweets and processed foods such as chips, cookies, and baked goods  Follow-up care  · Keep your medical appointments. Close follow-up is important to stroke rehabilitation and recovery.  · Some medicines require blood tests to check for progress or problems. Keep follow-up appointments for any blood tests ordered by your providers.  When to call  911  Call 911 right away if you have any of the following symptoms of stroke:  · Weakness, tingling, or loss of feeling on one side of your face or body  · Sudden double vision or trouble seeing in one or both eyes  · Sudden trouble talking or slurred speech  · Trouble understanding others  · Sudden, severe headache  · Dizziness, loss of balance, or a sense of falling  · Blackouts or seizures      F.A.S.T. is an easy way to remember the signs of stroke. When you see these signs, you know that you need to call 911 fast.  F.A.S.T. stands for:  · F is for face drooping. One side of the face is drooping or numb. When the person smiles, the smile is uneven.  · A is for arm weakness. One arm is weak or numb. When the person lifts both arms at the same time, one arm may drift downward.  · S is for speech difficulty. You may notice slurred speech or trouble speaking. The person can't repeat a simple sentence correctly when asked.  · T is for time to call 911. If someone shows any of these symptoms, even if they go away, call 911 immediately. Make note of the time the symptoms first appeared.  Date Last Reviewed: 8/26/2015 © 2000-2018 [employer]. [address] [place] [address], [place] [address]. All rights reserved. This information is not intended as a substitute for professional medical care. Always follow your healthcare professional's instructions.

## 2018-05-02 ENCOUNTER — OFFICE VISIT (OUTPATIENT)
Dept: SURGERY | Facility: CLINIC | Age: 78
End: 2018-05-02
Payer: MEDICARE

## 2018-05-02 VITALS
HEART RATE: 91 BPM | WEIGHT: 226 LBS | BODY MASS INDEX: 34.36 KG/M2 | TEMPERATURE: 99 F | DIASTOLIC BLOOD PRESSURE: 73 MMHG | SYSTOLIC BLOOD PRESSURE: 159 MMHG

## 2018-05-02 DIAGNOSIS — Z17.0 MALIGNANT NEOPLASM OF UPPER-OUTER QUADRANT OF RIGHT BREAST IN FEMALE, ESTROGEN RECEPTOR POSITIVE: Primary | ICD-10-CM

## 2018-05-02 DIAGNOSIS — C50.411 MALIGNANT NEOPLASM OF UPPER-OUTER QUADRANT OF RIGHT BREAST IN FEMALE, ESTROGEN RECEPTOR POSITIVE: Primary | ICD-10-CM

## 2018-05-02 PROCEDURE — 99024 POSTOP FOLLOW-UP VISIT: CPT | Mod: S$GLB,,, | Performed by: SURGERY

## 2018-05-02 PROCEDURE — 99999 PR PBB SHADOW E&M-EST. PATIENT-LVL III: CPT | Mod: PBBFAC,,, | Performed by: SURGERY

## 2018-05-02 RX ORDER — BRIMONIDINE TARTRATE 1 MG/ML
SOLUTION/ DROPS OPHTHALMIC
Status: ON HOLD | COMMUNITY
End: 2020-01-01 | Stop reason: CLARIF

## 2018-05-02 NOTE — PROGRESS NOTES
Breast Surgery  Department of Surgery      REFERRING PROVIDER: Dr. Medellin  Chief Complaint: Right breast cancer    Subjective:      Patient ID: Elina Mei is a 78 y.o. female who  had right breast IDC, grade II, ER+MA+H2N- at the 9OC position.  She underwent R USG lumpectomy and SLNB on 1/26/18. Pathology results showed close DCIS margins, but her invasive component is clear by 5mm. Patient elected to undergo total right mastectomy on 3/29/18. She tolerated the procedure well and presents today for post operative check. She and daughter state she is doing well and pain is controlled. She still has a drain in place but unsure of how much it is putting out daily. Endorses slight discomfort and numbness under her right arm.     Pathology reported Invasive ductal carcinoma, moderately differentiated, 1.5 cm, and DCIS. 2/2 lymph nodes negative for metastasis. Inferior margin with DCIS < 0.1 mm. Anterior margin with <1mm. Case was discussed at tumor board on 2/20 and was recommended for patient to have re-excision followed by radiation and endocrine therapy. Patient has met with Dr. Ko recently and she recommended re-excision followed by whole breast irradiation for a total dose of 4200 cGy +1000 cGy boost. Patient and family wanted to discuss options prior to making decision.    Interval history:   Patient doing well this visit. Minimal pain, drain now with minimal serosanguinous output. Patient was noted to have an embolic stroke since last appointment, was hospitalized briefly for this but has recovered almost full neurological function.    Patient does not routinely do self breast exams.  Patient has not noted a change on breast exam.  Patient denies nipple discharge. Patient denies to previous breast biopsy. Patient denies a personal history of breast cancer.    Findings at that time were the following:   Tumor size: 2.5 cm   Tumor grade: II   Estrogen Receptor: +   Progesterone Receptor: +   Her-2 juan: -    Lymph node status: unknown   Lymphatic invasion: unknown     Since her prior presentation to clinic she denies any changes to her medical history. She denies any hospitalizations or changes in her medications. She reports she has met with her primary care physician as requested and obtained lab work and pre-op CXR and EKG prior to her visit today.     GYN History:  Age of menarche was 10. Age of menopause was 40s with hysterectomy.   Patient denies hormonal therapy. Patient is . Age of first live birth was 17. Patient did breast feed.    Past Medical History:   Diagnosis Date    Acute right-sided weakness 2018    Arthritis     Diabetes mellitus     Hypertension     Malignant neoplasm of upper-outer quadrant of right breast in female, estrogen receptor positive 2017    Stroke 2018     Past Surgical History:   Procedure Laterality Date    EYE SURGERY      HYSTERECTOMY       Current Outpatient Prescriptions on File Prior to Visit   Medication Sig Dispense Refill    amlodipine (NORVASC) 10 MG tablet Take 10 mg by mouth once daily.      aspirin (ECOTRIN) 81 MG EC tablet Take 1 tablet (81 mg total) by mouth once daily. 30 tablet 1    atorvastatin (LIPITOR) 40 MG tablet Take 1 tablet (40 mg total) by mouth once daily. 30 tablet 1    dorzolamide-timolol 2-0.5% (COSOPT) 22.3-6.8 mg/mL ophthalmic solution Place 1 drop into both eyes 2 (two) times daily.  6    fluticasone (FLONASE) 50 mcg/actuation nasal spray 1 spray by Each Nare route 2 (two) times daily as needed (sinus congestion). 15 g 0    furosemide (LASIX) 40 MG tablet Take 40 mg by mouth once daily.       LANTUS SOLOSTAR 100 unit/mL (3 mL) InPn pen INJECT 35 UNIT(S) EVERY EVENING BY SUBCUTANEOUS ROUTE.  2    losartan (COZAAR) 50 MG tablet Take 50 mg by mouth once daily.      metformin (GLUCOPHAGE) 500 MG tablet Take 500 mg by mouth 2 (two) times daily with meals.      potassium chloride (KLOR-CON) 10 MEQ TbSR Take 10 mEq by mouth  once daily.      TRAVATAN Z 0.004 % Drop Place 1 drop into both eyes nightly.  3    hydrocodone-acetaminophen 5-325mg (NORCO) 5-325 mg per tablet Take 1-2 tablets by mouth every 6 (six) hours as needed for Pain. 30 tablet 0    nitroGLYCERIN (NITROSTAT) 0.4 MG SL tablet Place 1 tablet (0.4 mg total) under the tongue every 5 (five) minutes as needed for Chest pain. 10 tablet 0     No current facility-administered medications on file prior to visit.      Social History     Social History    Marital status:      Spouse name: N/A    Number of children: N/A    Years of education: N/A     Occupational History    Not on file.     Social History Main Topics    Smoking status: Never Smoker    Smokeless tobacco: Current User     Types: Snuff    Alcohol use No    Drug use: No    Sexual activity: Not Currently     Other Topics Concern    Not on file     Social History Narrative    No narrative on file     Family History   Problem Relation Age of Onset    Cancer Sister 75     Colon CA    Breast cancer Sister 55    Cancer Brother     Hypertension Mother         Review of Systems   Constitutional: Negative for appetite change, chills, fever and unexpected weight change.   HENT: Negative for facial swelling, postnasal drip and sore throat.    Eyes: Negative for redness and itching.   Respiratory: Negative for chest tightness and shortness of breath.    Cardiovascular: Negative for chest pain and palpitations.   Gastrointestinal: Negative for blood in stool, diarrhea, nausea and vomiting.   Genitourinary: Negative for difficulty urinating and dysuria.   Musculoskeletal: Negative for arthralgias and joint swelling.   Skin: Negative for rash and wound.   Neurological: Negative for dizziness and syncope.   Hematological: Negative for adenopathy.   Psychiatric/Behavioral: Negative for agitation. The patient is not nervous/anxious.      Objective:   BP (!) 159/73 (BP Location: Left arm, Patient Position: Sitting,  BP Method: Large (Automatic))   Pulse 91   Temp 98.8 °F (37.1 °C) (Oral)   Wt 102.5 kg (226 lb)   BMI 34.36 kg/m²     Physical Exam   Constitutional: She appears well-developed and well-nourished.   HENT:   Head: Normocephalic.   Eyes: No scleral icterus.   Neck: Neck supple. No tracheal deviation present.   Cardiovascular: Normal rate and regular rhythm.    Pulmonary/Chest: Breath sounds normal. No respiratory distress. Right breast exhibits no inverted nipple, no mass, no nipple discharge and no skin change. Left breast exhibits no inverted nipple, no mass, no nipple discharge and no skin change.       Abdominal: Soft. She exhibits no mass. There is no tenderness.   Musculoskeletal: She exhibits no edema.   Lymphadenopathy:     She has no cervical adenopathy.   Neurological: She is alert.   Skin: No rash noted. No erythema.     Psychiatric: She has a normal mood and affect.           PATH:  FINAL PATHOLOGIC DIAGNOSIS  1. Right breast, ultrasound-guided lumpectomy:  - Invasive ductal carcinoma, moderately differentiated, 1.5 cm.  - DCIS.  - Please see CAP synoptic report below.  2. New posterior margin, excision:  - Benign breast tissue with focal microcalcifications.  - Negative for malignancy or atypia.  3. New inferior margin, excision:  - Ductal carcinoma in situ (DCIS), low to intermediate nuclear grade, cribriform and micropapillary type, at  least 1.6 cm.  - Margin uninvolved, < 0.1 mm from the new margin.  4. New medial margin, excision:  - Benign breast tissue.  - Negative for malignancy or atypia.  5. Right axillary sentinel node, hot, 1100, dissection:  - Two lymph nodes, negative for carcinoma (0/2).    Note: All sentinel lymph node tissue was examined at three levels with routine (H&E) stains and with three epithelial  immunostains (AE1/AE3, Cam5.2, WSK). Positive and negative controls reacted appropriately.  SURGICAL PATHOLOGY CANCER CASE SUMMARY- Breast  Procedure: Excision (less than a total  mastectomy).  Specimen laterality: Right.  Tumor size: 15 mm (greatest dimension).  Histologic type: Invasive ductal carcinoma.  Histiologic grade: Grade 2 (glandular/tubular differentiation- 3, nuclear pleomorphism- 2, mitotic rate- 1)  Tumor focality: Single focus  Ductal carcinoma in situ: Present, negative for extensive intraductal component (EIC). See note.  Size of DCIS: ~4.0 cm.  - Number of blocks with DCIS: 10.  - Number of blocks examined: 16.  Architectural patterns: Micropapillary and cribriform types.  Nuclear grade: Low to intermediate.  Necrosis: Not present.  Note: The size (extent) of DCIS is an estimation of the volume of breast tissue occupied by DCIS. DCIS is  surrounding the carcinoma but is not within the invasive component. Though it does not strictly fulfill the criteria for  EIC, DCIS spans nearly the entire lumpectomy specimen.  Margins (including new margins)-  Uninvolved by invasive carcinoma, distance from closest margin (superior) is 5 mm (part 1).  Uninvolved by DCIS, distance from closest margins are < 0.1 mm (part 3, inferior) and < 1 mm (part 1,  anterior).  Treatment effect: No known presurgical therapy.  Number of sentinel lymph nodes examined: 2.  Total number of lymph nodes examined (sentinel and non-sentinel): 2.  Number of lymph nodes involved: 0.  Pathologic staging (pTNM, AJCC 8th edition): pT1c pN0.  Ancillary studies- (Performed on biopsy specimen [id number])  ER: Positive (strong, >95% of tumor cell nuclei).  FL: Positive (spectrum of weak, moderate and strong, >90% of tumor cell nuclei)  HER2: Negative (1+).  All stains have satisfactory positive and negative controls.      Additional 1.3cm of DCIS on mastectomy specimen    Assessment:       76 y/o female with R breast cancer, completely excised but margins close for DCIS, now s/p right mastectomy on 3/29/18  Plan:     Drain removed in clinic today  Continue following with medical oncology  Return to clinic in 4 months  for surveillance clinical exam

## 2018-05-03 PROBLEM — I67.1 ANEURYSM, CAROTID ARTERY, INTERNAL: Status: ACTIVE | Noted: 2018-05-03

## 2018-05-14 ENCOUNTER — TELEPHONE (OUTPATIENT)
Dept: ADMINISTRATIVE | Facility: CLINIC | Age: 78
End: 2018-05-14

## 2018-05-15 ENCOUNTER — HOME CARE VISIT (OUTPATIENT)
Dept: NEUROLOGY | Facility: HOSPITAL | Age: 78
End: 2018-05-15

## 2018-05-17 ENCOUNTER — OFFICE VISIT (OUTPATIENT)
Dept: SURGERY | Facility: CLINIC | Age: 78
End: 2018-05-17
Payer: MEDICARE

## 2018-05-17 VITALS — BODY MASS INDEX: 33.44 KG/M2 | RESPIRATION RATE: 12 BRPM | WEIGHT: 219.94 LBS

## 2018-05-17 DIAGNOSIS — C50.411 MALIGNANT NEOPLASM OF UPPER-OUTER QUADRANT OF RIGHT BREAST IN FEMALE, ESTROGEN RECEPTOR POSITIVE: Primary | ICD-10-CM

## 2018-05-17 DIAGNOSIS — Z17.0 MALIGNANT NEOPLASM OF UPPER-OUTER QUADRANT OF RIGHT BREAST IN FEMALE, ESTROGEN RECEPTOR POSITIVE: Primary | ICD-10-CM

## 2018-05-17 PROCEDURE — 99999 PR PBB SHADOW E&M-EST. PATIENT-LVL II: CPT | Mod: PBBFAC,,, | Performed by: INTERNAL MEDICINE

## 2018-05-17 PROCEDURE — 99205 OFFICE O/P NEW HI 60 MIN: CPT | Mod: S$GLB,,, | Performed by: INTERNAL MEDICINE

## 2018-05-17 NOTE — PROGRESS NOTES
Subjective:       Patient ID: Elina Mei is a 78 y.o. female.    Chief Complaint: No chief complaint on file.    HPI     Presents for medical oncology opinion.   She had to reschedule this appointment because she had an embolic CVA involving the left MCA the interval.  She is regaining most of her strength and speech.  Continues OT, PT  Daughter reports right sided strength improved, more independent, improving right had .    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 pathologic prognostic staging system    Mrs. Mei is an 77 yo female with recently diagnosed invasive breast cancer, Grade 2, ER+, NJ+, Her2 juan negative  - underwent right  lumpectomy and SLNB on 18 with Dr. Melo  Pathology results:  Reported invasive ductal carcinoma, moderately differentiated, 1.5 cm, and DCIS. 2/2 lymph nodes negative for metastasis. Inferior margin with DCIS < 0.1 mm. Anterior margin with <1mm. Case was discussed at tumor board on  and was recommended for patient to have re-excision followed by radiation and endocrine therapy. Patient has met with Dr. Ko recently and she recommended re-excision followed by whole breast irradiation for a total dose of 4200 cGy +1000 cGy boost.   - underwent total right mastectomy on 3/29/18.     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    PMH:  Active Ambulatory Problems     Diagnosis Date Noted    HTN (hypertension) 2013    DM (diabetes mellitus) 2013    Glaucoma (increased eye pressure) 2013    Chest pain syndrome  12/23/2013    Anemia 12/23/2013    Malignant neoplasm of upper-outer quadrant of right breast in female, estrogen receptor positive 12/05/2017    At risk for lymphedema 01/16/2018    Embolic stroke involving left middle cerebral artery 04/26/2018    Acute right-sided weakness 04/26/2018    Aneurysm, carotid artery, internal 05/03/2018     Resolved Ambulatory Problems     Diagnosis Date Noted    Chest pain, atypical 04/01/2013    Chest pain at rest 04/01/2013    Breast cancer, right 01/25/2018    Breast cancer 03/29/2018     Past Medical History:   Diagnosis Date    Acute right-sided weakness 4/26/2018    Arthritis     Diabetes mellitus     Hypertension     Malignant neoplasm of upper-outer quadrant of right breast in female, estrogen receptor positive 12/5/2017    Stroke 4/26/2018     Cataract surgery  Hysterectomy- in her 40s, fibroid tumor    Review of Systems   Constitutional: Positive for activity change. Negative for appetite change, chills, fatigue, fever and unexpected weight change.   HENT: Negative for congestion, nosebleeds, postnasal drip, rhinorrhea, sinus pain, sinus pressure, sneezing, sore throat and trouble swallowing.    Respiratory: Negative for cough, shortness of breath and wheezing.    Cardiovascular: Negative for chest pain, palpitations and leg swelling.   Gastrointestinal: Negative for abdominal distention, abdominal pain, blood in stool, constipation, diarrhea, nausea and vomiting.   Genitourinary: Negative for decreased urine volume, difficulty urinating, dysuria, frequency and urgency.   Musculoskeletal: Positive for arthralgias. Negative for myalgias, neck pain and neck stiffness.   Skin: Negative for color change, pallor, rash and wound.   Neurological: Positive for speech difficulty and weakness (improving on right). Negative for dizziness, facial asymmetry, light-headedness, numbness and headaches.   Hematological: Negative for adenopathy. Does not bruise/bleed easily.    Psychiatric/Behavioral: Negative for dysphoric mood. The patient is not nervous/anxious.        Objective:      Physical Exam   Constitutional: She is oriented to person, place, and time. She appears well-developed and well-nourished. No distress.   Presents with her daughter   SONIA:   Head: Normocephalic and atraumatic.   Right Ear: External ear normal.   Left Ear: External ear normal.   Mouth/Throat: Oropharynx is clear and moist. No oropharyngeal exudate.   Eyes: Conjunctivae and EOM are normal. Pupils are equal, round, and reactive to light. Right eye exhibits no discharge. Left eye exhibits no discharge. No scleral icterus. Right pupil is round and reactive. Left pupil is round and reactive.   Neck: Normal range of motion. Neck supple. No JVD present. No tracheal deviation present. No thyromegaly present.   Cardiovascular: Normal rate, regular rhythm, normal heart sounds and intact distal pulses.  Exam reveals no gallop and no friction rub.    No murmur heard.  Pulmonary/Chest: Effort normal and breath sounds normal. No respiratory distress. She has no wheezes. She has no rales. She exhibits no tenderness.   Abdominal: Soft. Bowel sounds are normal. She exhibits no distension and no mass. There is no hepatosplenomegaly. There is no tenderness. There is no rebound and no guarding.   Musculoskeletal: Normal range of motion. She exhibits no edema, tenderness or deformity.   Lymphadenopathy:        Head (right side): No submandibular adenopathy present.        Head (left side): No submandibular adenopathy present.     She has no cervical adenopathy.        Right cervical: No superficial cervical, no deep cervical and no posterior cervical adenopathy present.       Left cervical: No superficial cervical, no deep cervical and no posterior cervical adenopathy present.        Right: No inguinal and no supraclavicular adenopathy present.        Left: No inguinal and no supraclavicular adenopathy present.    Neurological: She is alert and oriented to person, place, and time. She has normal strength and normal reflexes. No cranial nerve deficit or sensory deficit. Coordination normal.   Improving rt sided weakness  Mild speech deficit- worse with fatigue   Skin: Skin is warm and dry. No bruising, no lesion, no petechiae and no rash noted. She is not diaphoretic. No erythema. No pallor.   Psychiatric: She has a normal mood and affect. Her behavior is normal. Judgment and thought content normal. Her mood appears not anxious. She does not exhibit a depressed mood.   Nursing note and vitals reviewed.    Labs- reviewed  Assessment:       1. Malignant neoplasm of upper-outer quadrant of right breast in female, estrogen receptor positive        Plan:     1. Discussed endocrine therapy but with recent CVA not a candidate  RTC 8 weeks to re-assess  Knows to call for any issues

## 2018-06-12 ENCOUNTER — OFFICE VISIT (OUTPATIENT)
Dept: NEUROLOGY | Facility: CLINIC | Age: 78
End: 2018-06-12
Payer: MEDICARE

## 2018-06-12 VITALS
HEART RATE: 74 BPM | WEIGHT: 220.25 LBS | HEIGHT: 68 IN | SYSTOLIC BLOOD PRESSURE: 155 MMHG | DIASTOLIC BLOOD PRESSURE: 65 MMHG | BODY MASS INDEX: 33.38 KG/M2

## 2018-06-12 DIAGNOSIS — I63.312 CEREBROVASCULAR ACCIDENT (CVA) DUE TO THROMBOSIS OF LEFT MIDDLE CEREBRAL ARTERY: Primary | ICD-10-CM

## 2018-06-12 PROCEDURE — 3077F SYST BP >= 140 MM HG: CPT | Mod: CPTII,S$GLB,, | Performed by: PSYCHIATRY & NEUROLOGY

## 2018-06-12 PROCEDURE — 99214 OFFICE O/P EST MOD 30 MIN: CPT | Mod: S$GLB,,, | Performed by: PSYCHIATRY & NEUROLOGY

## 2018-06-12 PROCEDURE — 3078F DIAST BP <80 MM HG: CPT | Mod: CPTII,S$GLB,, | Performed by: PSYCHIATRY & NEUROLOGY

## 2018-06-12 PROCEDURE — 99999 PR PBB SHADOW E&M-EST. PATIENT-LVL IV: CPT | Mod: PBBFAC,,, | Performed by: PSYCHIATRY & NEUROLOGY

## 2018-06-12 RX ORDER — CLOPIDOGREL BISULFATE 75 MG/1
75 TABLET ORAL DAILY
Qty: 30 TABLET | Refills: 3 | Status: ON HOLD | OUTPATIENT
Start: 2018-06-12 | End: 2020-01-01

## 2018-06-12 NOTE — PROGRESS NOTES
Elina Mei is a 78 y.o. year old female that presents for stroke follow up.  Chief Complaint   Patient presents with    Stroke   .     HPI:  Mr Mei has  HTN, DM, arthritis, right breaSt cancer s/p total mastectomy on 3/29/18, and history of left MCA distribution infarct with residual mild right sided weakness and dysarthria.  As per review of her hospital records, she was admitted to the stroke service on 4/26/19 due to acute onset right sided facial droop, slurred speech, and R sided weakness mainly the right arm. She was not treated with iv alteplase due to recent surgery. Subsequent MRI brain showed small areas of cortical acute infarctions in the left MCA distribution. MRA brain showed high-grade stenosis or occlusion left mid M2 segmental branch of the MCA. MRA neck without hemodynamically significant carotid disease. TTE: normal EF, no wall motion abnormality, LA normal size. She did well and was discharged home on ASA and atorvastatin 40 mg daily.  Stated that she is fully independent and functional at this time, although admits to slight weakness of right hand and leg that does not preclude her from doing her ADL without assistance.  Denies recurrence of symptoms concerning for another cerebrovascular insult. No evidence of post stroke seizures, fatigue, or cognitive impairment. However, her daughter thinks that at times she is anxious and depressed.  Complains of worsening of bilateral pulsatile tinnitus that was present even before the stroke and she has an appointment with ENT tomorrow to address this matter.  Otherwise, she denies HA, vertigo, double vision, trouble swallowing, imbalance, falls, or visual disturbances.   For further details regarding her recent admission to our neurovascular un it, please see attached cerebrovascular history below.    Cerebrovascular History:  History of Present Illness:   Mrs Elina Mei is a 79 y/o right handed female with HTN, DM, arthritis, right breat cancer s/p  total mastectomy on 3/29/18,  who presents to the ED via EMS with a complaint of hat started at 8:30 AM. She was last seen normal by her family when she woke up this morning. Per EMS she was able to stand and support herself as well as to sit up and turn.  Mrs Mei was getting ready to left the house for a medical appointment when the aforementioned symptoms began.  She reports that the right arm and leg are improving.  Denies HA, vertigo, double vision, trouble swallowing, or visual disturbances.  CT head was personally reviewed and showed no acute abnormality.   Hospital Course (synopsis of major diagnoses, care, treatment, and services provided during the course of the hospital stay): 4/27/18 - Patient significantly improving, needs only for home health.    Ms Mei admitted for stroke work up, unable to be treated with tpa due to recent mastectomy, no LVO.   Noted to have significant improvement while inpatient, teams recommending home health. She does have multiple family members available to help her with needs.    Etiology unclear at this time, will follow up in clinic, patient to continue home meds at discharge.   Will increase atorvatatin to 40 mg (previously on 10)    Inpatient acute stroke work up completed and patient is stable for discharge.  Please see imaging and discharge medication list below.     STROKE DOCUMENTATION   Acute Stroke Times   Last Known Normal Date: 04/26/18  Last Known Normal Time: 0830  Symptom Onset Date: 04/26/18  Symptom Onset Time: 0830  Stroke Team Called Date: 04/26/18  Stroke Team Called Time: 0924  Stroke Team Arrival Date: 04/26/18  Stroke Team Arrival Time: 0929  CT Interpretation Time: 0938  Decision to Treat Time for Alteplase:  (No russ aleplase candidae)  Decision to Treat Time for IR:  (No IR candidate)      NIH Scale:  1a. Level Of Consciousness: 0-->Alert: keenly responsive  1b. LOC Questions: 0-->Answers both questions correctly  1c. LOC Commands: 0-->Performs both  tasks correctly  2. Best Gaze: 0-->Normal  3. Visual: 0-->No visual loss  4. Facial Palsy: 1-->Minor paralysis (flattened nasolabial fold, asymmetry on smiling)  5a. Motor Arm, Left: 0-->No drift: limb holds 90 (or 45) degrees for full 10 secs  5b. Motor Arm, Right: 1-->Drift: limb holds 90 (or 45) degrees, but drifts down before full 10 secs: does not hit bed or other support  6a. Motor Leg, Left: 0-->No drift: leg holds 30 degree position for full 5 secs  6b. Motor Leg, Right: 0-->No drift: leg holds 30 degree position for full 5 secs  7. Limb Ataxia: 0-->Absent  8. Sensory: 1-->Mild-to-moderate sensory loss: patient feels pinprick is less sharp or is dull on the affected side: or there is a loss of superficial pain with pinprick, but patient is aware of being touched  9. Best Language: 0-->No aphasia: normal  10. Dysarthria: 1-->Mild-to-moderate dysarthria: patient slurs at least some words and, at worst, can be understood with some difficulty  11. Extinction and Inattention (formerly Neglect): 0-->No abnormality  Total (NIH Stroke Scale): 4           Modified Yuma Score: 2  [place] Coma Scale:    ABCD2 Score:    [id number] Score:   HAS -BLED Score:   ICH Score:   Hunt & Diaz Classification:         Assessment/Plan:      Diagnostic Results:  MRI brain 4/26/18    Small cortical areas of signal abnormality left MCA distribution most compatible with hyperacute infarcts.  No evidence for significant mass effect or hemorrhagic conversion.    Clinical correlation and follow-up advised    Please see MRA report for further details.     MRA   MRA head: Motion limited examination.    High-grade stenosis or occlusion left mid M2 segmental branch of the MCA corresponding to calcifications seen on CT    Irregularity of the proximal right M2 segmental branch of the MCA which is likely artifactual from motion    Approximately 5 mm right supraclinoid ICA aneurysm projected superiorly    Irregularity and narrowing of the  proximal basilar artery concerning for stenosis or underlying fenestration    MRA neck:    Unremarkable motion limited MRA of the neck as detailed above without evidence for significant focal stenosis or occlusion     CT head   Age-appropriate generalized cerebral volume loss with focal hyperdensity along the left mid M2 segmental branch of the MCA with additional punctate regions of calcification within the left frontal parietal and occipital sulci which are concerning for calcified emboli overall age indeterminate.    There is no evidence for acute intracranial hemorrhage or sulcal effacement to suggest large territory recent infarction.  Clinical correlation and further evaluation with MRI as warranted if patient compatible.    Please see above for additional details.     Echo 4/27/18  Normal LA     1 - Normal left ventricular systolic function (EF 60-65%).     2 - Indeterminate LV diastolic function.     3 - Normal right ventricular systolic function .     4 - The estimated PA systolic pressure is 39 mmHg.     5 - Trivial to mild tricuspid regurgitation.     6 - Concentric hypertrophy.     7 - No wall motion abnormalities.         Interventions: None     Complications: None     Disposition: Home or Self Care            Final Active Diagnoses:     Diagnosis Date Noted POA    PRINCIPAL PROBLEM:  Embolic stroke involving left middle cerebral artery [I63.412] 04/26/2018 Yes    Aneurysm, carotid artery, internal [I67.1] 04/27/2018 Unknown    Acute right-sided weakness [M62.89] 04/26/2018 Yes    HTN (hypertension) [I10] 04/01/2013 Yes    DM (diabetes mellitus) [E11.9] 04/01/2013 Yes       Problems Resolved During this Admission:     Diagnosis Date Noted Date Resolved POA          * Embolic stroke involving left middle cerebral artery     79 y/o right handed female with HTN, DM, arthritis, right breat cancer s/p total mastectomy on 3/29/18,  who presents to the ED via EMS with a complaint of right sided facial  droop, slurred speech, and R sided weakness mainly the right arm that started at 8:30 AM     Antithrombotics for secondary stroke prevention: Antiplatelets: Aspirin: 81 mg daily     Statins for secondary stroke prevention and hyperlipidemia, if present:   Statins: Atorvastatin- 40 mg daily     Aggressive risk factor modification: HTN, DM, Diet, Exercise, Obesity, malignancy     Rehab efforts: PT/OT/SLP to evaluate and treat - recommending home health      Diagnostics ordered/pending: none      VTE prophylaxis: Heparin 5000 units SQ every 8 hours     BP parameters:  Can normalize at discharge                 Aneurysm, carotid artery, internal     5mm R ICA - likely incidental, discussed with patient and family and Dr Nicholas  No intervention at this time.           Acute right-sided weakness     Significantly improved.   Therapy teams evaluated- home health              DM (diabetes mellitus)     Stroke risk factor, resume home meds - A1c 7.0          HTN (hypertension)     Stroke risk factor   24 hour range - 138-204/62-93  Resume home meds on dc                 Recommendations:      Post-discharge complication risks: Falls     Stroke Education given to: patient and family     Follow-up in Stroke Clinic in 4-6 weeks  PCP In one week      Discharge Plan:  Antithrombotics: Aspirin 81mg  Statin: Atorvastatin 10mg increased to 40 mg  Aggresive risk factor modification:  Hypertension  Diabetes  High Cholesterol  Diet  Exercise  Obesity  malignancy            Past Medical History:   Diagnosis Date    Acute right-sided weakness 4/26/2018    Arthritis     Diabetes mellitus     Hypertension     Malignant neoplasm of upper-outer quadrant of right breast in female, estrogen receptor positive 12/5/2017    Stroke 4/26/2018     Social History     Social History    Marital status:      Spouse name: N/A    Number of children: N/A    Years of education: N/A     Occupational History    Not on file.     Social History  "Main Topics    Smoking status: Never Smoker    Smokeless tobacco: Current User     Types: Snuff    Alcohol use No    Drug use: No    Sexual activity: Not Currently     Other Topics Concern    Not on file     Social History Narrative    No narrative on file     Past Surgical History:   Procedure Laterality Date    EYE SURGERY      HYSTERECTOMY       Family History   Problem Relation Age of Onset    Cancer Sister 75        Colon CA    Breast cancer Sister 55    Cancer Brother     Hypertension Mother            Review of Systems  General ROS: negative for chills, fever or weight loss  Psychological ROS: negative for hallucination, depression or suicidal ideation  Ophthalmic ROS: negative for blurry vision, photophobia or eye pain  ENT ROS: negative for epistaxis, sore throat or rhinorrhea  Respiratory ROS: no cough, shortness of breath, or wheezing  Cardiovascular ROS: no chest pain or dyspnea on exertion  Gastrointestinal ROS: no abdominal pain, change in bowel habits, or black/ bloody stools  Genito-Urinary ROS: no dysuria, trouble voiding, or hematuria  Musculoskeletal ROS: negative for gait disturbance or muscular weakness  Neurological ROS: no syncope or seizures; no ataxia  Dermatological ROS: negative for pruritis, rash and jaundice      Physical Exam:  BP (!) 155/65   Pulse 74   Ht 5' 8" (1.727 m)   Wt 99.9 kg (220 lb 3.8 oz)   BMI 33.49 kg/m²   General appearance: alert, cooperative, no distress  Constitutional:Oriented to person, place, and time.appears well-developed and well-nourished.   HEENT: Normocephalic, atraumatic, neck symmetrical, no nasal discharge   Eyes: conjunctivae/corneas clear, PERRL, EOM's intact  Lungs: clear to auscultation bilaterally, no dullness to percussion bilaterally  Heart: regular rate and rhythm without rub; no displacement of the PMI   Abdomen: soft, non-tender; bowel sounds normoactive; no organomegaly  Extremities: extremities symmetric; no clubbing, cyanosis, " or edema  Integument: Skin color, texture, turgor normal; no rashes; hair distrubution normal  Neurologic:    Mental status: alert and awake, oriented x 4, comprehension, naming, and repetition intact. No right to left confusion. Performs serial 7's without difficulty .Mild dysarthria.  CN 2-12: pupils 4 mm bilaterally, reactive to light. Fundi without papilledema. Visual fields full to confrontation. EOM full without nystagmus. Face sensation normal in all distributions. Face asymmetric due to mild right face weakness. Hearing grossly intact. Palate elevates well. Tongue midline without atrophy or fasciculations.  Motor: 5/5 all over except mild right arm-leg weakness.  Sensory: intact in all modalities.  DTR's: 2+ all over.  Plantars: no tested.  Coordination: finger to nose and heel-knee-shin intact.  Gait: no ataxia or bradykinesia  Psychiatric: no pressured speech; normal affect; no evidence of impaired cognition     LABS:    Complete Blood Count  Lab Results   Component Value Date    RBC 3.78 (L) 04/27/2018    HGB 11.0 (L) 04/27/2018    HCT 35.1 (L) 04/27/2018    MCV 93 04/27/2018    MCH 29.1 04/27/2018    MCHC 31.3 (L) 04/27/2018    RDW 12.7 04/27/2018     04/27/2018    MPV 10.8 04/27/2018    GRAN 3.5 04/27/2018    GRAN 58.6 04/27/2018    LYMPH 1.7 04/27/2018    LYMPH 28.0 04/27/2018    MONO 0.6 04/27/2018    MONO 9.8 04/27/2018    EOS 0.2 04/27/2018    BASO 0.03 04/27/2018    EOSINOPHIL 2.9 04/27/2018    BASOPHIL 0.5 04/27/2018    DIFFMETHOD Automated 04/27/2018       Comprehensive Metabolic Panel  Lab Results   Component Value Date     (H) 04/27/2018    BUN 10 04/27/2018    CREATININE 1.0 04/27/2018     04/27/2018    K 3.5 04/27/2018     04/27/2018    PROT 7.0 04/27/2018    ALBUMIN 3.0 (L) 04/27/2018    BILITOT 0.7 04/27/2018    AST 11 04/27/2018    ALKPHOS 87 04/27/2018    CO2 26 04/27/2018    ALT 6 (L) 04/27/2018    ANIONGAP 8 04/27/2018    EGFRNONAA 53.3 (A) 04/27/2018     ESTGFRAFRICA >60.0 04/27/2018       TSH  Lab Results   Component Value Date    TSH 1.426 04/26/2018         Assessment: 79 y/o with HTN, DM, arthritis, right breaSt cancer s/p total mastectomy on 3/29/18, and history of left MCA distribution infarct with residual mild right sided weakness and dysarthria.  Doing fairly well from a neurological standpoint, mRS score 0.  Suspect likely etiology is focal high grade atherosclerotic disease or occlusion left mid M2 segmental branch of the MCA.     No diagnosis found.  There were no encounter diagnoses.      Plan:  1) L MCA distribution infarct: will add Plavix to ASA and atorvastatin for secondary stroke prevention.   Will defer 30 day event monitor or loop recorder as a cardio embolic etiology seems less likely.  2) High grade stenosis left mid M2 MCA: recommended DAPT, and continuing atorvastatin 40 mg daily (this may need to be increased to 80 mg daily if tolerated).   3) HTN, stroke risk factor, managed by PCP, goal SBP<140  4) DM, stroke risk factor, as per PCP management.  5) R breat cancer s/p mastectomy, follow by oncology.  No orders of the defined types were placed in this encounter.          Jesus Nicholas MD

## 2018-06-28 VITALS
SYSTOLIC BLOOD PRESSURE: 158 MMHG | OXYGEN SATURATION: 96 % | RESPIRATION RATE: 20 BRPM | DIASTOLIC BLOOD PRESSURE: 88 MMHG | WEIGHT: 222 LBS | HEART RATE: 74 BPM | BODY MASS INDEX: 33.75 KG/M2

## 2018-06-28 NOTE — PROGRESS NOTES
Ms. Mei doing ok at home with her adult grandchildren, very pleasant woman functioning well mentally and physically, with slight forgetfulness and weakness throughout. No complaints of headaches, dizziness or nausea. Strong family support assisting with medications, diet and MD follow up visits. BP/Pulse WNL eating and sleeping with no problems.

## 2018-07-10 ENCOUNTER — OFFICE VISIT (OUTPATIENT)
Dept: NEUROLOGY | Facility: CLINIC | Age: 78
End: 2018-07-10
Payer: MEDICARE

## 2018-07-10 VITALS
WEIGHT: 220 LBS | HEIGHT: 68 IN | HEART RATE: 84 BPM | DIASTOLIC BLOOD PRESSURE: 70 MMHG | SYSTOLIC BLOOD PRESSURE: 144 MMHG | BODY MASS INDEX: 33.34 KG/M2

## 2018-07-10 DIAGNOSIS — I63.512 STROKE DUE TO OCCLUSION OF LEFT MIDDLE CEREBRAL ARTERY: Primary | ICD-10-CM

## 2018-07-10 PROCEDURE — 99214 OFFICE O/P EST MOD 30 MIN: CPT | Mod: S$GLB,,, | Performed by: PSYCHIATRY & NEUROLOGY

## 2018-07-10 PROCEDURE — 3078F DIAST BP <80 MM HG: CPT | Mod: CPTII,S$GLB,, | Performed by: PSYCHIATRY & NEUROLOGY

## 2018-07-10 PROCEDURE — 99999 PR PBB SHADOW E&M-EST. PATIENT-LVL III: CPT | Mod: PBBFAC,,, | Performed by: PSYCHIATRY & NEUROLOGY

## 2018-07-10 PROCEDURE — 3077F SYST BP >= 140 MM HG: CPT | Mod: CPTII,S$GLB,, | Performed by: PSYCHIATRY & NEUROLOGY

## 2018-07-11 NOTE — PROGRESS NOTES
"  Elina Mei is a 78 y.o. year old female that  presents to discuss some concerns regarding her medications.  Chief Complaint   Patient presents with    Follow-up   .     HPI:  Mrs Mei has HTN, DM, arthritis, right breaSt cancer s/p total mastectomy on 3/29/18, and history of left MCA distribution infarct on 4/26/18 with residual mild right sided weakness and dysarthria. She was not treated with iv alteplase due to recent surgery. Her stroke work up can be summarized as follows: MRI brain showed small areas of cortical acute infarctions in the left MCA distribution. MRA brain showed high-grade stenosis or occlusion left mid M2 segmental branch of the MCA. MRA neck without hemodynamically significant carotid disease. TTE: normal EF, no wall motion abnormality, LA normal size.  Stated that she is fully independent and functional at this time.  She was scheduled to undergo surgical procedure by ENT but she has been afraid to pursue that route because she is concern that is having " fluid in my brain".   Her daughter said that the planned surgery will consist of " putting tubes to remove fluid from her ear".  Mrs Mei indicated that she decided no to take the Plavix because of the concerns about " the fluid in my brain".  Otherwise, she denies recurrence of symptoms concerning for another cerebrovascular insult. No evidence of post stroke seizures, fatigue, or cognitive impairment. However, her daughter thinks that at times she is anxious and depressed.  Otherwise, she denies HA, vertigo, double vision, trouble swallowing, imbalance, falls, or visual disturbances.       Past Medical History:   Diagnosis Date    Acute right-sided weakness 4/26/2018    Arthritis     Diabetes mellitus     Hypertension     Malignant neoplasm of upper-outer quadrant of right breast in female, estrogen receptor positive 12/5/2017    Stroke 4/26/2018     Social History     Social History    Marital status:      Spouse name: " "N/A    Number of children: N/A    Years of education: N/A     Occupational History    Not on file.     Social History Main Topics    Smoking status: Never Smoker    Smokeless tobacco: Current User     Types: Snuff    Alcohol use No    Drug use: No    Sexual activity: Not Currently     Other Topics Concern    Not on file     Social History Narrative    No narrative on file     Past Surgical History:   Procedure Laterality Date    EYE SURGERY      HYSTERECTOMY       Family History   Problem Relation Age of Onset    Cancer Sister 75        Colon CA    Breast cancer Sister 55    Cancer Brother     Hypertension Mother            Review of Systems  General ROS: negative for chills, fever or weight loss  Psychological ROS: negative for hallucination, depression or suicidal ideation  Ophthalmic ROS: negative for blurry vision, photophobia or eye pain  ENT ROS: negative for epistaxis, sore throat or rhinorrhea  Respiratory ROS: no cough, shortness of breath, or wheezing  Cardiovascular ROS: no chest pain or dyspnea on exertion  Gastrointestinal ROS: no abdominal pain, change in bowel habits, or black/ bloody stools  Genito-Urinary ROS: no dysuria, trouble voiding, or hematuria  Musculoskeletal ROS: negative for gait disturbance or muscular weakness  Neurological ROS: as above.  Dermatological ROS: negative for pruritis, rash and jaundice      Physical Exam:  BP (!) 144/70   Pulse 84   Ht 5' 8" (1.727 m)   Wt 99.8 kg (220 lb 0.3 oz)   BMI 33.45 kg/m²   General appearance: alert, cooperative, no distress  Constitutional:Oriented to person, place, and time.appears well-developed and well-nourished.   HEENT: Normocephalic, atraumatic, neck symmetrical, no nasal discharge   Eyes: conjunctivae/corneas clear, PERRL, EOM's intact  Lungs: clear to auscultation bilaterally, no dullness to percussion bilaterally  Heart: regular rate and rhythm without rub; no displacement of the PMI   Abdomen: soft, non-tender; " bowel sounds normoactive; no organomegaly  Extremities: extremities symmetric; no clubbing, cyanosis, or edema  Integument: Skin color, texture, turgor normal; no rashes; hair distrubution normal  Neurologic:   Mental status: alert and awake, oriented x 4, comprehension, naming, and repetition intact. No right to left confusion. Performs serial 7's without difficulty .Mild dysarthria.  CN 2-12: pupils 4 mm bilaterally, reactive to light. Fundi without papilledema. Visual fields full to confrontation. EOM full without nystagmus. Face sensation normal in all distributions. Face asymmetric due to mild right face weakness. Hearing grossly intact. Palate elevates well. Tongue midline without atrophy or fasciculations.  Motor: 5/5 all over except mild right arm-leg weakness.  Sensory: intact in all modalities.  DTR's: 2+ all over.  Plantars: no tested.  Coordination: finger to nose and heel-knee-shin intact.  Gait: no ataxia or bradykinesia  Psychiatric: no pressured speech; normal affect; no evidence of impaired cognition     LABS:    Complete Blood Count  Lab Results   Component Value Date    RBC 3.78 (L) 04/27/2018    HGB 11.0 (L) 04/27/2018    HCT 35.1 (L) 04/27/2018    MCV 93 04/27/2018    MCH 29.1 04/27/2018    MCHC 31.3 (L) 04/27/2018    RDW 12.7 04/27/2018     04/27/2018    MPV 10.8 04/27/2018    GRAN 3.5 04/27/2018    GRAN 58.6 04/27/2018    LYMPH 1.7 04/27/2018    LYMPH 28.0 04/27/2018    MONO 0.6 04/27/2018    MONO 9.8 04/27/2018    EOS 0.2 04/27/2018    BASO 0.03 04/27/2018    EOSINOPHIL 2.9 04/27/2018    BASOPHIL 0.5 04/27/2018    DIFFMETHOD Automated 04/27/2018       Comprehensive Metabolic Panel  Lab Results   Component Value Date     (H) 04/27/2018    BUN 10 04/27/2018    CREATININE 1.0 04/27/2018     04/27/2018    K 3.5 04/27/2018     04/27/2018    PROT 7.0 04/27/2018    ALBUMIN 3.0 (L) 04/27/2018    BILITOT 0.7 04/27/2018    AST 11 04/27/2018    ALKPHOS 87 04/27/2018    CO2 26  "04/27/2018    ALT 6 (L) 04/27/2018    ANIONGAP 8 04/27/2018    EGFRNONAA 53.3 (A) 04/27/2018    ESTGFRAFRICA >60.0 04/27/2018       TSH  Lab Results   Component Value Date    TSH 1.426 04/26/2018         Assessment: 77 y/o with HTN, DM, arthritis, right breaSt cancer s/p total mastectomy on 3/29/18, and history of left MCA distribution infarct with residual mild right sided weakness and dysarthria.  Doing fairly well from a neurological standpoint, mRS score 0.  Suspect likely etiology is focal high grade atherosclerotic disease or occlusion left mid M2 segmental branch of the MCA.  Patient no taking Plavix due to concerns about having " fluid in my brain".  Had a long conversation with patient and reassured her that all her brain imaging studies never showed evidence of abnormal fluid accumulation in her brain and at the same time stressed the fact that she needs to take a combination of aspirin and Plavix due to MRA brain confirmed high-grade stenosis or occlusion left mid M2 segmental branch of the MCA and she is agreeable to do so.  No diagnosis found.  There were no encounter diagnoses.    Plan:  1) L MCA distribution infarct: Plavix + ASA and atorvastatin for secondary stroke prevention.   Will defer 30 day event monitor or loop recorder as a cardio embolic etiology seems less likely.  2) High grade stenosis left mid M2 MCA: recommended DAPT, and continuing atorvastatin 40 mg daily (this may need to be increased to 80 mg daily if tolerated).   3) HTN, stroke risk factor, managed by PCP, goal SBP<140  4) DM, stroke risk factor, as per PCP management.  5) R breat cancer s/p mastectomy, follow by oncology.        No orders of the defined types were placed in this encounter.          Jesus Nicholas MD  "

## 2018-07-12 ENCOUNTER — HOME CARE VISIT (OUTPATIENT)
Dept: NEUROLOGY | Facility: HOSPITAL | Age: 78
End: 2018-07-12

## 2018-07-12 VITALS
RESPIRATION RATE: 20 BRPM | WEIGHT: 223 LBS | DIASTOLIC BLOOD PRESSURE: 72 MMHG | BODY MASS INDEX: 33.91 KG/M2 | SYSTOLIC BLOOD PRESSURE: 138 MMHG | OXYGEN SATURATION: 97 % | HEART RATE: 68 BPM

## 2018-07-20 ENCOUNTER — OFFICE VISIT (OUTPATIENT)
Dept: HEMATOLOGY/ONCOLOGY | Facility: CLINIC | Age: 78
End: 2018-07-20
Payer: MEDICARE

## 2018-07-20 VITALS
SYSTOLIC BLOOD PRESSURE: 150 MMHG | HEART RATE: 86 BPM | OXYGEN SATURATION: 95 % | HEIGHT: 68 IN | RESPIRATION RATE: 20 BRPM | DIASTOLIC BLOOD PRESSURE: 72 MMHG | TEMPERATURE: 99 F | WEIGHT: 222.69 LBS | BODY MASS INDEX: 33.75 KG/M2

## 2018-07-20 DIAGNOSIS — C50.411 MALIGNANT NEOPLASM OF UPPER-OUTER QUADRANT OF RIGHT BREAST IN FEMALE, ESTROGEN RECEPTOR POSITIVE: Primary | ICD-10-CM

## 2018-07-20 DIAGNOSIS — Z17.0 MALIGNANT NEOPLASM OF UPPER-OUTER QUADRANT OF RIGHT BREAST IN FEMALE, ESTROGEN RECEPTOR POSITIVE: Primary | ICD-10-CM

## 2018-07-20 DIAGNOSIS — I63.412 EMBOLIC STROKE INVOLVING LEFT MIDDLE CEREBRAL ARTERY: ICD-10-CM

## 2018-07-20 PROCEDURE — 3078F DIAST BP <80 MM HG: CPT | Mod: CPTII,S$GLB,, | Performed by: INTERNAL MEDICINE

## 2018-07-20 PROCEDURE — 99999 PR PBB SHADOW E&M-EST. PATIENT-LVL III: CPT | Mod: PBBFAC,,, | Performed by: INTERNAL MEDICINE

## 2018-07-20 PROCEDURE — 3077F SYST BP >= 140 MM HG: CPT | Mod: CPTII,S$GLB,, | Performed by: INTERNAL MEDICINE

## 2018-07-20 PROCEDURE — 99214 OFFICE O/P EST MOD 30 MIN: CPT | Mod: S$GLB,,, | Performed by: INTERNAL MEDICINE

## 2018-07-20 NOTE — PROGRESS NOTES
Subjective:       Patient ID: Elina Mei is a 78 y.o. female.    Chief Complaint: Follow-up    HPI     Presents for follow-up of pT1cN0 right breast cancer    In late April she had an embolic CVA involving the left MCA and has recovered over all well.  She is regaining most of her strength and speech.  Daughter reports right sided strength improved, more independent, improving right had .  This event postponed decisions regarding adjuvant therapy.    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 pathologic prognostic staging system     Mrs. Mei is an 79 yo female with recently diagnosed invasive breast cancer, Grade 2, ER+, KS+, Her2 juan negative  - underwent right  lumpectomy and SLNB on 1/26/18 with Dr. Melo  Pathology results:  Reported invasive ductal carcinoma, moderately differentiated, 1.5 cm, and DCIS. 2/2 lymph nodes negative for metastasis. Inferior margin with DCIS < 0.1 mm. Anterior margin with <1mm. Case was discussed at tumor board on 2/20 and was recommended for patient to have re-excision followed by radiation and endocrine therapy. Patient has met with Dr. Ko recently and she recommended re-excision followed by whole breast irradiation for a total dose of 4200 cGy +1000 cGy boost.   - underwent total right mastectomy on 3/29/18.     Review of Systems   Constitutional: Positive for activity change and fatigue (noted to be easier). Negative for appetite change, chills, fever and unexpected weight change.   HENT: Negative for congestion, nosebleeds, postnasal drip, rhinorrhea, sinus pain, sinus pressure, sneezing, sore throat and trouble swallowing.    Eyes: Positive for visual disturbance.   Respiratory: Negative for cough, shortness of breath and wheezing.    Cardiovascular: Negative for chest pain, palpitations and leg swelling.   Gastrointestinal: Negative for abdominal distention, abdominal pain, blood in  stool, constipation, diarrhea, nausea and vomiting.   Genitourinary: Negative for decreased urine volume, difficulty urinating, dysuria, frequency and urgency.   Musculoskeletal: Positive for arthralgias. Negative for myalgias, neck pain and neck stiffness.   Skin: Negative for color change, pallor, rash and wound.   Neurological: Positive for speech difficulty (better) and weakness (improving on right). Negative for dizziness, facial asymmetry, light-headedness, numbness and headaches.   Hematological: Negative for adenopathy. Does not bruise/bleed easily.   Psychiatric/Behavioral: Negative for dysphoric mood. The patient is not nervous/anxious.        Objective:      Physical Exam   Constitutional: She is oriented to person, place, and time. She appears well-developed and well-nourished. No distress.   Presents with her daughter   HENT:   Head: Normocephalic and atraumatic.   Right Ear: External ear normal.   Left Ear: External ear normal.   Mouth/Throat: Oropharynx is clear and moist. No oropharyngeal exudate.   Eyes: Conjunctivae and EOM are normal. Pupils are equal, round, and reactive to light. Right eye exhibits no discharge. Left eye exhibits no discharge. No scleral icterus. Right pupil is round and reactive. Left pupil is round and reactive.   Neck: Normal range of motion. Neck supple. No JVD present. No tracheal deviation present. No thyromegaly present.   Cardiovascular: Normal rate, regular rhythm, normal heart sounds and intact distal pulses.  Exam reveals no gallop and no friction rub.    No murmur heard.  Pulmonary/Chest: Effort normal and breath sounds normal. No respiratory distress. She has no wheezes. She has no rales. She exhibits no tenderness.   Abdominal: Soft. Bowel sounds are normal. She exhibits no distension and no mass. There is no hepatosplenomegaly. There is no tenderness. There is no rebound and no guarding.   Musculoskeletal: Normal range of motion. She exhibits no edema, tenderness  or deformity.   Lymphadenopathy:        Head (right side): No submandibular adenopathy present.        Head (left side): No submandibular adenopathy present.     She has no cervical adenopathy.        Right cervical: No superficial cervical, no deep cervical and no posterior cervical adenopathy present.       Left cervical: No superficial cervical, no deep cervical and no posterior cervical adenopathy present.        Right: No inguinal and no supraclavicular adenopathy present.        Left: No inguinal and no supraclavicular adenopathy present.   Neurological: She is alert and oriented to person, place, and time. She has normal strength and normal reflexes. No cranial nerve deficit or sensory deficit. Coordination normal.   Improving rt sided weakness  Mild speech deficit- worse with fatigue   Skin: Skin is warm and dry. No bruising, no lesion, no petechiae and no rash noted. She is not diaphoretic. No erythema. No pallor.   Psychiatric: She has a normal mood and affect. Her behavior is normal. Judgment and thought content normal. Her mood appears not anxious. She does not exhibit a depressed mood.   Nursing note and vitals reviewed.    Labs- reviewed  Assessment:       1. Malignant neoplasm of upper-outer quadrant of right breast in female, estrogen receptor positive    2. Embolic stroke involving left middle cerebral artery        Plan:     1. We discussed benefits versus risk s of adjuvant endocrine therapy  Due to concerns for risk of thrombus, CV complications we have decided to pursue surveillance alone  Knows to call for any issues  RTC 3 months with labs  2. Recovering well  Managed by neurology and her PCP

## 2018-07-23 ENCOUNTER — TELEPHONE (OUTPATIENT)
Dept: SURGERY | Facility: CLINIC | Age: 78
End: 2018-07-23

## 2018-07-23 NOTE — TELEPHONE ENCOUNTER
----- Message from Lesley Espinal MD sent at 7/20/2018  4:35 PM CDT -----  Can we get a knitted knocker for Mrs. Mei?  Thanks  Lesley

## 2018-07-23 NOTE — TELEPHONE ENCOUNTER
Called patient to discuss our knitted knockers, no answer and unable to leave VM. Will try again later

## 2018-07-24 PROBLEM — H66.90 CHRONIC OTITIS MEDIA: Status: ACTIVE | Noted: 2018-07-24

## 2018-08-21 ENCOUNTER — TELEPHONE (OUTPATIENT)
Dept: HEMATOLOGY/ONCOLOGY | Facility: CLINIC | Age: 78
End: 2018-08-21

## 2018-08-21 NOTE — TELEPHONE ENCOUNTER
Returned call to pt.   Pt stated she needs to schedule appt in October.   Informed pt scheduled on 10/22 already.   Pt verbalized understanding of appts.           ----- Message from Kylie Mcarthur sent at 8/21/2018  5:08 PM CDT -----  Contact: patient  Patient says she needs a return appointment in October.    She can be reached at [phone]    Thanks  KB

## 2018-09-12 ENCOUNTER — HOME CARE VISIT (OUTPATIENT)
Dept: NEUROLOGY | Facility: HOSPITAL | Age: 78
End: 2018-09-12

## 2018-09-13 VITALS
BODY MASS INDEX: 33.6 KG/M2 | WEIGHT: 221 LBS | SYSTOLIC BLOOD PRESSURE: 138 MMHG | HEART RATE: 78 BPM | RESPIRATION RATE: 20 BRPM | OXYGEN SATURATION: 98 % | DIASTOLIC BLOOD PRESSURE: 92 MMHG

## 2018-10-16 ENCOUNTER — HOME CARE VISIT (OUTPATIENT)
Dept: NEUROLOGY | Facility: HOSPITAL | Age: 78
End: 2018-10-16

## 2018-10-16 VITALS
SYSTOLIC BLOOD PRESSURE: 142 MMHG | OXYGEN SATURATION: 95 % | DIASTOLIC BLOOD PRESSURE: 70 MMHG | WEIGHT: 227 LBS | BODY MASS INDEX: 34.52 KG/M2 | HEART RATE: 86 BPM | RESPIRATION RATE: 20 BRPM

## 2018-10-22 ENCOUNTER — LAB VISIT (OUTPATIENT)
Dept: LAB | Facility: HOSPITAL | Age: 78
End: 2018-10-22
Payer: MEDICARE

## 2018-10-22 ENCOUNTER — OFFICE VISIT (OUTPATIENT)
Dept: HEMATOLOGY/ONCOLOGY | Facility: CLINIC | Age: 78
End: 2018-10-22
Payer: MEDICARE

## 2018-10-22 VITALS
BODY MASS INDEX: 33.45 KG/M2 | SYSTOLIC BLOOD PRESSURE: 164 MMHG | DIASTOLIC BLOOD PRESSURE: 76 MMHG | HEART RATE: 78 BPM | WEIGHT: 220.69 LBS | RESPIRATION RATE: 20 BRPM | TEMPERATURE: 99 F | HEIGHT: 68 IN | OXYGEN SATURATION: 96 %

## 2018-10-22 DIAGNOSIS — Z17.0 MALIGNANT NEOPLASM OF UPPER-OUTER QUADRANT OF RIGHT BREAST IN FEMALE, ESTROGEN RECEPTOR POSITIVE: ICD-10-CM

## 2018-10-22 DIAGNOSIS — N28.9 RENAL INSUFFICIENCY: ICD-10-CM

## 2018-10-22 DIAGNOSIS — C50.411 MALIGNANT NEOPLASM OF UPPER-OUTER QUADRANT OF RIGHT BREAST IN FEMALE, ESTROGEN RECEPTOR POSITIVE: ICD-10-CM

## 2018-10-22 DIAGNOSIS — C50.411 MALIGNANT NEOPLASM OF UPPER-OUTER QUADRANT OF RIGHT BREAST IN FEMALE, ESTROGEN RECEPTOR POSITIVE: Primary | ICD-10-CM

## 2018-10-22 DIAGNOSIS — Z17.0 MALIGNANT NEOPLASM OF UPPER-OUTER QUADRANT OF RIGHT BREAST IN FEMALE, ESTROGEN RECEPTOR POSITIVE: Primary | ICD-10-CM

## 2018-10-22 LAB
25(OH)D3+25(OH)D2 SERPL-MCNC: 38 NG/ML
ALBUMIN SERPL BCP-MCNC: 3.4 G/DL
ALP SERPL-CCNC: 87 U/L
ALT SERPL W/O P-5'-P-CCNC: 9 U/L
ANION GAP SERPL CALC-SCNC: 8 MMOL/L
AST SERPL-CCNC: 12 U/L
BASOPHILS # BLD AUTO: 0.05 K/UL
BASOPHILS NFR BLD: 0.6 %
BILIRUB SERPL-MCNC: 0.4 MG/DL
BUN SERPL-MCNC: 18 MG/DL
CALCIUM SERPL-MCNC: 9.5 MG/DL
CHLORIDE SERPL-SCNC: 109 MMOL/L
CO2 SERPL-SCNC: 25 MMOL/L
CREAT SERPL-MCNC: 1.4 MG/DL
DIFFERENTIAL METHOD: ABNORMAL
EOSINOPHIL # BLD AUTO: 0.2 K/UL
EOSINOPHIL NFR BLD: 2.4 %
ERYTHROCYTE [DISTWIDTH] IN BLOOD BY AUTOMATED COUNT: 13.2 %
EST. GFR  (AFRICAN AMERICAN): 40.9 ML/MIN/1.73 M^2
EST. GFR  (NON AFRICAN AMERICAN): 35.5 ML/MIN/1.73 M^2
GLUCOSE SERPL-MCNC: 105 MG/DL
HCT VFR BLD AUTO: 37.3 %
HGB BLD-MCNC: 11.4 G/DL
IMM GRANULOCYTES # BLD AUTO: 0.02 K/UL
IMM GRANULOCYTES NFR BLD AUTO: 0.2 %
LYMPHOCYTES # BLD AUTO: 2.6 K/UL
LYMPHOCYTES NFR BLD: 32.1 %
MCH RBC QN AUTO: 28.4 PG
MCHC RBC AUTO-ENTMCNC: 30.6 G/DL
MCV RBC AUTO: 93 FL
MONOCYTES # BLD AUTO: 0.7 K/UL
MONOCYTES NFR BLD: 8.6 %
NEUTROPHILS # BLD AUTO: 4.5 K/UL
NEUTROPHILS NFR BLD: 56.1 %
NRBC BLD-RTO: 0 /100 WBC
PLATELET # BLD AUTO: 281 K/UL
PMV BLD AUTO: 10.5 FL
POTASSIUM SERPL-SCNC: 4 MMOL/L
PROT SERPL-MCNC: 7.8 G/DL
RBC # BLD AUTO: 4.01 M/UL
SODIUM SERPL-SCNC: 142 MMOL/L
WBC # BLD AUTO: 8.07 K/UL

## 2018-10-22 PROCEDURE — 99214 OFFICE O/P EST MOD 30 MIN: CPT | Mod: PBBFAC | Performed by: PHYSICIAN ASSISTANT

## 2018-10-22 PROCEDURE — 80053 COMPREHEN METABOLIC PANEL: CPT

## 2018-10-22 PROCEDURE — 99213 OFFICE O/P EST LOW 20 MIN: CPT | Mod: S$PBB,,, | Performed by: PHYSICIAN ASSISTANT

## 2018-10-22 PROCEDURE — 3078F DIAST BP <80 MM HG: CPT | Mod: CPTII,,, | Performed by: PHYSICIAN ASSISTANT

## 2018-10-22 PROCEDURE — 1101F PT FALLS ASSESS-DOCD LE1/YR: CPT | Mod: CPTII,,, | Performed by: PHYSICIAN ASSISTANT

## 2018-10-22 PROCEDURE — 3077F SYST BP >= 140 MM HG: CPT | Mod: CPTII,,, | Performed by: PHYSICIAN ASSISTANT

## 2018-10-22 PROCEDURE — 85025 COMPLETE CBC W/AUTO DIFF WBC: CPT

## 2018-10-22 PROCEDURE — 82306 VITAMIN D 25 HYDROXY: CPT

## 2018-10-22 PROCEDURE — 99999 PR PBB SHADOW E&M-EST. PATIENT-LVL IV: CPT | Mod: PBBFAC,,, | Performed by: PHYSICIAN ASSISTANT

## 2018-10-22 PROCEDURE — 36415 COLL VENOUS BLD VENIPUNCTURE: CPT

## 2018-10-22 NOTE — PROGRESS NOTES
Subjective:       Patient ID: Elina Mei is a 78 y.o. female.    Chief Complaint: Malignant neoplasm of upper-outer quadrant of right breast i    Presents for follow-up of pT1cN0 right breast cancer     In late April 2018 she had an embolic CVA involving the left MCA and has recovered over all well.  She is regaining most of her strength and speech.  Due to above and thrombotic concerns, adjuvant endocrine therapy was held and she is under surveillance.      Overall she is feeling well.  No fever, chills, nausea or vomiting. She does have occasional shooting pain at right mastectomy site that comes and goes, that has been presents since surgery.  She remains on anti-coagulation.     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 pathologic prognostic staging system     Mrs. Mei is an 77 yo female with recently diagnosed invasive breast cancer, Grade 2, ER+, CT+, Her2 juan negative  - underwent right  lumpectomy and SLNB on 1/26/18 with Dr. Melo  Pathology results:  Reported invasive ductal carcinoma, moderately differentiated, 1.5 cm, and DCIS. 2/2 lymph nodes negative for metastasis. Inferior margin with DCIS < 0.1 mm. Anterior margin with <1mm. Case was discussed at tumor board on 2/20 and was recommended for patient to have re-excision followed by radiation and endocrine therapy. Patient has met with Dr. Ko recently and she recommended re-excision followed by whole breast irradiation for a total dose of 4200 cGy +1000 cGy boost.   - underwent total right mastectomy on 3/29/18.           Review of Systems   Constitutional: Negative for activity change, appetite change, chills, fatigue, fever and unexpected weight change.   HENT: Negative for congestion, nosebleeds, postnasal drip, rhinorrhea, sinus pressure, sinus pain, sneezing, sore throat and trouble swallowing.    Eyes: Positive for visual disturbance.   Respiratory:  Negative for cough, shortness of breath and wheezing.    Cardiovascular: Negative for chest pain, palpitations and leg swelling.   Gastrointestinal: Negative for abdominal distention, abdominal pain, blood in stool, constipation, diarrhea, nausea and vomiting.   Genitourinary: Negative for decreased urine volume, difficulty urinating, dysuria, frequency and urgency.   Musculoskeletal: Positive for arthralgias. Negative for myalgias, neck pain and neck stiffness.   Skin: Negative for color change, pallor, rash and wound.   Neurological: Positive for weakness (improving on right). Negative for dizziness, facial asymmetry, light-headedness, numbness and headaches. Speech difficulty: improved.   Hematological: Negative for adenopathy. Does not bruise/bleed easily.   Psychiatric/Behavioral: Negative for dysphoric mood. The patient is not nervous/anxious.        Objective:      Physical Exam   Constitutional: She is oriented to person, place, and time. She appears well-developed and well-nourished. No distress.   Presents with family member  ECOG 0   HENT:   Head: Normocephalic.   Mouth/Throat: Oropharynx is clear and moist. No oropharyngeal exudate.   Eyes: Conjunctivae are normal. Pupils are equal, round, and reactive to light. No scleral icterus.   Neck: Normal range of motion. Neck supple. No thyromegaly present.   Cardiovascular: Normal rate and regular rhythm.   Pulmonary/Chest: Effort normal and breath sounds normal. No respiratory distress.   S/p right mastectomy without chest wall mass. LEft breast without mass, nodule or skin changes. No axillary or supraclavicular adenopathy.    Abdominal: Soft. Bowel sounds are normal. She exhibits no distension and no mass. There is no tenderness.   Musculoskeletal: Normal range of motion. She exhibits no edema or tenderness.   Lymphadenopathy:     She has no cervical adenopathy.   Neurological: She is alert and oriented to person, place, and time. No cranial nerve deficit.    with some mild speech deficit   Skin: Skin is warm and dry.   Psychiatric: She has a normal mood and affect. Her behavior is normal. Thought content normal.   Vitals reviewed.      Assessment:       1. Malignant neoplasm of upper-outer quadrant of right breast in female, estrogen receptor positive    2. Renal insufficiency        Plan:       1)clinically without evidence of disease; RTC in 3 months and left mammogram due 11/2018  2)I provided patient with copy of today's labs which she will bring to her outside PCP for further evaluation of renal insufficiency. I also encouraged her to hydrate. Patient and family are   amenable to plan.  Distress Screening Results: Psychosocial Distress screening score of Distress Score: 4 noted and reviewed. No intervention indicated.

## 2018-11-06 ENCOUNTER — HOSPITAL ENCOUNTER (OUTPATIENT)
Facility: HOSPITAL | Age: 78
Discharge: HOME | End: 2018-11-08
Attending: EMERGENCY MEDICINE | Admitting: INTERNAL MEDICINE
Payer: MEDICARE

## 2018-11-06 DIAGNOSIS — K92.2 GI BLEED: Primary | ICD-10-CM

## 2018-11-06 DIAGNOSIS — N18.30 CKD (CHRONIC KIDNEY DISEASE), STAGE III: ICD-10-CM

## 2018-11-06 DIAGNOSIS — I63.412 EMBOLIC STROKE INVOLVING LEFT MIDDLE CEREBRAL ARTERY: ICD-10-CM

## 2018-11-06 DIAGNOSIS — K92.2 GASTROINTESTINAL HEMORRHAGE, UNSPECIFIED GASTROINTESTINAL HEMORRHAGE TYPE: ICD-10-CM

## 2018-11-06 DIAGNOSIS — K29.70 GASTRITIS, PRESENCE OF BLEEDING UNSPECIFIED, UNSPECIFIED CHRONICITY, UNSPECIFIED GASTRITIS TYPE: ICD-10-CM

## 2018-11-06 DIAGNOSIS — K92.1 GASTROINTESTINAL HEMORRHAGE WITH MELENA: ICD-10-CM

## 2018-11-06 DIAGNOSIS — D64.9 ANEMIA, UNSPECIFIED TYPE: ICD-10-CM

## 2018-11-06 DIAGNOSIS — K92.2 LOWER GI BLEED: ICD-10-CM

## 2018-11-06 PROBLEM — K62.5 RECTAL BLEEDING: Status: ACTIVE | Noted: 2018-11-06

## 2018-11-06 LAB
ABO + RH BLD: NORMAL
ALBUMIN SERPL BCP-MCNC: 2.8 G/DL
ALP SERPL-CCNC: 70 U/L
ALT SERPL W/O P-5'-P-CCNC: 8 U/L
ANION GAP SERPL CALC-SCNC: 10 MMOL/L
APTT BLDCRRT: 27.7 SEC
AST SERPL-CCNC: 10 U/L
BASOPHILS # BLD AUTO: 0.02 K/UL
BASOPHILS NFR BLD: 0.2 %
BILIRUB SERPL-MCNC: 0.4 MG/DL
BLD GP AB SCN CELLS X3 SERPL QL: NORMAL
BUN SERPL-MCNC: 28 MG/DL
CALCIUM SERPL-MCNC: 8.6 MG/DL
CHLORIDE SERPL-SCNC: 114 MMOL/L
CO2 SERPL-SCNC: 20 MMOL/L
CREAT SERPL-MCNC: 1.4 MG/DL
DIFFERENTIAL METHOD: ABNORMAL
EOSINOPHIL # BLD AUTO: 0.1 K/UL
EOSINOPHIL NFR BLD: 1.4 %
ERYTHROCYTE [DISTWIDTH] IN BLOOD BY AUTOMATED COUNT: 13.8 %
EST. GFR  (AFRICAN AMERICAN): 41 ML/MIN/1.73 M^2
EST. GFR  (NON AFRICAN AMERICAN): 36 ML/MIN/1.73 M^2
ESTIMATED AVG GLUCOSE: 148 MG/DL
FERRITIN SERPL-MCNC: 31 NG/ML
FOLATE SERPL-MCNC: 16.8 NG/ML
GLUCOSE SERPL-MCNC: 139 MG/DL
HBA1C MFR BLD HPLC: 6.8 %
HCT VFR BLD AUTO: 28.7 %
HGB BLD-MCNC: 8.5 G/DL
INR PPP: 0.9
IRON SERPL-MCNC: 57 UG/DL
LIPASE SERPL-CCNC: 100 U/L
LYMPHOCYTES # BLD AUTO: 2.1 K/UL
LYMPHOCYTES NFR BLD: 20.3 %
MCH RBC QN AUTO: 27.9 PG
MCHC RBC AUTO-ENTMCNC: 29.6 G/DL
MCV RBC AUTO: 94 FL
MONOCYTES # BLD AUTO: 0.5 K/UL
MONOCYTES NFR BLD: 4.7 %
NEUTROPHILS # BLD AUTO: 7.6 K/UL
NEUTROPHILS NFR BLD: 73.2 %
OB PNL STL: POSITIVE
PLATELET # BLD AUTO: 264 K/UL
PMV BLD AUTO: 10 FL
POCT GLUCOSE: 112 MG/DL (ref 70–110)
POCT GLUCOSE: 120 MG/DL (ref 70–110)
POCT GLUCOSE: 151 MG/DL (ref 70–110)
POCT GLUCOSE: 70 MG/DL (ref 70–110)
POTASSIUM SERPL-SCNC: 4 MMOL/L
PROT SERPL-MCNC: 6.4 G/DL
PROTHROMBIN TIME: 10 SEC
RBC # BLD AUTO: 3.05 M/UL
SATURATED IRON: 23 %
SODIUM SERPL-SCNC: 144 MMOL/L
TOTAL IRON BINDING CAPACITY: 249 UG/DL
TRANSFERRIN SERPL-MCNC: 168 MG/DL
VIT B12 SERPL-MCNC: 555 PG/ML
WBC # BLD AUTO: 10.34 K/UL

## 2018-11-06 PROCEDURE — 82272 OCCULT BLD FECES 1-3 TESTS: CPT

## 2018-11-06 PROCEDURE — 99283 EMERGENCY DEPT VISIT LOW MDM: CPT

## 2018-11-06 PROCEDURE — 93005 ELECTROCARDIOGRAM TRACING: CPT

## 2018-11-06 PROCEDURE — 85610 PROTHROMBIN TIME: CPT

## 2018-11-06 PROCEDURE — 83036 HEMOGLOBIN GLYCOSYLATED A1C: CPT

## 2018-11-06 PROCEDURE — 83690 ASSAY OF LIPASE: CPT

## 2018-11-06 PROCEDURE — 93010 ELECTROCARDIOGRAM REPORT: CPT | Mod: ,,, | Performed by: INTERNAL MEDICINE

## 2018-11-06 PROCEDURE — 86850 RBC ANTIBODY SCREEN: CPT

## 2018-11-06 PROCEDURE — G0378 HOSPITAL OBSERVATION PER HR: HCPCS

## 2018-11-06 PROCEDURE — 25000003 PHARM REV CODE 250: Performed by: STUDENT IN AN ORGANIZED HEALTH CARE EDUCATION/TRAINING PROGRAM

## 2018-11-06 PROCEDURE — 99204 OFFICE O/P NEW MOD 45 MIN: CPT | Mod: GC,,, | Performed by: INTERNAL MEDICINE

## 2018-11-06 PROCEDURE — 82746 ASSAY OF FOLIC ACID SERUM: CPT

## 2018-11-06 PROCEDURE — 85730 THROMBOPLASTIN TIME PARTIAL: CPT

## 2018-11-06 PROCEDURE — 82607 VITAMIN B-12: CPT

## 2018-11-06 PROCEDURE — 85025 COMPLETE CBC W/AUTO DIFF WBC: CPT

## 2018-11-06 PROCEDURE — 83540 ASSAY OF IRON: CPT

## 2018-11-06 PROCEDURE — 63600175 PHARM REV CODE 636 W HCPCS: Performed by: STUDENT IN AN ORGANIZED HEALTH CARE EDUCATION/TRAINING PROGRAM

## 2018-11-06 PROCEDURE — 82728 ASSAY OF FERRITIN: CPT

## 2018-11-06 PROCEDURE — 80053 COMPREHEN METABOLIC PANEL: CPT

## 2018-11-06 PROCEDURE — C9113 INJ PANTOPRAZOLE SODIUM, VIA: HCPCS | Performed by: STUDENT IN AN ORGANIZED HEALTH CARE EDUCATION/TRAINING PROGRAM

## 2018-11-06 RX ORDER — GLUCAGON 1 MG
1 KIT INJECTION
Status: DISCONTINUED | OUTPATIENT
Start: 2018-11-06 | End: 2018-11-08 | Stop reason: HOSPADM

## 2018-11-06 RX ORDER — ATORVASTATIN CALCIUM 40 MG/1
40 TABLET, FILM COATED ORAL DAILY
Status: DISCONTINUED | OUTPATIENT
Start: 2018-11-06 | End: 2018-11-08 | Stop reason: HOSPADM

## 2018-11-06 RX ORDER — PANTOPRAZOLE SODIUM 40 MG/10ML
40 INJECTION, POWDER, LYOPHILIZED, FOR SOLUTION INTRAVENOUS DAILY
Status: DISCONTINUED | OUTPATIENT
Start: 2018-11-06 | End: 2018-11-08 | Stop reason: HOSPADM

## 2018-11-06 RX ORDER — INSULIN ASPART 100 [IU]/ML
0-5 INJECTION, SOLUTION INTRAVENOUS; SUBCUTANEOUS EVERY 6 HOURS PRN
Status: DISCONTINUED | OUTPATIENT
Start: 2018-11-06 | End: 2018-11-08 | Stop reason: HOSPADM

## 2018-11-06 RX ORDER — SODIUM CHLORIDE 0.9 % (FLUSH) 0.9 %
3 SYRINGE (ML) INJECTION
Status: DISCONTINUED | OUTPATIENT
Start: 2018-11-06 | End: 2018-11-08 | Stop reason: HOSPADM

## 2018-11-06 RX ORDER — FUROSEMIDE 40 MG/1
40 TABLET ORAL DAILY
Status: DISCONTINUED | OUTPATIENT
Start: 2018-11-07 | End: 2018-11-07

## 2018-11-06 RX ADMIN — ATORVASTATIN CALCIUM 40 MG: 40 TABLET, FILM COATED ORAL at 01:11

## 2018-11-06 RX ADMIN — PANTOPRAZOLE SODIUM 40 MG: 40 INJECTION, POWDER, FOR SOLUTION INTRAVENOUS at 05:11

## 2018-11-06 NOTE — CONSULTS
[facility]  Gastroenterology  Consult Note    Patient Name: Elina Mei  MRN: [id number]  Admission Date: 11/6/2018  Hospital Length of Stay: 0 days  Code Status: Full Code   Attending Provider: Triston Olivas MD   Consulting Provider: Nia Carter MD  Primary Care Physician: Laurence Baron MD  Principal Problem:Lower GI bleed    Consults  Subjective:     HPI:  Ms. 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 She is on plavix. She was never a drinker but she does use snuff. She denies NSAIDs use. She has a long history of constipation that is controlled with intermittent miralax and increased water intake. She denies nausea, vomiting, diarrhea, constipation, fever, abdominal pain, weight loss, or previous episodes of GIB, bowel resections, or radiation therapy. She is unsure but thinks she has had a colonoscopy over 40 years ago at the time of her hysterectomy which was done for a tumor on her uterus. Daughter that she lives with is at bedside to supplement the history.    Past Medical History:   Diagnosis Date    Acute right-sided weakness 4/26/2018    Arthritis     Diabetes mellitus     Hypertension     Malignant neoplasm of upper-outer quadrant of right breast in female, estrogen receptor positive 12/5/2017    Status post mastectomy, right 2018    Stroke 4/26/2018       Past Surgical History:   Procedure Laterality Date    BIOPSY-LYMPH NODE-SENTINEL Right 1/25/2018    [facility]    DISSECTION-LYMPH NODE-AXILLARY Right 1/25/2018    [facility] at [facility] OR [facility]    EYE SURGERY      HYSTERECTOMY      INJECTION-NODE-SENTINEL Right 1/25/2018    [facility] at [facility]    MASTECTOMY      MASTECTOMY 23 hr stay Right 3/29/2018    Performed by [facility] at [facility] OR [address] [place] [address]     MASTECTOMY-PARTIAL U/S guided Right 1/25/2018    [facility]ND [facility]    MYRINGOTOMY WITH INSERTION OF VENTILATION TUBE Bilateral 7/24/2018    Procedure: MYRINGOTOMY, WITH TYMPANOSTOMY TUBE INSERTION;  Surgeon: Ventura Whitt MD;  Location: [facility] OR;  Service: ENT;  Laterality: Bilateral;    MYRINGOTOMY, WITH TYMPANOSTOMY TUBE INSERTION Bilateral 7/24/2018    [facility] by [facility] at [facility] OR       Review of patient's allergies indicates:  No Known Allergies  Family History     Problem Relation (Age of Onset)    Breast cancer Sister (55)    Cancer Sister (75), Brother    Hypertension Mother        Tobacco Use    Smoking status: Never Smoker    Smokeless tobacco: Current User     Types: Snuff   Substance and Sexual Activity    Alcohol use: No    Drug use: No    Sexual activity: Not Currently     Review of Systems   Constitutional: Negative for activity change, appetite change, fever and unexpected weight change.   HENT: Negative for hearing loss, mouth sores, nosebleeds and trouble swallowing.    Eyes: Negative for pain and redness.   Respiratory: Negative for cough and shortness of breath.    Cardiovascular: Negative for chest pain and leg swelling.   Gastrointestinal: Positive for blood in stool and constipation. Negative for abdominal distention, abdominal pain, diarrhea, nausea, rectal pain and vomiting.   Endocrine: Negative.    Genitourinary: Negative.  Negative for vaginal bleeding and vaginal discharge.   Musculoskeletal: Negative for back pain and neck pain.   Skin: Negative for rash and wound.   Neurological: Positive for weakness. Negative for seizures, syncope and headaches.   Hematological: Negative.    Psychiatric/Behavioral: Positive for dysphoric mood. Negative for behavioral problems and confusion.   All other systems reviewed and are negative.    Objective:     Vital Signs (Most Recent):  Temp: 98.6 °F (37 °C) (11/06/18 1335)  Pulse: 74  (11/06/18 1249)  Resp: 18 (11/06/18 1249)  BP: (!) 140/66 (11/06/18 1249)  SpO2: 98 % (11/06/18 1249) Vital Signs (24h Range):  Temp:  [98.6 °F (37 °C)-98.9 °F (37.2 °C)] 98.6 °F (37 °C)  Pulse:  [66-74] 74  Resp:  [16-19] 18  SpO2:  [95 %-100 %] 98 %  BP: (104-145)/(57-66) 140/66     Weight: 100.7 kg (222 lb) (11/06/18 0913)  Body mass index is 33.75 kg/m².    No intake or output data in the 24 hours ending 11/06/18 1416    Lines/Drains/Airways     Airway                 Airway - Non-Surgical 07/24/18 1159  days          Peripheral Intravenous Line                 Peripheral IV - Single Lumen 11/06/18 Left Antecubital less than 1 day                Physical Exam   Constitutional: She is oriented to person, place, and time. She appears well-developed and well-nourished. No distress.   HENT:   Head: Normocephalic and atraumatic.   Mouth/Throat: No oropharyngeal exudate.   Eyes: EOM are normal. Pupils are equal, round, and reactive to light. No scleral icterus.   Neck: Normal range of motion. Neck supple.   Cardiovascular: Normal rate and regular rhythm.   No murmur heard.  Pulmonary/Chest: Effort normal. No respiratory distress. She has no wheezes.   Abdominal: Soft. Bowel sounds are normal. She exhibits no distension. There is no tenderness.   Rectal refused, just performed with red blood per nurse   Musculoskeletal: Normal range of motion. She exhibits no edema.   Neurological: She is alert and oriented to person, place, and time. No cranial nerve deficit.   Skin: Skin is warm and dry. No rash noted. She is not diaphoretic.   Psychiatric: She has a normal mood and affect. Her behavior is normal.       Significant Labs:  CBC:   Recent Labs   Lab 11/06/18  0955   WBC 10.34   HGB 8.5*   HCT 28.7*        CMP:   Recent Labs   Lab 11/06/18  0955   *   CALCIUM 8.6*   ALBUMIN 2.8*   PROT 6.4      K 4.0   CO2 20*   *   BUN 28*   CREATININE 1.4   ALKPHOS 70   ALT 8*   AST 10   BILITOT 0.4        Significant Imaging:  Nothing new for review    Assessment/Plan:     Ms. Mei is an 78 year old woman with a history of constipation but no colonoscopy in the last 40 years who presents with painless rectal bleeding that could represent a diverticular bleed, outlet bleeding from a fissure vs hemorrhoids, or something more ominous. She is on plavix for CVA. She will need endoscopy but appears to have stopped bleeding from her history given.     Rectal bleeding    - PPI IV   - monitor Hg and transfuse for goal >7  - agree with holding plavix  - will need endoscopy but will discuss timing with staff         Thank you for this consult. Please call with questions. Discussed with staff, Dr. Muñoz.     Nia Carter MD MPH  [facility] PGY 4  [phone] cell  [phone] pager

## 2018-11-06 NOTE — SUBJECTIVE & OBJECTIVE
Past Medical History:   Diagnosis Date    Acute right-sided weakness 4/26/2018    Arthritis     Diabetes mellitus     Hypertension     Malignant neoplasm of upper-outer quadrant of right breast in female, estrogen receptor positive 12/5/2017    Status post mastectomy, right 2018    Stroke 4/26/2018       Past Surgical History:   Procedure Laterality Date    BIOPSY-LYMPH NODE-SENTINEL Right 1/25/2018    [facility] at [facility] OR [address] [place] [address]    DISSECTION-LYMPH NODE-AXILLARY Right 1/25/2018    Performed [facility] at [facility] OR [address] [place] [address]    EYE SURGERY      HYSTERECTOMY      INJECTION-NODE-SENTINEL Right 1/25/2018    [facility]    MASTECTOMY      MASTECTOMY 23 hr stay Right 3/29/2018    [facility]    MASTECTOMY-PARTIAL U/S guided Right 1/25/2018    Performed by [facility] at [facility] OR [address] [place] [address]    MYRINGOTOMY WITH INSERTION OF VENTILATION TUBE Bilateral 7/24/2018    Procedure: MYRINGOTOMY, WITH TYMPANOSTOMY TUBE INSERTION;  Surgeon: Ventura Whitt MD;  Location: [facility];  Service: ENT;  Laterality: Bilateral;    MYRINGOTOMY, WITH TYMPANOSTOMY TUBE INSERTION Bilateral 7/24/2018    [facility]       Review of patient's allergies indicates:  No Known Allergies  Family History     Problem Relation (Age of Onset)    Breast cancer Sister (55)    Cancer Sister (75), Brother    Hypertension Mother        Tobacco Use    Smoking status: Never Smoker    Smokeless tobacco: Current User     Types: Snuff   Substance and Sexual Activity    Alcohol use: No    Drug use: No    Sexual activity: Not Currently     Review of Systems   Constitutional: Negative for activity change, appetite change, fever and unexpected weight change.   HENT: Negative for hearing loss, mouth sores, nosebleeds and trouble swallowing.    Eyes: Negative for pain and redness.   Respiratory:  Negative for cough and shortness of breath.    Cardiovascular: Negative for chest pain and leg swelling.   Gastrointestinal: Positive for blood in stool and constipation. Negative for abdominal distention, abdominal pain, diarrhea, nausea, rectal pain and vomiting.   Endocrine: Negative.    Genitourinary: Negative.  Negative for vaginal bleeding and vaginal discharge.   Musculoskeletal: Negative for back pain and neck pain.   Skin: Negative for rash and wound.   Neurological: Positive for weakness. Negative for seizures, syncope and headaches.   Hematological: Negative.    Psychiatric/Behavioral: Positive for dysphoric mood. Negative for behavioral problems and confusion.   All other systems reviewed and are negative.    Objective:     Vital Signs (Most Recent):  Temp: 98.6 °F (37 °C) (11/06/18 1335)  Pulse: 74 (11/06/18 1249)  Resp: 18 (11/06/18 1249)  BP: (!) 140/66 (11/06/18 1249)  SpO2: 98 % (11/06/18 1249) Vital Signs (24h Range):  Temp:  [98.6 °F (37 °C)-98.9 °F (37.2 °C)] 98.6 °F (37 °C)  Pulse:  [66-74] 74  Resp:  [16-19] 18  SpO2:  [95 %-100 %] 98 %  BP: (104-145)/(57-66) 140/66     Weight: 100.7 kg (222 lb) (11/06/18 0913)  Body mass index is 33.75 kg/m².    No intake or output data in the 24 hours ending 11/06/18 1416    Lines/Drains/Airways     Airway                 Airway - Non-Surgical 07/24/18 1159  days          Peripheral Intravenous Line                 Peripheral IV - Single Lumen 11/06/18 Left Antecubital less than 1 day                Physical Exam   Constitutional: She is oriented to person, place, and time. She appears well-developed and well-nourished. No distress.   HENT:   Head: Normocephalic and atraumatic.   Mouth/Throat: No oropharyngeal exudate.   Eyes: EOM are normal. Pupils are equal, round, and reactive to light. No scleral icterus.   Neck: Normal range of motion. Neck supple.   Cardiovascular: Normal rate and regular rhythm.   No murmur heard.  Pulmonary/Chest: Effort  normal. No respiratory distress. She has no wheezes.   Abdominal: Soft. Bowel sounds are normal. She exhibits no distension. There is no tenderness.   Rectal refused, just performed with red blood per nurse   Musculoskeletal: Normal range of motion. She exhibits no edema.   Neurological: She is alert and oriented to person, place, and time. No cranial nerve deficit.   Skin: Skin is warm and dry. No rash noted. She is not diaphoretic.   Psychiatric: She has a normal mood and affect. Her behavior is normal.       Significant Labs:  CBC:   Recent Labs   Lab 11/06/18  0955   WBC 10.34   HGB 8.5*   HCT 28.7*        CMP:   Recent Labs   Lab 11/06/18  0955   *   CALCIUM 8.6*   ALBUMIN 2.8*   PROT 6.4      K 4.0   CO2 20*   *   BUN 28*   CREATININE 1.4   ALKPHOS 70   ALT 8*   AST 10   BILITOT 0.4       Significant Imaging:  Nothing new for review

## 2018-11-06 NOTE — ED NOTES
APPEARANCE: Alert, oriented and in no acute distress.  CARDIAC: Normal rate and rhythm, no murmur heard.   PERIPHERAL VASCULAR: peripheral pulses present. Normal cap refill. No edema. Warm to touch.    RESPIRATORY:Normal rate and effort, breath sounds clear bilaterally throughout chest. Respirations are equal and unlabored no obvious signs of distress.  GASTRO: soft, bowel sounds normal, no tenderness, no abdominal distention.  MUSC: Full ROM. No bony tenderness or soft tissue tenderness. No obvious deformity.  SKIN: Skin is warm and dry, normal skin turgor, mucous membranes moist. Flesh colored polyp noted to rectum  NEURO: 5/5 strength major flexors/extensors bilaterally. Sensory intact to light touch bilaterally. [place] coma scale: eyes open spontaneously-4, oriented & converses-5, obeys commands-6. No neurological abnormalities.   MENTAL STATUS: awake, alert and aware of environment.  EYE: PERRL, both eyes: pupils brisk and reactive to light. Normal size.  ENT: EARS: no obvious drainage. NOSE: no active bleeding.   BREAST: asymmetrical. No masses. No tenderness. Right sided masectomy    Facial droop to right side of face pt denies residual symptoms from past stroke.

## 2018-11-06 NOTE — ED PROVIDER NOTES
Encounter Date: 11/6/2018    SCRIBE #1 NOTE: I, Prabhjot Ross, am scribing for, and in the presence of,  Dr. Meza. I have scribed the entire note.       History     Chief Complaint   Patient presents with    Rectal Bleeding     Started last night with bright red blood. Complaining of generalized weakness. Pt is on blood thinners.      Time seen by provider: 9:21 AM    This is a 78 y.o. female who presents with complaint of lower abdominal pain with rectal bleeding starting around 1900 yesterday, progressing into today. She notes her symptoms began with abdominal pain followed by BM consisting of loose stool mixed with BRB. She notes a second similar BM last night and reports she passed BRB without any stool this morning. Patient also reports generalized weakness starting today, but notes no current abdominal pain. Patient denies any nausea, vomiting, fever, or any other concerning symptoms. She has no prior history of GI bleeding. She reports a history of hysterectomy over 30 years ago. Patient has a history of HTN and CVA, and is currently on anticoagulants.          Review of patient's allergies indicates:  No Known Allergies  Past Medical History:   Diagnosis Date    Acute right-sided weakness 4/26/2018    Arthritis     Diabetes mellitus     Hypertension     Malignant neoplasm of upper-outer quadrant of right breast in female, estrogen receptor positive 12/5/2017    Status post mastectomy, right 2018    Stroke 4/26/2018     Past Surgical History:   Procedure Laterality Date    BIOPSY-LYMPH NODE-SENTINEL Right 1/25/2018    [facility]formed [facility] at [facility] 2ND [facility]    DISSECTION-LYMPH NODE-AXILLARY Right 1/25/2018    [facility] at [facility]    EYE SURGERY      HYSTERECTOMY      INJECTION-NODE-SENTINEL Right 1/25/2018    [facility] at [facility]    MASTECTOMY      MASTECTOMY 23 hr stay Right 3/29/2018    Performed by [facility] at [facility]  OR 2ND FLR    MASTECTOMY-PARTIAL U/S guided Right 1/25/2018    [facility] 2ND FLR    MYRINGOTOMY WITH INSERTION OF VENTILATION TUBE Bilateral 7/24/2018    Procedure: MYRINGOTOMY, WITH TYMPANOSTOMY TUBE INSERTION;  Surgeon: Ventura Whitt MD;  [facility] OR;  Service: ENT;  Laterality: Bilateral;    MYRINGOTOMY, WITH TYMPANOSTOMY TUBE INSERTION Bilateral 7/24/2018    [facility] by [facility] at [facility] OR     Family History   Problem Relation Age of Onset    Cancer Sister 75        Colon CA    Breast cancer Sister 55    Cancer Brother     Hypertension Mother      Social History     Tobacco Use    Smoking status: Never Smoker    Smokeless tobacco: Current User     Types: Snuff   Substance Use Topics    Alcohol use: No    Drug use: No     Review of Systems   Constitutional: Negative for fever.   Gastrointestinal: Positive for abdominal pain, anal bleeding and blood in stool. Negative for nausea and vomiting.   Neurological: Positive for weakness (generalized).   All other systems reviewed and are negative.    Physical Exam     Initial Vitals [11/06/18 0913]   BP Pulse Resp Temp SpO2   118/63 74 17 98.9 °F (37.2 °C) 95 %      MAP       --         Physical Exam    Nursing note and vitals reviewed.  Constitutional: She appears well-developed and well-nourished. She is not diaphoretic. No distress.   Obese    HENT:   Head: Normocephalic and atraumatic.   Eyes: Conjunctivae and EOM are normal.   Neck: Normal range of motion. Neck supple.   Cardiovascular: Normal rate, regular rhythm and normal heart sounds.   Pulmonary/Chest: Breath sounds normal. No respiratory distress.   Abdominal: Soft. There is no tenderness.   Genitourinary:   Genitourinary Comments: Flesh-colored pedunculated polyp that was attached proximal to the dentate line  Gross blood on exam, maroonish in hue  No hemorrhoidal tissue   Musculoskeletal: Normal range of motion. She exhibits no  edema or tenderness.   Neurological: She is alert and oriented to person, place, and time. She has normal strength.   Skin: Skin is warm and dry. Capillary refill takes less than 2 seconds.       ED Course   Procedures  Labs Reviewed   CBC W/ AUTO DIFFERENTIAL - Abnormal; Notable for the following components:       Result Value    RBC 3.05 (*)     Hemoglobin 8.5 (*)     Hematocrit 28.7 (*)     MCHC 29.6 (*)     Gran% 73.2 (*)     All other components within normal limits   COMPREHENSIVE METABOLIC PANEL - Abnormal; Notable for the following components:    Chloride 114 (*)     CO2 20 (*)     Glucose 139 (*)     BUN, Bld 28 (*)     Calcium 8.6 (*)     Albumin 2.8 (*)     ALT 8 (*)     eGFR if  41 (*)     eGFR if non  36 (*)     All other components within normal limits   LIPASE - Abnormal; Notable for the following components:    Lipase 100 (*)     All other components within normal limits   OCCULT BLOOD X 1, STOOL - Abnormal; Notable for the following components:    Occult Blood Positive (*)     All other components within normal limits   POCT GLUCOSE - Abnormal; Notable for the following components:    POCT Glucose 151 (*)     All other components within normal limits   APTT   PROTIME-INR   TYPE & SCREEN     EKG Readings: (Independently Interpreted)   Rhythm: Normal Sinus Rhythm. Heart Rate: 70. Ectopy: No Ectopy. Conduction: Bifasicular.   Time: 0934          Medical Decision Making:   Initial Assessment:   This is a 78 y.o. female who presents with complaint of lower abdominal pain with rectal bleeding starting around 1900 yesterday, progressing into today.  Clinical Tests:   Lab Tests: Ordered and Reviewed  Medical Tests: Ordered and Reviewed  ED Management:  1125  Spoke with [facility], who will admit the patient. Patient remains hemodynamically stable. No further GI bleeding.                I, Dr. Levy Meza, personally performed the services described in this  documentation. All medical record entries made by the scribe were at my direction and in my presence.  I have reviewed the chart and agree that the record reflects my personal performance and is accurate and complete        Clinical Impression:     1. Anemia, unspecified type    2. GI bleed        Disposition:   Disposition: Discharged  Condition: Stable       Levy Meza MD  11/06/18 6792

## 2018-11-06 NOTE — HPI
Ms. 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 She is on plavix. She was never a drinker but she does use snuff. She denies NSAIDs use. She has a long history of constipation that is controlled with intermittent miralax and increased water intake. She denies nausea, vomiting, diarrhea, constipation, fever, abdominal pain, weight loss, or previous episodes of GIB, bowel resections, or radiation therapy. She is unsure but thinks she has had a colonoscopy over 40 years ago at the time of her hysterectomy which was done for a tumor on her uterus. Daughter that she lives with is at bedside to supplement the history.

## 2018-11-06 NOTE — ASSESSMENT & PLAN NOTE
- PPI IV   - monitor Hg and transfuse for goal >7  - agree with holding plavix  - will need endoscopy but will discuss timing with staff

## 2018-11-06 NOTE — H&P
[facility] History and Physical - Resident Note    Admitting Team: Team B  Attending Physician: Triston Olivas MD  Resident: Brandyn Matthew MD   Interns: Emmanuelle Acosta MD PhD  Date of Admit: 11/6/2018    Chief Complaint     Rectal Bleeding (Started last night with bright red blood. Complaining of generalized weakness. Pt is on blood thinners. )      Subjective:      History of Present Illness:  Elina Mei is a 78 y.o. female who  has a past medical history of Acute right-sided weakness (4/26/2018), Arthritis, Diabetes mellitus, Hypertension, Malignant neoplasm of upper-outer quadrant of right breast in female, estrogen receptor positive (12/5/2017), Status post mastectomy, right (2018), and Stroke (4/26/2018).. The patient presented to [facility] on 11/6/2018 with a primary complaint of Rectal Bleeding (Started last night with bright red blood. Complaining of generalized weakness. Pt is on blood thinners. )      The patient was in their usual state of health until this morning when she went to to take her insulin. She sat on the commode and had a BM and noticed BRB on the tissue when she wiped. The stool in the toilet was mixed with blood. The pt was unable to quantify the amount. This has never happened before. The patient reports having a colonoscopy 40 years ago when she had a hysterectomy 2/2 to a tumor on her uterus. She states she had 4 surgeries at that time but was unable to describe. The procedure was done at [facility]. Of note, the patient was recently diagnosed with breast cancer and underwent a double mastectomy in March of 2018, she then subsequently had a stroke in April and has some residual right sided weakness and is being treated with Asprin and Plavix. Patient denies fever/chills, pain, dizziness, weakness, SOB, or chest pain.      Past Medical History:  Past Medical History:   Diagnosis Date    Acute right-sided weakness 4/26/2018    Arthritis      Diabetes mellitus     Hypertension     Malignant neoplasm of upper-outer quadrant of right breast in female, estrogen receptor positive 12/5/2017    Status post mastectomy, right 2018    Stroke 4/26/2018       Past Surgical History:  Past Surgical History:   Procedure Laterality Date    BIOPSY-LYMPH NODE-SENTINEL Right 1/25/2018    Performed by [facility] at [facility] OR [address] [place] [address]    DISSECTION-LYMPH NODE-AXILLARY Right 1/25/2018    [facility]    EYE SURGERY      HYSTERECTOMY      INJECTION-NODE-SENTINEL Right 1/25/2018    Performed by [facility] at [facility] OR 2ND FLR    MASTECTOMY      MASTECTOMY 23 hr stay Right 3/29/2018    Performed by [facility] at [facility] OR 2ND FLR    MASTECTOMY-PARTIAL U/S guided Right 1/25/2018    Performed by Ymui Melo MD at [facility] OR [address] [place] [address]    MYRINGOTOMY WITH INSERTION OF VENTILATION TUBE Bilateral 7/24/2018    Procedure: MYRINGOTOMY, WITH TYMPANOSTOMY TUBE INSERTION;  Surgeon: Ventura Whitt MD;  Location: [facility];  Service: ENT;  Laterality: Bilateral;    MYRINGOTOMY, WITH TYMPANOSTOMY TUBE INSERTION Bilateral 7/24/2018    Performed by [facility]Witt [facility] at [facility] OR       Allergies:  Review of patient's allergies indicates:  No Known Allergies    Home Medications:  Prior to Admission medications    Medication Sig Start Date End Date Taking? Authorizing Provider   amlodipine (NORVASC) 10 MG tablet Take 10 mg by mouth once daily.    Historical Provider, MD   aspirin (ECOTRIN) 81 MG EC tablet Take 1 tablet (81 mg total) by mouth once daily. 4/28/18 4/28/19  ARNOLD Armstrong   atorvastatin (LIPITOR) 40 MG tablet Take 1 tablet (40 mg total) by mouth once daily. 4/28/18 4/28/19  ARNOLD Armstrong   brimonidine 0.1% (ALPHAGAN P) 0.1 % Drop Alphagan P 0.1 % eye drops    Historical Provider, MD   clopidogrel (PLAVIX) 75 mg tablet Take 1 tablet (75 mg total) by mouth once daily. 6/12/18 6/12/19  Jesus DHILLON  MD Cristopher   dorzolamide-timolol 2-0.5% (COSOPT) 22.3-6.8 mg/mL ophthalmic solution Place 1 drop into both eyes 2 (two) times daily. 9/9/17   Historical Provider, MD   fluticasone (FLONASE) 50 mcg/actuation nasal spray 1 spray by Each Nare route 2 (two) times daily as needed (sinus congestion). 9/10/17   Alonzo Michelle MD   folic acid/multivit-min/lutein (CENTRUM SILVER ORAL) Take 1 Dose by mouth once daily.    Historical Provider, MD   furosemide (LASIX) 40 MG tablet Take 40 mg by mouth once daily.     Historical Provider, MD   LANTUS SOLOSTAR 100 unit/mL (3 mL) InPn pen INJECT 35 UNIT(S) EVERY EVENING BY SUBCUTANEOUS ROUTE. 9/9/17   Historical Provider, MD   losartan (COZAAR) 50 MG tablet Take 50 mg by mouth once daily.    Historical Provider, MD   metformin (GLUCOPHAGE) 500 MG tablet Take 500 mg by mouth 2 (two) times daily with meals.    Historical Provider, MD   nitroGLYCERIN (NITROSTAT) 0.4 MG SL tablet Place 1 tablet (0.4 mg total) under the tongue every 5 (five) minutes as needed for Chest pain. 4/3/13 4/3/14  Maren Caicedo MD   ondansetron (ZOFRAN-ODT) 8 MG TbDL Take 1 tablet (8 mg total) by mouth every 8 (eight) hours as needed. 7/24/18   Ventura Whitt MD   potassium chloride (KLOR-CON) 10 MEQ TbSR Take 10 mEq by mouth once daily.    Historical Provider, MD       Family History:  Family History   Problem Relation Age of Onset    Cancer Sister 75        Colon CA    Breast cancer Sister 55    Cancer Brother     Hypertension Mother        Social History:  Social History     Tobacco Use    Smoking status: Never Smoker    Smokeless tobacco: Current User     Types: Snuff   Substance Use Topics    Alcohol use: No    Drug use: No       Review of Systems:  Pertinent items are noted in HPI. All other systems are reviewed and are negative.    Health Maintaince :   Primary Care Physician: Dr. Baron  Immunizations:   TDap  Influenza   Pneumovax   Cancer Screening:  PAP: is up to date,  "n/a s/p hysterectomy    Mammogram: is up to date, double mastectomy  Colonoscopy: is not up to date, stated she had one 40 years ago     Objective:   Last 24 Hour Vital Signs:  BP  Min: 104/58  Max: 145/66  Temp  Av.8 °F (37.1 °C)  Min: 98.7 °F (37.1 °C)  Max: 98.9 °F (37.2 °C)  Pulse  Av.3  Min: 66  Max: 74  Resp  Av.6  Min: 16  Max: 19  SpO2  Av %  Min: 95 %  Max: 100 %  Height  Av' 8" (172.7 cm)  Min: 5' 8" (172.7 cm)  Max: 5' 8" (172.7 cm)  Weight  Av.7 kg (222 lb)  Min: 100.7 kg (222 lb)  Max: 100.7 kg (222 lb)  Body mass index is 33.75 kg/m².  No intake/output data recorded.    Physical Examination:  General: Alert, Awake, Oriented to person place and date, No Distress, obese woman laying comfortably in bed  Head: normocephalic, atraumatic  Eyes: PERRL, EOMI, no scleral icterus, no conjunctival pallor  Nose: no tenderness to palpation, no drainage or erythema   Mouth and Throat: poor dentition, no lesions seen, moist mucus membranes  Neck: no lymphadenopathy appreciated, No carotid bruits,   Respiratory: lungs clear to ascultation bilaterally, no accessory use of muscles   Cardiovascular: regular rate with regular rhythm, no murmurs  Gastrointestinal: soft, suprapubic tenderness on palpation, nondistended, no rebound or guarding, normoactive bowel sounds.   Extremities: distal pulses 2+, no edema noted, normal ROM, right sided weakness   Neuro:  full strength in L extremities, weakness in R UE, no sensory deficits.   Skin: no lesions, rashes, or breakdown.       Laboratory:  Most Recent Data:  CBC:   Lab Results   Component Value Date    WBC 10.34 2018    HGB 8.5 (L) 2018    HCT 28.7 (L) 2018     2018    MCV 94 2018    RDW 13.8 2018     BMP:     LFTs:   Lab Results   Component Value Date    PROT 6.4 2018    ALBUMIN 2.8 (L) 2018    BILITOT 0.4 2018    AST 10 2018    ALKPHOS 70 2018    ALT 8 (L) 2018 "     Coags:   Lab Results   Component Value Date    INR 0.9 11/06/2018     FLP:   Lab Results   Component Value Date    CHOL 231 (H) 04/26/2018    HDL 45 04/26/2018    LDLCALC 160.2 (H) 04/26/2018    TRIG 129 04/26/2018    CHOLHDL 19.5 (L) 04/26/2018     DM:   Lab Results   Component Value Date    HGBA1C 7.0 (H) 03/30/2018    HGBA1C 7.3 (H) 12/20/2017    HGBA1C 9.0 (H) 04/02/2013    LDLCALC 160.2 (H) 04/26/2018    CREATININE 1.4 11/06/2018     Thyroid:   Lab Results   Component Value Date    TSH 1.426 04/26/2018     Anemia: No results found for: IRON, TIBC, FERRITIN, [id number], FOLATE  Cardiac:   Lab Results   Component Value Date    TROPONINI 0.017 04/27/2018    BNP 34 12/23/2013     Urinalysis:   Lab Results   Component Value Date    LABURIN  04/01/2013     MULTIPLE ORGANISMS ISOLATED. NONE IN PREDOMINANCE. REPEAT IF CLINICALLY NECESSARY.    COLORU Yellow 12/20/2017    SPECGRAV 1.020 12/20/2017    NITRITE Negative 12/20/2017    KETONESU Negative 12/20/2017    UROBILINOGEN Negative 12/20/2017    WBCUA 2 12/20/2017       Trended Lab Data:  Recent Labs   Lab 11/06/18  0955   WBC 10.34   HGB 8.5*   HCT 28.7*      MCV 94   RDW 13.8      K 4.0   *   CO2 20*   BUN 28*   CREATININE 1.4   *   PROT 6.4   ALBUMIN 2.8*   BILITOT 0.4   AST 10   ALKPHOS 70   ALT 8*       Trended Cardiac Data:  No results for input(s): TROPONINI, CKTOTAL, CKMB, BNP in the last 168 hours.    Microbiology Data:  None    Other Laboratory Data:  None    Other Results:  None    Radiology:  Imaging Results    None          Assessment:     Elina Mei is a 78 y.o. female with:  Patient Active Problem List    Diagnosis Date Noted    Lower GI bleed 11/06/2018    CKD (chronic kidney disease), stage III 11/06/2018    Chronic otitis media 07/24/2018    Aneurysm, carotid artery, internal 05/03/2018    Embolic stroke involving left middle cerebral artery 04/26/2018    Acute right-sided weakness 04/26/2018    At risk for  lymphedema 01/16/2018    Malignant neoplasm of upper-outer quadrant of right breast in female, estrogen receptor positive 12/05/2017    Chest pain syndrome 12/23/2013     Class: Acute    Anemia 12/23/2013     Class: Chronic    HTN (hypertension) 04/01/2013    DM (diabetes mellitus) 04/01/2013    Glaucoma (increased eye pressure) 04/01/2013        Plan:     1. Lower GI Bleed  - BRBPR vs hematochezia 2/2 suspected diverticular bleed, no Hx of GIB in the past, History of breast cancer and unknown uterine tumor per pt. No recent NSAID use, rectal exam positive; FOBT pending  - Hg 8.5; with goal of >7  vitals: -145/50-60s; HR 70s on admission  - on asprin and plavix for Hx of recent stroke in April 208 which increased risk of bleeding  - coag studies: PT 10, PTT 27.7  - will hold antihypertensives, antiplatelets, anticoagulants, and DVT chemoprophylaxis in setting of bleed  - Daily CBC and CMP- anemia workup pending  - 2 large bore IVs, type and cross  - PPI daily  - GI consult placed    2. Elevated lipase  - Lipase 100, could be 2/2 diabetes per uptodate, no EtOH Hx  - Considering URQ U/S Pending    3.T2DM  - Well controlled, A1C 6.8  - Currently decreased home Lantus 2/2 current diet, 10 units nightly  - Continue 40mg Lasix daily  - PRN 1mg glucagon for sugars <70 mg/dL  - PRN Insulin 0-5 units for sugars >150     4. Hx Stroke  - Hx of Stroke April 2018  -Hold dual antiplatelet therapy in setting GI bleed  - Continue Atorvastatin    5. HTN  -holding home medications     6. Stage III CKD  - GFR 41, Cr 1.4  - Renally dose all medications      HM  - Influenza vaccine     PPX- None in setting of bleed  Diet - Adult clear liquids  Dispo- pending stable CBC and GI recommendations          Code Status:     Full CODE    Emmanuelle Aocsta  [facility] HO-I  [facility] Team B Internal Medicine Service    [facility] Medicine Hospitalist Pager numbers:   [facility] Hospitalist Medicine Team A  (Mahad/Andrey): [phone]  [facility] Hospitalist Medicine Team B (Kristie/Brendon):  [phone]

## 2018-11-07 ENCOUNTER — ANESTHESIA EVENT (OUTPATIENT)
Dept: ENDOSCOPY | Facility: HOSPITAL | Age: 78
End: 2018-11-07
Payer: MEDICARE

## 2018-11-07 LAB
ALBUMIN SERPL BCP-MCNC: 2.7 G/DL
ALP SERPL-CCNC: 66 U/L
ALT SERPL W/O P-5'-P-CCNC: 5 U/L
ANION GAP SERPL CALC-SCNC: 9 MMOL/L
AST SERPL-CCNC: 10 U/L
BASOPHILS # BLD AUTO: 0.02 K/UL
BASOPHILS NFR BLD: 0.3 %
BILIRUB SERPL-MCNC: 0.4 MG/DL
BUN SERPL-MCNC: 24 MG/DL
CALCIUM SERPL-MCNC: 8.5 MG/DL
CHLORIDE SERPL-SCNC: 113 MMOL/L
CO2 SERPL-SCNC: 21 MMOL/L
CREAT SERPL-MCNC: 1.3 MG/DL
DIFFERENTIAL METHOD: ABNORMAL
EOSINOPHIL # BLD AUTO: 0.1 K/UL
EOSINOPHIL NFR BLD: 1.9 %
ERYTHROCYTE [DISTWIDTH] IN BLOOD BY AUTOMATED COUNT: 13.9 %
EST. GFR  (AFRICAN AMERICAN): 45 ML/MIN/1.73 M^2
EST. GFR  (NON AFRICAN AMERICAN): 39 ML/MIN/1.73 M^2
GLUCOSE SERPL-MCNC: 103 MG/DL
HCT VFR BLD AUTO: 24.2 %
HGB BLD-MCNC: 7.3 G/DL
HGB BLD-MCNC: 7.5 G/DL
LYMPHOCYTES # BLD AUTO: 2 K/UL
LYMPHOCYTES NFR BLD: 29.4 %
MCH RBC QN AUTO: 28.1 PG
MCHC RBC AUTO-ENTMCNC: 30.2 G/DL
MCV RBC AUTO: 93 FL
MONOCYTES # BLD AUTO: 0.4 K/UL
MONOCYTES NFR BLD: 6.2 %
NEUTROPHILS # BLD AUTO: 4.2 K/UL
NEUTROPHILS NFR BLD: 61.9 %
PLATELET # BLD AUTO: 230 K/UL
PMV BLD AUTO: 9.8 FL
POCT GLUCOSE: 100 MG/DL (ref 70–110)
POCT GLUCOSE: 126 MG/DL (ref 70–110)
POCT GLUCOSE: 168 MG/DL (ref 70–110)
POCT GLUCOSE: 187 MG/DL (ref 70–110)
POTASSIUM SERPL-SCNC: 3.7 MMOL/L
PROT SERPL-MCNC: 5.9 G/DL
RBC # BLD AUTO: 2.6 M/UL
SODIUM SERPL-SCNC: 143 MMOL/L
WBC # BLD AUTO: 6.74 K/UL

## 2018-11-07 PROCEDURE — 25000003 PHARM REV CODE 250

## 2018-11-07 PROCEDURE — 85025 COMPLETE CBC W/AUTO DIFF WBC: CPT

## 2018-11-07 PROCEDURE — G8988 SELF CARE GOAL STATUS: HCPCS | Mod: CH

## 2018-11-07 PROCEDURE — 25000003 PHARM REV CODE 250: Performed by: STUDENT IN AN ORGANIZED HEALTH CARE EDUCATION/TRAINING PROGRAM

## 2018-11-07 PROCEDURE — 36415 COLL VENOUS BLD VENIPUNCTURE: CPT

## 2018-11-07 PROCEDURE — 85018 HEMOGLOBIN: CPT | Mod: 59

## 2018-11-07 PROCEDURE — G0378 HOSPITAL OBSERVATION PER HR: HCPCS

## 2018-11-07 PROCEDURE — G8987 SELF CARE CURRENT STATUS: HCPCS | Mod: CH

## 2018-11-07 PROCEDURE — C9113 INJ PANTOPRAZOLE SODIUM, VIA: HCPCS | Performed by: STUDENT IN AN ORGANIZED HEALTH CARE EDUCATION/TRAINING PROGRAM

## 2018-11-07 PROCEDURE — S5571 INSULIN DISPOS PEN 3 ML: HCPCS | Performed by: STUDENT IN AN ORGANIZED HEALTH CARE EDUCATION/TRAINING PROGRAM

## 2018-11-07 PROCEDURE — G8978 MOBILITY CURRENT STATUS: HCPCS | Mod: CH

## 2018-11-07 PROCEDURE — 63600175 PHARM REV CODE 636 W HCPCS: Performed by: STUDENT IN AN ORGANIZED HEALTH CARE EDUCATION/TRAINING PROGRAM

## 2018-11-07 PROCEDURE — 97165 OT EVAL LOW COMPLEX 30 MIN: CPT

## 2018-11-07 PROCEDURE — 80053 COMPREHEN METABOLIC PANEL: CPT

## 2018-11-07 PROCEDURE — G8989 SELF CARE D/C STATUS: HCPCS | Mod: CH

## 2018-11-07 PROCEDURE — 97161 PT EVAL LOW COMPLEX 20 MIN: CPT

## 2018-11-07 PROCEDURE — G8979 MOBILITY GOAL STATUS: HCPCS | Mod: CH

## 2018-11-07 PROCEDURE — G8980 MOBILITY D/C STATUS: HCPCS | Mod: CH

## 2018-11-07 RX ORDER — POLYETHYLENE GLYCOL 3350, SODIUM SULFATE ANHYDROUS, SODIUM BICARBONATE, SODIUM CHLORIDE, POTASSIUM CHLORIDE 236; 22.74; 6.74; 5.86; 2.97 G/4L; G/4L; G/4L; G/4L; G/4L
4000 POWDER, FOR SOLUTION ORAL ONCE
Status: DISCONTINUED | OUTPATIENT
Start: 2018-11-07 | End: 2018-11-07

## 2018-11-07 RX ORDER — POLYETHYLENE GLYCOL 3350, SODIUM SULFATE ANHYDROUS, SODIUM BICARBONATE, SODIUM CHLORIDE, POTASSIUM CHLORIDE 236; 22.74; 6.74; 5.86; 2.97 G/4L; G/4L; G/4L; G/4L; G/4L
4000 POWDER, FOR SOLUTION ORAL ONCE
Status: COMPLETED | OUTPATIENT
Start: 2018-11-07 | End: 2018-11-07

## 2018-11-07 RX ADMIN — PANTOPRAZOLE SODIUM 40 MG: 40 INJECTION, POWDER, FOR SOLUTION INTRAVENOUS at 09:11

## 2018-11-07 RX ADMIN — ATORVASTATIN CALCIUM 40 MG: 40 TABLET, FILM COATED ORAL at 09:11

## 2018-11-07 RX ADMIN — FUROSEMIDE 40 MG: 40 TABLET ORAL at 09:11

## 2018-11-07 RX ADMIN — THERA TABS 1 TABLET: TAB at 09:11

## 2018-11-07 RX ADMIN — INSULIN DETEMIR 10 UNITS: 100 INJECTION, SOLUTION SUBCUTANEOUS at 09:11

## 2018-11-07 RX ADMIN — POLYETHYLENE GLYCOL 3350, SODIUM SULFATE ANHYDROUS, SODIUM BICARBONATE, SODIUM CHLORIDE, POTASSIUM CHLORIDE 4000 ML: 236; 22.74; 6.74; 5.86; 2.97 POWDER, FOR SOLUTION ORAL at 08:11

## 2018-11-07 NOTE — PLAN OF CARE
11/07/18 1116   TSAI Message   Medicare Outpatient and Observation Notification regarding financial responsibility Given to patient/caregiver;Explained to patient/caregiver;Signed/date by patient/caregiver   Date TSAI was signed 11/07/18   Time TSAI was signed 1000

## 2018-11-07 NOTE — PLAN OF CARE
Problem: Physical Therapy Goal  Goal: Physical Therapy Goal  Goals to be met by: 11/7/2018     No PT goals established         Outcome: Outcome(s) achieved Date Met: 11/07/18  Recommend Home  Patient appears to be at her functional baseline  No acute skilled PT needs  No DME needs  Will DC PT service

## 2018-11-07 NOTE — PLAN OF CARE
[facility] Plan of Care Note    No further episodes of bleeding; Hg went from 8.5 to 7.3 but she is asymptomatic. Will still plan for EGD/colon tomorrow with clear liquid diet today/NPO at midnight and prep this evening.     Nia Carter MD MPH  [facility] PGY 4  [phone] cell  [phone] pager

## 2018-11-07 NOTE — PLAN OF CARE
Patient AAOx3  Lives at home with daughter, son, grandson  DMe includes cane, RW and shower chair    Has had [facility] in the past (April 2018)    PCP is Dr. Duke  Neurologist is Dr. Anguiano       11/07/18 1372   Discharge Assessment   Assessment Type Discharge Planning Assessment   Confirmed/corrected address and phone number on facesheet? Yes   Assessment information obtained from? Patient   Prior to hospitilization cognitive status: Alert/Oriented   Prior to hospitalization functional status: Independent;Assistive Equipment   Current cognitive status: Alert/Oriented   Current Functional Status: Independent;Assistive Equipment   Lives With child(paramjit), adult   Is patient able to care for self after discharge? Yes   Patient's perception of discharge disposition home or selfcare   Readmission Within The Last 30 Days no previous admission in last 30 days   Equipment Currently Used at Home cane, quad;walker, rolling;shower chair   Do you have any problems affording any of your prescribed medications? No   Is the patient taking medications as prescribed? yes   Does the patient have transportation home? Yes   Transportation Available family or friend will provide   Discharge Plan A Home;Home with family   Discharge Plan B Home;Home Health   Patient/Family In Agreement With Plan yes     Gay Almazan, RN, CCM, CMSRN  RN Transition Navigator  [phone]

## 2018-11-07 NOTE — PLAN OF CARE
Notified Ena in lab to draw pt's labs in PD tubes.  She asked to put note above pt's bed.  Nurse put a notice above bed for lab.

## 2018-11-07 NOTE — PLAN OF CARE
Problem: Occupational Therapy Goal  Goal: Occupational Therapy Goal  Outcome: Ongoing (interventions implemented as appropriate)  OT sony completed this date. Pt lives w/ adult children in [place] w/ 1STE & 0HR; has T/S combo. PLOF: (I) w/o DME w/ all fxnl tasks incl sponge bath & light IADLs. Currently, pt w/o change in fxnl status from baseline. Edu/tx re: HEP. Pt/dgtr verbalized understanding.    Pt at baseline for all mobility & fxnl tasks therefore no further OT svcs indicated at this time.

## 2018-11-07 NOTE — PT/OT/SLP EVAL
Occupational Therapy   Evaluation and Discharge Note    Name: Elina Mei  MRN: [id number]  Admitting Diagnosis:  GI bleed      Recommendations:     Discharge Recommendations: home  Discharge Equipment Recommendations:  none  Barriers to discharge:  None    History:     Occupational Profile:  Living Environment: w/ adult children in H w/ 1STE & 0HR; has T/S combo  Previous level of function: Mod (I) w/o DME  Roles and Routines: sponge baths & light IADLs  Equipment Owned:  (doesn't use RW, SPC, TTB)  Assistance upon Discharge: PRN S/A    Past Medical History:   Diagnosis Date    Acute right-sided weakness 4/26/2018    Arthritis     Diabetes mellitus     Hypertension     Malignant neoplasm of upper-outer quadrant of right breast in female, estrogen receptor positive 12/5/2017    Status post mastectomy, right 2018    Stroke 4/26/2018       Past Surgical History:   Procedure Laterality Date    BIOPSY-LYMPH NODE-SENTINEL Right 1/25/2018    Performed by [facility] Melo [facility] at [facility] OR [address] [place] [address]    DISSECTION-LYMPH NODE-AXILLARY Right 1/25/2018    Performed by [facility] Melo MD at [facility] OR [address] [place] [address]    EYE SURGERY      HYSTERECTOMY      INJECTION-NODE-SENTINEL Right 1/25/2018    Performed by [facility] Melo MD at [facility] OR [address] [place] [address]    MASTECTOMY      MASTECTOMY 23 hr stay Right 3/29/2018    [facility]    MASTECTOMY-PARTIAL U/S guided Right 1/25/2018    Performed by Yumi Melo MD at [facility] OR [address] [place] [address]    MYRINGOTOMY WITH INSERTION OF VENTILATION TUBE Bilateral 7/24/2018    Procedure: MYRINGOTOMY, WITH TYMPANOSTOMY TUBE INSERTION;  Surgeon: Ventura Whitt MD;  Location: [facility];  Service: ENT;  Laterality: Bilateral;    MYRINGOTOMY, WITH TYMPANOSTOMY TUBE INSERTION Bilateral 7/24/2018    [facility]       Subjective     Chief Complaint: GIB  Patient/Family stated goals: return home  Communicated with: estrellita prior to  "session.  Pain/Comfort:  · Pain Rating 1: 0/10  · Pain Rating Post-Intervention 1: 0/10    Patients cultural, spiritual, Confucianism conflicts given the current situation:      Objective:     Patient found with: bed alarm    General Precautions: Standard, fall   Orthopedic Precautions:N/A   Braces: N/A     Occupational Performance:    Bed Mobility:    · Patient completed Supine to Sit with modified independence    Functional Mobility/Transfers:  · Patient completed Sit <> Stand Transfer with modified independence  with  no assistive device   · Functional Mobility: w/o DME around room w/ Mod (I)    Activities of Daily Living:  · Lower Body Dressing: modified independence don undergarments  · Toileting: modified independence on toilet    Cognitive/Visual Perceptual:  Grossly WFL    Physical Exam:  B UEs WFL at 4+/5    Patient left at EOB with all lines intact, call button in reach and dgtr present    AMPA 6 Click:  [facility] Total Score: 24    Treatment & Education:  OT sony completed this date. Pt lives w/ adult children in SSH w/ 1STE & 0HR; has T/S combo. PLOF: (I) w/o DME w/ all fxnl tasks incl sponge bath & light IADLs. Currently, pt w/o change in fxnl status from baseline. Edu/tx re: HEP. Pt/dgtr verbalized understanding.      Assessment:   Pt at baseline for all mobility & fxnl tasks therefore no further OT svcs indicated at this time.    Elina Mei is a 78 y.o. female with a medical diagnosis of GI bleed. At this time, patient is functioning at their prior level of function and does not require further acute OT services.     Clinical Decision Makin.  OT Low:  "Pt evaluation falls under low complexity for evaluation coding due to performance deficits noted in 1-3 areas as stated above and 0 co-morbities affecting current functional status. Data obtained from problem focused assessments. No modifications or assistance was required for completion of evaluation. Only brief occupational profile and history " "review completed."     Plan:     During this hospitalization, patient does not require further acute OT services.  Please re-consult if situation changes.    · Plan of Care Reviewed with: patient, daughter    This Plan of care has been discussed with the patient who was involved in its development and understands and is in agreement with the identified goals and treatment plan    GOALS:   Multidisciplinary Problems     Occupational Therapy Goals        Problem: Occupational Therapy Goal    Goal Priority Disciplines Outcome Interventions   Occupational Therapy Goal     OT, PT/OT Ongoing (interventions implemented as appropriate)                    Time Tracking:     OT Date of Treatment: 11/07/18  OT Start Time: 0955  OT Stop Time: 1012  OT Total Time (min): 17 min    Billable Minutes:Evaluation 17  Total Time 17    SWETHA Burleson  11/7/2018    "

## 2018-11-07 NOTE — PT/OT/SLP EVAL
Physical Therapy Evaluation and Discharge Note    Patient Name:  Elina Mei   MRN:  [id number]    Recommendations:     Discharge Recommendations:  home   Discharge Equipment Recommendations: none   Barriers to discharge: None    Assessment:     Elina Mei is a 78 y.o. female admitted with a medical diagnosis of GI bleed. .  At this time, patient is functioning at their prior level of function and does not require further acute PT services.     Recent Surgery: Procedure(s) (LRB):  COLONOSCOPY (N/A)  EGD (ESOPHAGOGASTRODUODENOSCOPY) (N/A)      Plan:     During this hospitalization, patient does not require further acute PT services.  Please re-consult if situation changes.      Subjective     Chief Complaint: none voiced  Patient/Family Comments/goals: go home  Pain/Comfort:  · Pain Rating 1: 0/10  · Pain Rating Post-Intervention 1: 0/10    Patients cultural, spiritual, Scientology conflicts given the current situation:      Living Environment:  Lives with dtg and other family members no concerns SSH  Prior to admission, patients level of function was independent.  Equipment used at home: bath bench, cane, quad, walker, rolling.  DME owned (not currently used): rolling walker and shower chair.  Upon discharge, patient will have assistance from family.    Objective:     Communicated with primary nurse prior to session.  Patient found supine upon PT entry to room found with: bed alarm     General Precautions: Standard, fall   Orthopedic Precautions:N/A   Braces: N/A     Exams:  · RLE ROM: WFL  · RLE Strength: WFL  · LLE ROM: WFL  · LLE Strength: WFL    Functional Mobility:  · Transfers:     · Sit to Stand:  modified independence with no AD  · Gait: Mod I to supervision ~ 70 ft without AD  · Balance: fair +    AM-PAC 6 CLICK MOBILITY  Total Score:23       Therapeutic Activities and Exercises:   na    AM-PAC 6 CLICK MOBILITY  Total Score:23     Patient left sitting EOB with O T with call button in reach and dtg  present.    GOALS:   Multidisciplinary Problems     Physical Therapy Goals     Not on file          Multidisciplinary Problems (Resolved)        Problem: Physical Therapy Goal    Goal Priority Disciplines Outcome Goal Variances Interventions   Physical Therapy Goal   (Resolved)     PT, PT/OT Outcome(s) achieved     Description:  Goals to be met by: 11/7/2018     No PT goals established                           History:     Past Medical History:   Diagnosis Date    Acute right-sided weakness 4/26/2018    Arthritis     Diabetes mellitus     Hypertension     Malignant neoplasm of upper-outer quadrant of right breast in female, estrogen receptor positive 12/5/2017    Status post mastectomy, right 2018    Stroke 4/26/2018       Past Surgical History:   Procedure Laterality Date    BIOPSY-LYMPH NODE-SENTINEL Right 1/25/2018    Performed [facility] at [facility] OR [address] [place] [address]    DISSECTION-LYMPH NODE-AXILLARY Right 1/25/2018    Performed by [facility] at [facility] OR [address] [place] [address]    EYE SURGERY      HYSTERECTOMY      INJECTION-NODE-SENTINEL Right 1/25/2018    Performed by [facility] at [facility] OR [address] [place] [address]    MASTECTOMY      MASTECTOMY 23 hr stay Right 3/29/2018    [facility]    MASTECTOMY-PARTIAL U/S guided Right 1/25/2018    Performed by [facility] MD at [facility] OR [address] [place] [address]    MYRINGOTOMY WITH INSERTION OF VENTILATION TUBE Bilateral 7/24/2018    Procedure: MYRINGOTOMY, WITH TYMPANOSTOMY TUBE INSERTION;  Surgeon: Ventura Whitt MD;  Location: [facility];  Service: ENT;  Laterality: Bilateral;    MYRINGOTOMY, WITH TYMPANOSTOMY TUBE INSERTION Bilateral 7/24/2018    Performed by [facility] at [facility]       Clinical Decision Making:     History  Co-morbidities and personal factors that may impact the plan of care Examination  Body Structures and Functions, activity limitations and participation restrictions that may impact the plan of care Clinical  Presentation   Decision Making/ Complexity Score   Co-morbidities:   [] Time since onset of injury / illness / exacerbation  [] Status of current condition  []Patient's cognitive status and safety concerns    [x] Multiple Medical Problems (see med hx)  Personal Factors:   [] Patient's age  [] Prior Level of function   [] Patient's home situation (environment and family support)  [] Patient's level of motivation  [] Expected progression of patient      HISTORY:(criteria)    [] 27799 - no personal factors/history    [x] 92499 - has 1-2 personal factor/comorbidity     [] 81011 - has >3 personal factor/comorbidity     Body Regions:  [] Objective examination findings  [] Head     []  Neck  [] Trunk   [] Upper Extremity  [] Lower Extremity    Body Systems:  [] For communication ability, affect, cognition, language, and learning style: the assessment of the ability to make needs known, consciousness, orientation (person, place, and time), expected emotional /behavioral responses, and learning preferences (eg, learning barriers, education  needs)  [x] For the neuromuscular system: a general assessment of gross coordinated movement (eg, balance, gait, locomotion, transfers, and transitions) and motor function  (motor control and motor learning)  [] For the musculoskeletal system: the assessment of gross symmetry, gross range of motion, gross strength, height, and weight  [] For the integumentary system: the assessment of pliability(texture), presence of scar formation, skin color, and skin integrity  [] For cardiovascular/pulmonary system: the assessment of heart rate, respiratory rate, blood pressure, and edema     Activity limitations:    [] Patient's cognitive status and saf ety concerns          [] Status of current condition      [] Weight bearing restriction  [] Cardiopulmunary Restriction    Participation Restrictions:   [] Goals and goal agreement with the patient     [] Rehab potential (prognosis) and probable  outcome      Examination of Body System: (criteria)    [] 24076 - addressing 1-2 elements    [] 53599 - addressing a total of 3 or more elements     [] 19609 -  Addressing a total of 4 or more elements         Clinical Presentation: (criteria)  Choose one     On examination of body system using standardized tests and measures patient presents with (CHOOSE ONE) elements from any of the following: body structures and functions, activity limitations, and/or participation restrictions.  Leading to a clinical presentation that is considered stable and/or uncomplicated                              Clinical Decision Making  (Eval Complexity):  Low- 67402     Time Tracking:     PT Received On: 11/07/18  PT Start Time: 0945     PT Stop Time: 1005  PT Total Time (min): 20 min     Billable Minutes: Evaluation 20      Ahsan Brewer, PT  11/07/2018

## 2018-11-07 NOTE — PROGRESS NOTES
"[facility] Internal Medicine Progress Note    Admitting Team: Team B  Attending Physician: Triston Olivas MD  Resident: Brandyn Matthew MD   Interns: Emmanuelle Acosta MD PhD  Date of Admit: 2018      Subjective:      Patient denies any further episodes of blood in her BM this morning, no current complaint at this point. Discussed possible colonoscopy on Thursday, patient states that she is hesitant to get a procedure.      Objective:   Last 24 Hour Vital Signs:  BP  Min: 104/58  Max: 166/74  Temp  Av.5 °F (36.9 °C)  Min: 96.6 °F (35.9 °C)  Max: 98.9 °F (37.2 °C)  Pulse  Av.5  Min: 66  Max: 81  Resp  Av.3  Min: 15  Max: 19  SpO2  Av.9 %  Min: 95 %  Max: 100 %  Height  Av' 8" (172.7 cm)  Min: 5' 8" (172.7 cm)  Max: 5' 8" (172.7 cm)  Weight  Av.2 kg (225 lb 3.8 oz)  Min: 100.7 kg (222 lb)  Max: 103.3 kg (227 lb 11.8 oz)  Body mass index is 34.63 kg/m².  No intake/output data recorded.    Physical Examination:  General: Alert, Awake, Oriented to person place and date, No Distress, obese woman laying comfortably in bed  Head: normocephalic, atraumatic  Eyes: PERRL, EOMI, no scleral icterus, no conjunctival pallor  Nose: no tenderness to palpation, no drainage or erythema   Mouth and Throat: poor dentition, no lesions seen, moist mucus membranes  Neck: no lymphadenopathy appreciated, No carotid bruits,   Respiratory: lungs clear to ascultation bilaterally, no accessory use of muscles   Cardiovascular: regular rate with regular rhythm, no murmurs  Gastrointestinal: soft, suprapubic tenderness on palpation, nondistended, no rebound or guarding, normoactive bowel sounds.   Extremities: distal pulses 2+, no edema noted, normal ROM, right sided weakness   Neuro:  full strength in L extremities, weakness in R UE, no sensory deficits.   Skin: no lesions, rashes, or breakdown.       Laboratory:    Trended Lab Data:  Recent Labs   Lab 18  0955 18  0550   WBC 10.34 6.74   HGB 8.5* 7.3*   HCT " 28.7* 24.2*    230   MCV 94 93   RDW 13.8 13.9    143   K 4.0 3.7   * 113*   CO2 20* 21*   BUN 28* 24*   CREATININE 1.4 1.3   * 103   PROT 6.4 5.9*   ALBUMIN 2.8* 2.7*   BILITOT 0.4 0.4   AST 10 10   ALKPHOS 70 66   ALT 8* 5*     Microbiology Data:  None    Other Laboratory Data:  None    Other Results:  None    Radiology:  Imaging Results    None          Assessment:     Elina Mei is a 78 y.o. female with:  Patient Active Problem List    Diagnosis Date Noted    Lower GI bleed 11/06/2018    CKD (chronic kidney disease), stage III 11/06/2018    Rectal bleeding 11/06/2018    Chronic otitis media 07/24/2018    Aneurysm, carotid artery, internal 05/03/2018    Embolic stroke involving left middle cerebral artery 04/26/2018    Acute right-sided weakness 04/26/2018    At risk for lymphedema 01/16/2018    Malignant neoplasm of upper-outer quadrant of right breast in female, estrogen receptor positive 12/05/2017    Chest pain syndrome 12/23/2013     Class: Acute    Anemia 12/23/2013     Class: Chronic    HTN (hypertension) 04/01/2013    DM (diabetes mellitus) 04/01/2013    Glaucoma (increased eye pressure) 04/01/2013        Plan:     1. Lower GI Bleed  - Hematochezia 2/2 suspected diverticular bleed, no Hx of GIB in the past, History of breast cancer and unknown uterine tumor per pt. No recent NSAID use, rectal exam positive for chidi blood; FOBT +  - Hg 8.5; with goal of >7  vitals: -145/50-60s; HR 70s on admission  - on asprin and plavix for Hx of recent stroke in April 208 which increased risk of bleeding  - will hold antihypertensives, antiplatelets, anticoagulants, and DVT chemoprophylaxis in setting of bleed  - 2 large bore IVs, type and cross  - PPI daily  - GI consult placed, poss colonoscopy on Thursday  - H/H dropped to 7.3 but no further episodes of bleeding, cont to monitor    2. Elevated lipase  - Lipase 100, could be 2/2 diabetes per uptodate, no EtOH Hx  -  Considering URQ U/S Pending    3.T2DM  - Well controlled, A1C 6.8  - Currently decreased home Lantus 2/2 current diet, 10 units nightly  - Continue 40mg Lasix daily  - PRN 1mg glucagon for sugars <70 mg/dL  - PRN Insulin 0-5 units for sugars >150     4. Hx Stroke  - Hx of Stroke April 2018  -Hold dual antiplatelet therapy in setting GI bleed  - Continue Atorvastatin  - consult PT/OT    5. HTN  -holding home medications     6. Stage III CKD  - GFR 41, Cr 1.4  - Renally dose all medications      HM  - Influenza vaccine     PPX- None in setting of bleed  Diet - Adult clear liquids  Dispo- possible colonoscopy on Thursday, monitor H/H daily      Code Status:     Full CODE    Brandynann Matthew  [facility] Internal Medicine HO-II  [facility] Team B Internal Medicine Service    [facility] Medicine Hospitalist Pager numbers:   [facility] Hospitalist Medicine Team A (Mahad/Andrey): [phone]  [facility] Hospitalist Medicine Team B (Kristie/Brendon):  [phone]

## 2018-11-08 ENCOUNTER — TELEPHONE (OUTPATIENT)
Dept: NEUROLOGY | Facility: HOSPITAL | Age: 78
End: 2018-11-08

## 2018-11-08 ENCOUNTER — ANESTHESIA (OUTPATIENT)
Dept: ENDOSCOPY | Facility: HOSPITAL | Age: 78
End: 2018-11-08
Payer: MEDICARE

## 2018-11-08 VITALS
RESPIRATION RATE: 16 BRPM | HEIGHT: 68 IN | BODY MASS INDEX: 34.52 KG/M2 | TEMPERATURE: 99 F | SYSTOLIC BLOOD PRESSURE: 146 MMHG | OXYGEN SATURATION: 96 % | WEIGHT: 227.75 LBS | HEART RATE: 71 BPM | DIASTOLIC BLOOD PRESSURE: 63 MMHG

## 2018-11-08 LAB
ALBUMIN SERPL BCP-MCNC: 2.8 G/DL
ALP SERPL-CCNC: 68 U/L
ALT SERPL W/O P-5'-P-CCNC: 8 U/L
ANION GAP SERPL CALC-SCNC: 9 MMOL/L
AST SERPL-CCNC: 14 U/L
BASOPHILS # BLD AUTO: 0.03 K/UL
BASOPHILS NFR BLD: 0.5 %
BILIRUB SERPL-MCNC: 0.5 MG/DL
BUN SERPL-MCNC: 16 MG/DL
CALCIUM SERPL-MCNC: 8.5 MG/DL
CHLORIDE SERPL-SCNC: 111 MMOL/L
CO2 SERPL-SCNC: 24 MMOL/L
CREAT SERPL-MCNC: 1.2 MG/DL
DIFFERENTIAL METHOD: ABNORMAL
EOSINOPHIL # BLD AUTO: 0.2 K/UL
EOSINOPHIL NFR BLD: 2.7 %
ERYTHROCYTE [DISTWIDTH] IN BLOOD BY AUTOMATED COUNT: 13.5 %
EST. GFR  (AFRICAN AMERICAN): 49 ML/MIN/1.73 M^2
EST. GFR  (NON AFRICAN AMERICAN): 43 ML/MIN/1.73 M^2
GLUCOSE SERPL-MCNC: 128 MG/DL
HCT VFR BLD AUTO: 23.6 %
HGB BLD-MCNC: 7.2 G/DL
LYMPHOCYTES # BLD AUTO: 2.1 K/UL
LYMPHOCYTES NFR BLD: 32.6 %
MCH RBC QN AUTO: 28 PG
MCHC RBC AUTO-ENTMCNC: 30.5 G/DL
MCV RBC AUTO: 92 FL
MONOCYTES # BLD AUTO: 0.6 K/UL
MONOCYTES NFR BLD: 8.8 %
NEUTROPHILS # BLD AUTO: 3.5 K/UL
NEUTROPHILS NFR BLD: 55.2 %
PLATELET # BLD AUTO: 246 K/UL
PMV BLD AUTO: 10.1 FL
POCT GLUCOSE: 112 MG/DL (ref 70–110)
POCT GLUCOSE: 137 MG/DL (ref 70–110)
POTASSIUM SERPL-SCNC: 3.7 MMOL/L
PROT SERPL-MCNC: 6.3 G/DL
RBC # BLD AUTO: 2.57 M/UL
SODIUM SERPL-SCNC: 144 MMOL/L
WBC # BLD AUTO: 6.38 K/UL

## 2018-11-08 PROCEDURE — 88342 IMHCHEM/IMCYTCHM 1ST ANTB: CPT | Performed by: PATHOLOGY

## 2018-11-08 PROCEDURE — G0378 HOSPITAL OBSERVATION PER HR: HCPCS

## 2018-11-08 PROCEDURE — 25000003 PHARM REV CODE 250: Performed by: NURSE ANESTHETIST, CERTIFIED REGISTERED

## 2018-11-08 PROCEDURE — 27201012 HC FORCEPS, HOT/COLD, DISP: Performed by: INTERNAL MEDICINE

## 2018-11-08 PROCEDURE — 25000003 PHARM REV CODE 250: Performed by: STUDENT IN AN ORGANIZED HEALTH CARE EDUCATION/TRAINING PROGRAM

## 2018-11-08 PROCEDURE — 63600175 PHARM REV CODE 636 W HCPCS: Performed by: NURSE ANESTHETIST, CERTIFIED REGISTERED

## 2018-11-08 PROCEDURE — 80053 COMPREHEN METABOLIC PANEL: CPT

## 2018-11-08 PROCEDURE — 37000009 HC ANESTHESIA EA ADD 15 MINS: Performed by: INTERNAL MEDICINE

## 2018-11-08 PROCEDURE — 37000008 HC ANESTHESIA 1ST 15 MINUTES: Performed by: INTERNAL MEDICINE

## 2018-11-08 PROCEDURE — 45378 DIAGNOSTIC COLONOSCOPY: CPT | Performed by: INTERNAL MEDICINE

## 2018-11-08 PROCEDURE — C9113 INJ PANTOPRAZOLE SODIUM, VIA: HCPCS | Performed by: STUDENT IN AN ORGANIZED HEALTH CARE EDUCATION/TRAINING PROGRAM

## 2018-11-08 PROCEDURE — 88305 TISSUE EXAM BY PATHOLOGIST: CPT | Performed by: PATHOLOGY

## 2018-11-08 PROCEDURE — 00813 ANES UPR LWR GI NDSC PX: CPT | Performed by: INTERNAL MEDICINE

## 2018-11-08 PROCEDURE — 63600175 PHARM REV CODE 636 W HCPCS: Performed by: STUDENT IN AN ORGANIZED HEALTH CARE EDUCATION/TRAINING PROGRAM

## 2018-11-08 PROCEDURE — 85025 COMPLETE CBC W/AUTO DIFF WBC: CPT

## 2018-11-08 PROCEDURE — 88342 IMHCHEM/IMCYTCHM 1ST ANTB: CPT | Mod: 26,,, | Performed by: PATHOLOGY

## 2018-11-08 PROCEDURE — 36415 COLL VENOUS BLD VENIPUNCTURE: CPT

## 2018-11-08 PROCEDURE — 88305 TISSUE EXAM BY PATHOLOGIST: CPT | Mod: 26,,, | Performed by: PATHOLOGY

## 2018-11-08 PROCEDURE — 43239 EGD BIOPSY SINGLE/MULTIPLE: CPT | Performed by: INTERNAL MEDICINE

## 2018-11-08 RX ORDER — LIDOCAINE HCL/PF 100 MG/5ML
SYRINGE (ML) INTRAVENOUS
Status: DISCONTINUED | OUTPATIENT
Start: 2018-11-08 | End: 2018-11-08

## 2018-11-08 RX ORDER — DOCUSATE SODIUM 100 MG/1
100 CAPSULE, LIQUID FILLED ORAL 2 TIMES DAILY
Refills: 0 | COMMUNITY
Start: 2018-11-08

## 2018-11-08 RX ORDER — SODIUM CHLORIDE 9 MG/ML
INJECTION, SOLUTION INTRAVENOUS CONTINUOUS PRN
Status: DISCONTINUED | OUTPATIENT
Start: 2018-11-08 | End: 2018-11-08

## 2018-11-08 RX ORDER — FERROUS SULFATE 325(65) MG
325 TABLET ORAL 2 TIMES DAILY
Refills: 0 | Status: ON HOLD | COMMUNITY
Start: 2018-11-08 | End: 2020-01-01 | Stop reason: CLARIF

## 2018-11-08 RX ORDER — PROPOFOL 10 MG/ML
VIAL (ML) INTRAVENOUS CONTINUOUS PRN
Status: DISCONTINUED | OUTPATIENT
Start: 2018-11-08 | End: 2018-11-08

## 2018-11-08 RX ORDER — PROPOFOL 10 MG/ML
VIAL (ML) INTRAVENOUS
Status: DISCONTINUED | OUTPATIENT
Start: 2018-11-08 | End: 2018-11-08

## 2018-11-08 RX ORDER — PANTOPRAZOLE SODIUM 40 MG/1
40 TABLET, DELAYED RELEASE ORAL DAILY
Qty: 30 TABLET | Refills: 11 | Status: ON HOLD | OUTPATIENT
Start: 2018-11-08 | End: 2020-01-01 | Stop reason: CLARIF

## 2018-11-08 RX ADMIN — ATORVASTATIN CALCIUM 40 MG: 40 TABLET, FILM COATED ORAL at 05:11

## 2018-11-08 RX ADMIN — PROPOFOL 150 MCG/KG/MIN: 10 INJECTION, EMULSION INTRAVENOUS at 01:11

## 2018-11-08 RX ADMIN — SODIUM CHLORIDE: 0.9 INJECTION, SOLUTION INTRAVENOUS at 01:11

## 2018-11-08 RX ADMIN — PROPOFOL 70 MG: 10 INJECTION, EMULSION INTRAVENOUS at 01:11

## 2018-11-08 RX ADMIN — THERA TABS 1 TABLET: TAB at 05:11

## 2018-11-08 RX ADMIN — LIDOCAINE HYDROCHLORIDE 75 MG: 20 INJECTION, SOLUTION INTRAVENOUS at 01:11

## 2018-11-08 RX ADMIN — PANTOPRAZOLE SODIUM 40 MG: 40 INJECTION, POWDER, FOR SOLUTION INTRAVENOUS at 09:11

## 2018-11-08 NOTE — ANESTHESIA PREPROCEDURE EVALUATION
11/08/2018  Elina Mei is a 78 y.o., female admitted with BRBPR for EGD and colonoscopy under MAC    Patient Active Problem List   Diagnosis    HTN (hypertension)    DM (diabetes mellitus)    Glaucoma (increased eye pressure)    Chest pain syndrome    Anemia    Malignant neoplasm of upper-outer quadrant of right breast in female, estrogen receptor positive    At risk for lymphedema    Embolic stroke involving left middle cerebral artery    Acute right-sided weakness    Aneurysm, carotid artery, internal    Chronic otitis media    GI bleed    CKD (chronic kidney disease), stage III    Rectal bleeding       Anesthesia Evaluation    I have reviewed the Patient Summary Reports.    I have reviewed the Nursing Notes.   I have reviewed the Medications.     Review of Systems  Anesthesia Hx:  No problems with previous Anesthesia  History of prior surgery of interest to airway management or planning: Previous anesthesia: General Airway issues documented on chart review include difficult direct laryngoscopy    Cardiovascular:   Hypertension    Neurological:   CVA, residual symptoms Residual symptoms on right   Endocrine:   Diabetes, using insulin        Physical Exam  General:  Obesity    Airway/Jaw/Neck:  Airway Findings: Mouth Opening: Normal Tongue: Large  Mallampati: III  TM Distance: 4 - 6 cm      Dental:  Dental Findings:   Chest/Lungs:  Chest/Lungs Findings: Normal Respiratory Rate     Heart/Vascular:  Heart Findings: Rate: Normal        Mental Status:  Mental Status Findings:  Cooperative, Alert and Oriented       Lab Results   Component Value Date    WBC 6.38 11/08/2018    HGB 7.2 (L) 11/08/2018    HCT 23.6 (L) 11/08/2018     11/08/2018    CHOL 231 (H) 04/26/2018    TRIG 129 04/26/2018    HDL 45 04/26/2018    ALT 8 (L) 11/08/2018    AST 14 11/08/2018     11/08/2018    K 3.7 11/08/2018      (H) 11/08/2018    CREATININE 1.2 11/08/2018    BUN 16 11/08/2018    CO2 24 11/08/2018    TSH 1.426 04/26/2018    INR 0.9 11/06/2018    HGBA1C 6.8 (H) 11/06/2018       CONCLUSIONS     1 - Normal left ventricular systolic function (EF 60-65%).     2 - Indeterminate LV diastolic function.     3 - Normal right ventricular systolic function .     4 - The estimated PA systolic pressure is 39 mmHg.     5 - Trivial to mild tricuspid regurgitation.     6 - Concentric hypertrophy.     7 - No wall motion abnormalities.             This document has been electronically    SIGNED BY: Neeru Waller MD On: 04/26/2018 16:32    Anesthesia Plan  Type of Anesthesia, risks & benefits discussed:  Anesthesia Type:  MAC, general  Patient's Preference:   Intra-op Monitoring Plan: standard ASA monitors  Intra-op Monitoring Plan Comments:   Post Op Pain Control Plan: multimodal analgesia  Post Op Pain Control Plan Comments:   Induction:   IV  Beta Blocker:  Patient is not currently on a Beta-Blocker (No further documentation required).       Informed Consent: Patient understands risks and agrees with Anesthesia plan.  Questions answered. Anesthesia consent signed with patient.  ASA Score: 3     Day of Surgery Review of History & Physical:            Ready For Surgery From Anesthesia Perspective.

## 2018-11-08 NOTE — TELEPHONE ENCOUNTER
----- Message from Ahsan Enciso MD sent at 11/8/2018  2:11 PM CST -----  Please schedule this patient in clinic int he next month to discuss repeat colonoscopy with polyp removal

## 2018-11-08 NOTE — PLAN OF CARE
Pt returned from endoscopy. She is awake and oriented. Complaints of headache and hunger pains. She is on RA and on tele. Ambulated to the bathroom. VS documented. Will continue to monitor.

## 2018-11-08 NOTE — [employer] PATIENT INSTRUCTIONS
Discharge Summary/Instructions after an Endoscopic Procedure  Patient Name: Elina Mei  Patient MRN: [id number]  Patient YOB: 1940 Thursday, November 08, 2018  Ahsan Enciso MD  RESTRICTIONS:  During your procedure today, you received medications for sedation.  These   medications may affect your judgment, balance and coordination.  Therefore,   for 24 hours, you have the following restrictions:   - DO NOT drive a car, operate machinery, make legal/financial decisions,   sign important papers or drink alcohol.    ACTIVITY:  Today: no heavy lifting, straining or running due to procedural   sedation/anesthesia.  The following day: return to full activity including work.  DIET:  Eat and drink normally unless instructed otherwise.     TREATMENT FOR COMMON SIDE EFFECTS:  - Mild abdominal pain, nausea, belching, bloating or excessive gas:  rest,   eat lightly and use a heating pad.  - Sore Throat: treat with throat lozenges and/or gargle with warm salt   water.  - Because air was used during the procedure, expelling large amounts of air   from your rectum or belching is normal.  - If a bowel prep was taken, you may not have a bowel movement for 1-3 days.    This is normal.  SYMPTOMS TO WATCH FOR AND REPORT TO YOUR PHYSICIAN:  1. Abdominal pain or bloating, other than gas cramps.  2. Chest pain.  3. Back pain.  4. Signs of infection such as: chills or fever occurring within 24 hours   after the procedure.  5. Rectal bleeding, which would show as bright red, maroon, or black stools.   (A tablespoon of blood from the rectum is not serious, especially if   hemorrhoids are present.)  6. Vomiting.  7. Weakness or dizziness.  GO DIRECTLY TO THE NEAREST EMERGENCY ROOM IF YOU HAVE ANY OF THE FOLLOWING:      Difficulty breathing              Chills and/or fever over 101 F   Persistent vomiting and/or vomiting blood   Severe abdominal pain   Severe chest pain   Black, tarry stools   Bleeding- more than one tablespoon   Any  other symptom or condition that you feel may need urgent attention  Your doctor recommends these additional instructions:  If any biopsies were taken, your doctors clinic will contact you in 1 to 2   weeks with any results.  - Perform a colonoscopy today for further evaluation of rectal bleeding now   resolved.   - Daily PPI and avoid NSAIDS  - Discharge patient to home.  For questions, problems or results please call your physician - Ahsan Enciso MD at Work:  [phone].  EMERGENCY PHONE NUMBER: [phone],  LAB RESULTS: [phone]  IF A COMPLICATION OR EMERGENCY SITUATION ARISES AND YOU ARE UNABLE TO REACH   YOUR PHYSICIAN - GO DIRECTLY TO THE EMERGENCY ROOM.  MD Ahsan Valiente MD  11/8/2018 2:18:46 PM  This report has been verified and signed electronically.  [employer]

## 2018-11-08 NOTE — [employer] PATIENT INSTRUCTIONS
Discharge Summary/Instructions after an Endoscopic Procedure  Patient Name: Elina Mei  Patient MRN: [id number]  Patient YOB: 1940 Thursday, November 08, 2018  Ahsan Enciso MD  RESTRICTIONS:  During your procedure today, you received medications for sedation.  These   medications may affect your judgment, balance and coordination.  Therefore,   for 24 hours, you have the following restrictions:   - DO NOT drive a car, operate machinery, make legal/financial decisions,   sign important papers or drink alcohol.    ACTIVITY:  Today: no heavy lifting, straining or running due to procedural   sedation/anesthesia.  The following day: return to full activity including work.  DIET:  Eat and drink normally unless instructed otherwise.     TREATMENT FOR COMMON SIDE EFFECTS:  - Mild abdominal pain, nausea, belching, bloating or excessive gas:  rest,   eat lightly and use a heating pad.  - Sore Throat: treat with throat lozenges and/or gargle with warm salt   water.  - Because air was used during the procedure, expelling large amounts of air   from your rectum or belching is normal.  - If a bowel prep was taken, you may not have a bowel movement for 1-3 days.    This is normal.  SYMPTOMS TO WATCH FOR AND REPORT TO YOUR PHYSICIAN:  1. Abdominal pain or bloating, other than gas cramps.  2. Chest pain.  3. Back pain.  4. Signs of infection such as: chills or fever occurring within 24 hours   after the procedure.  5. Rectal bleeding, which would show as bright red, maroon, or black stools.   (A tablespoon of blood from the rectum is not serious, especially if   hemorrhoids are present.)  6. Vomiting.  7. Weakness or dizziness.  GO DIRECTLY TO THE NEAREST EMERGENCY ROOM IF YOU HAVE ANY OF THE FOLLOWING:      Difficulty breathing              Chills and/or fever over 101 F   Persistent vomiting and/or vomiting blood   Severe abdominal pain   Severe chest pain   Black, tarry stools   Bleeding- more than one tablespoon   Any  other symptom or condition that you feel may need urgent attention  Your doctor recommends these additional instructions:  If any biopsies were taken, your doctors clinic will contact you in 1 to 2   weeks with any results.  - [facility]   - She will need to follow up in GI clinic for discussion regarding repeat   colonoscopy for removal of polyps off of Plavix  - Ok to discharge from GI perspective on PPI therapy for gastritis.  For questions, problems or results please call your physician - Ahsan Enciso MD at Work:  [phone].  EMERGENCY PHONE NUMBER: [phone],  LAB RESULTS: [phone]  IF A COMPLICATION OR EMERGENCY SITUATION ARISES AND YOU ARE UNABLE TO REACH   YOUR PHYSICIAN - GO DIRECTLY TO THE EMERGENCY ROOM.  MD Ahsan Valiente MD  11/8/2018 2:20:03 PM  This report has been verified and signed electronically.  [employer]

## 2018-11-08 NOTE — ANESTHESIA POSTPROCEDURE EVALUATION
"Anesthesia Post Evaluation    Patient: Elina Mei    Procedure(s) Performed: Procedure(s) (LRB):  COLONOSCOPY (N/A)  EGD (ESOPHAGOGASTRODUODENOSCOPY) (N/A)    Final Anesthesia Type: MAC  Patient location during evaluation: GI PACU  Patient participation: Yes- Able to Participate  Level of consciousness: awake and alert  Post-procedure vital signs: reviewed and stable  Pain management: adequate  Airway patency: patent  PONV status at discharge: No PONV  Anesthetic complications: no      Cardiovascular status: blood pressure returned to baseline  Respiratory status: unassisted and spontaneous ventilation  Hydration status: euvolemic  Follow-up not needed.        Visit Vitals  BP (!) 198/88   Pulse 80   Temp 36.7 °C (98.1 °F) (Temporal)   Resp 20   Ht 5' 8" (1.727 m)   Wt 103.3 kg (227 lb 11.8 oz)   SpO2 98%   Breastfeeding? No   BMI 34.63 kg/m²       Pain/Ronny Score: Pain Assessment Performed: Yes (11/8/2018 11:00 AM)  Presence of Pain: denies (11/8/2018 11:00 AM)        "

## 2018-11-08 NOTE — NURSING
Received patient upon rounds at 1915H, patient seen sitting at the edge of the bed, with family around. Conscious , coherent to time, date, place, person and situation. GCS 15. No subjective complaint of any pain. With left arm saline lock gauge 22, flushed patent with clean and dry dressing. Advised patient to call for any assistance.With right limb alert, will insert second IV line, Will start Golytely at 2000H, patient for colonoscopy tomorrow, advised to have clear liquid diet and NPO post midnight. MD informed to secure Blood consent. Will see patient. Telemetry Normal sinus rhythm, Oxygen saturation 94-95% in room air   CAll bell within reach. Bed alarm ON. Safety fall precaution maintained.  Will continue to monitor patient.

## 2018-11-08 NOTE — DISCHARGE SUMMARY
[facility] Hospital Medicine Discharge Summary    Primary Team: [facility] Hospitalist Team B  Attending Physician: Triston Olivas MD  Resident: Vipul  Intern: Karla    Date of Admit: 11/6/2018  Date of Discharge: 11/8/2018    Discharge to: Home  Condition: Improved    Interval events:  This morning, pt was complaining that she was up all night passing BMs due to her bowel prep. Denied any bleeding overnight, denies abd pain, CP, N/V, lightheadedness, dizziness on standing, fatigue.     Physical exam:  GEN-AOx3, NAD, laying comfortably in bed  HEENT-EOMI, MMM, throat without erythema or exudates  NECK-no thyromegaly or other masses  HEART-RRR, no murmurs or rubs  RESP-CTAB, normal work of breathing, no wheezes, crackles, or rhonchi  ABD-soft and protuberant, NT, ND, +BS  EXT-no clubbing, cyanosis, or edema; 2+ DP/PT pulses  NEURO- moves all four extremities equally  SKIN-warm and dry; no rashes    Discharge Diagnoses     Patient Active Problem List   Diagnosis    HTN (hypertension)    DM (diabetes mellitus)    Glaucoma (increased eye pressure)    Chest pain syndrome    Anemia    Malignant neoplasm of upper-outer quadrant of right breast in female, estrogen receptor positive    At risk for lymphedema    Embolic stroke involving left middle cerebral artery    Acute right-sided weakness    Aneurysm, carotid artery, internal    Chronic otitis media    GI bleed    CKD (chronic kidney disease), stage III    Rectal bleeding       Consultants and Procedures     Consultants:  GI    Procedures:   EGD, colonoscopy    Imaging:  None    Brief History of Present Illness      Elina Mei is a 78 y.o. female who  has a past medical history of Acute right-sided weakness (4/26/2018), Arthritis, Diabetes mellitus, Hypertension, Malignant neoplasm of upper-outer quadrant of right breast in female, estrogen receptor positive (12/5/2017), Status post mastectomy, right (2018), and Stroke (4/26/2018).. The patient presented to  [facility] on 11/6/2018 with a primary complaint of Rectal Bleeding (Started last night with bright red blood. Complaining of generalized weakness. Pt is on blood thinners. )     The patient was in their usual state of health until morning of presentation when she went to to take her insulin. She sat on the commode and had a BM and noticed BRB on the tissue when she wiped. The stool in the toilet was mixed with blood. The pt was unable to quantify the amount. This has never happened before. The patient reports having a colonoscopy 40 years ago when she had a hysterectomy 2/2 to a tumor on her uterus. She states she had 4 surgeries at that time but was unable to describe. The procedure was done at [facility]. Of note, the patient was recently diagnosed with breast cancer and underwent a double mastectomy in March of 2018, she then subsequently had a stroke in April and has some residual right sided weakness and is being treated with Asprin and Plavix. Patient denies fever/chills, pain, dizziness, weakness, SOB, or chest pain.     Hospital Course By Problem with Pertinent Findings     Lower GI Bleed  - Hematochezia 2/2 suspected diverticular bleed, no Hx of GIB in the past, History of breast cancer and unknown uterine tumor per pt. No recent NSAID use, rectal exam positive for chidi blood; FOBT +  - Hg 8.5; with goal of >7  vitals: -145/50-60s; HR 70s on admission  - on asprin and plavix for Hx of recent stroke in April 208 which increased risk of bleeding  - will initially hold antihypertensives, antiplatelets, anticoagulants, and DVT chemoprophylaxis in setting of bleed > restart on discharge  - 2 large bore IVs, type and cross  - PPI daily > continue upon discharge  - GI consulted: EDG, colonoscopy done 11/8 with no active bleeds (EGD with erosive gastropathy which was biopsied; colonoscopy showed 2 polyps which were not removed at this time; pt ok to restart asa/plavix and resume  diet, will need GI clinic f/u)  - H/H dropped to 7s but has remained stable throughout remainder of admission     Elevated lipase  - Lipase 100, could be 2/2 diabetes per uptodate, no EtOH Hx     T2DM  - Well controlled, A1C 6.8  - Currently decreased home Lantus 2/2 current diet, 10 units nightly  - SSI while inpatient > resume home meds upon discharge     Hx Stroke  - Hx of Stroke April 2018  - Held dual antiplatelet therapy in setting GI bleed >resume on discharge  - Continue Atorvastatin  - consult PT/OT > seen and signed off as no further needs     HTN  -held home medications initially > restart upon discharge     Stage III CKD  - GFR 41, Cr 1.4  - Renally dose all medications     HM  - Influenza vaccine given while inpatient     Discharge Medications      Elina Mei   Home Medication Instructions BHUMIKA:[id number]    Printed on:11/08/18 0881   Medication Information                      amlodipine (NORVASC) 10 MG tablet  Take 10 mg by mouth once daily.             aspirin (ECOTRIN) 81 MG EC tablet  Take 1 tablet (81 mg total) by mouth once daily.             atorvastatin (LIPITOR) 40 MG tablet  Take 1 tablet (40 mg total) by mouth once daily.             brimonidine 0.1% (ALPHAGAN P) 0.1 % Drop  Alphagan P 0.1 % eye drops             clopidogrel (PLAVIX) 75 mg tablet  Take 1 tablet (75 mg total) by mouth once daily.             docusate sodium (COLACE) 100 MG capsule  Take 1 capsule (100 mg total) by mouth 2 (two) times daily.             dorzolamide-timolol 2-0.5% (COSOPT) 22.3-6.8 mg/mL ophthalmic solution  Place 1 drop into both eyes 2 (two) times daily.             ferrous sulfate (FEOSOL) 325 mg (65 mg iron) Tab tablet  Take 1 tablet (325 mg total) by mouth 2 (two) times daily. Every other day.             fluticasone (FLONASE) 50 mcg/actuation nasal spray  1 spray by Each Nare route 2 (two) times daily as needed (sinus congestion).             folic acid/multivit-min/lutein (CENTRUM SILVER ORAL)  Take 1  Dose by mouth once daily.             furosemide (LASIX) 40 MG tablet  Take 40 mg by mouth once daily.              LANTUS SOLOSTAR 100 unit/mL (3 mL) InPn pen  INJECT 35 UNIT(S) EVERY EVENING BY SUBCUTANEOUS ROUTE.             losartan (COZAAR) 50 MG tablet  Take 50 mg by mouth once daily.             metformin (GLUCOPHAGE) 500 MG tablet  Take 500 mg by mouth 2 (two) times daily with meals.             nitroGLYCERIN (NITROSTAT) 0.4 MG SL tablet  Place 1 tablet (0.4 mg total) under the tongue every 5 (five) minutes as needed for Chest pain.             ondansetron (ZOFRAN-ODT) 8 MG TbDL  Take 1 tablet (8 mg total) by mouth every 8 (eight) hours as needed.             pantoprazole (PROTONIX) 40 MG tablet  Take 1 tablet (40 mg total) by mouth once daily.             potassium chloride (KLOR-CON) 10 MEQ TbSR  Take 10 mEq by mouth once daily.                 Discharge Information:   Diet:  Diabetic    Physical Activity:  As tolerated             Instructions:  1. Take all medications as prescribed  2. Keep all follow-up appointments  3. Return to the hospital or call your primary care physicians if any worsening symptoms such as fever, chest pain, shortness of breath, return of symptoms, or any other concerns.    Follow-Up Appointments:  F/u with GI on 12/5/18  F/u with PCP in 1-2 weeks    India Alvarado MD  [facility] Internal Medicine, -

## 2018-11-08 NOTE — PLAN OF CARE
Future Appointments   Date Time Provider Department Center   12/5/2018  9:00 AM Ahsan Enciso MD [facility] TUMOR [facility]        11/08/18 1615   Final Note   Anticipated Discharge Disposition Home   Hospital Follow Up  Appt(s) scheduled? Yes   Discharge plans and expectations educations in teach back method with documentation complete? Yes   Right Care Referral Info   Post Acute Recommendation No Care     Gay Almazan, RN, CCM, CMSRN  RN Transition Navigator  [phone]

## 2018-11-08 NOTE — TRANSFER OF CARE
"Anesthesia Transfer of Care Note    Patient: Elina Mei    Procedure(s) Performed: Procedure(s) (LRB):  COLONOSCOPY (N/A)  EGD (ESOPHAGOGASTRODUODENOSCOPY) (N/A)    Patient location: GI    Anesthesia Type: MAC    Transport from OR: Transported from OR on 6-10 L/min O2 by face mask with adequate spontaneous ventilation    Post pain: adequate analgesia    Post assessment: no apparent anesthetic complications    Post vital signs: stable    Level of consciousness: awake    Nausea/Vomiting: no nausea/vomiting    Complications: none    Transfer of care protocol was followed      Last vitals:   Visit Vitals  BP (!) 198/88   Pulse 80   Temp 36.7 °C (98.1 °F) (Temporal)   Resp 20   Ht 5' 8" (1.727 m)   Wt 103.3 kg (227 lb 11.8 oz)   SpO2 98%   Breastfeeding? No   BMI 34.63 kg/m²     "

## 2018-11-08 NOTE — TELEPHONE ENCOUNTER
Clinic appt scheduled with Rochelle, daughter, on Wednesday, December 5, 2018 at 9am.  Rochelle repeated date and time correctly.

## 2018-11-08 NOTE — PLAN OF CARE
Problem: Patient Care Overview  Goal: Plan of Care Review  Outcome: Ongoing (interventions implemented as appropriate)  Patient stable VS over the night, afebrile. No episodes of nausea and vomiting over the night. Stool almost near to clear liquid output. No signs of active bleeding over the night. NO subjective complaint of any pain. Passed 9 times of stool over the night. Golytely 200 cc left.  Free from fall. Will endorse patient to day shift Nurse.

## 2018-11-08 NOTE — PLAN OF CARE
Informed Dr. Alvarado concerning of pt complaints of having headache from hunger pains. MD is aware. No orders at this time. Will continue to monitor.

## 2018-11-09 NOTE — PLAN OF CARE
Pt discharge to home. Pt is stable. Discharge teaching discussed with pt. Discussed about the new medications and its side effects and upcoming appts. Verbalized clear understanding. IV removed and exhibit no signs of active bleeding. No complaints of discomfort or pain. No respiratory distress noted. Telemetry box removed. Waiting for transport.

## 2018-11-27 RX ORDER — OMEPRAZOLE 40 MG/1
40 CAPSULE, DELAYED RELEASE ORAL
Qty: 28 CAPSULE | Refills: 0 | Status: ON HOLD | OUTPATIENT
Start: 2018-11-27 | End: 2020-01-01 | Stop reason: CLARIF

## 2018-11-27 RX ORDER — BISMUTH SUBCITRATE POTASSIUM, METRONIDAZOLE AND TETRACYCLINE HYDROCHLORIDE 140; 125; 125 MG/1; MG/1; MG/1
3 CAPSULE ORAL
Qty: 120 CAPSULE | Refills: 0 | Status: SHIPPED | OUTPATIENT
Start: 2018-11-27 | End: 2018-12-07

## 2018-11-28 ENCOUNTER — TELEPHONE (OUTPATIENT)
Dept: NEUROLOGY | Facility: HOSPITAL | Age: 78
End: 2018-11-28

## 2018-11-28 NOTE — TELEPHONE ENCOUNTER
April, [facility], to notify Dr. Enciso, Omeprazole order received, pt on Plavix which is contraindication for use with omeprazole.  April also noted order written for 14 days.  April notified information given to be forwarded to Dr. Enciso.

## 2018-11-28 NOTE — PROGRESS NOTES
H pylori positive. Pylera and omeprazole BID sent to pharmacy. Provided clinic number for questions.

## 2018-12-10 ENCOUNTER — OFFICE VISIT (OUTPATIENT)
Dept: NEUROLOGY | Facility: HOSPITAL | Age: 78
End: 2018-12-10
Attending: INTERNAL MEDICINE
Payer: MEDICARE

## 2018-12-10 VITALS
BODY MASS INDEX: 43.63 KG/M2 | TEMPERATURE: 100 F | SYSTOLIC BLOOD PRESSURE: 145 MMHG | DIASTOLIC BLOOD PRESSURE: 69 MMHG | HEIGHT: 60 IN | HEART RATE: 78 BPM | WEIGHT: 222.25 LBS

## 2018-12-10 DIAGNOSIS — K63.5 POLYP OF COLON, UNSPECIFIED PART OF COLON, UNSPECIFIED TYPE: Primary | ICD-10-CM

## 2018-12-10 PROCEDURE — 99215 OFFICE O/P EST HI 40 MIN: CPT | Performed by: INTERNAL MEDICINE

## 2018-12-10 RX ORDER — FUROSEMIDE 40 MG/1
TABLET ORAL
COMMUNITY
End: 2019-01-01 | Stop reason: SDUPTHER

## 2018-12-10 RX ORDER — BLOOD SUGAR DIAGNOSTIC
STRIP MISCELLANEOUS
Refills: 0 | COMMUNITY
Start: 2018-09-26

## 2018-12-10 RX ORDER — BLOOD SUGAR DIAGNOSTIC
STRIP MISCELLANEOUS
COMMUNITY

## 2018-12-10 RX ORDER — DEXTROSE 4 G
TABLET,CHEWABLE ORAL
COMMUNITY

## 2018-12-10 RX ORDER — POLYETHYLENE GLYCOL 3350, SODIUM SULFATE ANHYDROUS, SODIUM BICARBONATE, SODIUM CHLORIDE, POTASSIUM CHLORIDE 236; 22.74; 6.74; 5.86; 2.97 G/4L; G/4L; G/4L; G/4L; G/4L
4 POWDER, FOR SOLUTION ORAL ONCE
Qty: 4000 ML | Refills: 0 | Status: SHIPPED | OUTPATIENT
Start: 2018-12-10 | End: 2018-12-10

## 2018-12-10 RX ORDER — DORZOLAMIDE HYDROCHLORIDE AND TIMOLOL MALEATE 20; 5 MG/ML; MG/ML
SOLUTION/ DROPS OPHTHALMIC
COMMUNITY

## 2018-12-10 NOTE — PROGRESS NOTES
[facility]    CC: polyps    HPI: 77 y/o female with CVA (4/2018, currently on DAPT) invasive ductal carcinoma of the breast s/p R mastectomy who presents for follow up of two moderate sized colon polyps that were found on colonoscopy in November after an episode of rectal bleeding. She was on plavix at the time and therefore polypectomy was not performed. She presents to discuss repeat colonoscopy with polypectomy while off plavix. She also had EGD prior to colonoscopy, had h.pylori related gastritis and completed quadruple therapy she states.      Past Medical History:   Diagnosis Date    Acute right-sided weakness 4/26/2018    Arthritis     Diabetes mellitus     Hypertension     Malignant neoplasm of upper-outer quadrant of right breast in female, estrogen receptor positive 12/5/2017    Status post mastectomy, right 2018    Stroke 4/26/2018     Review of Systems  General ROS: negative for chills, fever or weight loss  Respiratory ROS: no cough, shortness of breath, or wheezing  Cardiovascular ROS: no chest pain or dyspnea on exertion      Physical Examination  BP (!) 145/69   Pulse 78   Temp 99.5 °F (37.5 °C) (Oral)   Ht 5' (1.524 m)   Wt 100.8 kg (222 lb 3.6 oz)   BMI 43.40 kg/m²   General appearance: alert, cooperative, no distress  HENT: Normocephalic, atraumatic, neck symmetrical, no nasal discharge   Eyes: conjunctivae/corneas clear, PERRL, EOM's intact  Lungs: clear to auscultation bilaterally, no dullness to percussion bilaterally  Heart: regular rate and rhythm without rub; no displacement of the PMI   Abdomen: soft, non-tender; bowel sounds normoactive; no organomegaly  Extremities: extremities symmetric; no clubbing, cyanosis, or edema    Labs:  Lab Results   Component Value Date    WBC 6.38 11/08/2018    HGB 7.2 (L) 11/08/2018    HCT 23.6 (L) 11/08/2018    MCV 92 11/08/2018     11/08/2018     Imaging:  CXR: Cardiomegaly.  No significant airspace consolidation or pleural  effusion identified    Assessment:   79 y/o female with history of History of two colon polyps, one 7 mm AC and one 10 mm sigmoid colon polyp. She is here to schedule colonoscopy     Plan:   -Repeat colonoscopy with polypectomy while off Plavix; shedule January 7th, 2018  -Hold plavix for 5 days prior to procedure, last dose on Wednesday January 2  -She should continue aspirin those days she is holding plavix  -Plan to cold snare 7 mm ascending colon polyp  -Plan to hot snare 10mm sigmoid colon polyp and place clip on stalk; if performed patient would likely be able to go back on her plavix immediately post-procedure  -Hold iron supplementation for one week prior to procedure  -Miralax 17 g PO daily for treatment of constipation    Ahsan Enciso MD   [address], [address]   [place] [address] [phone]

## 2018-12-10 NOTE — PATIENT INSTRUCTIONS
Hold Plavix and oral iron 5 days prior to procedure    Nulytely Colonoscopy Prep Instructions    [facility] [place]   [address], [place] [address]  [phone]      PATIENT NAME:   Elina Mei                PROCEDURE DAY:  Monday, January 7, 2019    *Your procedure time many change.  The hospital will contact you the day prior to   the procedure to confirm your procedure time and arrival.       CLEAR LIQUID DIET (START THE DAY BEFORE PROCEDURE): Sunday, January 6, 2019      Clear Liquid Diet means any liquid from the list below that is not red or purple in color:   Gatorade, Olayinka-Aid, Lemonade (Yellow ONLY)-Gatorade is the preferred liquid   Tea (no milk or dairy)   Carbonated beverages (soft drink), regular or diet   Apple juice, white grape juice, white cranberry juice   Jell-O (orange, lemon, or lime flavors ONLY)   Clear, fat-free, beef or chicken broth   Bouillon, clear consommé   Snowball, Popsicles (NOT red or purple)  * No Solid Food or Alcohol     ITEMS TO BE PURCHASED FOR PREP (Nu-Lytely requires a prescription):         Nu-Lytely preparation solution.          Gas tablets (Gas-X, Mylanta Gas, Simethicone, dulcolax)    BOWEL PREP INSTRUCTIONS THE DAY BEFORE THE EXAM: Sunday, January 6. 2019  1. Drink only clear liquids (see the above diet) all day. Gatorade is the best liquid.   Drink an extra 8 ounces of clear liquid every hour from 11am to 5pm.         2.   At 6pm, mix Nu-Lytely powder according to the directions on the container and               drink 8 ounces of solution every 10 minutes until about half of the solution is                consumed. Place the remainder of the solution in the refrigerator.         3.   At 9pm, take two gas tablets with 8 ounces of clear liquid.        4.   At 10pm, take two gas tablets with 8 ounces of clear liquid.      THE DAY OF THE EXAM: Monday, January 7, 2019        1.  Beginning 5 hours before your procedure time, drink the  remaining half of              Nu-Lytely solution. Drink 8 ounces of solution every 10 minutes until the solution              is gone.              *If your procedure is scheduled for the early morning, you will need to get up in               the middle of the night to take this dose of preparation. The correct timing of this               dose is essential to an effective preparation. If you do not take this dose, your               exam may be incomplete and need to be repeated.         2.  Have nothing to eat or drink for 3 hours before the procedure.         3.  Take your morning medications, if any, with a small sip of water.         4.  Bring someone to drive you home (you should not drive for 12 hrs after the exam)        5.  Report to Admitting, 1st floor hospital entrance 2 hours before procedure time.       If you are diabetic, do not take insulin or oral medications the morning of the procedure. Take only a half dose of insulin the day before your procedure. Do not take your diabetic pills the day of your procedure               Colonoscopy is used to view the inside of your lower digestive tract (colon and rectum). It can help screen for colon cancer and can also help find the source of abdominal pain, bleeding, and changes in bowel habits. The test is usually done in the hospital on an outpatient basis. During the exam, the doctor can remove a small tissue sample ( a biopsy) for testing. Small growths, such as polyps, may also be removed during colonoscopy.     A camera attached to a flexible tube with a viewing lens is used to take video pictures.    Getting Ready    Be sure to tell your doctor about any medications you take. Also tell your doctor about any health conditions you may have.   Discuss the risks of the test with your doctor. These include bleeding and bowel puncture.   Your rectum and colon must be empty for the test. So be sure to follow the diet and bowel prep instructions exactly.  If you dont, the test may need to be rescheduled.   You will need to have a friend or family member prepared to drive you home after the test.     Colonoscopy provides an inside view of the entire colon.    During the Test    You are given sedating (relaxing) medication through an IV line. You may be drowsy or completely asleep.   The procedure takes 30 minutes or longer.   The doctor performs a digital rectal exam to check for anal and rectal problems. The rectum is lubricated and the scope inserted.   If you are awake, you may have a feeling similar to needing to have a bowel movement. You may also feel pressure as air is pumped into the colon. Its okay to pass gas during the procedure.  After the Test    You may discuss the results with your doctor right away or at a future visit.   Try to pass all the gas right after the test to help prevent bloating and cramping.   After the test, you can go back to your normal eating and other activities.  Risks and Possible Complications Include:   Bleeding  A puncture or tear in the colon  Risks of anesthesia

## 2018-12-20 ENCOUNTER — HOME CARE VISIT (OUTPATIENT)
Dept: NEUROLOGY | Facility: HOSPITAL | Age: 78
End: 2018-12-20

## 2018-12-20 VITALS
HEART RATE: 78 BPM | RESPIRATION RATE: 20 BRPM | BODY MASS INDEX: 43.16 KG/M2 | DIASTOLIC BLOOD PRESSURE: 80 MMHG | WEIGHT: 221 LBS | SYSTOLIC BLOOD PRESSURE: 150 MMHG | OXYGEN SATURATION: 98 %

## 2019-01-01 ENCOUNTER — HOME CARE VISIT (OUTPATIENT)
Dept: NEUROLOGY | Facility: HOSPITAL | Age: 79
End: 2019-01-01

## 2019-01-01 ENCOUNTER — TELEPHONE (OUTPATIENT)
Dept: HEMATOLOGY/ONCOLOGY | Facility: CLINIC | Age: 79
End: 2019-01-01

## 2019-01-01 ENCOUNTER — LAB VISIT (OUTPATIENT)
Dept: LAB | Facility: HOSPITAL | Age: 79
End: 2019-01-01
Payer: MEDICARE

## 2019-01-01 ENCOUNTER — OFFICE VISIT (OUTPATIENT)
Dept: HEMATOLOGY/ONCOLOGY | Facility: CLINIC | Age: 79
End: 2019-01-01
Payer: MEDICARE

## 2019-01-01 ENCOUNTER — HOSPITAL ENCOUNTER (OUTPATIENT)
Dept: RADIOLOGY | Facility: HOSPITAL | Age: 79
Discharge: HOME | End: 2019-05-27
Attending: NURSE PRACTITIONER
Payer: MEDICARE

## 2019-01-01 ENCOUNTER — HOSPITAL ENCOUNTER (EMERGENCY)
Facility: HOSPITAL | Age: 79
Discharge: HOME | End: 2019-10-10
Attending: EMERGENCY MEDICINE
Payer: MEDICARE

## 2019-01-01 ENCOUNTER — LAB VISIT (OUTPATIENT)
Dept: LAB | Facility: HOSPITAL | Age: 79
End: 2019-01-01
Attending: NURSE PRACTITIONER
Payer: MEDICARE

## 2019-01-01 VITALS
OXYGEN SATURATION: 98 % | BODY MASS INDEX: 33.51 KG/M2 | TEMPERATURE: 98 F | WEIGHT: 221.13 LBS | SYSTOLIC BLOOD PRESSURE: 166 MMHG | DIASTOLIC BLOOD PRESSURE: 96 MMHG | RESPIRATION RATE: 18 BRPM | HEART RATE: 76 BPM | HEIGHT: 68 IN

## 2019-01-01 VITALS
HEART RATE: 78 BPM | RESPIRATION RATE: 20 BRPM | SYSTOLIC BLOOD PRESSURE: 138 MMHG | BODY MASS INDEX: 33.75 KG/M2 | WEIGHT: 222 LBS | DIASTOLIC BLOOD PRESSURE: 80 MMHG | OXYGEN SATURATION: 97 %

## 2019-01-01 VITALS
WEIGHT: 219 LBS | RESPIRATION RATE: 20 BRPM | DIASTOLIC BLOOD PRESSURE: 80 MMHG | HEART RATE: 74 BPM | OXYGEN SATURATION: 97 % | SYSTOLIC BLOOD PRESSURE: 138 MMHG | BODY MASS INDEX: 33.3 KG/M2

## 2019-01-01 VITALS
HEIGHT: 68 IN | RESPIRATION RATE: 16 BRPM | OXYGEN SATURATION: 95 % | TEMPERATURE: 98 F | WEIGHT: 220 LBS | BODY MASS INDEX: 33.34 KG/M2 | SYSTOLIC BLOOD PRESSURE: 172 MMHG | DIASTOLIC BLOOD PRESSURE: 80 MMHG | HEART RATE: 73 BPM

## 2019-01-01 VITALS
HEART RATE: 75 BPM | RESPIRATION RATE: 16 BRPM | SYSTOLIC BLOOD PRESSURE: 180 MMHG | OXYGEN SATURATION: 99 % | BODY MASS INDEX: 33.98 KG/M2 | TEMPERATURE: 98 F | WEIGHT: 224.19 LBS | HEIGHT: 68 IN | DIASTOLIC BLOOD PRESSURE: 80 MMHG

## 2019-01-01 VITALS — HEIGHT: 68 IN | BODY MASS INDEX: 33.95 KG/M2 | WEIGHT: 224 LBS

## 2019-01-01 DIAGNOSIS — C50.411 MALIGNANT NEOPLASM OF UPPER-OUTER QUADRANT OF RIGHT BREAST IN FEMALE, ESTROGEN RECEPTOR POSITIVE: ICD-10-CM

## 2019-01-01 DIAGNOSIS — I10 ESSENTIAL HYPERTENSION: ICD-10-CM

## 2019-01-01 DIAGNOSIS — N18.30 ANEMIA DUE TO STAGE 3 CHRONIC KIDNEY DISEASE: ICD-10-CM

## 2019-01-01 DIAGNOSIS — R53.83 FATIGUE, UNSPECIFIED TYPE: Primary | ICD-10-CM

## 2019-01-01 DIAGNOSIS — N18.9 ANEMIA DUE TO CHRONIC KIDNEY DISEASE, UNSPECIFIED CKD STAGE: ICD-10-CM

## 2019-01-01 DIAGNOSIS — R42 DIZZINESS: ICD-10-CM

## 2019-01-01 DIAGNOSIS — Z17.0 MALIGNANT NEOPLASM OF UPPER-OUTER QUADRANT OF RIGHT BREAST IN FEMALE, ESTROGEN RECEPTOR POSITIVE: ICD-10-CM

## 2019-01-01 DIAGNOSIS — Z17.0 MALIGNANT NEOPLASM OF UPPER-OUTER QUADRANT OF RIGHT BREAST IN FEMALE, ESTROGEN RECEPTOR POSITIVE: Primary | ICD-10-CM

## 2019-01-01 DIAGNOSIS — R53.1 WEAKNESS: ICD-10-CM

## 2019-01-01 DIAGNOSIS — D63.1 ANEMIA DUE TO STAGE 3 CHRONIC KIDNEY DISEASE: ICD-10-CM

## 2019-01-01 DIAGNOSIS — C50.411 MALIGNANT NEOPLASM OF UPPER-OUTER QUADRANT OF RIGHT BREAST IN FEMALE, ESTROGEN RECEPTOR POSITIVE: Primary | ICD-10-CM

## 2019-01-01 DIAGNOSIS — R55 NEAR SYNCOPE: ICD-10-CM

## 2019-01-01 DIAGNOSIS — D63.1 ANEMIA DUE TO CHRONIC KIDNEY DISEASE, UNSPECIFIED CKD STAGE: ICD-10-CM

## 2019-01-01 DIAGNOSIS — N18.30 CKD (CHRONIC KIDNEY DISEASE), STAGE III: ICD-10-CM

## 2019-01-01 LAB
ALBUMIN SERPL BCP-MCNC: 3.1 G/DL (ref 3.5–5.2)
ALBUMIN SERPL BCP-MCNC: 3.3 G/DL (ref 3.5–5.2)
ALBUMIN SERPL BCP-MCNC: 3.3 G/DL (ref 3.5–5.2)
ALP SERPL-CCNC: 82 U/L (ref 55–135)
ALP SERPL-CCNC: 85 U/L (ref 55–135)
ALP SERPL-CCNC: 91 U/L (ref 55–135)
ALT SERPL W/O P-5'-P-CCNC: 7 U/L (ref 10–44)
ALT SERPL W/O P-5'-P-CCNC: 8 U/L (ref 10–44)
ALT SERPL W/O P-5'-P-CCNC: 9 U/L (ref 10–44)
ANION GAP SERPL CALC-SCNC: 10 MMOL/L (ref 8–16)
ANION GAP SERPL CALC-SCNC: 6 MMOL/L (ref 8–16)
ANION GAP SERPL CALC-SCNC: 8 MMOL/L (ref 8–16)
AST SERPL-CCNC: 11 U/L (ref 10–40)
AST SERPL-CCNC: 12 U/L (ref 10–40)
AST SERPL-CCNC: 17 U/L (ref 10–40)
BACTERIA #/AREA URNS AUTO: ABNORMAL /HPF
BACTERIA UR CULT: ABNORMAL
BASOPHILS # BLD AUTO: 0.05 K/UL (ref 0–0.2)
BASOPHILS NFR BLD: 0.7 % (ref 0–1.9)
BILIRUB SERPL-MCNC: 0.4 MG/DL (ref 0.1–1)
BILIRUB UR QL STRIP: NEGATIVE
BUN SERPL-MCNC: 17 MG/DL (ref 8–23)
BUN SERPL-MCNC: 17 MG/DL (ref 8–23)
BUN SERPL-MCNC: 18 MG/DL (ref 8–23)
CALCIUM SERPL-MCNC: 9 MG/DL (ref 8.7–10.5)
CALCIUM SERPL-MCNC: 9.2 MG/DL (ref 8.7–10.5)
CALCIUM SERPL-MCNC: 9.6 MG/DL (ref 8.7–10.5)
CHLORIDE SERPL-SCNC: 109 MMOL/L (ref 95–110)
CHLORIDE SERPL-SCNC: 111 MMOL/L (ref 95–110)
CHLORIDE SERPL-SCNC: 111 MMOL/L (ref 95–110)
CLARITY UR REFRACT.AUTO: ABNORMAL
CO2 SERPL-SCNC: 22 MMOL/L (ref 23–29)
CO2 SERPL-SCNC: 26 MMOL/L (ref 23–29)
CO2 SERPL-SCNC: 30 MMOL/L (ref 23–29)
COLOR UR AUTO: YELLOW
CREAT SERPL-MCNC: 1 MG/DL (ref 0.5–1.4)
CREAT SERPL-MCNC: 1.1 MG/DL (ref 0.5–1.4)
CREAT SERPL-MCNC: 1.3 MG/DL (ref 0.5–1.4)
DIFFERENTIAL METHOD: ABNORMAL
EOSINOPHIL # BLD AUTO: 0.2 K/UL (ref 0–0.5)
EOSINOPHIL NFR BLD: 2.2 % (ref 0–8)
ERYTHROCYTE [DISTWIDTH] IN BLOOD BY AUTOMATED COUNT: 13.2 % (ref 11.5–14.5)
ERYTHROCYTE [DISTWIDTH] IN BLOOD BY AUTOMATED COUNT: 14.1 % (ref 11.5–14.5)
ERYTHROCYTE [DISTWIDTH] IN BLOOD BY AUTOMATED COUNT: 15.6 % (ref 11.5–14.5)
EST. GFR  (AFRICAN AMERICAN): 44.5 ML/MIN/1.73 M^2
EST. GFR  (AFRICAN AMERICAN): 54.4 ML/MIN/1.73 M^2
EST. GFR  (AFRICAN AMERICAN): >60 ML/MIN/1.73 M^2
EST. GFR  (NON AFRICAN AMERICAN): 38.6 ML/MIN/1.73 M^2
EST. GFR  (NON AFRICAN AMERICAN): 47.2 ML/MIN/1.73 M^2
EST. GFR  (NON AFRICAN AMERICAN): 53 ML/MIN/1.73 M^2
GLUCOSE SERPL-MCNC: 76 MG/DL (ref 70–110)
GLUCOSE SERPL-MCNC: 76 MG/DL (ref 70–110)
GLUCOSE SERPL-MCNC: 78 MG/DL (ref 70–110)
GLUCOSE UR QL STRIP: NEGATIVE
HCT VFR BLD AUTO: 34.5 % (ref 37–48.5)
HCT VFR BLD AUTO: 35.3 % (ref 37–48.5)
HCT VFR BLD AUTO: 40 % (ref 37–48.5)
HGB BLD-MCNC: 10.2 G/DL (ref 12–16)
HGB BLD-MCNC: 10.8 G/DL (ref 12–16)
HGB BLD-MCNC: 11.8 G/DL (ref 12–16)
HGB UR QL STRIP: NEGATIVE
HYALINE CASTS UR QL AUTO: 0 /LPF
IMM GRANULOCYTES # BLD AUTO: 0.01 K/UL (ref 0–0.04)
IMM GRANULOCYTES # BLD AUTO: 0.02 K/UL (ref 0–0.04)
IMM GRANULOCYTES # BLD AUTO: 0.03 K/UL (ref 0–0.04)
IMM GRANULOCYTES NFR BLD AUTO: 0.4 % (ref 0–0.5)
KETONES UR QL STRIP: NEGATIVE
LEUKOCYTE ESTERASE UR QL STRIP: NEGATIVE
LYMPHOCYTES # BLD AUTO: 1.7 K/UL (ref 1–4.8)
LYMPHOCYTES NFR BLD: 21.9 % (ref 18–48)
MCH RBC QN AUTO: 28.3 PG (ref 27–31)
MCH RBC QN AUTO: 28.4 PG (ref 27–31)
MCH RBC QN AUTO: 28.5 PG (ref 27–31)
MCHC RBC AUTO-ENTMCNC: 29.5 G/DL (ref 32–36)
MCHC RBC AUTO-ENTMCNC: 29.6 G/DL (ref 32–36)
MCHC RBC AUTO-ENTMCNC: 30.6 G/DL (ref 32–36)
MCV RBC AUTO: 92 FL (ref 82–98)
MCV RBC AUTO: 96 FL (ref 82–98)
MCV RBC AUTO: 96 FL (ref 82–98)
MICROSCOPIC COMMENT: ABNORMAL
MONOCYTES # BLD AUTO: 0.7 K/UL (ref 0.3–1)
MONOCYTES NFR BLD: 8.8 % (ref 4–15)
NEUTROPHILS # BLD AUTO: 3.4 K/UL (ref 1.8–7.7)
NEUTROPHILS # BLD AUTO: 3.7 K/UL (ref 1.8–7.7)
NEUTROPHILS # BLD AUTO: 5 K/UL (ref 1.8–7.7)
NEUTROPHILS NFR BLD: 66 % (ref 38–73)
NITRITE UR QL STRIP: NEGATIVE
NRBC BLD-RTO: 0 /100 WBC
PH UR STRIP: 7 [PH] (ref 5–8)
PLATELET # BLD AUTO: 230 K/UL (ref 150–350)
PLATELET # BLD AUTO: 250 K/UL (ref 150–350)
PLATELET # BLD AUTO: 261 K/UL (ref 150–350)
PMV BLD AUTO: 10.5 FL (ref 9.2–12.9)
PMV BLD AUTO: 10.6 FL (ref 9.2–12.9)
PMV BLD AUTO: 11.1 FL (ref 9.2–12.9)
POTASSIUM SERPL-SCNC: 3.9 MMOL/L (ref 3.5–5.1)
POTASSIUM SERPL-SCNC: 3.9 MMOL/L (ref 3.5–5.1)
POTASSIUM SERPL-SCNC: 4.3 MMOL/L (ref 3.5–5.1)
PROT SERPL-MCNC: 7.2 G/DL (ref 6–8.4)
PROT SERPL-MCNC: 7.4 G/DL (ref 6–8.4)
PROT SERPL-MCNC: 7.5 G/DL (ref 6–8.4)
PROT UR QL STRIP: ABNORMAL
RBC # BLD AUTO: 3.58 M/UL (ref 4–5.4)
RBC # BLD AUTO: 3.82 M/UL (ref 4–5.4)
RBC # BLD AUTO: 4.16 M/UL (ref 4–5.4)
RBC #/AREA URNS AUTO: 1 /HPF (ref 0–4)
SODIUM SERPL-SCNC: 143 MMOL/L (ref 136–145)
SODIUM SERPL-SCNC: 145 MMOL/L (ref 136–145)
SODIUM SERPL-SCNC: 145 MMOL/L (ref 136–145)
SP GR UR STRIP: 1.01 (ref 1–1.03)
SQUAMOUS #/AREA URNS AUTO: 2 /HPF
T4 SERPL-MCNC: 7.4 UG/DL (ref 4.5–11.5)
TSH SERPL DL<=0.005 MIU/L-ACNC: 0.99 UIU/ML (ref 0.4–4)
URN SPEC COLLECT METH UR: ABNORMAL
WBC # BLD AUTO: 5.91 K/UL (ref 3.9–12.7)
WBC # BLD AUTO: 6.77 K/UL (ref 3.9–12.7)
WBC # BLD AUTO: 7.62 K/UL (ref 3.9–12.7)
WBC #/AREA URNS AUTO: 2 /HPF (ref 0–5)

## 2019-01-01 PROCEDURE — 77065 MAMMO DIGITAL DIAGNOSTIC LEFT WITH TOMOSYNTHESIS_CAD: ICD-10-PCS | Mod: 26,LT,, | Performed by: RADIOLOGY

## 2019-01-01 PROCEDURE — 84436 ASSAY OF TOTAL THYROXINE: CPT

## 2019-01-01 PROCEDURE — 1101F PR PT FALLS ASSESS DOC 0-1 FALLS W/OUT INJ PAST YR: ICD-10-PCS | Mod: CPTII,S$GLB,, | Performed by: NURSE PRACTITIONER

## 2019-01-01 PROCEDURE — 3080F PR MOST RECENT DIASTOLIC BLOOD PRESSURE >= 90 MM HG: ICD-10-PCS | Mod: CPTII,S$GLB,, | Performed by: NURSE PRACTITIONER

## 2019-01-01 PROCEDURE — 87186 SC STD MICRODIL/AGAR DIL: CPT

## 2019-01-01 PROCEDURE — 80053 COMPREHEN METABOLIC PANEL: CPT

## 2019-01-01 PROCEDURE — 93005 ELECTROCARDIOGRAM TRACING: CPT

## 2019-01-01 PROCEDURE — 93010 EKG 12-LEAD: ICD-10-PCS | Mod: ,,, | Performed by: INTERNAL MEDICINE

## 2019-01-01 PROCEDURE — 87077 CULTURE AEROBIC IDENTIFY: CPT

## 2019-01-01 PROCEDURE — 99999 PR PBB SHADOW E&M-EST. PATIENT-LVL III: ICD-10-PCS | Mod: PBBFAC,,, | Performed by: NURSE PRACTITIONER

## 2019-01-01 PROCEDURE — 99214 OFFICE O/P EST MOD 30 MIN: CPT | Mod: S$GLB,,, | Performed by: NURSE PRACTITIONER

## 2019-01-01 PROCEDURE — 93010 ELECTROCARDIOGRAM REPORT: CPT | Mod: ,,, | Performed by: INTERNAL MEDICINE

## 2019-01-01 PROCEDURE — 85027 COMPLETE CBC AUTOMATED: CPT

## 2019-01-01 PROCEDURE — 99284 EMERGENCY DEPT VISIT MOD MDM: CPT | Mod: GC,,, | Performed by: PSYCHIATRY & NEUROLOGY

## 2019-01-01 PROCEDURE — 99284 PR EMERGENCY DEPT VISIT,LEVEL IV: ICD-10-PCS | Mod: GC,,, | Performed by: PSYCHIATRY & NEUROLOGY

## 2019-01-01 PROCEDURE — 99999 PR PBB SHADOW E&M-EST. PATIENT-LVL III: CPT | Mod: PBBFAC,,, | Performed by: NURSE PRACTITIONER

## 2019-01-01 PROCEDURE — 99285 EMERGENCY DEPT VISIT HI MDM: CPT | Mod: 25

## 2019-01-01 PROCEDURE — 77065 DX MAMMO INCL CAD UNI: CPT | Mod: 26,LT,, | Performed by: RADIOLOGY

## 2019-01-01 PROCEDURE — 3080F DIAST BP >= 90 MM HG: CPT | Mod: CPTII,S$GLB,, | Performed by: NURSE PRACTITIONER

## 2019-01-01 PROCEDURE — 77061 MAMMO DIGITAL DIAGNOSTIC LEFT WITH TOMOSYNTHESIS_CAD: ICD-10-PCS | Mod: 26,LT,, | Performed by: RADIOLOGY

## 2019-01-01 PROCEDURE — 36415 COLL VENOUS BLD VENIPUNCTURE: CPT

## 2019-01-01 PROCEDURE — 99285 EMERGENCY DEPT VISIT HI MDM: CPT | Mod: ,,, | Performed by: EMERGENCY MEDICINE

## 2019-01-01 PROCEDURE — 77065 DX MAMMO INCL CAD UNI: CPT | Mod: TC,PO,LT

## 2019-01-01 PROCEDURE — 84443 ASSAY THYROID STIM HORMONE: CPT

## 2019-01-01 PROCEDURE — 81001 URINALYSIS AUTO W/SCOPE: CPT

## 2019-01-01 PROCEDURE — 3077F PR MOST RECENT SYSTOLIC BLOOD PRESSURE >= 140 MM HG: ICD-10-PCS | Mod: CPTII,S$GLB,, | Performed by: NURSE PRACTITIONER

## 2019-01-01 PROCEDURE — 87088 URINE BACTERIA CULTURE: CPT

## 2019-01-01 PROCEDURE — 99214 PR OFFICE/OUTPT VISIT, EST, LEVL IV, 30-39 MIN: ICD-10-PCS | Mod: S$GLB,,, | Performed by: NURSE PRACTITIONER

## 2019-01-01 PROCEDURE — 99285 PR EMERGENCY DEPT VISIT,LEVEL V: ICD-10-PCS | Mod: ,,, | Performed by: EMERGENCY MEDICINE

## 2019-01-01 PROCEDURE — 85025 COMPLETE CBC W/AUTO DIFF WBC: CPT

## 2019-01-01 PROCEDURE — 3077F SYST BP >= 140 MM HG: CPT | Mod: CPTII,S$GLB,, | Performed by: NURSE PRACTITIONER

## 2019-01-01 PROCEDURE — 99213 PR OFFICE/OUTPT VISIT, EST, LEVL III, 20-29 MIN: ICD-10-PCS | Mod: S$GLB,,, | Performed by: NURSE PRACTITIONER

## 2019-01-01 PROCEDURE — 87086 URINE CULTURE/COLONY COUNT: CPT

## 2019-01-01 PROCEDURE — 77061 BREAST TOMOSYNTHESIS UNI: CPT | Mod: 26,LT,, | Performed by: RADIOLOGY

## 2019-01-01 PROCEDURE — 99213 OFFICE O/P EST LOW 20 MIN: CPT | Mod: S$GLB,,, | Performed by: NURSE PRACTITIONER

## 2019-01-01 PROCEDURE — 1101F PT FALLS ASSESS-DOCD LE1/YR: CPT | Mod: CPTII,S$GLB,, | Performed by: NURSE PRACTITIONER

## 2019-01-01 PROCEDURE — 99213 OFFICE O/P EST LOW 20 MIN: CPT | Mod: PBBFAC | Performed by: NURSE PRACTITIONER

## 2019-01-03 ENCOUNTER — TELEPHONE (OUTPATIENT)
Dept: ENDOSCOPY | Facility: HOSPITAL | Age: 79
End: 2019-01-03

## 2019-01-15 ENCOUNTER — TELEPHONE (OUTPATIENT)
Dept: ENDOSCOPY | Facility: HOSPITAL | Age: 79
End: 2019-01-15

## 2019-01-15 NOTE — TELEPHONE ENCOUNTER
Spoke to patient about procedure she stated that she does not have a ride at this time and will call us back later this afternoon.  Patient's arrival time 1000.  2nd dose of prep @ 0400.  Clear liquids until 0600 and then NPO.

## 2019-01-15 NOTE — TELEPHONE ENCOUNTER
Spoke with patient about arrival time @1000.     Prep instructions reviewed: the day before the procedure, follow a clear liquid diet all day, then start the first 1/2 of prep at 5pm and take 2nd 1/2 of prep @0400.  Pt must be completely NPO when prep completed @0600.              Medications: Do not take Insulin or oral diabetic medications the day of the procedure.  Take as prescribed: heart, seizure and blood pressure medication in the morning with a sip of water (less than an ounce).  Take any breathing medications and bring inhalers to hospital with you Leave all valuables and jewelry at home.     Wear comfortable clothes to procedure to change into hospital gown You cannot drive for 24 hours after your procedure because you will receive sedation for your procedure to make you comfortable. Ride confirmed.

## 2019-01-17 ENCOUNTER — ANESTHESIA (OUTPATIENT)
Dept: ENDOSCOPY | Facility: HOSPITAL | Age: 79
End: 2019-01-17
Payer: MEDICARE

## 2019-01-17 ENCOUNTER — TELEPHONE (OUTPATIENT)
Dept: ENDOSCOPY | Facility: HOSPITAL | Age: 79
End: 2019-01-17

## 2019-01-17 ENCOUNTER — ANESTHESIA EVENT (OUTPATIENT)
Dept: ENDOSCOPY | Facility: HOSPITAL | Age: 79
End: 2019-01-17
Payer: MEDICARE

## 2019-01-17 ENCOUNTER — HOSPITAL ENCOUNTER (OUTPATIENT)
Facility: HOSPITAL | Age: 79
Discharge: HOME | End: 2019-01-17
Attending: INTERNAL MEDICINE | Admitting: INTERNAL MEDICINE
Payer: MEDICARE

## 2019-01-17 VITALS
HEART RATE: 74 BPM | RESPIRATION RATE: 17 BRPM | SYSTOLIC BLOOD PRESSURE: 169 MMHG | TEMPERATURE: 98 F | DIASTOLIC BLOOD PRESSURE: 75 MMHG | WEIGHT: 220 LBS | BODY MASS INDEX: 33.34 KG/M2 | HEIGHT: 68 IN | OXYGEN SATURATION: 98 %

## 2019-01-17 DIAGNOSIS — K63.5 POLYP OF COLON, UNSPECIFIED PART OF COLON, UNSPECIFIED TYPE: Primary | ICD-10-CM

## 2019-01-17 DIAGNOSIS — K63.5 COLON POLYPS: ICD-10-CM

## 2019-01-17 LAB
GLUCOSE SERPL-MCNC: 97 MG/DL (ref 70–110)
POCT GLUCOSE: 97 MG/DL (ref 70–110)

## 2019-01-17 PROCEDURE — 37000009 HC ANESTHESIA EA ADD 15 MINS: Performed by: INTERNAL MEDICINE

## 2019-01-17 PROCEDURE — 25000003 PHARM REV CODE 250: Performed by: INTERNAL MEDICINE

## 2019-01-17 PROCEDURE — 88305 TISSUE EXAM BY PATHOLOGIST: CPT | Performed by: PATHOLOGY

## 2019-01-17 PROCEDURE — 27201012 HC FORCEPS, HOT/COLD, DISP: Performed by: INTERNAL MEDICINE

## 2019-01-17 PROCEDURE — 82962 GLUCOSE BLOOD TEST: CPT | Performed by: INTERNAL MEDICINE

## 2019-01-17 PROCEDURE — 27200997: Performed by: INTERNAL MEDICINE

## 2019-01-17 PROCEDURE — 27201028 HC NEEDLE, SCLERO: Performed by: INTERNAL MEDICINE

## 2019-01-17 PROCEDURE — 63600175 PHARM REV CODE 636 W HCPCS: Performed by: NURSE ANESTHETIST, CERTIFIED REGISTERED

## 2019-01-17 PROCEDURE — 88305 TISSUE EXAM BY PATHOLOGIST: CPT | Mod: 26,,, | Performed by: PATHOLOGY

## 2019-01-17 PROCEDURE — 37000008 HC ANESTHESIA 1ST 15 MINUTES: Performed by: INTERNAL MEDICINE

## 2019-01-17 PROCEDURE — 45390 COLONOSCOPY W/RESECTION: CPT | Performed by: INTERNAL MEDICINE

## 2019-01-17 PROCEDURE — 27201089 HC SNARE, DISP (ANY): Performed by: INTERNAL MEDICINE

## 2019-01-17 PROCEDURE — 45380 COLONOSCOPY AND BIOPSY: CPT | Performed by: INTERNAL MEDICINE

## 2019-01-17 PROCEDURE — 45381 COLONOSCOPY SUBMUCOUS NJX: CPT | Performed by: INTERNAL MEDICINE

## 2019-01-17 PROCEDURE — 25000003 PHARM REV CODE 250: Performed by: NURSE ANESTHETIST, CERTIFIED REGISTERED

## 2019-01-17 PROCEDURE — 45385 COLONOSCOPY W/LESION REMOVAL: CPT | Performed by: INTERNAL MEDICINE

## 2019-01-17 PROCEDURE — 88305 TISSUE SPECIMEN TO PATHOLOGY - SURGERY: ICD-10-PCS | Mod: 26,,, | Performed by: PATHOLOGY

## 2019-01-17 RX ORDER — PROPOFOL 10 MG/ML
VIAL (ML) INTRAVENOUS CONTINUOUS PRN
Status: DISCONTINUED | OUTPATIENT
Start: 2019-01-17 | End: 2019-01-17

## 2019-01-17 RX ORDER — SODIUM CHLORIDE 9 MG/ML
INJECTION, SOLUTION INTRAVENOUS CONTINUOUS
Status: DISCONTINUED | OUTPATIENT
Start: 2019-01-17 | End: 2020-01-01

## 2019-01-17 RX ORDER — SODIUM CHLORIDE 0.9 % (FLUSH) 0.9 %
3 SYRINGE (ML) INJECTION
Status: ACTIVE | OUTPATIENT
Start: 2019-01-17

## 2019-01-17 RX ORDER — PROPOFOL 10 MG/ML
VIAL (ML) INTRAVENOUS
Status: DISCONTINUED | OUTPATIENT
Start: 2019-01-17 | End: 2019-01-17

## 2019-01-17 RX ORDER — GLYCOPYRROLATE 0.2 MG/ML
INJECTION INTRAMUSCULAR; INTRAVENOUS
Status: DISCONTINUED | OUTPATIENT
Start: 2019-01-17 | End: 2019-01-17

## 2019-01-17 RX ADMIN — PROPOFOL 50 MG: 10 INJECTION, EMULSION INTRAVENOUS at 12:01

## 2019-01-17 RX ADMIN — GLYCOPYRROLATE 0.2 MG: 0.2 INJECTION, SOLUTION INTRAMUSCULAR; INTRAVENOUS at 12:01

## 2019-01-17 RX ADMIN — SODIUM CHLORIDE: 0.9 INJECTION, SOLUTION INTRAVENOUS at 12:01

## 2019-01-17 RX ADMIN — PROPOFOL 150 MCG/KG/MIN: 10 INJECTION, EMULSION INTRAVENOUS at 12:01

## 2019-01-17 NOTE — OR NURSING
Recovery complete. Patient recovered to baseline. Discharge instructions reviewed with patient per ALEJO Crump RN , patient verbalized understanding

## 2019-01-17 NOTE — ANESTHESIA PREPROCEDURE EVALUATION
01/17/2019  Elina Mei is a 78 y.o., female for colonoscopy under MAC    Patient Active Problem List   Diagnosis    HTN (hypertension)    DM (diabetes mellitus)    Glaucoma (increased eye pressure)    Chest pain syndrome    Anemia    Malignant neoplasm of upper-outer quadrant of right breast in female, estrogen receptor positive    At risk for lymphedema    Embolic stroke involving left middle cerebral artery    Acute right-sided weakness    Aneurysm, carotid artery, internal    Chronic otitis media    GI bleed    CKD (chronic kidney disease), stage III    Rectal bleeding    Gastritis    Colon polyps       Pre-op Assessment    I have reviewed the Patient Summary Reports.     I have reviewed the Nursing Notes.   I have reviewed the Medications.     Review of Systems  Anesthesia Hx:  No problems with previous Anesthesia  History of prior surgery of interest to airway management or planning: Previous anesthesia: General Airway issues documented on chart review include difficult direct laryngoscopy    Cardiovascular:   Hypertension    Neurological:   CVA, residual symptoms Residual symptoms on right   Endocrine:   Diabetes, using insulin        Physical Exam  General:  Obesity    Airway/Jaw/Neck:  Airway Findings: Mouth Opening: Normal Tongue: Large  Mallampati: III  TM Distance: 4 - 6 cm      Dental:  Dental Findings:   Chest/Lungs:  Chest/Lungs Findings: Normal Respiratory Rate     Heart/Vascular:  Heart Findings: Rate: Normal        Mental Status:  Mental Status Findings:  Cooperative, Alert and Oriented       Lab Results   Component Value Date    WBC 6.38 11/08/2018    HGB 7.2 (L) 11/08/2018    HCT 23.6 (L) 11/08/2018     11/08/2018    CHOL 231 (H) 04/26/2018    TRIG 129 04/26/2018    HDL 45 04/26/2018    ALT 8 (L) 11/08/2018    AST 14 11/08/2018     11/08/2018    K 3.7 11/08/2018      (H) 11/08/2018    CREATININE 1.2 11/08/2018    BUN 16 11/08/2018    CO2 24 11/08/2018    TSH 1.426 04/26/2018    INR 0.9 11/06/2018    HGBA1C 6.8 (H) 11/06/2018       CONCLUSIONS     1 - Normal left ventricular systolic function (EF 60-65%).     2 - Indeterminate LV diastolic function.     3 - Normal right ventricular systolic function .     4 - The estimated PA systolic pressure is 39 mmHg.     5 - Trivial to mild tricuspid regurgitation.     6 - Concentric hypertrophy.     7 - No wall motion abnormalities.             This document has been electronically    SIGNED BY: Neeru Waller MD On: 04/26/2018 16:32    Anesthesia Plan  Type of Anesthesia, risks & benefits discussed:  Anesthesia Type:  MAC, general  Patient's Preference:   Intra-op Monitoring Plan: standard ASA monitors  Intra-op Monitoring Plan Comments:   Post Op Pain Control Plan: multimodal analgesia  Post Op Pain Control Plan Comments:   Induction:   IV  Beta Blocker:  Patient is not currently on a Beta-Blocker (No further documentation required).       Informed Consent: Patient understands risks and agrees with Anesthesia plan.  Questions answered. Anesthesia consent signed with patient.  ASA Score: 3     Day of Surgery Review of History & Physical:            Ready For Surgery From Anesthesia Perspective.

## 2019-01-17 NOTE — H&P
[facility]     CC: polyps     HPI: 79 y/o female with CVA (4/2018, currently on DAPT) invasive ductal carcinoma of the breast s/p R mastectomy who presents for follow up of two moderate sized colon polyps that were found on colonoscopy in November after an episode of rectal bleeding. She was on plavix at the time and therefore polypectomy was not performed. She presents to discuss repeat colonoscopy with polypectomy while off plavix. She also had EGD prior to colonoscopy, had h.pylori related gastritis and completed quadruple therapy she states.           Past Medical History:   Diagnosis Date    Acute right-sided weakness 4/26/2018    Arthritis      Diabetes mellitus      Hypertension      Malignant neoplasm of upper-outer quadrant of right breast in female, estrogen receptor positive 12/5/2017    Status post mastectomy, right 2018    Stroke 4/26/2018      Review of Systems  General ROS: negative for chills, fever or weight loss  Respiratory ROS: no cough, shortness of breath, or wheezing  Cardiovascular ROS: no chest pain or dyspnea on exertion        Physical Examination  Vitals:    01/17/19 1129   BP: (!) 163/74   Pulse: 73   Resp: 16   Temp: 98.1 °F (36.7 °C)       General appearance: alert, cooperative, no distress  HENT: Normocephalic, atraumatic, neck symmetrical, no nasal discharge   Eyes: conjunctivae/corneas clear, PERRL, EOM's intact  Lungs: clear to auscultation bilaterally, no dullness to percussion bilaterally  Heart: regular rate and rhythm without rub; no displacement of the PMI   Abdomen: soft, non-tender; bowel sounds normoactive; no organomegaly  Extremities: extremities symmetric; no clubbing, cyanosis, or edema     Labs:        Lab Results   Component Value Date     WBC 6.38 11/08/2018     HGB 7.2 (L) 11/08/2018     HCT 23.6 (L) 11/08/2018     MCV 92 11/08/2018      11/08/2018      Imaging:  CXR: Cardiomegaly.  No significant airspace consolidation or pleural effusion  identified     Assessment:   77 y/o female with history of History of two colon polyps, one 7 mm AC and one 10 mm sigmoid colon polyp. She is here to schedule colonoscopy      Plan:   -Repeat colonoscopy with polypectomy while off Plavix    Ashwin De La Torre MD  [facility], PGY V  [phone]

## 2019-01-17 NOTE — [employer] PATIENT INSTRUCTIONS
Discharge Summary/Instructions after an Endoscopic Procedure  Patient Name: Elina Mei  Patient MRN: [id number]  Patient YOB: 1940 Thursday, January 17, 2019  Ahsan Enciso MD  RESTRICTIONS:  During your procedure today, you received medications for sedation.  These   medications may affect your judgment, balance and coordination.  Therefore,   for 24 hours, you have the following restrictions:   - DO NOT drive a car, operate machinery, make legal/financial decisions,   sign important papers or drink alcohol.    ACTIVITY:  Today: no heavy lifting, straining or running due to procedural   sedation/anesthesia.  The following day: return to full activity including work.  DIET:  Eat and drink normally unless instructed otherwise.     TREATMENT FOR COMMON SIDE EFFECTS:  - Mild abdominal pain, nausea, belching, bloating or excessive gas:  rest,   eat lightly and use a heating pad.  - Sore Throat: treat with throat lozenges and/or gargle with warm salt   water.  - Because air was used during the procedure, expelling large amounts of air   from your rectum or belching is normal.  - If a bowel prep was taken, you may not have a bowel movement for 1-3 days.    This is normal.  SYMPTOMS TO WATCH FOR AND REPORT TO YOUR PHYSICIAN:  1. Abdominal pain or bloating, other than gas cramps.  2. Chest pain.  3. Back pain.  4. Signs of infection such as: chills or fever occurring within 24 hours   after the procedure.  5. Rectal bleeding, which would show as bright red, maroon, or black stools.   (A tablespoon of blood from the rectum is not serious, especially if   hemorrhoids are present.)  6. Vomiting.  7. Weakness or dizziness.  GO DIRECTLY TO THE NEAREST EMERGENCY ROOM IF YOU HAVE ANY OF THE FOLLOWING:      Difficulty breathing              Chills and/or fever over 101 F   Persistent vomiting and/or vomiting blood   Severe abdominal pain   Severe chest pain   Black, tarry stools   Bleeding- more than one tablespoon   Any  other symptom or condition that you feel may need urgent attention  Your doctor recommends these additional instructions:  If any biopsies were taken, your doctors clinic will contact you in 1 to 2   weeks with any results.  - Resume Plavix (clopidogrel) now    - Repeat colonoscopy could be considered in 3-5 years but the risks/benefits   should be re-evaluated at that time   - Discharge to home   - Condition stable   - Resume previous diet   - The signs and symptoms of potential delayed complications were discussed   with the patient. If signs or symptoms of these complications develop, call   the [facility] at 1 [phone].   - Return to normal activities tomorrow.  Written discharge instructions were   provided to the patient.   For questions, problems or results please call your physician - Ahsan Enciso MD at Work:  [phone].  EMERGENCY PHONE NUMBER: [phone],  LAB RESULTS: [phone]  IF A COMPLICATION OR EMERGENCY SITUATION ARISES AND YOU ARE UNABLE TO REACH   YOUR PHYSICIAN - GO DIRECTLY TO THE EMERGENCY ROOM.  MD Ahsan Valiente MD  1/17/2019 3:12:46 PM  This report has been verified and signed electronically.  [employer]

## 2019-01-17 NOTE — TRANSFER OF CARE
"Anesthesia Transfer of Care Note    Patient: Elina Mei    Procedure(s) Performed: Procedure(s) (LRB):  COLONOSCOPY-with polypectomy (N/A)    Patient location: GI    Anesthesia Type: MAC    Transport from OR: Transported from OR on room air with adequate spontaneous ventilation    Post pain: adequate analgesia    Post assessment: no apparent anesthetic complications and tolerated procedure well    Post vital signs: stable    Level of consciousness: awake, alert and oriented    Nausea/Vomiting: no nausea/vomiting    Complications: none    Transfer of care protocol was followed      Last vitals:   Visit Vitals  BP (!) 163/74   Pulse 73   Temp 36.7 °C (98.1 °F)   Resp 16   Ht 5' 8" (1.727 m)   Wt 99.8 kg (220 lb)   SpO2 97%   Breastfeeding? No   BMI 33.45 kg/m²     "

## 2019-01-17 NOTE — ANESTHESIA POSTPROCEDURE EVALUATION
"Anesthesia Post Evaluation    Patient: Elina Mei    Procedure(s) Performed: Procedure(s) (LRB):  COLONOSCOPY-with polypectomy (N/A)    Final Anesthesia Type: MAC  Patient location during evaluation: GI PACU  Patient participation: Yes- Able to Participate  Level of consciousness: awake and alert and oriented  Post-procedure vital signs: reviewed and stable  Pain management: adequate  Airway patency: patent  PONV status at discharge: No PONV  Anesthetic complications: no      Cardiovascular status: stable, hemodynamically stable and blood pressure returned to baseline  Respiratory status: room air, unassisted and spontaneous ventilation  Hydration status: euvolemic  Follow-up not needed.        Visit Vitals  BP (!) 163/74   Pulse 73   Temp 36.7 °C (98.1 °F)   Resp 16   Ht 5' 8" (1.727 m)   Wt 99.8 kg (220 lb)   SpO2 97%   Breastfeeding? No   BMI 33.45 kg/m²       Pain/Ronny Score: No Data Recorded      "

## 2019-01-29 ENCOUNTER — HOME CARE VISIT (OUTPATIENT)
Dept: NEUROLOGY | Facility: HOSPITAL | Age: 79
End: 2019-01-29

## 2019-02-12 ENCOUNTER — OFFICE VISIT (OUTPATIENT)
Dept: HEMATOLOGY/ONCOLOGY | Facility: CLINIC | Age: 79
End: 2019-02-12
Payer: MEDICARE

## 2019-02-12 ENCOUNTER — LAB VISIT (OUTPATIENT)
Dept: LAB | Facility: HOSPITAL | Age: 79
End: 2019-02-12
Attending: DERMATOLOGY
Payer: MEDICARE

## 2019-02-12 VITALS
HEART RATE: 80 BPM | RESPIRATION RATE: 20 BRPM | WEIGHT: 217.81 LBS | DIASTOLIC BLOOD PRESSURE: 87 MMHG | SYSTOLIC BLOOD PRESSURE: 196 MMHG | BODY MASS INDEX: 33.12 KG/M2 | TEMPERATURE: 98 F

## 2019-02-12 DIAGNOSIS — C50.411 MALIGNANT NEOPLASM OF UPPER-OUTER QUADRANT OF RIGHT BREAST IN FEMALE, ESTROGEN RECEPTOR POSITIVE: ICD-10-CM

## 2019-02-12 DIAGNOSIS — D50.0 ANEMIA DUE TO CHRONIC BLOOD LOSS: ICD-10-CM

## 2019-02-12 DIAGNOSIS — I63.412 EMBOLIC STROKE INVOLVING LEFT MIDDLE CEREBRAL ARTERY: ICD-10-CM

## 2019-02-12 DIAGNOSIS — C50.411 MALIGNANT NEOPLASM OF UPPER-OUTER QUADRANT OF RIGHT BREAST IN FEMALE, ESTROGEN RECEPTOR POSITIVE: Primary | ICD-10-CM

## 2019-02-12 DIAGNOSIS — Z17.0 MALIGNANT NEOPLASM OF UPPER-OUTER QUADRANT OF RIGHT BREAST IN FEMALE, ESTROGEN RECEPTOR POSITIVE: ICD-10-CM

## 2019-02-12 DIAGNOSIS — Z17.0 MALIGNANT NEOPLASM OF UPPER-OUTER QUADRANT OF RIGHT BREAST IN FEMALE, ESTROGEN RECEPTOR POSITIVE: Primary | ICD-10-CM

## 2019-02-12 LAB
ALBUMIN SERPL BCP-MCNC: 3.6 G/DL
ALP SERPL-CCNC: 95 U/L
ALT SERPL W/O P-5'-P-CCNC: 10 U/L
ANION GAP SERPL CALC-SCNC: 10 MMOL/L
AST SERPL-CCNC: 13 U/L
BILIRUB SERPL-MCNC: 0.5 MG/DL
BUN SERPL-MCNC: 19 MG/DL
CALCIUM SERPL-MCNC: 9.2 MG/DL
CHLORIDE SERPL-SCNC: 110 MMOL/L
CO2 SERPL-SCNC: 25 MMOL/L
CREAT SERPL-MCNC: 1.5 MG/DL
ERYTHROCYTE [DISTWIDTH] IN BLOOD BY AUTOMATED COUNT: 13.4 %
EST. GFR  (AFRICAN AMERICAN): 37.7 ML/MIN/1.73 M^2
EST. GFR  (NON AFRICAN AMERICAN): 32.7 ML/MIN/1.73 M^2
GLUCOSE SERPL-MCNC: 104 MG/DL
HCT VFR BLD AUTO: 34.8 %
HGB BLD-MCNC: 10.8 G/DL
IMM GRANULOCYTES # BLD AUTO: 0.03 K/UL
MCH RBC QN AUTO: 28.1 PG
MCHC RBC AUTO-ENTMCNC: 31 G/DL
MCV RBC AUTO: 90 FL
NEUTROPHILS # BLD AUTO: 5.6 K/UL
PLATELET # BLD AUTO: 276 K/UL
PMV BLD AUTO: 10.8 FL
POTASSIUM SERPL-SCNC: 4.1 MMOL/L
PROT SERPL-MCNC: 7.7 G/DL
RBC # BLD AUTO: 3.85 M/UL
SODIUM SERPL-SCNC: 145 MMOL/L
WBC # BLD AUTO: 8.4 K/UL

## 2019-02-12 PROCEDURE — 85027 COMPLETE CBC AUTOMATED: CPT

## 2019-02-12 PROCEDURE — 3077F SYST BP >= 140 MM HG: CPT | Mod: CPTII,S$GLB,, | Performed by: INTERNAL MEDICINE

## 2019-02-12 PROCEDURE — 1101F PR PT FALLS ASSESS DOC 0-1 FALLS W/OUT INJ PAST YR: ICD-10-PCS | Mod: CPTII,S$GLB,, | Performed by: INTERNAL MEDICINE

## 2019-02-12 PROCEDURE — 3079F DIAST BP 80-89 MM HG: CPT | Mod: CPTII,S$GLB,, | Performed by: INTERNAL MEDICINE

## 2019-02-12 PROCEDURE — 3079F PR MOST RECENT DIASTOLIC BLOOD PRESSURE 80-89 MM HG: ICD-10-PCS | Mod: CPTII,S$GLB,, | Performed by: INTERNAL MEDICINE

## 2019-02-12 PROCEDURE — 99214 PR OFFICE/OUTPT VISIT, EST, LEVL IV, 30-39 MIN: ICD-10-PCS | Mod: S$GLB,,, | Performed by: INTERNAL MEDICINE

## 2019-02-12 PROCEDURE — 99214 OFFICE O/P EST MOD 30 MIN: CPT | Mod: S$GLB,,, | Performed by: INTERNAL MEDICINE

## 2019-02-12 PROCEDURE — 36415 COLL VENOUS BLD VENIPUNCTURE: CPT

## 2019-02-12 PROCEDURE — 1101F PT FALLS ASSESS-DOCD LE1/YR: CPT | Mod: CPTII,S$GLB,, | Performed by: INTERNAL MEDICINE

## 2019-02-12 PROCEDURE — 80053 COMPREHEN METABOLIC PANEL: CPT

## 2019-02-12 PROCEDURE — 99999 PR PBB SHADOW E&M-EST. PATIENT-LVL III: ICD-10-PCS | Mod: PBBFAC,,, | Performed by: INTERNAL MEDICINE

## 2019-02-12 PROCEDURE — 99999 PR PBB SHADOW E&M-EST. PATIENT-LVL III: CPT | Mod: PBBFAC,,, | Performed by: INTERNAL MEDICINE

## 2019-02-12 PROCEDURE — 3077F PR MOST RECENT SYSTOLIC BLOOD PRESSURE >= 140 MM HG: ICD-10-PCS | Mod: CPTII,S$GLB,, | Performed by: INTERNAL MEDICINE

## 2019-02-12 NOTE — PROGRESS NOTES
Subjective:       Patient ID: Elina Mei is a 78 y.o. female.    Chief Complaint: Follow-up    HPI     Presents for follow-up of pT1cN0 right breast cancer  Tearful today as her sister  yesterday in [place]  Otherwise is doing well  Denies pain complaints    In late 2018 she had an embolic CVA involving the left MCA and has recovered over all well.  She has regainied her strength and speech.  Due to above and thrombotic concerns, adjuvant endocrine therapy was held and she is under surveillance.   She remains on anti-coagulation.    She has had an anemia work-up with scopes in the interval  Colonoscopy with polyps- benign     Oncology History:  Diagnosis:   Invasive ductal carcinoma, grade 2, pT1cN0, 2 negative lymph nodes  Stage IA per AJCC 8th ed. pathologic prognostic staging system     Mrs. Mei is an 77 yo female with recently diagnosed invasive breast cancer, Grade 2, ER+, [place]+, Her2 juan negative  - underwent right  lumpectomy and SLNB on 18 with Dr. Melo  Pathology results:  Reported invasive ductal carcinoma, moderately differentiated, 1.5 cm, and DCIS. 2/2 lymph nodes negative for metastasis. Inferior margin with DCIS < 0.1 mm. Anterior margin with <1mm. Case was discussed at tumor board on  and was recommended for patient to have re-excision followed by radiation and endocrine therapy. Patient has met with Dr. Ko recently and she recommended re-excision followed by whole breast irradiation for a total dose of 4200 cGy +1000 cGy boost.   - underwent total right mastectomy on 3/29/18.         Review of Systems   Constitutional: Negative for activity change, appetite change, chills, fatigue, fever and unexpected weight change.   HENT: Negative for congestion, nosebleeds, postnasal drip, rhinorrhea, sinus pressure, sinus pain, sneezing, sore throat and trouble swallowing.    Eyes: Positive for visual disturbance.   Respiratory: Negative for cough, shortness of breath and wheezing.     Cardiovascular: Negative for chest pain, palpitations and leg swelling.   Gastrointestinal: Negative for abdominal distention, abdominal pain, blood in stool, constipation, diarrhea, nausea and vomiting.   Genitourinary: Negative for decreased urine volume, difficulty urinating, dysuria, frequency and urgency.   Musculoskeletal: Negative for arthralgias, myalgias, neck pain and neck stiffness.   Skin: Negative for color change, pallor, rash and wound.   Neurological: Negative for dizziness, facial asymmetry, weakness, light-headedness, numbness and headaches. Speech difficulty: improved.   Hematological: Negative for adenopathy. Does not bruise/bleed easily.   Psychiatric/Behavioral: Positive for dysphoric mood. The patient is not nervous/anxious.        Objective:      Physical Exam   Constitutional: She is oriented to person, place, and time. She appears well-developed and well-nourished. She appears distressed (tearful today).   Presents alone today  Son in law in waiting room   HENT:   Head: Normocephalic.   Mouth/Throat: Oropharynx is clear and moist. No oropharyngeal exudate.   Eyes: Conjunctivae are normal. Pupils are equal, round, and reactive to light. No scleral icterus.   Neck: Normal range of motion. Neck supple. No thyromegaly present.   Cardiovascular: Normal rate and regular rhythm.   Pulmonary/Chest: Effort normal and breath sounds normal. No respiratory distress.   S/p right mastectomy without chest wall mass.   Left breast without mass, nodule or skin changes. No axillary or supraclavicular adenopathy.    Abdominal: Soft. Bowel sounds are normal. She exhibits no distension and no mass. There is no tenderness.   Musculoskeletal: Normal range of motion. She exhibits no edema, tenderness or deformity.   Lymphadenopathy:     She has no cervical adenopathy.   Neurological: She is alert and oriented to person, place, and time. No cranial nerve deficit. Coordination normal.   with some mild speech  deficit   Skin: Skin is warm and dry. No rash noted. She is not diaphoretic. No erythema. No pallor.   Mild flat bruising, arm   Psychiatric: She has a normal mood and affect. Her behavior is normal. Judgment and thought content normal.   Nursing note and vitals reviewed.    labs- reviewed  Assessment:       1. Malignant neoplasm of upper-outer quadrant of right breast in female, estrogen receptor positive    2. Embolic stroke involving left middle cerebral artery    3. Anemia due to chronic blood loss        Plan:     1. Not on adjuvant therapy with other comorbidities  Continue surveillance  2. On medical therapy and recovered well  3. Parameters improved, recheck in 3 months  Additional labs        Distress Screening Results: Psychosocial Distress screening score of Distress Score: 5 noted and reviewed. No intervention indicated.   Knows to call for any issues- grief due to loss and has family support

## 2019-02-13 VITALS
SYSTOLIC BLOOD PRESSURE: 132 MMHG | RESPIRATION RATE: 20 BRPM | BODY MASS INDEX: 32.69 KG/M2 | WEIGHT: 215 LBS | HEART RATE: 72 BPM | DIASTOLIC BLOOD PRESSURE: 78 MMHG | OXYGEN SATURATION: 97 %

## 2019-02-26 ENCOUNTER — HOME CARE VISIT (OUTPATIENT)
Dept: NEUROLOGY | Facility: HOSPITAL | Age: 79
End: 2019-02-26

## 2019-02-26 VITALS
OXYGEN SATURATION: 97 % | RESPIRATION RATE: 20 BRPM | BODY MASS INDEX: 32.39 KG/M2 | DIASTOLIC BLOOD PRESSURE: 80 MMHG | WEIGHT: 213 LBS | HEART RATE: 74 BPM | SYSTOLIC BLOOD PRESSURE: 140 MMHG

## 2019-02-26 NOTE — PROGRESS NOTES
Patient doing well today, patient to schedule appoint with PCP to renew prescriptions and also see Oncologist about a prosthetic cup for right breast.

## 2019-04-02 ENCOUNTER — HOME CARE VISIT (OUTPATIENT)
Dept: NEUROLOGY | Facility: HOSPITAL | Age: 79
End: 2019-04-02

## 2019-04-02 VITALS
BODY MASS INDEX: 33.3 KG/M2 | WEIGHT: 219 LBS | DIASTOLIC BLOOD PRESSURE: 78 MMHG | OXYGEN SATURATION: 97 % | SYSTOLIC BLOOD PRESSURE: 142 MMHG | RESPIRATION RATE: 20 BRPM | HEART RATE: 82 BPM

## 2019-04-02 NOTE — PROGRESS NOTES
Patient in need of a script written for orthopaedic chest support from [employer].

## 2019-04-26 DIAGNOSIS — C50.411 MALIGNANT NEOPLASM OF UPPER-OUTER QUADRANT OF RIGHT BREAST IN FEMALE, ESTROGEN RECEPTOR POSITIVE: Primary | ICD-10-CM

## 2019-04-26 DIAGNOSIS — Z17.0 MALIGNANT NEOPLASM OF UPPER-OUTER QUADRANT OF RIGHT BREAST IN FEMALE, ESTROGEN RECEPTOR POSITIVE: Primary | ICD-10-CM

## 2019-05-15 NOTE — PROGRESS NOTES
Subjective:       Patient ID: Elina Mei is a 79 y.o. female.    Chief Complaint: Follow-up    HPI     Presents for follow-up of pT1cN0 right breast cancer    Feels ok today. Has her usual arthritis pains in her knees.   New shoes causes her feet to sweat.   Otherwise is doing well  Denies new pain complaints.     In late April 2018 she had an embolic CVA involving the left MCA and has recovered over all well.  She has regainied her strength and speech.  Due to above and thrombotic concerns, adjuvant endocrine therapy was held and she is under surveillance.   She remains on anti-coagulation.    She has had an anemia work-up with scopes.  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 pathologic prognostic staging system     Mrs. Mei is an 78 yo female with recently diagnosed invasive breast cancer, Grade 2, ER+, [place]+, Her2 juan negative  - underwent right  lumpectomy and SLNB on 1/26/18 with Dr. Melo  Pathology results:  Reported invasive ductal carcinoma, moderately differentiated, 1.5 cm, and DCIS. 2/2 lymph nodes negative for metastasis. Inferior margin with DCIS < 0.1 mm. Anterior margin with <1mm. Case was discussed at tumor board on 2/20 and was recommended for patient to have re-excision followed by radiation and endocrine therapy. Patient has met with Dr. Ko recently and she recommended re-excision followed by whole breast irradiation for a total dose of 4200 cGy +1000 cGy boost.   - underwent total right mastectomy on 3/29/18.         Review of Systems   Constitutional: Negative for activity change, appetite change, chills, fatigue, fever and unexpected weight change.   HENT: Negative for congestion, nosebleeds, postnasal drip, rhinorrhea, sinus pressure, sinus pain, sneezing, sore throat and trouble swallowing.    Eyes: Positive for visual disturbance (glaucoma).   Respiratory: Negative  for cough, shortness of breath and wheezing.    Cardiovascular: Negative for chest pain, palpitations and leg swelling.   Gastrointestinal: Negative for abdominal distention, abdominal pain, blood in stool, constipation, diarrhea, nausea and vomiting.   Genitourinary: Negative for decreased urine volume, difficulty urinating, dysuria, frequency and urgency.   Musculoskeletal: Positive for arthralgias (knees). Negative for myalgias, neck pain and neck stiffness.   Skin: Negative for color change, pallor, rash and wound.   Neurological: Negative for dizziness, facial asymmetry, weakness, light-headedness, numbness and headaches. Speech difficulty: improved.   Hematological: Negative for adenopathy. Does not bruise/bleed easily.   Psychiatric/Behavioral: Negative for dysphoric mood. The patient is not nervous/anxious.        Objective:      Physical Exam   Constitutional: She is oriented to person, place, and time. She appears well-developed and well-nourished. No distress.   Presents alone today  Son in law in waiting room. Daughter was not able to bring her due to illness.    HENT:   Head: Normocephalic.   Mouth/Throat: Oropharynx is clear and moist. No oropharyngeal exudate.   Eyes: Pupils are equal, round, and reactive to light. Conjunctivae are normal. No scleral icterus.   Neck: Normal range of motion. Neck supple. No thyromegaly present.   Cardiovascular: Normal rate and regular rhythm.   Pulmonary/Chest: Effort normal and breath sounds normal. No respiratory distress.   S/p right mastectomy without chest wall mass.   Left breast without mass, nodule or skin changes. No axillary or supraclavicular adenopathy.    Abdominal: Soft. Bowel sounds are normal. She exhibits no distension and no mass. There is no tenderness.   Musculoskeletal: Normal range of motion. She exhibits no edema, tenderness or deformity.   No spinal or paraspinal tenderness.    Lymphadenopathy:     She has no cervical adenopathy.    Neurological: She is alert and oriented to person, place, and time. No cranial nerve deficit. Coordination normal.   with some mild speech deficit   Skin: Skin is warm and dry. No rash noted. She is not diaphoretic. No erythema. No pallor.   Psychiatric: She has a normal mood and affect. Her behavior is normal. Judgment and thought content normal.   Nursing note and vitals reviewed.      WBC   Date Value Ref Range Status   05/16/2019 6.77 3.90 - 12.70 K/uL Final     Hemoglobin   Date Value Ref Range Status   05/16/2019 10.2 (L) 12.0 - 16.0 g/dL Final     Hematocrit   Date Value Ref Range Status   05/16/2019 34.5 (L) 37.0 - 48.5 % Final     Platelets   Date Value Ref Range Status   05/16/2019 250 150 - 350 K/uL Final     Gran # (ANC)   Date Value Ref Range Status   05/16/2019 3.7 1.8 - 7.7 K/uL Final     Comment:     The ANC is based on a white cell differential from an   automated cell counter. It has not been microscopically   reviewed for the presence of abnormal cells. Clinical   correlation is required.         Chemistry        Component Value Date/Time     05/16/2019 1502    K 4.3 05/16/2019 1502     (H) 05/16/2019 1502    CO2 22 (L) 05/16/2019 1502    BUN 17 05/16/2019 1502    CREATININE 1.1 05/16/2019 1502    GLU 76 05/16/2019 1502        Component Value Date/Time    CALCIUM 9.2 05/16/2019 1502    ALKPHOS 82 05/16/2019 1502    AST 17 05/16/2019 1502    ALT 9 (L) 05/16/2019 1502    BILITOT 0.4 05/16/2019 1502    ESTGFRAFRICA 54.4 (A) 05/16/2019 1502    EGFRNONAA 47.2 (A) 05/16/2019 1502            Assessment:       1. Malignant neoplasm of upper-outer quadrant of right breast in female, estrogen receptor positive    2. Anemia due to stage 3 chronic kidney disease    3. Essential hypertension    4. CKD (chronic kidney disease), stage III        Plan:       Doing well, JENY clinically.   Labs reviewed. Anemia parameters stable. Kidney function stable.   Not on adjuvant therapy with other  comorbidities  Continue surveillance  Due for Left breast imaging- [facility]  RTC in 3 months to see Dr. Espinal.   Encouraged to monitor BP's at home and follow up with PCP for management.   Continue f/u with renal.     Patient is in agreement with the proposed treatment plan. All questions were answered to the patient's satisfaction. Pt knows to call clinic for any new or worsening symptoms and if anything is needed before the next clinic visit.      MONTY Franklin-C  Hematology & Oncology  [address] [place] [id number]  ph. [phone]  Fax. [phone]     I spent 30 minutes (face to face) with the patient, more than 50% was in counseling and coordination of care as detailed above.       Distress Screening Results: Psychosocial Distress screening score of Distress Score: 0 noted and reviewed. No intervention indicated.

## 2019-05-17 NOTE — TELEPHONE ENCOUNTER
----- Message from Enmanuel Parish NP sent at 5/16/2019  4:59 PM CDT -----  Due for left breast MMG  RTC in 3 months to see Dr. Espinal with a cbc, cmp

## 2019-08-06 NOTE — PROGRESS NOTES
Ms. Mei well today at home alone, very pleasant woman high functioning mentally and physically. Ambulates and performs most ADL's independently, no complaints of headaches, dizziness or nausea. BP.Pulse WNL states eating/swallowing with no difficulty. Claims to sleep well throughout the night. Patient expressed how grateful she is for the Stroke program and how much she will miss the SM visits.

## 2019-08-13 NOTE — PROGRESS NOTES
Subjective:       Patient ID: Elina Mei is a 79 y.o. female.    Chief Complaint: Malignant neoplasm of upper-outer quadrant of right breast i    HPI     Presents for follow-up of pT1cN0 right breast cancer      Feels good today.   Has her usual arthritis pains in her knees.   Denies new pain complaints.   Appetite good.   Weight stable.       L MMG 5/2019:  Impression:  No mammographic evidence of malignancy.  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  She has regainied her strength and speech.  Due to above and thrombotic concerns, adjuvant endocrine therapy was held and she is under surveillance.   She remains on anti-coagulation.    She has had an anemia work-up with scopes.  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 pathologic prognostic staging system     Mrs. Mei is an 80 yo female with recently diagnosed invasive breast cancer, Grade 2, ER+, MA+, Her2 juan negative  - underwent right  lumpectomy and SLNB on 1/26/18 with Dr. Melo  Pathology results:  Reported invasive ductal carcinoma, moderately differentiated, 1.5 cm, and DCIS. 2/2 lymph nodes negative for metastasis. Inferior margin with DCIS < 0.1 mm. Anterior margin with <1mm. Case was discussed at tumor board on 2/20 and was recommended for patient to have re-excision followed by radiation and endocrine therapy. Patient has met with Dr. Ko recently and she recommended re-excision followed by whole breast irradiation for a total dose of 4200 cGy +1000 cGy boost.   - underwent total right mastectomy on 3/29/18.         Review of Systems   Constitutional: Negative for activity change, appetite change, chills, fatigue, fever and unexpected weight change.   HENT: Negative  for congestion, nosebleeds, postnasal drip, rhinorrhea, sinus pressure, sinus pain, sneezing, sore throat and trouble swallowing.    Eyes: Positive for visual disturbance (glaucoma).   Respiratory: Negative for cough, shortness of breath and wheezing.    Cardiovascular: Negative for chest pain, palpitations and leg swelling.   Gastrointestinal: Negative for abdominal distention, abdominal pain, blood in stool, constipation, diarrhea, nausea and vomiting.   Genitourinary: Negative for decreased urine volume, difficulty urinating, dysuria, frequency and urgency.   Musculoskeletal: Positive for arthralgias (knees). Negative for myalgias, neck pain and neck stiffness.   Skin: Negative for color change, pallor, rash and wound.   Neurological: Negative for dizziness, facial asymmetry, weakness, light-headedness, numbness and headaches. Speech difficulty: improved.   Hematological: Negative for adenopathy. Does not bruise/bleed easily.   Psychiatric/Behavioral: Negative for dysphoric mood. The patient is not nervous/anxious.        Objective:      Physical Exam   Constitutional: She is oriented to person, place, and time. She appears well-developed and well-nourished. No distress.   Presents alone today.   HENT:   Head: Normocephalic.   Mouth/Throat: Oropharynx is clear and moist. No oropharyngeal exudate.   Eyes: Pupils are equal, round, and reactive to light. Conjunctivae are normal. No scleral icterus.   Neck: Normal range of motion. Neck supple. No thyromegaly present.   Cardiovascular: Normal rate and regular rhythm.   Pulmonary/Chest: Effort normal and breath sounds normal. No respiratory distress.   S/p right mastectomy without chest wall mass.   Left breast without mass, nodule or skin changes. No axillary or supraclavicular adenopathy.    Abdominal: Soft. Bowel sounds are normal. She exhibits no distension and no mass. There is no tenderness.   Musculoskeletal: Normal range of motion. She exhibits no edema,  tenderness or deformity.   No spinal or paraspinal tenderness.    Lymphadenopathy:     She has no cervical adenopathy.   Neurological: She is alert and oriented to person, place, and time. No cranial nerve deficit. Coordination normal.   with some mild speech deficit- no change   Skin: Skin is warm and dry. No rash noted. She is not diaphoretic. No erythema. No pallor.   Psychiatric: She has a normal mood and affect. Her behavior is normal. Judgment and thought content normal.   Nursing note and vitals reviewed.      WBC   Date Value Ref Range Status   08/14/2019 5.91 3.90 - 12.70 K/uL Final     Hemoglobin   Date Value Ref Range Status   08/14/2019 11.8 (L) 12.0 - 16.0 g/dL Final     Hematocrit   Date Value Ref Range Status   08/14/2019 40.0 37.0 - 48.5 % Final     Platelets   Date Value Ref Range Status   08/14/2019 230 150 - 350 K/uL Final     Gran # (ANC)   Date Value Ref Range Status   08/14/2019 3.4 1.8 - 7.7 K/uL Final     Comment:     The ANC is based on a white cell differential from an   automated cell counter. It has not been microscopically   reviewed for the presence of abnormal cells. Clinical   correlation is required.         Chemistry        Component Value Date/Time     08/14/2019 1511    K 3.9 08/14/2019 1511     (H) 08/14/2019 1511    CO2 26 08/14/2019 1511    BUN 17 08/14/2019 1511    CREATININE 1.3 08/14/2019 1511    GLU 78 08/14/2019 1511        Component Value Date/Time    CALCIUM 9.6 08/14/2019 1511    ALKPHOS 91 08/14/2019 1511    AST 12 08/14/2019 1511    ALT 8 (L) 08/14/2019 1511    BILITOT 0.4 08/14/2019 1511    ESTGFRAFRICA 44.5 (A) 08/14/2019 1511    EGFRNONAA 38.6 (A) 08/14/2019 1511            Assessment:       1. Malignant neoplasm of upper-outer quadrant of right breast in female, estrogen receptor positive    2. Anemia due to chronic kidney disease, unspecified CKD stage    3. CKD (chronic kidney disease), stage III        Plan:       -Doing well, JENY clinically.    -Labs reviewed. Anemia parameters improved. Kidney function overall stable.   -Not on adjuvant therapy with other comorbidities  -Continue surveillance  -Due for Left breast imaging [facility] 5/2020.   -RTC in 3 months to see Dr. Espinal.   -Encouraged to monitor BP's at home and follow up with PCP for management.   -Continue f/u with renal and other established providers.       Patient is in agreement with the proposed treatment plan. All questions were answered to the patient's satisfaction. Pt knows to call clinic for any new or worsening symptoms and if anything is needed before the next clinic visit.      Enmanuel Parish, FNP-C  Hematology & Oncology  [facility] [place] [address]  ph. [phone]  Fax. [phone]     I spent 30 minutes (face to face) with the patient, more than 50% was in counseling and coordination of care as detailed above.

## 2019-10-10 PROBLEM — R42 VERTIGO: Status: ACTIVE | Noted: 2019-01-01

## 2019-10-10 NOTE — ASSESSMENT & PLAN NOTE
-Previous infarct, had followed up with Dr. Nicholas and had home nursing checks  -Continue secondary stroke prevention--clopidogrel 75 mg qd, atorvastatin 40 mg qd

## 2019-10-10 NOTE — SUBJECTIVE & OBJECTIVE
Past Medical History:   Diagnosis Date    Acute right-sided weakness 4/26/2018    Arthritis     Diabetes mellitus     Hypertension     Malignant neoplasm of upper-outer quadrant of right breast in female, estrogen receptor positive 12/5/2017    Status post mastectomy, right 2018    Stroke 4/26/2018     Past Surgical History:   Procedure Laterality Date    BREAST BIOPSY      COLONOSCOPY N/A 11/8/2018    Procedure: COLONOSCOPY;  Surgeon: Ahsan Enciso MD;  Location: [facility] ENDO;  Service: Endoscopy;  Laterality: N/A;    COLONOSCOPY N/A 1/17/2019    Procedure: COLONOSCOPY-with polypectomy;  Surgeon: Ahsan Enciso MD;  Location: [facility] ENDO;  Service: Endoscopy;  Laterality: N/A;    ESOPHAGOGASTRODUODENOSCOPY N/A 11/8/2018    Procedure: EGD (ESOPHAGOGASTRODUODENOSCOPY);  Surgeon: Ahsan Enciso MD;  Location: [facility];  Service: Endoscopy;  Laterality: N/A;    EYE SURGERY      HYSTERECTOMY      MASTECTOMY      MYRINGOTOMY WITH INSERTION OF VENTILATION TUBE Bilateral 7/24/2018    Procedure: MYRINGOTOMY, WITH TYMPANOSTOMY TUBE INSERTION;  Surgeon: Ventura Whitt MD;  [facility];  Service: ENT;  Laterality: Bilateral;     Family History   Problem Relation Age of Onset    Cancer Sister 75        Colon CA    Breast cancer Sister 55    Cancer Brother     Hypertension Mother      Social History     Tobacco Use    Smoking status: Never Smoker    Smokeless tobacco: Current User     Types: Snuff   Substance Use Topics    Alcohol use: No    Drug use: No     Review of patient's allergies indicates:  No Known Allergies    Medications: I have reviewed the current medication administration record.    No current facility-administered medications for this encounter.     Current Outpatient Medications:     ACCU-CHEK SMARTVIEW TEST STRIP Strp, USE TO TEST BLOOD SUGAR TWICE A DAY, Disp: , Rfl: 0    amlodipine (NORVASC) 10 MG tablet, Take 10 mg by mouth once daily., Disp: , Rfl:      aspirin (ECOTRIN) 81 MG EC tablet, Take 1 tablet (81 mg total) by mouth once daily., Disp: 30 tablet, Rfl: 1    atorvastatin (LIPITOR) 40 MG tablet, Take 1 tablet (40 mg total) by mouth once daily., Disp: 30 tablet, Rfl: 1    blood sugar diagnostic (ACCU-CHEK SMARTVIEW TEST STRIP MISC), , Disp: , Rfl:     blood-glucose meter Misc, accu-chek    kit franky, Disp: , Rfl:     brimonidine 0.1% (ALPHAGAN P) 0.1 % Drop, Alphagan P 0.1 % eye drops, Disp: , Rfl:     clopidogrel (PLAVIX) 75 mg tablet, Take 1 tablet (75 mg total) by mouth once daily., Disp: 30 tablet, Rfl: 3    docusate sodium (COLACE) 100 MG capsule, Take 1 capsule (100 mg total) by mouth 2 (two) times daily., Disp: , Rfl: 0    dorzolamide-timolol 2-0.5% 22.3-6.8 mg/mL ophthalmic solution, Place 1 drop into both eyes 2 (two) times daily., Disp: , Rfl: 6    dorzolamide-timolol 2-0.5% (COSOPT) 22.3-6.8 mg/mL ophthalmic solution, dorzol/timol sol 22.3-6.8, Disp: , Rfl:     ferrous sulfate 325 mg Tab tablet, Take 1 tablet (325 mg total) by mouth 2 (two) times daily. Every other day., Disp: , Rfl: 0    Fluticasone 50 mcg/actuation nasal spray, 1 spray by Each Nare route 2 times daily as needed (sinus congestion)., Disp: 15 g, Rfl: 0    folic acid/multivit-min/lutein (CENTRUM SILVER ORAL), Take 1 Dose by mouth once daily., Disp: , Rfl:     furosemide (LASIX) 40 MG tablet, Take 40 mg by mouth once daily. , Disp: , Rfl:     LANTUS SOLOSTAR 100 unit/mL InPn pen, INJECT 35 UNIT(S) EVERY EVENING BY SUBCUTANEOUS ROUTE., Disp: , Rfl: 2    losartan (COZAAR) 50 MG tablet, Take 50 mg by mouth once daily., Disp: , Rfl:     metformin (GLUCOPHAGE) 500 MG tablet, Take 500 mg by mouth 2 (two) times daily with meals., Disp: , Rfl:     nitroGLYCERIN 0.4 MG SL tablet, Place 1 tablet under the tongue every 5 (five) minutes as needed for Chest pain.,     omeprazole 40 MG capsule, Take 1 capsule (40 mg total) by mouth 2 (two) times daily before meals. for 14 days     "ondansetron (ZOFRAN-ODT) 8 MG TbDL, Take 1 tablet (8 mg total) by mouth every 8 (eight) hours as needed., Disp: 6 tablet, Rfl: 1    pantoprazole (PROTONIX) 40 MG tablet, Take 1 tablet (40 mg total) by mouth once daily., Disp: 30 tablet, Rfl: 11    pen needle, diabetic 32 gauge x 3/16" Ndle, by Misc.(Non-Drug; Combo Route) route., Disp: , Rfl:     potassium chloride (KLOR-CON) 10 MEQ TbSR, Take 10 mEq by mouth once daily., Disp: , Rfl:     travoprost, benzalkonium, (TRAVATAN) 0.004 % ophthalmic solution, , Disp: , Rfl:     Facility-Administered Medications Ordered in Other Encounters:     0.9%  NaCl infusion, , Intravenous, Continuous, Ahsan Enciso MD, Stopped at 01/17/19 1328    sodium chloride 0.9% flush 3 mL, 3 mL, Intravenous, PRN, Ahsan Enciso MD    Review of Systems   Constitutional: Positive for fatigue. Negative for chills and fever.   HENT: Positive for hearing loss (Baseline). Negative for rhinorrhea.    Eyes: Negative for photophobia and visual disturbance.   Respiratory: Negative for cough and shortness of breath.    Cardiovascular: Negative for chest pain and leg swelling.   Gastrointestinal: Negative for nausea and vomiting.   Musculoskeletal: Negative for neck pain and neck stiffness.   Skin: Negative for rash and wound.   Neurological: Positive for dizziness. Negative for light-headedness and headaches.     Objective:     Vital Signs (Most Recent):  Temp: 98.4 °F (36.9 °C) (10/10/19 0833)  Pulse: 72 (10/10/19 1002)  Resp: 14 (10/10/19 1003)  BP: (!) 192/92 (10/10/19 1002)  SpO2: 97 % (10/10/19 1002)    Vital Signs Range (Last 24H):  Temp:  [98.4 °F (36.9 °C)]   Pulse:  [71-76]   Resp:  [14-16]   BP: (142-199)/(60-93)   SpO2:  [95 %-97 %]     Physical Exam  General:  Well-developed, well-nourished, nad  HEENT:  NCAT, PERRLA, EOMI, oropharyngeal membranes non-erythematous/without exudate  Neck:  Supple, normal ROM without nuchal rigidity  Resp:  Symmetric expansion, no increased " wob  CVS:  No LE edema, peripheral pulses 2+ (radial, dorsalis pedis)  GI:  Abd soft, non-distended, non-tender to palpation  Neurologic Exam:  Mental Status:  AAOx3.  Speech, thought content appropriate.  Able to spell 'world' forward and 'backward.'  Recent, remote recall 3/3.  Cranial Nerves:  VFs intact on counting fingers in all quadrants bilaterally.  PERRLA, EOMI.  Facial movement, sensation intact and symmetric.  Palate raises symmetrically, tongue protrudes midline.  Trapezius, SCM strength 5/5 bilaterally.  No nystagmus, Byron-Hallpike maneuvers unremarkable bilaterally.  Motor:  Normal bulk and tone.  BUE shoulder abduction, biceps/triceps,  strength 5/5.  BLE hip flexors, knee flexion/extension, plantarflexion/dorsiflexion 5/5.  Mild pronator drift RUE.  Sensory:  Intact to light touch at all extremities and face without inattention.   Reflexes:  Biceps, brachioradialis, patellar 2+ and symmetric.  No ankle clonus.  Downgoing toe bilaterally.  Coordination:  FNF, SUMI, HTS intact with no dysmetria/ataxia/dysdiadochokinesia.  No resting tremor or myoclonus.  Gait:  Deferred 2/2 fall risk    Neurological Exam:   LOC: alert  Attention Span: Good   Language: No aphasia  Articulation: No dysarthria  Orientation: Person, Place, Time   Visual Fields: Full  EOM (CN III, IV, VI): Full/intact  Pupils (CN II, III): PERRL  Facial Sensation (CN V): Normal  Facial Movement (CN VII): Symmetric facial expression    Reflexes: 2+ throughout  Motor: Arm left  Normal 5/5  Leg left  Normal 5/5  Arm right  Paresis: 4/5  Leg right Normal 5/5  Cebellar: No evidence of appendicular or axial ataxia  Sensation: Intact to light touch, temperature and vibration  Tone: Normal tone throughout    Laboratory:  CMP:   Recent Labs   Lab 10/10/19  0957   CALCIUM 9.0   ALBUMIN 3.1*   PROT 7.2      K 3.9   CO2 30*      BUN 18   CREATININE 1.0   ALKPHOS 85   ALT 7*   AST 11   BILITOT 0.4     CBC:   Recent Labs   Lab  10/10/19  0957   WBC 7.62   RBC 3.82*   HGB 10.8*   HCT 35.3*      MCV 92   MCH 28.3   MCHC 30.6*     Lipid Panel: No results for input(s): CHOL, LDLCALC, HDL, TRIG in the last 168 hours.  Coagulation: No results for input(s): PT, INR, APTT in the last 168 hours.  Hgb A1C: No results for input(s): HGBA1C in the last 168 hours.  TSH: No results for input(s): TSH in the last 168 hours.    Diagnostic Results:    Brain imaging:  10/10/19 MRI Brain w/o contrast:  Small region of encephalomalacia left parietal lobe corresponding to abnormality seen on CT most compatible with prior infarction with gliosis and scarring.  Scattered foci of T2 FLAIR signal hyperintensity elsewhere supratentorial white matter suggestive for mild degree of chronic ischemic change.  Otherwise unremarkable noncontrast MRI brain as detailed above specifically without evidence for acute infarction.  Incidental bilateral globe proptosis clinical correlation for underlying thyroid orbitopathy.    Vessel Imaging:  10/10/19 MRA Brain w/o contrast:  Severe motion distorted MRA head as detailed above.  Clinical correlation and further evaluation with repeat imaging when patient better tolerate scanning as warranted.    Cardiac Evaluation:   10/10/19 EKG:  Sinus rhythm with 1st degree A-V block  Left anterior fascicular block  Right bundle branch block  Right ventricular hypertrophy  Abnormal ECG

## 2019-10-10 NOTE — ED TRIAGE NOTES
Patient presents to the ED with dizziness. States she was getting up to use RR and room was spinning and she eased herself to the floor. No LOC. Denies injury of any sort. No HA. VSS

## 2019-10-10 NOTE — ED NOTES
Patient and family updated on POC at this time. Pt is in bed with side rails up x2 and bed wheels locked. VSS. Call bell within reach of patient and family and instructed to call for any needs. Will continue to monitor.

## 2019-10-10 NOTE — ASSESSMENT & PLAN NOTE
-Description is more consistent with BPPV, would have pt see ENT outpatient and consider vestibular rehab  -Could also be related to starting cetirizine recently or relative hypoglycemia this am, less likely 2/2 positional nature of vertigo  -Vertigo occasionally presents as a late side effect of stroke, but again is unlikely to be positional in that case  -MRI Brain with no new stroke  -Can have outpatient work up, would start with ENT for possible BPPV

## 2019-10-10 NOTE — ED NOTES
Patient identifiers verified and correct for Elina Mei.    LOC: The patient is awake, alert and aware of environment with an appropriate affect, the patient is oriented x and speaking appropriately.  APPEARANCE: Patient resting comfortably and in no acute distress, patient is clean and well groomed, patient's clothing is properly fastened.  SKIN: The skin is warm and dry, color consistent with ethnicity, patient has normal skin turgor and moist mucus membranes, skin intact, no breakdown or bruising noted. Patient has a mastectomy to R arm. Right limb alert bracelet applied.   MUSCULOSKELETAL: Patient moving all extremities spontaneously. She has generalized weakness.   RESPIRATORY: Airway is open and patent, respirations are spontaneous.  CARDIAC: Patient has a normal rate, no periphreal edema noted, capillary refill < 3 seconds.  ABDOMEN: Soft and non tender to palpation.  HEENT: Patient complains of dizziness when she moves around. States the room is spinning.

## 2019-10-10 NOTE — PROVIDER PROGRESS NOTES - EMERGENCY DEPT.
Encounter Date: 10/10/2019    ED Physician Progress Notes         EKG - STEMI Decision  Initial Reading: No STEMI present (Sinus rhythm with prolonged NC interval at 238 milliseconds, otherwise grossly unchanged from prior).

## 2019-10-10 NOTE — HPI
"78 yo F with PMHx of DM II, HTN, HLD, glaucoma, and hx of L MCA stroke who had followed with Dr. Nicholas previously presents to [facility] ED 10/10/19 due to dizziness this am.  She states that it only occurs with going from laying down to sitting up and describes it as a head-spinning or room-spinning feeling rather than light-headedness.  Denies diplopia, dysphagia, coordination issues, gait changes, n/v.  Daughter at bedside notes that she had not eaten breakfast this am (maybe didn't eat dinner last night either?--pt can't remember), but still took her scheduled insulin.  POC BG in ED this am is 72.  Of note, patient also started taking cetirizine 2 days ago for allergies, rhinorrhea. Patient denies tinnitus, does have some baseline hearing difficulty.  One daughter notes that patient has been told she had ear problems, hears "whooshing" in her ears sometimes. Per chart review, looks like patient had bilateral myringotomy 07/24/18 and has hearing loss due to some serous otitis media.  Discussed that we will ensure we rule out stroke with MRI/MRA, but thought to be another etiology underlying positional vertigo--possibly BPPV versus medication side effect, cetirizine less likely to cause positional vertigo but sometimes noted to cause dizziness.    "

## 2019-10-10 NOTE — ED PROVIDER NOTES
Encounter Date: 10/10/2019       History     Chief Complaint   Patient presents with    Dizziness     pt reports that dizziness started this am when awakening, feels like the room is spinning, denies pain.      79 F with PMH of HTN, DM, L MCA stroke on ASA/Plavix (4/2018), HLD on lipitor, breast cancer s/p R mastectomy who presents with dizziness. Pt reports getting up out of bed to use the bathroom when she felt acutely dizzy and felt as if the room was spinning around her and fell back into bed. She feels the dizziness every time she sits up and it resolves when she lies flat. She did not hit her head or experience any other trauma. She had no LOC, no urinary or fecal incontinence, and no acute vision changes. This has not occurred before. She also reports fatigue and increased dryness of the mouth. Patient reports eating and drinking well. Sugars per daughter was in the low 70s, typically in the 90s. Of note, patient was recently prescribed cetirizine which she took for the first time yesterday. She denies F/C, CP, abdominal pain, N/V, dysuria, hematuria, dyspnea, constipation, or diarrhea.         Review of patient's allergies indicates:  No Known Allergies  Past Medical History:   Diagnosis Date    Acute right-sided weakness 4/26/2018    Arthritis     Diabetes mellitus     Hypertension     Malignant neoplasm of upper-outer quadrant of right breast in female, estrogen receptor positive 12/5/2017    Status post mastectomy, right 2018    Stroke 4/26/2018     Past Surgical History:   Procedure Laterality Date    BREAST BIOPSY      COLONOSCOPY N/A 11/8/2018    Procedure: COLONOSCOPY;  Surgeon: Ahsan Enciso MD;  Location: [facility];  Service: Endoscopy;  Laterality: N/A;    COLONOSCOPY N/A 1/17/2019    Procedure: COLONOSCOPY-with polypectomy;  Surgeon: Ahsan Enciso MD;  Location: [facility];  Service: Endoscopy;  Laterality: N/A;    ESOPHAGOGASTRODUODENOSCOPY N/A 11/8/2018    Procedure: EGD  (ESOPHAGOGASTRODUODENOSCOPY);  Surgeon: Ahsan Enciso MD;  Location: [facility] ENDO;  Service: Endoscopy;  Laterality: N/A;    EYE SURGERY      HYSTERECTOMY      MASTECTOMY      MYRINGOTOMY WITH INSERTION OF VENTILATION TUBE Bilateral 7/24/2018    Procedure: MYRINGOTOMY, WITH TYMPANOSTOMY TUBE INSERTION;  Surgeon: Ventura Whitt MD;  Location: [facility] OR;  Service: ENT;  Laterality: Bilateral;     Family History   Problem Relation Age of Onset    Cancer Sister 75        Colon CA    Breast cancer Sister 55    Cancer Brother     Hypertension Mother      Social History     Tobacco Use    Smoking status: Never Smoker    Smokeless tobacco: Current User     Types: Snuff   Substance Use Topics    Alcohol use: No    Drug use: No     Review of Systems   Constitutional: Positive for fatigue. Negative for chills and fever.   HENT: Negative for sore throat.    Eyes: Negative for visual disturbance.   Respiratory: Negative for shortness of breath.    Cardiovascular: Negative for chest pain.   Gastrointestinal: Negative for abdominal pain, constipation, diarrhea, nausea and vomiting.   Endocrine: Positive for polyuria.   Genitourinary: Negative for dysuria and hematuria.   Musculoskeletal: Negative for myalgias.   Neurological: Positive for dizziness and light-headedness. Negative for headaches.   Psychiatric/Behavioral: Negative for agitation.       Physical Exam     Initial Vitals [10/10/19 0833]   BP Pulse Resp Temp SpO2   (!) 142/60 76 16 98.4 °F (36.9 °C) 95 %      MAP       --         Physical Exam    Constitutional: She appears well-developed and well-nourished. She is not diaphoretic. No distress.   HENT:   Head: Normocephalic and atraumatic.   Mouth/Throat: No oropharyngeal exudate.   Dry skin noted around mouth. Mucosa was moist   Eyes: Conjunctivae and EOM are normal.   Neck: Normal range of motion. Neck supple.   Cardiovascular: Normal rate and regular rhythm.   No murmur heard.  Pulmonary/Chest:  Breath sounds normal. No respiratory distress.   Abdominal: Soft. Bowel sounds are normal. There is no tenderness. There is no rebound and no guarding.   Musculoskeletal: Normal range of motion.   Lymphadenopathy:     She has no cervical adenopathy.   Neurological: She is alert and oriented to person, place, and time. She has normal strength. No cranial nerve deficit or sensory deficit.   Strength 5/5 in all extremities, no dysmetria on finger nose test, intact coordination with heel shin test, no drift, sensation intact   Skin: Skin is warm and dry.         ED Course   Procedures  Labs Reviewed - No data to display  EKG Readings: (Independently Interpreted)   Sinus rhythm with rate of 72, left axis deviation, no acute ischemia.     ECG Results          EKG 12-lead (In process)  Result time 10/10/19 09:16:19    In process by Interface, Lab In [facility] (10/10/19 09:16:19)                 Narrative:    Test Reason : R42,    Vent. Rate : 073 BPM     Atrial Rate : 073 BPM     P-R Int : 258 ms          QRS Dur : 160 ms      QT Int : 448 ms       P-R-T Axes : 068 -73 067 degrees     QTc Int : 493 ms    Sinus rhythm with 1st degree A-V block  Left axis deviation  Right bundle branch block  LVH with repolarization abnormality  Abnormal ECG  When compared with ECG of 06-NOV-2018 09:33,  DC interval has increased  QT has lengthened    Referred By: [email or web address]   SELF           Confirmed By:                             Imaging Results    None          Medical Decision Making:   Initial Assessment:   79 F with PMH stroke, HTN, DM who is here for dizziness/vertigo possibly 2/2 basilar insufficiency, medication adverse effect, or orthostatic hypotension   Differential Diagnosis:   Differentials include but are not limited to BPPV, medication induced dizziness/vertigo, orthostatic hypotension, TIA/stroke - doubt as patient does not show clinical s/s, carotid insufficiency  ED Management:  10:26 AM basic labs, UA, CT head w/o contrast  ordered. [facility] neurology consulted, appreciate help.   10:36 AM MRI/MRA brain without contrast ordered.  2:22 PM MRI brain shows signs of infarct, though no new signs of acute ischemia. MRA brain was hampered due to patient movement, though it shows patent basilar artery. Patient able to stand up and walk around with significant dizziness or vertigo. Family reports that patient was able to move around at her baseline level. In light of negative brain imaging and resolution of symptoms, likely etiology of acute onset dizziness and vertigo is the start of new medication cetirizine (10/09). Patient advised to stop taking medication and follow up with PCP.   3:55 PM TSH and T4 wnl. Urine culture ordered as many bacteria seen on UA. Plan to discharge patient with plans to f/u with PCP in 1 or 2 days to further evaluation and management.               Attending Attestation:   Physician Attestation Statement for Resident:  As the supervising MD   Physician Attestation Statement: I have personally seen and examined this patient.   I agree with the above history. -: 79-year-old woman presents complaining dizziness and fatigue that began this morning.  She reports that she got up out of bed and felt very dizzy.  She then fell back into her bed.  She states that since then she has felt very tired like she just wants to go back to sleep.  She denies any chest pain or shortness of breath. No numbness or weakness in her arms or her legs.  She lives at home with multiple family members.   As the supervising MD I agree with the above PE.   -: Patient appears nontoxic.  She has full strength in all 4 extremities.  She has normal rapid alternating movements and normal finger-to-nose.  Normal speech.   As the supervising MD I agree with the above treatment, course, plan, and disposition.   -: Our differential diagnosis was wide including infection, electrolyte abnormality, posterior stroke.  We did discuss the case with the stroke team  given her initial complaint was a vertigo and that this patient had a stroke approximately 1 year ago.  They evaluated the patient and recommended an MRI in the ED.  We also sent labs all of which were unremarkable other than a urinalysis that showed many bacteria.  A culture was added on to this as it was not clearly an infection with no white blood cells and negative leukocyte esterase, negative nitrate.  Other labs were unremarkable. MRI was somewhat limited by motion however did not show anything concerning.  We did discuss this again once it was completed with the stroke team and they felt comfortable with patient going home.  Patient was able to ambulate without difficulty and she patient and family felt comfortable going home with close follow-up.  She understands to return for worsening in any way.                       Clinical Impression:       ICD-10-CM ICD-9-CM   1. Fatigue, unspecified type R53.83 780.79   2. Dizziness R42 780.4   3. Near syncope R55 780.2   4. Weakness R53.1 780.79                                Ahsan Terry MD  Resident  10/10/19 1623       Melissa Hannah MD  10/12/19 0847

## 2019-10-10 NOTE — ASSESSMENT & PLAN NOTE
-Mild hypoglycemia that am, possibly causing some dizziness  -Stroke risk factor, last Hgb A1c 6.8%  -Management per PCP

## 2019-10-10 NOTE — CONSULTS
[facility]  Vascular Neurology  Comprehensive Stroke Center  Consult Note    Inpatient consult to Vascular (Stroke) Neurology  Consult performed by: Sabrina Jennings MD  Consult ordered by: Ahsan Terry MD        Assessment/Plan:     Vertigo  -Description is more consistent with BPPV, would have pt see ENT outpatient and consider vestibular rehab  -Could also be related to starting cetirizine recently or relative hypoglycemia this am, less likely 2/2 positional nature of vertigo  -Vertigo occasionally presents as a late side effect of stroke, but again is unlikely to be positional in that case  -MRI Brain with no new stroke  -Can have outpatient work up, would start with ENT for possible BPPV    Chronic otitis media  -Noted per last ENT visit, had bilateral myringotomies  -Recommend re-establishing care with ENT    DM (diabetes mellitus)  -Mild hypoglycemia that am, possibly causing some dizziness  -Stroke risk factor, last Hgb A1c 6.8%  -Management per PCP    HTN (hypertension)  -Stroke risk factor, patient with unclear medication compliance--states she takes medications but also had not been eating and was still taking insulin so may have difficulty taking care of herself at home recently  -Management per PCP--pt on amlodipine 10 mg qd, furosemide 40 mg qd, losartan 50 mg bid    Embolic stroke involving left middle cerebral artery  -Previous infarct, had followed up with Dr. Nicholas and had home nursing checks  -Continue secondary stroke prevention--clopidogrel 75 mg qd, atorvastatin 40 mg qd    STROKE DOCUMENTATION      NIH Scale:  Interval: baseline  1a. Level of Consciousness: 0-->Alert, keenly responsive  1b. LOC Questions: 0-->Answers both questions correctly  1c. LOC Commands: 0-->Performs both tasks correctly  2. Best Gaze: 0-->Normal  3. Visual: 0-->No visual loss  4. Facial Palsy: 1-->Minor paralysis (flattened nasolabial fold, asymmetry on smiling)  5a. Motor Arm, Left:  0-->No drift, limb holds 90 (or 45) degrees for full 10 secs  5b. Motor Arm, Right: 0-->No drift, limb holds 90 (or 45) degrees for full 10 secs  6a. Motor Leg, Left: 0-->No drift, leg holds 30 degree position for full 5 secs  6b. Motor Leg, Right: 0-->No drift, leg holds 30 degree position for full 5 secs  7. Limb Ataxia: 0-->Absent  8. Sensory: 0-->Normal, no sensory loss  9. Best Language: 0-->No aphasia, normal  10. Dysarthria: 0-->Normal  11. Extinction and Inattention (formerly Neglect): 0-->No abnormality  Total (NIH Stroke Scale): 1    Modified Ramesh Score: 1  Joselyn Coma Scale:15   ABCD2 Score:    [id number] Score:   HAS -BLED Score:   ICH Score:   Hunt & Diaz Classification:     Thrombolysis Candidate? No, Strong suspicion for stroke mimic or alternative diagnosis     Delays to Thrombolysis?  No    Interventional Revascularization Candidate?   Is the patient eligible for mechanical endovascular reperfusion (PATEL)?  No; No large vessel occlusion and No; No significant neurological deficit    Hemorrhagic change of an Ischemic Stroke: Does this patient have an ischemic stroke with hemorrhagic changes? No     Subjective:     History of Present Illness:  80 yo F with PMHx of DM II, HTN, HLD, glaucoma, and hx of L MCA stroke who had followed with Dr. Nicholas previously [place] [facility] [place]10[place] due to dizziness this am.  She states that it only occurs with going from laying down to sitting up and describes it as a head-spinning or room-spinning feeling rather than light-headedness.  Denies diplopia, dysphagia, coordination issues, gait changes, n/v.  Daughter at bedside notes that she had not eaten breakfast this am (maybe didn't eat dinner last night either?--pt can't remember), but still took her scheduled insulin.  POC BG in ED this am is 72.  Of note, patient also started taking cetirizine 2 days ago for allergies, rhinorrhea. Patient denies tinnitus, does have some baseline hearing difficulty.  One  "daughter notes that patient has been told she had ear problems, hears "whooshing" in her ears sometimes. Per chart review, looks like patient had bilateral myringotomy 07/24/18 and has hearing loss due to some serous otitis media.  Discussed that we will ensure we rule out stroke with MRI/MRA, but thought to be another etiology underlying positional vertigo--possibly BPPV versus medication side effect, cetirizine less likely to cause positional vertigo but sometimes noted to cause dizziness.          Past Medical History:   Diagnosis Date    Acute right-sided weakness 4/26/2018    Arthritis     Diabetes mellitus     Hypertension     Malignant neoplasm of upper-outer quadrant of right breast in female, estrogen receptor positive 12/5/2017    Status post mastectomy, right 2018    Stroke 4/26/2018     Past Surgical History:   Procedure Laterality Date    BREAST BIOPSY      COLONOSCOPY N/A 11/8/2018    Procedure: COLONOSCOPY;  Surgeon: Ahsan Enciso MD;  Location: [facility];  Service: Endoscopy;  Laterality: N/A;    COLONOSCOPY N/A 1/17/2019    Procedure: COLONOSCOPY-with polypectomy;  Surgeon: Ahsan Enciso MD;  Location: [facility];  Service: Endoscopy;  Laterality: N/A;    ESOPHAGOGASTRODUODENOSCOPY N/A 11/8/2018    Procedure: EGD (ESOPHAGOGASTRODUODENOSCOPY);  Surgeon: Ahsan Enciso MD;  Location: [facility];  Service: Endoscopy;  Laterality: N/A;    EYE SURGERY      HYSTERECTOMY      MASTECTOMY      MYRINGOTOMY WITH INSERTION OF VENTILATION TUBE Bilateral 7/24/2018    Procedure: MYRINGOTOMY, WITH TYMPANOSTOMY TUBE INSERTION;  Surgeon: Ventura Whitt MD;  Location: [facility];  Service: ENT;  Laterality: Bilateral;     Family History   Problem Relation Age of Onset    Cancer Sister 75        Colon CA    Breast cancer Sister 55    Cancer Brother     Hypertension Mother      Social History     Tobacco Use    Smoking status: Never Smoker    Smokeless tobacco: Current User     Types: " Snuff   Substance Use Topics    Alcohol use: No    Drug use: No     Review of patient's allergies indicates:  No Known Allergies    Medications: I have reviewed the current medication administration record.    No current facility-administered medications for this encounter.     Current Outpatient Medications:     ACCU-CHEK SMARTVIEW TEST STRIP Strp, USE TO TEST BLOOD SUGAR TWICE A DAY, Disp: , Rfl: 0    amlodipine (NORVASC) 10 MG tablet, Take 10 mg by mouth once daily., Disp: , Rfl:     aspirin (ECOTRIN) 81 MG EC tablet, Take 1 tablet (81 mg total) by mouth once daily., Disp: 30 tablet, Rfl: 1    atorvastatin (LIPITOR) 40 MG tablet, Take 1 tablet (40 mg total) by mouth once daily., Disp: 30 tablet, Rfl: 1    blood sugar diagnostic (ACCU-CHEK SMARTVIEW TEST STRIP MISC), , Disp: , Rfl:     blood-glucose meter Misc, accu-chek    kit franky, Disp: , Rfl:     brimonidine 0.1% (ALPHAGAN P) 0.1 % Drop, Alphagan P 0.1 % eye drops, Disp: , Rfl:     clopidogrel (PLAVIX) 75 mg tablet, Take 1 tablet (75 mg total) by mouth once daily., Disp: 30 tablet, Rfl: 3    docusate sodium (COLACE) 100 MG capsule, Take 1 capsule (100 mg total) by mouth 2 (two) times daily., Disp: , Rfl: 0    dorzolamide-timolol 2-0.5% 22.3-6.8 mg/mL ophthalmic solution, Place 1 drop into both eyes 2 (two) times daily., Disp: , Rfl: 6    dorzolamide-timolol 2-0.5% (COSOPT) 22.3-6.8 mg/mL ophthalmic solution, dorzol/timol sol 22.3-6.8, Disp: , Rfl:     ferrous sulfate 325 mg Tab tablet, Take 1 tablet (325 mg total) by mouth 2 (two) times daily. Every other day., Disp: , Rfl: 0    Fluticasone 50 mcg/actuation nasal spray, 1 spray by Each Nare route 2 times daily as needed (sinus congestion)., Disp: 15 g, Rfl: 0    folic acid/multivit-min/lutein (CENTRUM SILVER ORAL), Take 1 Dose by mouth once daily., Disp: , Rfl:     furosemide (LASIX) 40 MG tablet, Take 40 mg by mouth once daily. , Disp: , Rfl:     LANTUS SOLOSTAR 100 unit/mL InPn pen, INJECT  "35 UNIT(S) EVERY EVENING BY SUBCUTANEOUS ROUTE., Disp: , Rfl: 2    losartan (COZAAR) 50 MG tablet, Take 50 mg by mouth once daily., Disp: , Rfl:     metformin (GLUCOPHAGE) 500 MG tablet, Take 500 mg by mouth 2 (two) times daily with meals., Disp: , Rfl:     nitroGLYCERIN 0.4 MG SL tablet, Place 1 tablet under the tongue every 5 (five) minutes as needed for Chest pain.,     omeprazole 40 MG capsule, Take 1 capsule (40 mg total) by mouth 2 (two) times daily before meals. for 14 days    ondansetron (ZOFRAN-ODT) 8 MG TbDL, Take 1 tablet (8 mg total) by mouth every 8 (eight) hours as needed., Disp: 6 tablet, Rfl: 1    pantoprazole (PROTONIX) 40 MG tablet, Take 1 tablet (40 mg total) by mouth once daily., Disp: 30 tablet, Rfl: 11    pen needle, diabetic 32 gauge x 3/16" Ndle, by Misc.(Non-Drug; Combo Route) route., Disp: , Rfl:     potassium chloride (KLOR-CON) 10 MEQ TbSR, Take 10 mEq by mouth once daily., Disp: , Rfl:     travoprost, benzalkonium, (TRAVATAN) 0.004 % ophthalmic solution, , Disp: , Rfl:     Facility-Administered Medications Ordered in Other Encounters:     0.9%  NaCl infusion, , Intravenous, Continuous, Ahsan Enciso MD, Stopped at 01/17/19 1328    sodium chloride 0.9% flush 3 mL, 3 mL, Intravenous, PRN, Ahsan Enciso MD    Review of Systems   Constitutional: Positive for fatigue. Negative for chills and fever.   HENT: Positive for hearing loss (Baseline). Negative for rhinorrhea.    Eyes: Negative for photophobia and visual disturbance.   Respiratory: Negative for cough and shortness of breath.    Cardiovascular: Negative for chest pain and leg swelling.   Gastrointestinal: Negative for nausea and vomiting.   Musculoskeletal: Negative for neck pain and neck stiffness.   Skin: Negative for rash and wound.   Neurological: Positive for dizziness. Negative for light-headedness and headaches.     Objective:     Vital Signs (Most Recent):  Temp: 98.4 °F (36.9 °C) (10/10/19 0833)  Pulse: 72 " (10/10/19 1002)  Resp: 14 (10/10/19 1003)  BP: (!) 192/92 (10/10/19 1002)  SpO2: 97 % (10/10/19 1002)    Vital Signs Range (Last 24H):  Temp:  [98.4 °F (36.9 °C)]   Pulse:  [71-76]   Resp:  [14-16]   BP: (142-199)/(60-93)   SpO2:  [95 %-97 %]     Physical Exam  General:  Well-developed, well-nourished, nad  HEENT:  NCAT, PERRLA, EOMI, oropharyngeal membranes non-erythematous/without exudate  Neck:  Supple, normal ROM without nuchal rigidity  Resp:  Symmetric expansion, no increased wob  CVS:  No LE edema, peripheral pulses 2+ (radial, dorsalis pedis)  GI:  Abd soft, non-distended, non-tender to palpation  Neurologic Exam:  Mental Status:  AAOx3.  Speech, thought content appropriate.  Able to spell 'world' forward and 'backward.'  Recent, remote recall 3/3.  Cranial Nerves:  VFs intact on counting fingers in all quadrants bilaterally.  PERRLA, EOMI.  Facial movement, sensation intact and symmetric.  Palate raises symmetrically, tongue protrudes midline.  Trapezius, SCM strength 5/5 bilaterally.  No nystagmus, Moy-Hallpike maneuvers unremarkable bilaterally.  Motor:  Normal bulk and tone.  BUE shoulder abduction, biceps/triceps,  strength 5/5.  BLE hip flexors, knee flexion/extension, plantarflexion/dorsiflexion 5/5.  Mild pronator drift RUE.  Sensory:  Intact to light touch at all extremities and face without inattention.   Reflexes:  Biceps, brachioradialis, patellar 2+ and symmetric.  No ankle clonus.  Downgoing toe bilaterally.  Coordination:  FNF, SUMI, HTS intact with no dysmetria/ataxia/dysdiadochokinesia.  No resting tremor or myoclonus.  Gait:  Deferred 2/2 fall risk    Neurological Exam:   LOC: alert  Attention Span: Good   Language: No aphasia  Articulation: No dysarthria  Orientation: Person, Place, Time   Visual Fields: Full  EOM (CN III, IV, VI): Full/intact  Pupils (CN II, III): PERRL  Facial Sensation (CN V): Normal  Facial Movement (CN VII): Symmetric facial expression    Reflexes: 2+  throughout  Motor: Arm left  Normal 5/5  Leg left  Normal 5/5  Arm right  Paresis: 4/5  Leg right Normal 5/5  Cebellar: No evidence of appendicular or axial ataxia  Sensation: Intact to light touch, temperature and vibration  Tone: Normal tone throughout    Laboratory:  CMP:   Recent Labs   Lab 10/10/19  0957   CALCIUM 9.0   ALBUMIN 3.1*   PROT 7.2      K 3.9   CO2 30*      BUN 18   CREATININE 1.0   ALKPHOS 85   ALT 7*   AST 11   BILITOT 0.4     CBC:   Recent Labs   Lab 10/10/19  0957   WBC 7.62   RBC 3.82*   HGB 10.8*   HCT 35.3*      MCV 92   MCH 28.3   MCHC 30.6*     Lipid Panel: No results for input(s): CHOL, LDLCALC, HDL, TRIG in the last 168 hours.  Coagulation: No results for input(s): PT, INR, APTT in the last 168 hours.  Hgb A1C: No results for input(s): HGBA1C in the last 168 hours.  TSH: No results for input(s): TSH in the last 168 hours.    Diagnostic Results:    Brain imaging:  10/10/19 MRI Brain w/o contrast:  Small region of encephalomalacia left parietal lobe corresponding to abnormality seen on CT most compatible with prior infarction with gliosis and scarring.  Scattered foci of T2 FLAIR signal hyperintensity elsewhere supratentorial white matter suggestive for mild degree of chronic ischemic change.  Otherwise unremarkable noncontrast MRI brain as detailed above specifically without evidence for acute infarction.  Incidental bilateral globe proptosis clinical correlation for underlying thyroid orbitopathy.    Vessel Imaging:  10/10/19 MRA Brain w/o contrast:  Severe motion distorted MRA head as detailed above.  Clinical correlation and further evaluation with repeat imaging when patient better tolerate scanning as warranted.    Cardiac Evaluation:   10/10/19 EKG:  Sinus rhythm with 1st degree A-V block  Left anterior fascicular block  Right bundle branch block  Right ventricular hypertrophy  Abnormal ECG    Sabrina Jennings MD  [facility]  Department of  Vascular Neurology   [facility]

## 2019-10-10 NOTE — ASSESSMENT & PLAN NOTE
-Stroke risk factor, patient with unclear medication compliance--states she takes medications but also had not been eating and was still taking insulin so may have difficulty taking care of herself at home recently  -Management per PCP--pt on amlodipine 10 mg qd, furosemide 40 mg qd, losartan 50 mg bid

## 2019-10-13 NOTE — PROVIDER PROGRESS NOTES - EMERGENCY DEPT.
Encounter Date: 10/10/2019    ED Physician Progress Notes        Physician Note:   Discussed urine culture results with pt's daughter, Mrs. Padilla. Pt's symptoms have improved since she was seen in the ED, but has urinary frequency. Prescription for Keflex called into patient's preferred pharmacy.     Marilia Agee PA-C   10/13/2019 2:28 PM

## 2020-01-01 ENCOUNTER — EXTERNAL HOME HEALTH (OUTPATIENT)
Dept: HOME HEALTH SERVICES | Facility: HOSPITAL | Age: 80
End: 2020-01-01

## 2020-01-01 ENCOUNTER — HOSPITAL ENCOUNTER (OUTPATIENT)
Facility: HOSPITAL | Age: 80
Discharge: HOME | End: 2020-01-18
Attending: EMERGENCY MEDICINE | Admitting: FAMILY MEDICINE
Payer: MEDICARE

## 2020-01-01 ENCOUNTER — HOSPITAL ENCOUNTER (INPATIENT)
Facility: HOSPITAL | Age: 80
LOS: 2 days | DRG: 871 | End: 2020-05-06
Attending: INTERNAL MEDICINE | Admitting: INTERNAL MEDICINE
Payer: MEDICARE

## 2020-01-01 VITALS
WEIGHT: 170 LBS | OXYGEN SATURATION: 97 % | TEMPERATURE: 98 F | SYSTOLIC BLOOD PRESSURE: 57 MMHG | HEIGHT: 67 IN | DIASTOLIC BLOOD PRESSURE: 32 MMHG | BODY MASS INDEX: 26.68 KG/M2 | RESPIRATION RATE: 28 BRPM

## 2020-01-01 VITALS
TEMPERATURE: 99 F | BODY MASS INDEX: 35.02 KG/M2 | RESPIRATION RATE: 18 BRPM | HEIGHT: 68 IN | OXYGEN SATURATION: 94 % | HEART RATE: 68 BPM | DIASTOLIC BLOOD PRESSURE: 72 MMHG | SYSTOLIC BLOOD PRESSURE: 166 MMHG | WEIGHT: 231.06 LBS

## 2020-01-01 DIAGNOSIS — N30.00 ACUTE CYSTITIS WITHOUT HEMATURIA: ICD-10-CM

## 2020-01-01 DIAGNOSIS — A49.01 STAPH AUREUS INFECTION: ICD-10-CM

## 2020-01-01 DIAGNOSIS — R94.31 PROLONGED Q-T INTERVAL ON ECG: ICD-10-CM

## 2020-01-01 DIAGNOSIS — N17.9 ACUTE RENAL FAILURE: ICD-10-CM

## 2020-01-01 DIAGNOSIS — R65.21 SEPTIC SHOCK: ICD-10-CM

## 2020-01-01 DIAGNOSIS — B95.61 STAPHYLOCOCCUS AUREUS BACTEREMIA: ICD-10-CM

## 2020-01-01 DIAGNOSIS — R78.81 BACTEREMIA: ICD-10-CM

## 2020-01-01 DIAGNOSIS — A41.9 SEPTIC SHOCK: ICD-10-CM

## 2020-01-01 DIAGNOSIS — R42 DIZZINESS: ICD-10-CM

## 2020-01-01 DIAGNOSIS — I63.412 EMBOLIC STROKE INVOLVING LEFT MIDDLE CEREBRAL ARTERY: ICD-10-CM

## 2020-01-01 DIAGNOSIS — R42 VERTIGO: ICD-10-CM

## 2020-01-01 DIAGNOSIS — R78.81 STAPHYLOCOCCUS AUREUS BACTEREMIA: ICD-10-CM

## 2020-01-01 DIAGNOSIS — I16.1 HYPERTENSIVE EMERGENCY: ICD-10-CM

## 2020-01-01 DIAGNOSIS — I16.0 HYPERTENSIVE URGENCY: Primary | ICD-10-CM

## 2020-01-01 DIAGNOSIS — R79.89 ELEVATED TROPONIN: ICD-10-CM

## 2020-01-01 DIAGNOSIS — J96.01 ACUTE RESPIRATORY FAILURE WITH HYPOXIA: ICD-10-CM

## 2020-01-01 LAB
25(OH)D3+25(OH)D2 SERPL-MCNC: 29 NG/ML (ref 30–96)
ALBUMIN SERPL BCP-MCNC: 2.7 G/DL (ref 3.5–5.2)
ALBUMIN SERPL BCP-MCNC: 2.7 G/DL (ref 3.5–5.2)
ALBUMIN SERPL BCP-MCNC: 2.8 G/DL (ref 3.5–5.2)
ALBUMIN SERPL BCP-MCNC: 2.9 G/DL (ref 3.5–5.2)
ALBUMIN SERPL BCP-MCNC: 3 G/DL (ref 3.5–5.2)
ALBUMIN SERPL BCP-MCNC: 3.1 G/DL (ref 3.5–5.2)
ALBUMIN SERPL BCP-MCNC: 3.2 G/DL (ref 3.5–5.2)
ALBUMIN SERPL BCP-MCNC: 3.2 G/DL (ref 3.5–5.2)
ALLENS TEST: ABNORMAL
ALP SERPL-CCNC: 110 U/L (ref 55–135)
ALP SERPL-CCNC: 110 U/L (ref 55–135)
ALP SERPL-CCNC: 186 U/L (ref 55–135)
ALP SERPL-CCNC: 79 U/L (ref 55–135)
ALP SERPL-CCNC: 79 U/L (ref 55–135)
ALT SERPL W/O P-5'-P-CCNC: 114 U/L (ref 10–44)
ALT SERPL W/O P-5'-P-CCNC: 238 U/L (ref 10–44)
ALT SERPL W/O P-5'-P-CCNC: 6 U/L (ref 10–44)
ALT SERPL W/O P-5'-P-CCNC: 6 U/L (ref 10–44)
ALT SERPL W/O P-5'-P-CCNC: 605 U/L (ref 10–44)
ANION GAP SERPL CALC-SCNC: 18 MMOL/L (ref 8–16)
ANION GAP SERPL CALC-SCNC: 18 MMOL/L (ref 8–16)
ANION GAP SERPL CALC-SCNC: 20 MMOL/L (ref 8–16)
ANION GAP SERPL CALC-SCNC: 24 MMOL/L (ref 8–16)
ANION GAP SERPL CALC-SCNC: 24 MMOL/L (ref 8–16)
ANION GAP SERPL CALC-SCNC: 28 MMOL/L (ref 8–16)
ANION GAP SERPL CALC-SCNC: 29 MMOL/L (ref 8–16)
ANION GAP SERPL CALC-SCNC: 29 MMOL/L (ref 8–16)
ANION GAP SERPL CALC-SCNC: 8 MMOL/L (ref 8–16)
ANION GAP SERPL CALC-SCNC: 9 MMOL/L (ref 8–16)
ANISOCYTOSIS BLD QL SMEAR: SLIGHT
APTT BLDCRRT: 34.4 SEC (ref 21–32)
AST SERPL-CCNC: 12 U/L (ref 10–40)
AST SERPL-CCNC: 1200 U/L (ref 10–40)
AST SERPL-CCNC: 14 U/L (ref 10–40)
AST SERPL-CCNC: 212 U/L (ref 10–40)
AST SERPL-CCNC: 522 U/L (ref 10–40)
AV INDEX (PROSTH): 0.82
AV MEAN GRADIENT: 8 MMHG
AV PEAK GRADIENT: 15 MMHG
AV VALVE AREA: 2.99 CM2
AV VELOCITY RATIO: 0.9
BACTERIA #/AREA URNS HPF: ABNORMAL /HPF
BACTERIA UR CULT: ABNORMAL
BASO STIPL BLD QL SMEAR: ABNORMAL
BASOPHILS # BLD AUTO: 0.02 K/UL (ref 0–0.2)
BASOPHILS # BLD AUTO: 0.02 K/UL (ref 0–0.2)
BASOPHILS # BLD AUTO: 0.04 K/UL (ref 0–0.2)
BASOPHILS # BLD AUTO: 0.07 K/UL (ref 0–0.2)
BASOPHILS NFR BLD: 0 % (ref 0–1.9)
BASOPHILS NFR BLD: 0 % (ref 0–1.9)
BASOPHILS NFR BLD: 0.2 % (ref 0–1.9)
BASOPHILS NFR BLD: 0.3 % (ref 0–1.9)
BASOPHILS NFR BLD: 0.3 % (ref 0–1.9)
BASOPHILS NFR BLD: 0.4 % (ref 0–1.9)
BILIRUB SERPL-MCNC: 0.3 MG/DL (ref 0.1–1)
BILIRUB SERPL-MCNC: 0.4 MG/DL (ref 0.1–1)
BILIRUB SERPL-MCNC: 0.5 MG/DL (ref 0.1–1)
BILIRUB SERPL-MCNC: 0.7 MG/DL (ref 0.1–1)
BILIRUB SERPL-MCNC: 1.2 MG/DL (ref 0.1–1)
BILIRUB UR QL STRIP: NEGATIVE
BNP SERPL-MCNC: 103 PG/ML (ref 0–99)
BSA FOR ECHO PROCEDURE: 1.91 M2
BUN SERPL-MCNC: 14 MG/DL (ref 8–23)
BUN SERPL-MCNC: 15 MG/DL (ref 8–23)
BUN SERPL-MCNC: 15 MG/DL (ref 8–23)
BUN SERPL-MCNC: 18 MG/DL (ref 8–23)
BUN SERPL-MCNC: 30 MG/DL (ref 8–23)
BUN SERPL-MCNC: 43 MG/DL (ref 8–23)
BUN SERPL-MCNC: 63 MG/DL (ref 8–23)
BUN SERPL-MCNC: 68 MG/DL (ref 8–23)
BUN SERPL-MCNC: 89 MG/DL (ref 8–23)
BUN SERPL-MCNC: 92 MG/DL (ref 8–23)
BURR CELLS BLD QL SMEAR: ABNORMAL
CALCIUM SERPL-MCNC: 8 MG/DL (ref 8.7–10.5)
CALCIUM SERPL-MCNC: 8.3 MG/DL (ref 8.7–10.5)
CALCIUM SERPL-MCNC: 8.9 MG/DL (ref 8.7–10.5)
CALCIUM SERPL-MCNC: 8.9 MG/DL (ref 8.7–10.5)
CALCIUM SERPL-MCNC: 9.2 MG/DL (ref 8.7–10.5)
CALCIUM SERPL-MCNC: 9.5 MG/DL (ref 8.7–10.5)
CALCIUM SERPL-MCNC: 9.5 MG/DL (ref 8.7–10.5)
CALCIUM SERPL-MCNC: 9.7 MG/DL (ref 8.7–10.5)
CALCIUM SERPL-MCNC: 9.8 MG/DL (ref 8.7–10.5)
CALCIUM SERPL-MCNC: 9.9 MG/DL (ref 8.7–10.5)
CHLORIDE SERPL-SCNC: 102 MMOL/L (ref 95–110)
CHLORIDE SERPL-SCNC: 104 MMOL/L (ref 95–110)
CHLORIDE SERPL-SCNC: 106 MMOL/L (ref 95–110)
CHLORIDE SERPL-SCNC: 109 MMOL/L (ref 95–110)
CHLORIDE SERPL-SCNC: 79 MMOL/L (ref 95–110)
CHLORIDE SERPL-SCNC: 82 MMOL/L (ref 95–110)
CHLORIDE SERPL-SCNC: 90 MMOL/L (ref 95–110)
CHLORIDE SERPL-SCNC: 97 MMOL/L (ref 95–110)
CHLORIDE SERPL-SCNC: 98 MMOL/L (ref 95–110)
CHLORIDE SERPL-SCNC: 98 MMOL/L (ref 95–110)
CLARITY UR: CLEAR
CO2 SERPL-SCNC: 11 MMOL/L (ref 23–29)
CO2 SERPL-SCNC: 11 MMOL/L (ref 23–29)
CO2 SERPL-SCNC: 12 MMOL/L (ref 23–29)
CO2 SERPL-SCNC: 15 MMOL/L (ref 23–29)
CO2 SERPL-SCNC: 16 MMOL/L (ref 23–29)
CO2 SERPL-SCNC: 17 MMOL/L (ref 23–29)
CO2 SERPL-SCNC: 21 MMOL/L (ref 23–29)
CO2 SERPL-SCNC: 26 MMOL/L (ref 23–29)
CO2 SERPL-SCNC: 29 MMOL/L (ref 23–29)
CO2 SERPL-SCNC: 8 MMOL/L (ref 23–29)
COLOR UR: YELLOW
CREAT SERPL-MCNC: 1.1 MG/DL (ref 0.5–1.4)
CREAT SERPL-MCNC: 1.4 MG/DL (ref 0.5–1.4)
CREAT SERPL-MCNC: 1.8 MG/DL (ref 0.5–1.4)
CREAT SERPL-MCNC: 1.8 MG/DL (ref 0.5–1.4)
CREAT SERPL-MCNC: 2.6 MG/DL (ref 0.5–1.4)
CREAT SERPL-MCNC: 3 MG/DL (ref 0.5–1.4)
CREAT SERPL-MCNC: 3.9 MG/DL (ref 0.5–1.4)
CREAT SERPL-MCNC: 4.1 MG/DL (ref 0.5–1.4)
CREAT SERPL-MCNC: 4.3 MG/DL (ref 0.5–1.4)
CREAT SERPL-MCNC: 5 MG/DL (ref 0.5–1.4)
CV ECHO LV RWT: 0.56 CM
D DIMER PPP IA.FEU-MCNC: 0.69 MG/L FEU
DACRYOCYTES BLD QL SMEAR: ABNORMAL
DELSYS: ABNORMAL
DIFFERENTIAL METHOD: ABNORMAL
DOP CALC AO PEAK VEL: 1.95 M/S
DOP CALC AO VTI: 29.17 CM
DOP CALC LVOT AREA: 3.6 CM2
DOP CALC LVOT DIAMETER: 2.15 CM
DOP CALC LVOT PEAK VEL: 1.75 M/S
DOP CALC LVOT STROKE VOLUME: 87.23 CM3
DOP CALCLVOT PEAK VEL VTI: 24.04 CM
E WAVE DECELERATION TIME: 335.86 MSEC
E/A RATIO: 0.61
E/E' RATIO: 15.33 M/S
ECHO LV POSTERIOR WALL: 1.1 CM (ref 0.6–1.1)
EOSINOPHIL # BLD AUTO: 0 K/UL (ref 0–0.5)
EOSINOPHIL # BLD AUTO: 0 K/UL (ref 0–0.5)
EOSINOPHIL # BLD AUTO: 0.2 K/UL (ref 0–0.5)
EOSINOPHIL # BLD AUTO: 0.2 K/UL (ref 0–0.5)
EOSINOPHIL NFR BLD: 0 % (ref 0–8)
EOSINOPHIL NFR BLD: 2.8 % (ref 0–8)
EOSINOPHIL NFR BLD: 3.6 % (ref 0–8)
ERYTHROCYTE [DISTWIDTH] IN BLOOD BY AUTOMATED COUNT: 13.6 % (ref 11.5–14.5)
ERYTHROCYTE [DISTWIDTH] IN BLOOD BY AUTOMATED COUNT: 13.7 % (ref 11.5–14.5)
ERYTHROCYTE [DISTWIDTH] IN BLOOD BY AUTOMATED COUNT: 16.9 % (ref 11.5–14.5)
ERYTHROCYTE [DISTWIDTH] IN BLOOD BY AUTOMATED COUNT: 17.3 % (ref 11.5–14.5)
ERYTHROCYTE [DISTWIDTH] IN BLOOD BY AUTOMATED COUNT: 17.4 % (ref 11.5–14.5)
ERYTHROCYTE [DISTWIDTH] IN BLOOD BY AUTOMATED COUNT: 17.5 % (ref 11.5–14.5)
ERYTHROCYTE [SEDIMENTATION RATE] IN BLOOD BY WESTERGREN METHOD: 22 MM/H
ERYTHROCYTE [SEDIMENTATION RATE] IN BLOOD BY WESTERGREN METHOD: 26 MM/H
ERYTHROCYTE [SEDIMENTATION RATE] IN BLOOD BY WESTERGREN METHOD: 26 MM/H
ERYTHROCYTE [SEDIMENTATION RATE] IN BLOOD BY WESTERGREN METHOD: 30 MM/H
EST. GFR  (AFRICAN AMERICAN): 10.4 ML/MIN/1.73 M^2
EST. GFR  (AFRICAN AMERICAN): 11 ML/MIN/1.73 M^2
EST. GFR  (AFRICAN AMERICAN): 11.7 ML/MIN/1.73 M^2
EST. GFR  (AFRICAN AMERICAN): 16.1 ML/MIN/1.73 M^2
EST. GFR  (AFRICAN AMERICAN): 19.1 ML/MIN/1.73 M^2
EST. GFR  (AFRICAN AMERICAN): 29.8 ML/MIN/1.73 M^2
EST. GFR  (AFRICAN AMERICAN): 29.8 ML/MIN/1.73 M^2
EST. GFR  (AFRICAN AMERICAN): 41 ML/MIN/1.73 M^2
EST. GFR  (AFRICAN AMERICAN): 54 ML/MIN/1.73 M^2
EST. GFR  (AFRICAN AMERICAN): 8.7 ML/MIN/1.73 M^2
EST. GFR  (NON AFRICAN AMERICAN): 10.1 ML/MIN/1.73 M^2
EST. GFR  (NON AFRICAN AMERICAN): 13.9 ML/MIN/1.73 M^2
EST. GFR  (NON AFRICAN AMERICAN): 16.6 ML/MIN/1.73 M^2
EST. GFR  (NON AFRICAN AMERICAN): 25.8 ML/MIN/1.73 M^2
EST. GFR  (NON AFRICAN AMERICAN): 25.8 ML/MIN/1.73 M^2
EST. GFR  (NON AFRICAN AMERICAN): 35 ML/MIN/1.73 M^2
EST. GFR  (NON AFRICAN AMERICAN): 47 ML/MIN/1.73 M^2
EST. GFR  (NON AFRICAN AMERICAN): 7.5 ML/MIN/1.73 M^2
EST. GFR  (NON AFRICAN AMERICAN): 9 ML/MIN/1.73 M^2
EST. GFR  (NON AFRICAN AMERICAN): 9.6 ML/MIN/1.73 M^2
ESTIMATED AVG GLUCOSE: 160 MG/DL (ref 68–131)
FERRITIN SERPL-MCNC: 22 NG/ML (ref 20–300)
FIO2: 50
FIO2: 50
FIO2: 65
FIO2: 75
FRACTIONAL SHORTENING: 44 % (ref 28–44)
GIANT PLATELETS BLD QL SMEAR: PRESENT
GLUCOSE SERPL-MCNC: 125 MG/DL (ref 70–110)
GLUCOSE SERPL-MCNC: 126 MG/DL (ref 70–110)
GLUCOSE SERPL-MCNC: 140 MG/DL (ref 70–110)
GLUCOSE SERPL-MCNC: 211 MG/DL (ref 70–110)
GLUCOSE SERPL-MCNC: 225 MG/DL (ref 70–110)
GLUCOSE SERPL-MCNC: 395 MG/DL (ref 70–110)
GLUCOSE SERPL-MCNC: 768 MG/DL (ref 70–110)
GLUCOSE SERPL-MCNC: 832 MG/DL (ref 70–110)
GLUCOSE UR QL STRIP: NEGATIVE
HBA1C MFR BLD HPLC: 7.2 % (ref 4–5.6)
HCO3 UR-SCNC: 11.1 MMOL/L (ref 24–28)
HCO3 UR-SCNC: 14.1 MMOL/L (ref 24–28)
HCO3 UR-SCNC: 21.5 MMOL/L (ref 24–28)
HCO3 UR-SCNC: 21.6 MMOL/L (ref 24–28)
HCT VFR BLD AUTO: 26.6 % (ref 37–48.5)
HCT VFR BLD AUTO: 27.8 % (ref 37–48.5)
HCT VFR BLD AUTO: 28.8 % (ref 37–48.5)
HCT VFR BLD AUTO: 31.8 % (ref 37–48.5)
HCT VFR BLD AUTO: 35.4 % (ref 37–48.5)
HCT VFR BLD AUTO: 35.5 % (ref 37–48.5)
HCT VFR BLD CALC: 30 %PCV (ref 36–54)
HGB BLD-MCNC: 10.7 G/DL (ref 12–16)
HGB BLD-MCNC: 10.7 G/DL (ref 12–16)
HGB BLD-MCNC: 7.9 G/DL (ref 12–16)
HGB BLD-MCNC: 8.2 G/DL (ref 12–16)
HGB BLD-MCNC: 8.3 G/DL (ref 12–16)
HGB BLD-MCNC: 9.1 G/DL (ref 12–16)
HGB UR QL STRIP: NEGATIVE
HYALINE CASTS #/AREA URNS LPF: 0 /LPF
HYPOCHROMIA BLD QL SMEAR: ABNORMAL
IMM GRANULOCYTES # BLD AUTO: 0.4 K/UL (ref 0–0.04)
IMM GRANULOCYTES # BLD AUTO: 0.83 K/UL (ref 0–0.04)
IMM GRANULOCYTES # BLD AUTO: ABNORMAL K/UL (ref 0–0.04)
IMM GRANULOCYTES # BLD AUTO: ABNORMAL K/UL (ref 0–0.04)
IMM GRANULOCYTES NFR BLD AUTO: 1.8 % (ref 0–0.5)
IMM GRANULOCYTES NFR BLD AUTO: 3.9 % (ref 0–0.5)
IMM GRANULOCYTES NFR BLD AUTO: ABNORMAL % (ref 0–0.5)
IMM GRANULOCYTES NFR BLD AUTO: ABNORMAL % (ref 0–0.5)
INR PPP: 1.2 (ref 0.8–1.2)
INTERVENTRICULAR SEPTUM: 1.4 CM (ref 0.6–1.1)
IP: 15
IT: 1
KETONES UR QL STRIP: NEGATIVE
LA MAJOR: 4.87 CM
LA MINOR: 5.37 CM
LA WIDTH: 3.66 CM
LACTATE SERPL-SCNC: 1.5 MMOL/L (ref 0.5–2.2)
LACTATE SERPL-SCNC: >12 MMOL/L (ref 0.5–2.2)
LACTATE SERPL-SCNC: >12 MMOL/L (ref 0.5–2.2)
LDH SERPL L TO P-CCNC: 12.33 MMOL/L (ref 0.36–1.25)
LEFT ATRIUM SIZE: 3.17 CM
LEFT ATRIUM VOLUME INDEX: 26.7 ML/M2
LEFT ATRIUM VOLUME: 50.37 CM3
LEFT INTERNAL DIMENSION IN SYSTOLE: 2.2 CM (ref 2.1–4)
LEFT VENTRICLE DIASTOLIC VOLUME INDEX: 32.07 ML/M2
LEFT VENTRICLE DIASTOLIC VOLUME: 60.52 ML
LEFT VENTRICLE MASS INDEX: 90 G/M2
LEFT VENTRICLE SYSTOLIC VOLUME INDEX: 11.6 ML/M2
LEFT VENTRICLE SYSTOLIC VOLUME: 21.94 ML
LEFT VENTRICULAR INTERNAL DIMENSION IN DIASTOLE: 3.9 CM (ref 3.5–6)
LEFT VENTRICULAR MASS: 169.35 G
LEUKOCYTE ESTERASE UR QL STRIP: NEGATIVE
LV LATERAL E/E' RATIO: 17.25 M/S
LV SEPTAL E/E' RATIO: 13.8 M/S
LYMPHOCYTES # BLD AUTO: 0.4 K/UL (ref 1–4.8)
LYMPHOCYTES # BLD AUTO: 0.7 K/UL (ref 1–4.8)
LYMPHOCYTES # BLD AUTO: 1.8 K/UL (ref 1–4.8)
LYMPHOCYTES # BLD AUTO: 1.9 K/UL (ref 1–4.8)
LYMPHOCYTES NFR BLD: 1 % (ref 18–48)
LYMPHOCYTES NFR BLD: 1.9 % (ref 18–48)
LYMPHOCYTES NFR BLD: 26.9 % (ref 18–48)
LYMPHOCYTES NFR BLD: 3.1 % (ref 18–48)
LYMPHOCYTES NFR BLD: 31.5 % (ref 18–48)
LYMPHOCYTES NFR BLD: 8 % (ref 18–48)
MAGNESIUM SERPL-MCNC: 1.9 MG/DL (ref 1.6–2.6)
MAGNESIUM SERPL-MCNC: 2 MG/DL (ref 1.6–2.6)
MAGNESIUM SERPL-MCNC: 2.1 MG/DL (ref 1.6–2.6)
MAGNESIUM SERPL-MCNC: 2.3 MG/DL (ref 1.6–2.6)
MAGNESIUM SERPL-MCNC: 2.5 MG/DL (ref 1.6–2.6)
MCH RBC QN AUTO: 27.8 PG (ref 27–31)
MCH RBC QN AUTO: 28.1 PG (ref 27–31)
MCH RBC QN AUTO: 28.6 PG (ref 27–31)
MCH RBC QN AUTO: 28.7 PG (ref 27–31)
MCH RBC QN AUTO: 28.7 PG (ref 27–31)
MCH RBC QN AUTO: 29.4 PG (ref 27–31)
MCHC RBC AUTO-ENTMCNC: 28.6 G/DL (ref 32–36)
MCHC RBC AUTO-ENTMCNC: 28.8 G/DL (ref 32–36)
MCHC RBC AUTO-ENTMCNC: 29.5 G/DL (ref 32–36)
MCHC RBC AUTO-ENTMCNC: 29.7 G/DL (ref 32–36)
MCHC RBC AUTO-ENTMCNC: 30.1 G/DL (ref 32–36)
MCHC RBC AUTO-ENTMCNC: 30.2 G/DL (ref 32–36)
MCV RBC AUTO: 100 FL (ref 82–98)
MCV RBC AUTO: 100 FL (ref 82–98)
MCV RBC AUTO: 92 FL (ref 82–98)
MCV RBC AUTO: 93 FL (ref 82–98)
MCV RBC AUTO: 97 FL (ref 82–98)
MCV RBC AUTO: 99 FL (ref 82–98)
MICROSCOPIC COMMENT: ABNORMAL
MODE: ABNORMAL
MONOCYTES # BLD AUTO: 0.4 K/UL (ref 0.3–1)
MONOCYTES # BLD AUTO: 0.6 K/UL (ref 0.3–1)
MONOCYTES # BLD AUTO: 1.4 K/UL (ref 0.3–1)
MONOCYTES # BLD AUTO: 1.8 K/UL (ref 0.3–1)
MONOCYTES NFR BLD: 1 % (ref 4–15)
MONOCYTES NFR BLD: 6.3 % (ref 4–15)
MONOCYTES NFR BLD: 6.3 % (ref 4–15)
MONOCYTES NFR BLD: 8.3 % (ref 4–15)
MONOCYTES NFR BLD: 8.4 % (ref 4–15)
MONOCYTES NFR BLD: 9 % (ref 4–15)
MV PEAK A VEL: 1.14 M/S
MV PEAK E VEL: 0.69 M/S
MYELOCYTES NFR BLD MANUAL: 2 %
NEUTROPHILS # BLD AUTO: 18.4 K/UL (ref 1.8–7.7)
NEUTROPHILS # BLD AUTO: 19.1 K/UL (ref 1.8–7.7)
NEUTROPHILS # BLD AUTO: 3.2 K/UL (ref 1.8–7.7)
NEUTROPHILS # BLD AUTO: 4.4 K/UL (ref 1.8–7.7)
NEUTROPHILS NFR BLD: 58.2 % (ref 38–73)
NEUTROPHILS NFR BLD: 61.7 % (ref 38–73)
NEUTROPHILS NFR BLD: 81 % (ref 38–73)
NEUTROPHILS NFR BLD: 85.6 % (ref 38–73)
NEUTROPHILS NFR BLD: 88.5 % (ref 38–73)
NEUTROPHILS NFR BLD: 91 % (ref 38–73)
NEUTS BAND NFR BLD MANUAL: 2 %
NEUTS BAND NFR BLD MANUAL: 5 %
NITRITE UR QL STRIP: POSITIVE
NRBC BLD-RTO: 1 /100 WBC
NRBC BLD-RTO: 2 /100 WBC
NRBC BLD-RTO: 2 /100 WBC
NRBC BLD-RTO: 4 /100 WBC
OVALOCYTES BLD QL SMEAR: ABNORMAL
PCO2 BLDA: 35 MMHG (ref 35–45)
PCO2 BLDA: 41.8 MMHG (ref 35–45)
PCO2 BLDA: 41.8 MMHG (ref 35–45)
PCO2 BLDA: 42.7 MMHG (ref 35–45)
PEEP: 10
PH SMN: 7.11 [PH] (ref 7.35–7.45)
PH SMN: 7.14 [PH] (ref 7.35–7.45)
PH SMN: 7.31 [PH] (ref 7.35–7.45)
PH SMN: 7.32 [PH] (ref 7.35–7.45)
PH UR STRIP: 6 [PH] (ref 5–8)
PHOSPHATE SERPL-MCNC: 3.3 MG/DL (ref 2.7–4.5)
PHOSPHATE SERPL-MCNC: 3.8 MG/DL (ref 2.7–4.5)
PHOSPHATE SERPL-MCNC: 4 MG/DL (ref 2.7–4.5)
PHOSPHATE SERPL-MCNC: 5.8 MG/DL (ref 2.7–4.5)
PHOSPHATE SERPL-MCNC: 6.6 MG/DL (ref 2.7–4.5)
PHOSPHATE SERPL-MCNC: 6.6 MG/DL (ref 2.7–4.5)
PHOSPHATE SERPL-MCNC: 7 MG/DL (ref 2.7–4.5)
PHOSPHATE SERPL-MCNC: 7 MG/DL (ref 2.7–4.5)
PHOSPHATE SERPL-MCNC: 7.2 MG/DL (ref 2.7–4.5)
PIP: 20
PISA TR MAX VEL: 2.84 M/S
PLATELET # BLD AUTO: 163 K/UL (ref 150–350)
PLATELET # BLD AUTO: 204 K/UL (ref 150–350)
PLATELET # BLD AUTO: 213 K/UL (ref 150–350)
PLATELET # BLD AUTO: 251 K/UL (ref 150–350)
PLATELET # BLD AUTO: 267 K/UL (ref 150–350)
PLATELET # BLD AUTO: 295 K/UL (ref 150–350)
PLATELET BLD QL SMEAR: ABNORMAL
PMV BLD AUTO: 10.4 FL (ref 9.2–12.9)
PMV BLD AUTO: 10.6 FL (ref 9.2–12.9)
PMV BLD AUTO: 10.9 FL (ref 9.2–12.9)
PMV BLD AUTO: 11 FL (ref 9.2–12.9)
PMV BLD AUTO: 11.2 FL (ref 9.2–12.9)
PMV BLD AUTO: 11.2 FL (ref 9.2–12.9)
PO2 BLDA: 144 MMHG (ref 80–100)
PO2 BLDA: 63 MMHG (ref 80–100)
PO2 BLDA: 77 MMHG (ref 80–100)
PO2 BLDA: 93 MMHG (ref 80–100)
POC BE: -15 MMOL/L
POC BE: -18 MMOL/L
POC BE: -4 MMOL/L
POC BE: -5 MMOL/L
POC IONIZED CALCIUM: 1.11 MMOL/L (ref 1.06–1.42)
POC SATURATED O2: 84 % (ref 95–100)
POC SATURATED O2: 90 % (ref 95–100)
POC SATURATED O2: 97 % (ref 95–100)
POC SATURATED O2: 99 % (ref 95–100)
POC TCO2: 12 MMOL/L (ref 23–27)
POC TCO2: 15 MMOL/L (ref 23–27)
POC TCO2: 23 MMOL/L (ref 23–27)
POC TCO2: 23 MMOL/L (ref 23–27)
POCT GLUCOSE: 103 MG/DL (ref 70–110)
POCT GLUCOSE: 107 MG/DL (ref 70–110)
POCT GLUCOSE: 112 MG/DL (ref 70–110)
POCT GLUCOSE: 130 MG/DL (ref 70–110)
POCT GLUCOSE: 147 MG/DL (ref 70–110)
POCT GLUCOSE: 151 MG/DL (ref 70–110)
POCT GLUCOSE: 152 MG/DL (ref 70–110)
POCT GLUCOSE: 235 MG/DL (ref 70–110)
POCT GLUCOSE: 300 MG/DL (ref 70–110)
POCT GLUCOSE: 451 MG/DL (ref 70–110)
POCT GLUCOSE: 489 MG/DL (ref 70–110)
POCT GLUCOSE: 70 MG/DL (ref 70–110)
POCT GLUCOSE: 75 MG/DL (ref 70–110)
POCT GLUCOSE: 96 MG/DL (ref 70–110)
POCT GLUCOSE: >500 MG/DL (ref 70–110)
POIKILOCYTOSIS BLD QL SMEAR: SLIGHT
POLYCHROMASIA BLD QL SMEAR: ABNORMAL
POLYCHROMASIA BLD QL SMEAR: ABNORMAL
POTASSIUM BLD-SCNC: 5.9 MMOL/L (ref 3.5–5.1)
POTASSIUM SERPL-SCNC: 3.5 MMOL/L (ref 3.5–5.1)
POTASSIUM SERPL-SCNC: 3.7 MMOL/L (ref 3.5–5.1)
POTASSIUM SERPL-SCNC: 3.9 MMOL/L (ref 3.5–5.1)
POTASSIUM SERPL-SCNC: 4 MMOL/L (ref 3.5–5.1)
POTASSIUM SERPL-SCNC: 4.6 MMOL/L (ref 3.5–5.1)
POTASSIUM SERPL-SCNC: 4.9 MMOL/L (ref 3.5–5.1)
POTASSIUM SERPL-SCNC: 5.2 MMOL/L (ref 3.5–5.1)
POTASSIUM SERPL-SCNC: 5.4 MMOL/L (ref 3.5–5.1)
POTASSIUM SERPL-SCNC: 5.4 MMOL/L (ref 3.5–5.1)
POTASSIUM SERPL-SCNC: 5.8 MMOL/L (ref 3.5–5.1)
POTASSIUM SERPL-SCNC: 5.8 MMOL/L (ref 3.5–5.1)
PROCALCITONIN SERPL IA-MCNC: 0.03 NG/ML
PROT SERPL-MCNC: 7.1 G/DL (ref 6–8.4)
PROT SERPL-MCNC: 7.2 G/DL (ref 6–8.4)
PROT SERPL-MCNC: 7.3 G/DL (ref 6–8.4)
PROT SERPL-MCNC: 7.9 G/DL (ref 6–8.4)
PROT SERPL-MCNC: 8.1 G/DL (ref 6–8.4)
PROT UR QL STRIP: ABNORMAL
PROTHROMBIN TIME: 11.9 SEC (ref 9–12.5)
RA MAJOR: 4.94 CM
RA WIDTH: 3.6 CM
RBC # BLD AUTO: 2.69 M/UL (ref 4–5.4)
RBC # BLD AUTO: 2.86 M/UL (ref 4–5.4)
RBC # BLD AUTO: 2.89 M/UL (ref 4–5.4)
RBC # BLD AUTO: 3.18 M/UL (ref 4–5.4)
RBC # BLD AUTO: 3.81 M/UL (ref 4–5.4)
RBC # BLD AUTO: 3.85 M/UL (ref 4–5.4)
RBC #/AREA URNS HPF: 0 /HPF (ref 0–4)
RIGHT VENTRICULAR END-DIASTOLIC DIMENSION: 3.58 CM
SAMPLE: ABNORMAL
SINUS: 3.58 CM
SITE: ABNORMAL
SODIUM BLD-SCNC: 137 MMOL/L (ref 136–145)
SODIUM SERPL-SCNC: 113 MMOL/L (ref 136–145)
SODIUM SERPL-SCNC: 117 MMOL/L (ref 136–145)
SODIUM SERPL-SCNC: 131 MMOL/L (ref 136–145)
SODIUM SERPL-SCNC: 136 MMOL/L (ref 136–145)
SODIUM SERPL-SCNC: 138 MMOL/L (ref 136–145)
SODIUM SERPL-SCNC: 140 MMOL/L (ref 136–145)
SODIUM SERPL-SCNC: 143 MMOL/L (ref 136–145)
SODIUM SERPL-SCNC: 144 MMOL/L (ref 136–145)
SP GR UR STRIP: 1.01 (ref 1–1.03)
STJ: 3 CM
TDI LATERAL: 0.04 M/S
TDI SEPTAL: 0.05 M/S
TDI: 0.05 M/S
TOXIC GRANULES BLD QL SMEAR: PRESENT
TR MAX PG: 32 MMHG
TRICUSPID ANNULAR PLANE SYSTOLIC EXCURSION: 1.47 CM
TROPONIN I SERPL DL<=0.01 NG/ML-MCNC: 0.02 NG/ML (ref 0–0.03)
TROPONIN I SERPL DL<=0.01 NG/ML-MCNC: 0.03 NG/ML (ref 0–0.03)
TROPONIN I SERPL DL<=0.01 NG/ML-MCNC: 0.04 NG/ML (ref 0–0.03)
TSH SERPL DL<=0.005 MIU/L-ACNC: 0.77 UIU/ML (ref 0.4–4)
URN SPEC COLLECT METH UR: ABNORMAL
UROBILINOGEN UR STRIP-ACNC: NEGATIVE EU/DL
VANCOMYCIN SERPL-MCNC: 16.4 UG/ML
VIT B12 SERPL-MCNC: 655 PG/ML (ref 210–950)
VT: 370
VT: 450
WBC # BLD AUTO: 21.49 K/UL (ref 3.9–12.7)
WBC # BLD AUTO: 21.64 K/UL (ref 3.9–12.7)
WBC # BLD AUTO: 33.67 K/UL (ref 3.9–12.7)
WBC # BLD AUTO: 35.29 K/UL (ref 3.9–12.7)
WBC # BLD AUTO: 5.56 K/UL (ref 3.9–12.7)
WBC # BLD AUTO: 7.11 K/UL (ref 3.9–12.7)
WBC #/AREA URNS HPF: 0 /HPF (ref 0–5)

## 2020-01-01 PROCEDURE — 85007 BL SMEAR W/DIFF WBC COUNT: CPT

## 2020-01-01 PROCEDURE — 84100 ASSAY OF PHOSPHORUS: CPT

## 2020-01-01 PROCEDURE — 63600175 PHARM REV CODE 636 W HCPCS: Performed by: STUDENT IN AN ORGANIZED HEALTH CARE EDUCATION/TRAINING PROGRAM

## 2020-01-01 PROCEDURE — 93005 ELECTROCARDIOGRAM TRACING: CPT

## 2020-01-01 PROCEDURE — 97116 GAIT TRAINING THERAPY: CPT

## 2020-01-01 PROCEDURE — 99900026 HC AIRWAY MAINTENANCE (STAT)

## 2020-01-01 PROCEDURE — 82803 BLOOD GASES ANY COMBINATION: CPT

## 2020-01-01 PROCEDURE — 80048 BASIC METABOLIC PNL TOTAL CA: CPT

## 2020-01-01 PROCEDURE — 99900035 HC TECH TIME PER 15 MIN (STAT)

## 2020-01-01 PROCEDURE — 94003 VENT MGMT INPAT SUBQ DAY: CPT

## 2020-01-01 PROCEDURE — 96374 THER/PROPH/DIAG INJ IV PUSH: CPT | Performed by: EMERGENCY MEDICINE

## 2020-01-01 PROCEDURE — 96375 TX/PRO/DX INJ NEW DRUG ADDON: CPT | Performed by: EMERGENCY MEDICINE

## 2020-01-01 PROCEDURE — 25000003 PHARM REV CODE 250: Performed by: STUDENT IN AN ORGANIZED HEALTH CARE EDUCATION/TRAINING PROGRAM

## 2020-01-01 PROCEDURE — G0378 HOSPITAL OBSERVATION PER HR: HCPCS

## 2020-01-01 PROCEDURE — 85610 PROTHROMBIN TIME: CPT

## 2020-01-01 PROCEDURE — 80202 ASSAY OF VANCOMYCIN: CPT

## 2020-01-01 PROCEDURE — 85025 COMPLETE CBC W/AUTO DIFF WBC: CPT

## 2020-01-01 PROCEDURE — 36600 WITHDRAWAL OF ARTERIAL BLOOD: CPT

## 2020-01-01 PROCEDURE — 87086 URINE CULTURE/COLONY COUNT: CPT

## 2020-01-01 PROCEDURE — 63600175 PHARM REV CODE 636 W HCPCS

## 2020-01-01 PROCEDURE — 27100171 HC OXYGEN HIGH FLOW UP TO 24 HOURS

## 2020-01-01 PROCEDURE — 36415 COLL VENOUS BLD VENIPUNCTURE: CPT

## 2020-01-01 PROCEDURE — 93010 ELECTROCARDIOGRAM REPORT: CPT | Mod: 76,,, | Performed by: INTERNAL MEDICINE

## 2020-01-01 PROCEDURE — 36410 VNPNXR 3YR/> PHY/QHP DX/THER: CPT | Mod: ,,, | Performed by: NURSE PRACTITIONER

## 2020-01-01 PROCEDURE — 97530 THERAPEUTIC ACTIVITIES: CPT | Mod: CQ

## 2020-01-01 PROCEDURE — 94761 N-INVAS EAR/PLS OXIMETRY MLT: CPT

## 2020-01-01 PROCEDURE — 99291 PR CRITICAL CARE, E/M 30-74 MINUTES: ICD-10-PCS | Mod: 25,,, | Performed by: NURSE PRACTITIONER

## 2020-01-01 PROCEDURE — 87186 SC STD MICRODIL/AGAR DIL: CPT

## 2020-01-01 PROCEDURE — 63600175 PHARM REV CODE 636 W HCPCS: Performed by: NURSE PRACTITIONER

## 2020-01-01 PROCEDURE — 81000 URINALYSIS NONAUTO W/SCOPE: CPT

## 2020-01-01 PROCEDURE — 83880 ASSAY OF NATRIURETIC PEPTIDE: CPT

## 2020-01-01 PROCEDURE — 80053 COMPREHEN METABOLIC PANEL: CPT | Mod: 91

## 2020-01-01 PROCEDURE — 94799 UNLISTED PULMONARY SVC/PX: CPT

## 2020-01-01 PROCEDURE — 80100008 HC CRRT DAILY MAINTENANCE

## 2020-01-01 PROCEDURE — 99291 CRITICAL CARE FIRST HOUR: CPT | Mod: GC,,, | Performed by: INTERNAL MEDICINE

## 2020-01-01 PROCEDURE — 82962 GLUCOSE BLOOD TEST: CPT

## 2020-01-01 PROCEDURE — 25000003 PHARM REV CODE 250: Performed by: NURSE PRACTITIONER

## 2020-01-01 PROCEDURE — 85027 COMPLETE CBC AUTOMATED: CPT

## 2020-01-01 PROCEDURE — 96372 THER/PROPH/DIAG INJ SC/IM: CPT | Mod: 59 | Performed by: EMERGENCY MEDICINE

## 2020-01-01 PROCEDURE — 99291 PR CRITICAL CARE, E/M 30-74 MINUTES: ICD-10-PCS | Mod: GC,,, | Performed by: INTERNAL MEDICINE

## 2020-01-01 PROCEDURE — 83735 ASSAY OF MAGNESIUM: CPT

## 2020-01-01 PROCEDURE — 83735 ASSAY OF MAGNESIUM: CPT | Mod: 91

## 2020-01-01 PROCEDURE — 36620 INSERTION CATHETER ARTERY: CPT | Mod: ,,, | Performed by: NURSE PRACTITIONER

## 2020-01-01 PROCEDURE — 85730 THROMBOPLASTIN TIME PARTIAL: CPT

## 2020-01-01 PROCEDURE — 93010 EKG 12-LEAD: ICD-10-PCS | Mod: 76,,, | Performed by: INTERNAL MEDICINE

## 2020-01-01 PROCEDURE — 96365 THER/PROPH/DIAG IV INF INIT: CPT | Performed by: EMERGENCY MEDICINE

## 2020-01-01 PROCEDURE — 25000003 PHARM REV CODE 250: Performed by: EMERGENCY MEDICINE

## 2020-01-01 PROCEDURE — 80053 COMPREHEN METABOLIC PANEL: CPT

## 2020-01-01 PROCEDURE — 80069 RENAL FUNCTION PANEL: CPT

## 2020-01-01 PROCEDURE — 97165 OT EVAL LOW COMPLEX 30 MIN: CPT

## 2020-01-01 PROCEDURE — 83605 ASSAY OF LACTIC ACID: CPT | Mod: 91

## 2020-01-01 PROCEDURE — 82306 VITAMIN D 25 HYDROXY: CPT

## 2020-01-01 PROCEDURE — 83036 HEMOGLOBIN GLYCOSYLATED A1C: CPT

## 2020-01-01 PROCEDURE — 94002 VENT MGMT INPAT INIT DAY: CPT

## 2020-01-01 PROCEDURE — 93010 EKG 12-LEAD: ICD-10-PCS | Mod: ,,, | Performed by: INTERNAL MEDICINE

## 2020-01-01 PROCEDURE — 85379 FIBRIN DEGRADATION QUANT: CPT

## 2020-01-01 PROCEDURE — 84100 ASSAY OF PHOSPHORUS: CPT | Mod: 91

## 2020-01-01 PROCEDURE — 27000221 HC OXYGEN, UP TO 24 HOURS

## 2020-01-01 PROCEDURE — 87040 BLOOD CULTURE FOR BACTERIA: CPT | Mod: 59

## 2020-01-01 PROCEDURE — 87205 SMEAR GRAM STAIN: CPT

## 2020-01-01 PROCEDURE — 97161 PT EVAL LOW COMPLEX 20 MIN: CPT

## 2020-01-01 PROCEDURE — 87077 CULTURE AEROBIC IDENTIFY: CPT

## 2020-01-01 PROCEDURE — 84484 ASSAY OF TROPONIN QUANT: CPT

## 2020-01-01 PROCEDURE — 37799 UNLISTED PX VASCULAR SURGERY: CPT

## 2020-01-01 PROCEDURE — 99291 CRITICAL CARE FIRST HOUR: CPT | Mod: 25,,, | Performed by: NURSE PRACTITIONER

## 2020-01-01 PROCEDURE — C9399 UNCLASSIFIED DRUGS OR BIOLOG: HCPCS | Performed by: STUDENT IN AN ORGANIZED HEALTH CARE EDUCATION/TRAINING PROGRAM

## 2020-01-01 PROCEDURE — 84145 PROCALCITONIN (PCT): CPT

## 2020-01-01 PROCEDURE — 97116 GAIT TRAINING THERAPY: CPT | Mod: CQ

## 2020-01-01 PROCEDURE — 25000003 PHARM REV CODE 250

## 2020-01-01 PROCEDURE — 87088 URINE BACTERIA CULTURE: CPT

## 2020-01-01 PROCEDURE — 90945 DIALYSIS ONE EVALUATION: CPT

## 2020-01-01 PROCEDURE — 85025 COMPLETE CBC W/AUTO DIFF WBC: CPT | Mod: 91

## 2020-01-01 PROCEDURE — 80069 RENAL FUNCTION PANEL: CPT | Mod: 91

## 2020-01-01 PROCEDURE — 82607 VITAMIN B-12: CPT

## 2020-01-01 PROCEDURE — 84132 ASSAY OF SERUM POTASSIUM: CPT

## 2020-01-01 PROCEDURE — 99223 PR INITIAL HOSPITAL CARE,LEVL III: ICD-10-PCS | Mod: ,,, | Performed by: INTERNAL MEDICINE

## 2020-01-01 PROCEDURE — 36410 PR VENIPUNC NEED PHYS SKILL,DX OR RX: ICD-10-PCS | Mod: ,,, | Performed by: NURSE PRACTITIONER

## 2020-01-01 PROCEDURE — 20000000 HC ICU ROOM

## 2020-01-01 PROCEDURE — 93010 ELECTROCARDIOGRAM REPORT: CPT | Mod: ,,, | Performed by: INTERNAL MEDICINE

## 2020-01-01 PROCEDURE — S0028 INJECTION, FAMOTIDINE, 20 MG: HCPCS | Performed by: STUDENT IN AN ORGANIZED HEALTH CARE EDUCATION/TRAINING PROGRAM

## 2020-01-01 PROCEDURE — 84443 ASSAY THYROID STIM HORMONE: CPT

## 2020-01-01 PROCEDURE — 36620 PR INSERT CATH,ART,PERCUT,SHORTTERM: ICD-10-PCS | Mod: ,,, | Performed by: NURSE PRACTITIONER

## 2020-01-01 PROCEDURE — 99223 PR INITIAL HOSPITAL CARE,LEVL III: ICD-10-PCS | Mod: ,,, | Performed by: PHYSICIAN ASSISTANT

## 2020-01-01 PROCEDURE — 83605 ASSAY OF LACTIC ACID: CPT

## 2020-01-01 PROCEDURE — 84484 ASSAY OF TROPONIN QUANT: CPT | Mod: 91

## 2020-01-01 PROCEDURE — 99285 EMERGENCY DEPT VISIT HI MDM: CPT | Mod: 25

## 2020-01-01 PROCEDURE — 99223 1ST HOSP IP/OBS HIGH 75: CPT | Mod: ,,, | Performed by: PHYSICIAN ASSISTANT

## 2020-01-01 PROCEDURE — 82728 ASSAY OF FERRITIN: CPT

## 2020-01-01 PROCEDURE — 99223 1ST HOSP IP/OBS HIGH 75: CPT | Mod: ,,, | Performed by: INTERNAL MEDICINE

## 2020-01-01 PROCEDURE — 97535 SELF CARE MNGMENT TRAINING: CPT

## 2020-01-01 PROCEDURE — 87070 CULTURE OTHR SPECIMN AEROBIC: CPT

## 2020-01-01 PROCEDURE — 25000003 PHARM REV CODE 250: Performed by: GENERAL PRACTICE

## 2020-01-01 RX ORDER — IBUPROFEN 200 MG
16 TABLET ORAL
Status: DISCONTINUED | OUTPATIENT
Start: 2020-01-01 | End: 2020-01-01 | Stop reason: HOSPADM

## 2020-01-01 RX ORDER — ASPIRIN 81 MG/1
81 TABLET ORAL DAILY
Qty: 30 TABLET | Refills: 1 | Status: SHIPPED | OUTPATIENT
Start: 2020-01-01 | End: 2020-01-01 | Stop reason: SDUPTHER

## 2020-01-01 RX ORDER — CLOPIDOGREL BISULFATE 75 MG/1
75 TABLET ORAL DAILY
Qty: 30 TABLET | Refills: 3 | Status: SHIPPED | OUTPATIENT
Start: 2020-01-01

## 2020-01-01 RX ORDER — INSULIN ASPART 100 [IU]/ML
1-10 INJECTION, SOLUTION INTRAVENOUS; SUBCUTANEOUS
Status: DISCONTINUED | OUTPATIENT
Start: 2020-01-01 | End: 2020-01-01 | Stop reason: HOSPADM

## 2020-01-01 RX ORDER — DEXMEDETOMIDINE HYDROCHLORIDE 4 UG/ML
0.2 INJECTION, SOLUTION INTRAVENOUS CONTINUOUS
Status: DISCONTINUED | OUTPATIENT
Start: 2020-01-01 | End: 2020-01-01 | Stop reason: HOSPADM

## 2020-01-01 RX ORDER — ONDANSETRON 2 MG/ML
4 INJECTION INTRAMUSCULAR; INTRAVENOUS
Status: DISCONTINUED | OUTPATIENT
Start: 2020-01-01 | End: 2020-01-01

## 2020-01-01 RX ORDER — ASPIRIN 81 MG/1
81 TABLET ORAL DAILY
Qty: 30 TABLET | Refills: 1 | Status: SHIPPED | OUTPATIENT
Start: 2020-01-01 | End: 2021-03-26

## 2020-01-01 RX ORDER — NITROFURANTOIN 25; 75 MG/1; MG/1
100 CAPSULE ORAL EVERY 12 HOURS
Status: DISCONTINUED | OUTPATIENT
Start: 2020-01-01 | End: 2020-01-01

## 2020-01-01 RX ORDER — MAGNESIUM SULFATE HEPTAHYDRATE 40 MG/ML
2 INJECTION, SOLUTION INTRAVENOUS
Status: DISCONTINUED | OUTPATIENT
Start: 2020-01-01 | End: 2020-01-01 | Stop reason: HOSPADM

## 2020-01-01 RX ORDER — NOREPINEPHRINE BITARTRATE/D5W 4MG/250ML
PLASTIC BAG, INJECTION (ML) INTRAVENOUS
Status: COMPLETED
Start: 2020-01-01 | End: 2020-01-01

## 2020-01-01 RX ORDER — CEFAZOLIN SODIUM 1 G/3ML
1 INJECTION, POWDER, FOR SOLUTION INTRAMUSCULAR; INTRAVENOUS EVERY 24 HOURS
Status: DISCONTINUED | OUTPATIENT
Start: 2020-01-01 | End: 2020-01-01

## 2020-01-01 RX ORDER — HYDROCODONE BITARTRATE AND ACETAMINOPHEN 500; 5 MG/1; MG/1
TABLET ORAL CONTINUOUS
Status: DISCONTINUED | OUTPATIENT
Start: 2020-01-01 | End: 2020-01-01 | Stop reason: HOSPADM

## 2020-01-01 RX ORDER — FUROSEMIDE 40 MG/1
40 TABLET ORAL DAILY
Status: DISCONTINUED | OUTPATIENT
Start: 2020-01-01 | End: 2020-01-01

## 2020-01-01 RX ORDER — FLUDROCORTISONE ACETATE 0.1 MG/1
100 TABLET ORAL DAILY
Status: DISCONTINUED | OUTPATIENT
Start: 2020-01-01 | End: 2020-01-01 | Stop reason: HOSPADM

## 2020-01-01 RX ORDER — CIPROFLOXACIN 500 MG/1
500 TABLET ORAL DAILY
Qty: 4 TABLET | Refills: 0 | Status: SHIPPED | OUTPATIENT
Start: 2020-01-01 | End: 2020-01-01

## 2020-01-01 RX ORDER — DEXMEDETOMIDINE HYDROCHLORIDE 4 UG/ML
INJECTION, SOLUTION INTRAVENOUS
Status: COMPLETED
Start: 2020-01-01 | End: 2020-01-01

## 2020-01-01 RX ORDER — FENTANYL CITRATE 50 UG/ML
INJECTION, SOLUTION INTRAMUSCULAR; INTRAVENOUS
Status: COMPLETED
Start: 2020-01-01 | End: 2020-01-01

## 2020-01-01 RX ORDER — POLYETHYLENE GLYCOL 3350 17 G/17G
17 POWDER, FOR SOLUTION ORAL DAILY
Status: DISCONTINUED | OUTPATIENT
Start: 2020-01-01 | End: 2020-01-01 | Stop reason: HOSPADM

## 2020-01-01 RX ORDER — SODIUM CHLORIDE 0.9 % (FLUSH) 0.9 %
10 SYRINGE (ML) INJECTION
Status: DISCONTINUED | OUTPATIENT
Start: 2020-01-01 | End: 2020-01-01 | Stop reason: HOSPADM

## 2020-01-01 RX ORDER — HYDRALAZINE HYDROCHLORIDE 20 MG/ML
10 INJECTION INTRAMUSCULAR; INTRAVENOUS EVERY 8 HOURS PRN
Status: DISCONTINUED | OUTPATIENT
Start: 2020-01-01 | End: 2020-01-01

## 2020-01-01 RX ORDER — ASPIRIN 81 MG/1
81 TABLET ORAL DAILY
Status: DISCONTINUED | OUTPATIENT
Start: 2020-01-01 | End: 2020-01-01 | Stop reason: HOSPADM

## 2020-01-01 RX ORDER — INSULIN ASPART 100 [IU]/ML
0-5 INJECTION, SOLUTION INTRAVENOUS; SUBCUTANEOUS EVERY 6 HOURS PRN
Status: DISCONTINUED | OUTPATIENT
Start: 2020-01-01 | End: 2020-01-01

## 2020-01-01 RX ORDER — ASPIRIN 325 MG
325 TABLET, DELAYED RELEASE (ENTERIC COATED) ORAL
Status: COMPLETED | OUTPATIENT
Start: 2020-01-01 | End: 2020-01-01

## 2020-01-01 RX ORDER — AMLODIPINE BESYLATE 5 MG/1
10 TABLET ORAL DAILY
Status: DISCONTINUED | OUTPATIENT
Start: 2020-01-01 | End: 2020-01-01 | Stop reason: HOSPADM

## 2020-01-01 RX ORDER — NOREPINEPHRINE BITARTRATE/D5W 4MG/250ML
PLASTIC BAG, INJECTION (ML) INTRAVENOUS
Status: DISCONTINUED
Start: 2020-01-01 | End: 2020-01-01 | Stop reason: HOSPADM

## 2020-01-01 RX ORDER — AMOXICILLIN 250 MG
1 CAPSULE ORAL 2 TIMES DAILY
Status: DISCONTINUED | OUTPATIENT
Start: 2020-01-01 | End: 2020-01-01 | Stop reason: HOSPADM

## 2020-01-01 RX ORDER — FAMOTIDINE 10 MG/ML
20 INJECTION INTRAVENOUS 2 TIMES DAILY
Status: DISCONTINUED | OUTPATIENT
Start: 2020-01-01 | End: 2020-01-01

## 2020-01-01 RX ORDER — PANTOPRAZOLE SODIUM 40 MG/1
40 TABLET, DELAYED RELEASE ORAL DAILY
COMMUNITY

## 2020-01-01 RX ORDER — FUROSEMIDE 10 MG/ML
40 INJECTION INTRAMUSCULAR; INTRAVENOUS ONCE
Status: COMPLETED | OUTPATIENT
Start: 2020-01-01 | End: 2020-01-01

## 2020-01-01 RX ORDER — FAMOTIDINE 10 MG/ML
20 INJECTION INTRAVENOUS DAILY
Status: DISCONTINUED | OUTPATIENT
Start: 2020-01-01 | End: 2020-01-01 | Stop reason: HOSPADM

## 2020-01-01 RX ORDER — HEPARIN SODIUM 5000 [USP'U]/ML
5000 INJECTION, SOLUTION INTRAVENOUS; SUBCUTANEOUS EVERY 8 HOURS
Status: DISCONTINUED | OUTPATIENT
Start: 2020-01-01 | End: 2020-01-01 | Stop reason: HOSPADM

## 2020-01-01 RX ORDER — VASOPRESSIN 20 [USP'U]/ML
INJECTION, SOLUTION INTRAMUSCULAR; SUBCUTANEOUS
Status: COMPLETED
Start: 2020-01-01 | End: 2020-01-01

## 2020-01-01 RX ORDER — CHLORHEXIDINE GLUCONATE ORAL RINSE 1.2 MG/ML
15 SOLUTION DENTAL 2 TIMES DAILY
Status: DISCONTINUED | OUTPATIENT
Start: 2020-01-01 | End: 2020-01-01 | Stop reason: HOSPADM

## 2020-01-01 RX ORDER — NOREPINEPHRINE BITARTRATE/D5W 4MG/250ML
0.02 PLASTIC BAG, INJECTION (ML) INTRAVENOUS CONTINUOUS
Status: DISCONTINUED | OUTPATIENT
Start: 2020-01-01 | End: 2020-01-01

## 2020-01-01 RX ORDER — MECLIZINE HYDROCHLORIDE 25 MG/1
25 TABLET ORAL
Status: COMPLETED | OUTPATIENT
Start: 2020-01-01 | End: 2020-01-01

## 2020-01-01 RX ORDER — GLUCAGON 1 MG
1 KIT INJECTION
Status: DISCONTINUED | OUTPATIENT
Start: 2020-01-01 | End: 2020-01-01 | Stop reason: HOSPADM

## 2020-01-01 RX ORDER — ATORVASTATIN CALCIUM 40 MG/1
40 TABLET, FILM COATED ORAL DAILY
Status: DISCONTINUED | OUTPATIENT
Start: 2020-01-01 | End: 2020-01-01 | Stop reason: HOSPADM

## 2020-01-01 RX ORDER — HYDRALAZINE HYDROCHLORIDE 20 MG/ML
5 INJECTION INTRAMUSCULAR; INTRAVENOUS EVERY 8 HOURS PRN
Status: DISCONTINUED | OUTPATIENT
Start: 2020-01-01 | End: 2020-01-01 | Stop reason: HOSPADM

## 2020-01-01 RX ORDER — FENTANYL CITRATE 50 UG/ML
50 INJECTION, SOLUTION INTRAMUSCULAR; INTRAVENOUS
Status: DISCONTINUED | OUTPATIENT
Start: 2020-01-01 | End: 2020-01-01 | Stop reason: HOSPADM

## 2020-01-01 RX ORDER — IBUPROFEN 200 MG
24 TABLET ORAL
Status: DISCONTINUED | OUTPATIENT
Start: 2020-01-01 | End: 2020-01-01 | Stop reason: HOSPADM

## 2020-01-01 RX ORDER — LOSARTAN POTASSIUM 100 MG/1
100 TABLET ORAL DAILY
Qty: 90 TABLET | Refills: 1 | Status: SHIPPED | OUTPATIENT
Start: 2020-01-01 | End: 2020-01-01 | Stop reason: SDUPTHER

## 2020-01-01 RX ORDER — LOSARTAN POTASSIUM 50 MG/1
50 TABLET ORAL DAILY
Status: DISCONTINUED | OUTPATIENT
Start: 2020-01-01 | End: 2020-01-01

## 2020-01-01 RX ORDER — CEFEPIME HYDROCHLORIDE 1 G/1
1 INJECTION, POWDER, FOR SOLUTION INTRAMUSCULAR; INTRAVENOUS
Status: DISCONTINUED | OUTPATIENT
Start: 2020-01-01 | End: 2020-01-01

## 2020-01-01 RX ORDER — ACETAMINOPHEN 325 MG/1
650 TABLET ORAL EVERY 8 HOURS PRN
Status: DISCONTINUED | OUTPATIENT
Start: 2020-01-01 | End: 2020-01-01 | Stop reason: HOSPADM

## 2020-01-01 RX ORDER — CYCLOBENZAPRINE HCL 10 MG
10 TABLET ORAL
Status: DISCONTINUED | OUTPATIENT
Start: 2020-01-01 | End: 2020-01-01

## 2020-01-01 RX ORDER — INSULIN ASPART 100 [IU]/ML
0-5 INJECTION, SOLUTION INTRAVENOUS; SUBCUTANEOUS EVERY 6 HOURS PRN
Status: DISCONTINUED | OUTPATIENT
Start: 2020-01-01 | End: 2020-01-01 | Stop reason: HOSPADM

## 2020-01-01 RX ORDER — ONDANSETRON 8 MG/1
8 TABLET, ORALLY DISINTEGRATING ORAL EVERY 6 HOURS PRN
Status: DISCONTINUED | OUTPATIENT
Start: 2020-01-01 | End: 2020-01-01 | Stop reason: HOSPADM

## 2020-01-01 RX ORDER — LOSARTAN POTASSIUM 50 MG/1
100 TABLET ORAL DAILY
Status: DISCONTINUED | OUTPATIENT
Start: 2020-01-01 | End: 2020-01-01 | Stop reason: HOSPADM

## 2020-01-01 RX ADMIN — NITROFURANTOIN MONOHYDRATE/MACROCRYSTALLINE 100 MG: 25; 75 CAPSULE ORAL at 09:01

## 2020-01-01 RX ADMIN — SENNOSIDES AND DOCUSATE SODIUM 1 TABLET: 8.6; 5 TABLET ORAL at 08:01

## 2020-01-01 RX ADMIN — CHLORHEXIDINE GLUCONATE 0.12% ORAL RINSE 15 ML: 1.2 LIQUID ORAL at 08:05

## 2020-01-01 RX ADMIN — CEFTRIAXONE 1 G: 1 INJECTION, SOLUTION INTRAVENOUS at 10:01

## 2020-01-01 RX ADMIN — Medication 0.24 MCG/KG/MIN: at 03:05

## 2020-01-01 RX ADMIN — FUROSEMIDE 40 MG: 10 INJECTION, SOLUTION INTRAVENOUS at 06:01

## 2020-01-01 RX ADMIN — HYDROCORTISONE SODIUM SUCCINATE 100 MG: 100 INJECTION, POWDER, FOR SOLUTION INTRAMUSCULAR; INTRAVENOUS at 01:05

## 2020-01-01 RX ADMIN — DEXMEDETOMIDINE HYDROCHLORIDE IN 0.9% SODIUM CHLORIDE 0.1 MCG/KG/HR: 400 INJECTION INTRAVENOUS at 06:05

## 2020-01-01 RX ADMIN — VASOPRESSIN 20 UNITS: 20 INJECTION INTRAVENOUS at 07:05

## 2020-01-01 RX ADMIN — FUROSEMIDE 40 MG: 40 TABLET ORAL at 09:01

## 2020-01-01 RX ADMIN — CHLORHEXIDINE GLUCONATE 0.12% ORAL RINSE 15 ML: 1.2 LIQUID ORAL at 09:05

## 2020-01-01 RX ADMIN — CEFTRIAXONE 1 G: 1 INJECTION, SOLUTION INTRAVENOUS at 11:01

## 2020-01-01 RX ADMIN — FENTANYL CITRATE 100 MCG: 50 INJECTION INTRAMUSCULAR; INTRAVENOUS at 01:05

## 2020-01-01 RX ADMIN — Medication 0.4 MCG/KG/MIN: at 09:05

## 2020-01-01 RX ADMIN — DEXTROSE 250 ML: 10 SOLUTION INTRAVENOUS at 02:05

## 2020-01-01 RX ADMIN — VASOPRESSIN 0.04 UNITS/MIN: 20 INJECTION INTRAVENOUS at 01:05

## 2020-01-01 RX ADMIN — ASPIRIN 325 MG: 325 TABLET, COATED ORAL at 03:01

## 2020-01-01 RX ADMIN — VASOPRESSIN 0.04 UNITS/MIN: 20 INJECTION INTRAVENOUS at 03:05

## 2020-01-01 RX ADMIN — HYDRALAZINE HYDROCHLORIDE 10 MG: 20 INJECTION INTRAMUSCULAR; INTRAVENOUS at 09:01

## 2020-01-01 RX ADMIN — AMLODIPINE BESYLATE 10 MG: 5 TABLET ORAL at 09:01

## 2020-01-01 RX ADMIN — NOREPINEPHRINE BITARTRATE 3 MCG/KG/MIN: 1 INJECTION, SOLUTION, CONCENTRATE INTRAVENOUS at 04:05

## 2020-01-01 RX ADMIN — LOSARTAN POTASSIUM 50 MG: 50 TABLET ORAL at 08:01

## 2020-01-01 RX ADMIN — AMLODIPINE BESYLATE 10 MG: 5 TABLET ORAL at 08:01

## 2020-01-01 RX ADMIN — PIPERACILLIN AND TAZOBACTAM 4.5 G: 4; .5 INJECTION, POWDER, LYOPHILIZED, FOR SOLUTION INTRAVENOUS; PARENTERAL at 02:05

## 2020-01-01 RX ADMIN — Medication 0.32 MCG/KG/MIN: at 11:05

## 2020-01-01 RX ADMIN — Medication: at 06:05

## 2020-01-01 RX ADMIN — DEXMEDETOMIDINE HYDROCHLORIDE IN 0.9% SODIUM CHLORIDE 0.2 MCG/KG/HR: 400 INJECTION INTRAVENOUS at 07:05

## 2020-01-01 RX ADMIN — FENTANYL CITRATE 100 MCG: 50 INJECTION, SOLUTION INTRAMUSCULAR; INTRAVENOUS at 01:05

## 2020-01-01 RX ADMIN — FENTANYL CITRATE 50 MCG: 50 INJECTION, SOLUTION INTRAMUSCULAR; INTRAVENOUS at 02:05

## 2020-01-01 RX ADMIN — HEPARIN SODIUM 5000 UNITS: 5000 INJECTION INTRAVENOUS; SUBCUTANEOUS at 09:05

## 2020-01-01 RX ADMIN — MECLIZINE HYDROCHLORIDE 25 MG: 25 TABLET ORAL at 02:01

## 2020-01-01 RX ADMIN — INSULIN DETEMIR 15 UNITS: 100 INJECTION, SOLUTION SUBCUTANEOUS at 05:05

## 2020-01-01 RX ADMIN — CALCIUM GLUCONATE 1 G: 94 INJECTION, SOLUTION INTRAVENOUS at 02:05

## 2020-01-01 RX ADMIN — POLYETHYLENE GLYCOL 3350 17 G: 17 POWDER, FOR SOLUTION ORAL at 08:01

## 2020-01-01 RX ADMIN — LOSARTAN POTASSIUM 50 MG: 50 TABLET ORAL at 09:01

## 2020-01-01 RX ADMIN — ATORVASTATIN CALCIUM 40 MG: 40 TABLET, FILM COATED ORAL at 08:01

## 2020-01-01 RX ADMIN — HEPARIN SODIUM 5000 UNITS: 5000 INJECTION, SOLUTION INTRAVENOUS; SUBCUTANEOUS at 06:01

## 2020-01-01 RX ADMIN — HEPARIN SODIUM 5000 UNITS: 5000 INJECTION INTRAVENOUS; SUBCUTANEOUS at 12:05

## 2020-01-01 RX ADMIN — Medication 0.42 MCG/KG/MIN: at 08:05

## 2020-01-01 RX ADMIN — INSULIN ASPART 3 UNITS: 100 INJECTION, SOLUTION INTRAVENOUS; SUBCUTANEOUS at 09:05

## 2020-01-01 RX ADMIN — ASPIRIN 81 MG: 81 TABLET, COATED ORAL at 09:01

## 2020-01-01 RX ADMIN — ATORVASTATIN CALCIUM 40 MG: 40 TABLET, FILM COATED ORAL at 09:01

## 2020-01-01 RX ADMIN — DEXMEDETOMIDINE HYDROCHLORIDE IN 0.9% SODIUM CHLORIDE 0.1 MCG/KG/HR: 400 INJECTION INTRAVENOUS at 05:05

## 2020-01-01 RX ADMIN — HEPARIN SODIUM 5000 UNITS: 5000 INJECTION INTRAVENOUS; SUBCUTANEOUS at 05:05

## 2020-01-01 RX ADMIN — FLUDROCORTISONE ACETATE 100 MCG: 0.1 TABLET ORAL at 02:05

## 2020-01-01 RX ADMIN — CEFAZOLIN 1 G: 1 INJECTION, POWDER, FOR SOLUTION INTRAMUSCULAR; INTRAVENOUS at 03:05

## 2020-01-01 RX ADMIN — CEFEPIME 1 G: 1 INJECTION, POWDER, FOR SOLUTION INTRAMUSCULAR; INTRAVENOUS at 08:05

## 2020-01-01 RX ADMIN — HEPARIN SODIUM 5000 UNITS: 5000 INJECTION, SOLUTION INTRAVENOUS; SUBCUTANEOUS at 09:01

## 2020-01-01 RX ADMIN — SODIUM CHLORIDE: 0.9 INJECTION, SOLUTION INTRAVENOUS at 01:05

## 2020-01-01 RX ADMIN — INSULIN HUMAN 7.7 UNITS: 100 INJECTION, SOLUTION PARENTERAL at 02:05

## 2020-01-01 RX ADMIN — SODIUM CHLORIDE 2.5 UNITS/HR: 9 INJECTION, SOLUTION INTRAVENOUS at 11:05

## 2020-01-01 RX ADMIN — VASOPRESSIN 0.04 UNITS/MIN: 20 INJECTION INTRAVENOUS at 07:05

## 2020-01-01 RX ADMIN — SENNOSIDES AND DOCUSATE SODIUM 1 TABLET: 8.6; 5 TABLET ORAL at 09:01

## 2020-01-01 RX ADMIN — ASPIRIN 81 MG: 81 TABLET, COATED ORAL at 08:01

## 2020-01-01 RX ADMIN — FAMOTIDINE 20 MG: 10 INJECTION, SOLUTION INTRAVENOUS at 09:05

## 2020-01-17 PROBLEM — N39.0 URINARY TRACT INFECTION WITHOUT HEMATURIA: Status: ACTIVE | Noted: 2020-01-01

## 2020-01-17 PROBLEM — I16.1 HYPERTENSIVE EMERGENCY: Status: ACTIVE | Noted: 2020-01-01

## 2020-01-17 PROBLEM — I16.0 HYPERTENSIVE URGENCY: Status: ACTIVE | Noted: 2020-01-01

## 2020-01-17 PROBLEM — R42 DIZZINESS: Status: ACTIVE | Noted: 2020-01-01

## 2020-01-17 NOTE — PROGRESS NOTES
Pharmacy New Medication Education    Patient and/or Caregiver ACCEPTED medication education.    Pharmacy has provided education on the name, indication, and possible side effects of the medication(s) prescribed, using teach-back method.     Learners of pharmacy medication education includes:  patient    The following medications have also been discussed, during this admission.     Current Facility-Administered Medications   Medication Frequency    amLODIPine tablet 10 mg Daily    aspirin EC tablet 81 mg Daily    atorvastatin tablet 40 mg Daily    cefTRIAXone (ROCEPHIN) 1 g in dextrose 5 % 50 mL IVPB Q24H    cyclobenzaprine tablet 10 mg ED 1 Time    dextrose 10% (D10W) Bolus PRN    dextrose 10% (D10W) Bolus PRN    furosemide tablet 40 mg Daily    glucagon (human recombinant) injection 1 mg PRN    glucose chewable tablet 16 g PRN    glucose chewable tablet 24 g PRN    hydrALAZINE injection 10 mg Q8H PRN    insulin aspart U-100 pen 1-10 Units QID (AC + HS) PRN    losartan tablet 50 mg Daily    ondansetron disintegrating tablet 8 mg Q6H PRN    senna-docusate 8.6-50 mg per tablet 1 tablet BID     Facility-Administered Medications Ordered in Other Encounters   Medication Frequency    0.9%  NaCl infusion Continuous    sodium chloride 0.9% flush 3 mL PRN          Thank you  Santos Victoria, PharmD

## 2020-01-17 NOTE — PT/OT/SLP PROGRESS
Occupational Therapy  Missed Visit    Patient Name:  Elina Mei   MRN:  [id number]    Patient not seen secondary to  ISABEL in testing. Will follow-up .    Dangelo Hendricks OT  1/17/2020

## 2020-01-17 NOTE — ASSESSMENT & PLAN NOTE
-on long term insulin, lantus 35units nightly at home  -hold home metformin  -hypoglycemia could be cause of dizziness  -A1C pending  -Will monitor BG  -Will hold home insulin  -Moderate SSI

## 2020-01-17 NOTE — H&P
"[facility] Medicine  History & Physical    Patient Name: Elina Mei  MRN: [id number]  Admission Date: 1/17/2020  Attending Physician: Severyn Yaroshevsky, MD   Primary Care Provider: SHORTY uDke MD         Patient information was obtained from patient and ER records.     Subjective:     Principal Problem:Hypertensive emergency    Chief Complaint:   Chief Complaint   Patient presents with    Dizziness     Pt. c/o dizziness that began last night while lying in bed Denies N/V/D. Pt.s dtr took her BP at that time and it was elevated.        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 She had taken her nightly meds ( valsartan, insulin and laxative before bed). She was trying to lay in bed but felt the room was spinning. She felt worse while looking at the fan. She states her daughter had checked her bp and said it was a little high but she is unsure of actual pressure. Denies LOC, fall, N/V, CP, SOB. She reports a similar episode in October and states she has been scared since then that it would happen again. She did have blurry vision that has since resolved and back pain. She also believes her "eyes went to god". She denies dysuria, urinary changes or hematuria. She does have constipation with last bm yesterday. She has been using chewing tobacco her whole life, denies other tobacco or drug use. She drinks ETOH occasionally. She reports compliance with all medications.     After discussion with patient, she wishes to be DNR/DNI.     In the ED, highest bp 207/87, trop elevated 0.037, EKG showed RBB which is seen on previous EKG but possible new T wave inversions, .   U/A positive for nitrites.     Past Medical History:   Diagnosis Date    Acute right-sided weakness 4/26/2018    Arthritis     Diabetes mellitus     Hypertension     " Malignant neoplasm of upper-outer quadrant of right breast in female, estrogen receptor positive 12/5/2017    Status post mastectomy, right 2018    Stroke 4/26/2018       Past Surgical History:   Procedure Laterality Date    BREAST BIOPSY      COLONOSCOPY N/A 11/8/2018    Procedure: COLONOSCOPY;  Surgeon: Ahsan Enciso MD;  Location: [facility] ENDO;  Service: Endoscopy;  Laterality: N/A;    COLONOSCOPY N/A 1/17/2019    Procedure: COLONOSCOPY-with polypectomy;  Surgeon: Ahsan Enciso MD;  Location: [facility] ENDO;  Service: Endoscopy;  Laterality: N/A;    ESOPHAGOGASTRODUODENOSCOPY N/A 11/8/2018    Procedure: EGD (ESOPHAGOGASTRODUODENOSCOPY);  Surgeon: Ahsan Enciso MD;  Location: [facility] ENDO;  Service: Endoscopy;  Laterality: N/A;    EYE SURGERY      HYSTERECTOMY      MASTECTOMY      MYRINGOTOMY WITH INSERTION OF VENTILATION TUBE Bilateral 7/24/2018    Procedure: MYRINGOTOMY, WITH TYMPANOSTOMY TUBE INSERTION;  Surgeon: Ventura Whitt MD;  Location: [facility] OR;  Service: ENT;  Laterality: Bilateral;       Review of patient's allergies indicates:  No Known Allergies    Current Facility-Administered Medications on File Prior to Encounter   Medication    0.9%  NaCl infusion    sodium chloride 0.9% flush 3 mL     Current Outpatient Medications on File Prior to Encounter   Medication Sig    amlodipine (NORVASC) 10 MG tablet Take 10 mg by mouth once daily.    docusate sodium (COLACE) 100 MG capsule Take 1 capsule (100 mg total) by mouth 2 (two) times daily.    fluticasone (FLONASE) 50 mcg/actuation nasal spray 1 spray by Each Nare route 2 (two) times daily as needed (sinus congestion).    folic acid/multivit-min/lutein (CENTRUM SILVER ORAL) Take 1 Dose by mouth once daily.    furosemide (LASIX) 40 MG tablet Take 40 mg by mouth once daily.     losartan (COZAAR) 50 MG tablet Take 50 mg by mouth once daily.    metformin (GLUCOPHAGE) 500 MG tablet Take 500 mg by mouth 2 (two) times daily with meals.  "   potassium chloride (KLOR-CON) 10 MEQ TbSR Take 10 mEq by mouth once daily.    ACCU-CHEK SMARTVIEW TEST STRIP Strp USE TO TEST BLOOD SUGAR TWICE A DAY    aspirin (ECOTRIN) 81 MG EC tablet Take 1 tablet (81 mg total) by mouth once daily.    atorvastatin (LIPITOR) 40 MG tablet Take 1 tablet (40 mg total) by mouth once daily.    blood sugar diagnostic (ACCU-CHEK SMARTVIEW TEST STRIP MISC)     blood-glucose meter Misc accu-chek    kit franky    brimonidine 0.1% (ALPHAGAN P) 0.1 % Drop Alphagan P 0.1 % eye drops    clopidogrel (PLAVIX) 75 mg tablet Take 1 tablet (75 mg total) by mouth once daily.    dorzolamide-timolol 2-0.5% (COSOPT) 22.3-6.8 mg/mL ophthalmic solution Place 1 drop into both eyes 2 (two) times daily.    dorzolamide-timolol 2-0.5% (COSOPT) 22.3-6.8 mg/mL ophthalmic solution dorzol/timol sol 22.3-6.8    ferrous sulfate (FEOSOL) 325 mg (65 mg iron) Tab tablet Take 1 tablet (325 mg total) by mouth 2 (two) times daily. Every other day.    LANTUS SOLOSTAR 100 unit/mL (3 mL) InPn pen INJECT 35 UNIT(S) EVERY EVENING BY SUBCUTANEOUS ROUTE.    nitroGLYCERIN (NITROSTAT) 0.4 MG SL tablet Place 1 tablet (0.4 mg total) under the tongue every 5 (five) minutes as needed for Chest pain.    omeprazole (PRILOSEC) 40 MG capsule Take 1 capsule (40 mg total) by mouth 2 (two) times daily before meals. for 14 days    ondansetron (ZOFRAN-ODT) 8 MG TbDL Take 1 tablet (8 mg total) by mouth every 8 (eight) hours as needed.    pantoprazole (PROTONIX) 40 MG tablet Take 1 tablet (40 mg total) by mouth once daily.    pen needle, diabetic 32 gauge x 3/16" Ndle by Misc.(Non-Drug; Combo Route) route.    travoprost, benzalkonium, (TRAVATAN) 0.004 % ophthalmic solution      Family History     Problem Relation (Age of Onset)    Breast cancer Sister (55)    Cancer Sister (75), Brother    Hypertension Mother        Tobacco Use    Smoking status: Never Smoker    Smokeless tobacco: Current User     Types: Snuff   Substance " and Sexual Activity    Alcohol use: No    Drug use: No    Sexual activity: Not Currently     Review of Systems   Constitutional: Negative for chills, fatigue and fever.   Respiratory: Negative for cough, choking, chest tightness, shortness of breath, wheezing and stridor.    Cardiovascular: Negative for chest pain, palpitations and leg swelling.   Gastrointestinal: Positive for constipation. Negative for abdominal distention, abdominal pain, blood in stool, diarrhea, nausea and vomiting.   Genitourinary: Negative for dysuria, flank pain and hematuria.   Musculoskeletal: Positive for back pain.   Skin: Negative for color change, pallor and rash.   Neurological: Positive for dizziness and facial asymmetry (baseline R sided droop). Negative for speech difficulty, weakness, light-headedness, numbness and headaches.   Psychiatric/Behavioral: Negative for agitation and behavioral problems.     Objective:     Vital Signs (Most Recent):  Temp: 98.3 °F (36.8 °C) (01/17/20 0536)  Pulse: 70 (01/17/20 0500)  Resp: (!) 21 (01/17/20 0309)  BP: (!) 168/70 (01/17/20 0500)  SpO2: 97 % (01/17/20 0500) Vital Signs (24h Range):  Temp:  [98 °F (36.7 °C)-98.3 °F (36.8 °C)] 98.3 °F (36.8 °C)  Pulse:  [66-70] 70  Resp:  [18-21] 21  SpO2:  [95 %-97 %] 97 %  BP: (168-207)/(70-87) 168/70     Weight: 99.8 kg (220 lb)  Body mass index is 33.45 kg/m².    Physical Exam   Constitutional: She is oriented to person, place, and time. She appears well-developed and well-nourished. No distress.   HENT:   Head: Normocephalic and atraumatic.   Eyes: Pupils are equal, round, and reactive to light. EOM are normal.   Neck: Normal range of motion. Neck supple.   Cardiovascular: Normal rate, regular rhythm, normal heart sounds and intact distal pulses. Exam reveals no gallop and no friction rub.   No murmur heard.  Pulmonary/Chest: Effort normal and breath sounds normal. No stridor. No respiratory distress. She has no wheezes. She has no rales. She  exhibits no tenderness.   Abdominal: Soft. Bowel sounds are normal. She exhibits no distension. There is no tenderness. There is no guarding.   Musculoskeletal: Normal range of motion.   Neurological: She is alert and oriented to person, place, and time. She has normal strength. A cranial nerve deficit is present. No sensory deficit.   Right sided facial droop.    Skin: Skin is warm and dry. Capillary refill takes less than 2 seconds. No rash noted. She is not diaphoretic. No erythema. No pallor.   Psychiatric: She has a normal mood and affect. Her behavior is normal.   Nursing note and vitals reviewed.        CRANIAL NERVES     CN III, IV, VI   Pupils are equal, round, and reactive to light.  Extraocular motions are normal.        Significant Labs:   CBC:   Recent Labs   Lab 01/17/20  0240   WBC 7.11   HGB 10.7*   HCT 35.5*        CMP:   Recent Labs   Lab 01/17/20  0240      K 3.7      CO2 26   *   BUN 18   CREATININE 1.4   CALCIUM 8.9   PROT 7.2   ALBUMIN 3.2*   BILITOT 0.3   ALKPHOS 79   AST 12   ALT 6*   ANIONGAP 9   EGFRNONAA 35*       Troponin:   Recent Labs   Lab 01/17/20  0240   TROPONINI 0.037*     TSH:   Recent Labs   Lab 10/10/19  1445   TSH 0.994     Urine Studies:   Recent Labs   Lab 01/17/20  0357   COLORU Yellow   APPEARANCEUA Clear   PHUR 6.0   SPECGRAV 1.015   PROTEINUA 1+*   GLUCUA Negative   KETONESU Negative   BILIRUBINUA Negative   OCCULTUA Negative   NITRITE Positive*   UROBILINOGEN Negative   LEUKOCYTESUR Negative   RBCUA 0   WBCUA 0   BACTERIA Many*   HYALINECASTS 0     All pertinent labs within the past 24 hours have been reviewed.    Significant Imaging: I have reviewed all pertinent imaging results/findings within the past 24 hours.    Assessment/Plan:     * Hypertensive emergency  Highest bp in /87  Trop 0.037  Cr: 1.4, baseline unclear previous Cr 1.0-1.4  CT head neg for acute hemorrhage  F/U MRI brain  BP has improved without medications  Will trend trop  and EKG Q6H  Continue home antihypertensives (Norvasc 10 and losartan 50).        Dizziness  Hx of vertigo  -resolved  -possible etiologies CVA, HTN, hypoglycemia, vertigo  MRI brain pending   Will monitor.     Urinary tract infection without hematuria  -nonspecific back pain  -U/A nitrites positive  -UCx pending         Vertigo  -currently asymptomatic  -given meclizine in ED  -can not rule out other etiologies of dizziness  -will monitor      Embolic stroke involving left middle cerebral artery  -residual right sided facial weakness  -CT head: neg for acute hemorrhage, can not rule out ischemia  MRI brain pending      DM (diabetes mellitus)  -on long term insulin, lantus 35units nightly at home  -hold home metformin  -hypoglycemia could be cause of dizziness  -A1C pending  -Will monitor BG  -Will hold home insulin  -Moderate SSI        VTE Risk Mitigation (From admission, onward)    None             Guevara Sosa MD HO-1  Department of Hospital Medicine   [facility]

## 2020-01-17 NOTE — SUBJECTIVE & OBJECTIVE
Past Medical History:   Diagnosis Date    Acute right-sided weakness 4/26/2018    Arthritis     Diabetes mellitus     Hypertension     Malignant neoplasm of upper-outer quadrant of right breast in female, estrogen receptor positive 12/5/2017    Status post mastectomy, right 2018    Stroke 4/26/2018       Past Surgical History:   Procedure Laterality Date    BREAST BIOPSY      COLONOSCOPY N/A 11/8/2018    Procedure: COLONOSCOPY;  Surgeon: Ahsan Enciso MD;  Location: [facility] ENDO;  Service: Endoscopy;  Laterality: N/A;    COLONOSCOPY N/A 1/17/2019    Procedure: COLONOSCOPY-with polypectomy;  Surgeon: Ahsan Enciso MD;  Location: [facility] ENDO;  Service: Endoscopy;  Laterality: N/A;    ESOPHAGOGASTRODUODENOSCOPY N/A 11/8/2018    Procedure: EGD (ESOPHAGOGASTRODUODENOSCOPY);  Surgeon: Ahsan Enciso MD;  Location: [facility] ENDO;  Service: Endoscopy;  Laterality: N/A;    EYE SURGERY      HYSTERECTOMY      MASTECTOMY      MYRINGOTOMY WITH INSERTION OF VENTILATION TUBE Bilateral 7/24/2018    Procedure: MYRINGOTOMY, WITH TYMPANOSTOMY TUBE INSERTION;  Surgeon: Ventura Whitt MD;  Location: [facility] OR;  Service: ENT;  Laterality: Bilateral;       Review of patient's allergies indicates:  No Known Allergies    Current Facility-Administered Medications on File Prior to Encounter   Medication    0.9%  NaCl infusion    sodium chloride 0.9% flush 3 mL     Current Outpatient Medications on File Prior to Encounter   Medication Sig    amlodipine (NORVASC) 10 MG tablet Take 10 mg by mouth once daily.    docusate sodium (COLACE) 100 MG capsule Take 1 capsule (100 mg total) by mouth 2 (two) times daily.    fluticasone (FLONASE) 50 mcg/actuation nasal spray 1 spray by Each Nare route 2 (two) times daily as needed (sinus congestion).    folic acid/multivit-min/lutein (CENTRUM SILVER ORAL) Take 1 Dose by mouth once daily.    furosemide (LASIX) 40 MG tablet Take 40 mg by mouth once daily.     losartan (COZAAR)  "50 MG tablet Take 50 mg by mouth once daily.    metformin (GLUCOPHAGE) 500 MG tablet Take 500 mg by mouth 2 (two) times daily with meals.    potassium chloride (KLOR-CON) 10 MEQ TbSR Take 10 mEq by mouth once daily.    ACCU-CHEK SMARTVIEW TEST STRIP Strp USE TO TEST BLOOD SUGAR TWICE A DAY    aspirin (ECOTRIN) 81 MG EC tablet Take 1 tablet (81 mg total) by mouth once daily.    atorvastatin (LIPITOR) 40 MG tablet Take 1 tablet (40 mg total) by mouth once daily.    blood sugar diagnostic (ACCU-CHEK SMARTVIEW TEST STRIP MISC)     blood-glucose meter Misc accu-chek    kit franky    brimonidine 0.1% (ALPHAGAN P) 0.1 % Drop Alphagan P 0.1 % eye drops    clopidogrel (PLAVIX) 75 mg tablet Take 1 tablet (75 mg total) by mouth once daily.    dorzolamide-timolol 2-0.5% (COSOPT) 22.3-6.8 mg/mL ophthalmic solution Place 1 drop into both eyes 2 (two) times daily.    dorzolamide-timolol 2-0.5% (COSOPT) 22.3-6.8 mg/mL ophthalmic solution dorzol/timol sol 22.3-6.8    ferrous sulfate (FEOSOL) 325 mg (65 mg iron) Tab tablet Take 1 tablet (325 mg total) by mouth 2 (two) times daily. Every other day.    LANTUS SOLOSTAR 100 unit/mL (3 mL) InPn pen INJECT 35 UNIT(S) EVERY EVENING BY SUBCUTANEOUS ROUTE.    nitroGLYCERIN (NITROSTAT) 0.4 MG SL tablet Place 1 tablet (0.4 mg total) under the tongue every 5 (five) minutes as needed for Chest pain.    omeprazole (PRILOSEC) 40 MG capsule Take 1 capsule (40 mg total) by mouth 2 (two) times daily before meals. for 14 days    ondansetron (ZOFRAN-ODT) 8 MG TbDL Take 1 tablet (8 mg total) by mouth every 8 (eight) hours as needed.    pantoprazole (PROTONIX) 40 MG tablet Take 1 tablet (40 mg total) by mouth once daily.    pen needle, diabetic 32 gauge x 3/16" Ndle by Misc.(Non-Drug; Combo Route) route.    travoprost, benzalkonium, (TRAVATAN) 0.004 % ophthalmic solution      Family History     Problem Relation (Age of Onset)    Breast cancer Sister (55)    Cancer Sister (75), Brother    " Hypertension Mother        Tobacco Use    Smoking status: Never Smoker    Smokeless tobacco: Current User     Types: Snuff   Substance and Sexual Activity    Alcohol use: No    Drug use: No    Sexual activity: Not Currently     Review of Systems   Constitutional: Negative for chills, fatigue and fever.   Respiratory: Negative for cough, choking, chest tightness, shortness of breath, wheezing and stridor.    Cardiovascular: Negative for chest pain, palpitations and leg swelling.   Gastrointestinal: Positive for constipation. Negative for abdominal distention, abdominal pain, blood in stool, diarrhea, nausea and vomiting.   Genitourinary: Negative for dysuria, flank pain and hematuria.   Musculoskeletal: Positive for back pain.   Skin: Negative for color change, pallor and rash.   Neurological: Positive for dizziness and facial asymmetry (baseline R sided droop). Negative for speech difficulty, weakness, light-headedness, numbness and headaches.   Psychiatric/Behavioral: Negative for agitation and behavioral problems.     Objective:     Vital Signs (Most Recent):  Temp: 98.3 °F (36.8 °C) (01/17/20 0536)  Pulse: 70 (01/17/20 0500)  Resp: (!) 21 (01/17/20 0309)  BP: (!) 168/70 (01/17/20 0500)  SpO2: 97 % (01/17/20 0500) Vital Signs (24h Range):  Temp:  [98 °F (36.7 °C)-98.3 °F (36.8 °C)] 98.3 °F (36.8 °C)  Pulse:  [66-70] 70  Resp:  [18-21] 21  SpO2:  [95 %-97 %] 97 %  BP: (168-207)/(70-87) 168/70     Weight: 99.8 kg (220 lb)  Body mass index is 33.45 kg/m².    Physical Exam   Constitutional: She is oriented to person, place, and time. She appears well-developed and well-nourished. No distress.   HENT:   Head: Normocephalic and atraumatic.   Eyes: Pupils are equal, round, and reactive to light. EOM are normal.   Neck: Normal range of motion. Neck supple.   Cardiovascular: Normal rate, regular rhythm, normal heart sounds and intact distal pulses. Exam reveals no gallop and no friction rub.   No murmur  heard.  Pulmonary/Chest: Effort normal and breath sounds normal. No stridor. No respiratory distress. She has no wheezes. She has no rales. She exhibits no tenderness.   Abdominal: Soft. Bowel sounds are normal. She exhibits no distension. There is no tenderness. There is no guarding.   Musculoskeletal: Normal range of motion.   Neurological: She is alert and oriented to person, place, and time. She has normal strength. A cranial nerve deficit is present. No sensory deficit.   Right sided facial droop.    Skin: Skin is warm and dry. Capillary refill takes less than 2 seconds. No rash noted. She is not diaphoretic. No erythema. No pallor.   Psychiatric: She has a normal mood and affect. Her behavior is normal.   Nursing note and vitals reviewed.        CRANIAL NERVES     CN III, IV, VI   Pupils are equal, round, and reactive to light.  Extraocular motions are normal.        Significant Labs:   CBC:   Recent Labs   Lab 01/17/20  0240   WBC 7.11   HGB 10.7*   HCT 35.5*        CMP:   Recent Labs   Lab 01/17/20  0240      K 3.7      CO2 26   *   BUN 18   CREATININE 1.4   CALCIUM 8.9   PROT 7.2   ALBUMIN 3.2*   BILITOT 0.3   ALKPHOS 79   AST 12   ALT 6*   ANIONGAP 9   EGFRNONAA 35*       Troponin:   Recent Labs   Lab 01/17/20  0240   TROPONINI 0.037*     TSH:   Recent Labs   Lab 10/10/19  1445   TSH 0.994     Urine Studies:   Recent Labs   Lab 01/17/20  0357   COLORU Yellow   APPEARANCEUA Clear   PHUR 6.0   SPECGRAV 1.015   PROTEINUA 1+*   GLUCUA Negative   KETONESU Negative   BILIRUBINUA Negative   OCCULTUA Negative   NITRITE Positive*   UROBILINOGEN Negative   LEUKOCYTESUR Negative   RBCUA 0   WBCUA 0   BACTERIA Many*   HYALINECASTS 0     All pertinent labs within the past 24 hours have been reviewed.    Significant Imaging: I have reviewed all pertinent imaging results/findings within the past 24 hours.

## 2020-01-17 NOTE — HPI
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 She had taken her nightly meds ( valsartan, insulin and laxative before bed). She was trying to lay in bed but felt the room was spinning. She felt worse while looking at the fan. She states her daughter had checked her bp and said it was a little high but she is unsure of actual pressure. Denies LOC, fall, N/V, CP, SOB. She reports a similar episode in October and states she has been scared since then that it would happen again. She did have blurry vision that has since resolved and back pain. She also believes her "eyes went to god". She denies dysuria, urinary changes or hematuria. She does have constipation with last bm yesterday. She has been using chewing tobacco her whole life, denies other tobacco or drug use. She drinks ETOH occasionally. She reports compliance with all medications.     After discussion with patient, she wishes to be DNR/DNI.     In the ED, highest bp 207/87, trop elevated 0.037, EKG showed RBB which is seen on previous EKG but possible new T wave inversions, .   U/A positive for nitrites.   "

## 2020-01-17 NOTE — ASSESSMENT & PLAN NOTE
Hx of vertigo  -resolved  -possible etiologies CVA, HTN, hypoglycemia, vertigo  MRI brain pending   Will monitor.

## 2020-01-17 NOTE — PLAN OF CARE
Problem: Occupational Therapy Goal  Goal: Occupational Therapy Goal  Description  Goals to be met by: 2/17/20     Patient will increase functional independence with ADLs by performing:    LE Dressing with Supervision.  Grooming while standing with Supervision.  Toileting from toilet with Supervision for hygiene and clothing management.   Supine to sit with Supervision.  Step transfer with Supervision  Toilet transfer to toilet with Supervision.  Increased functional strength to WFL for self care skills and functional mobility.  Upper extremity exercise program x10 reps per handout, with independence.     Outcome: Ongoing, Progressing     Pt would benefit from cont OT services in order to maximize functional independence. Recommending HHOT/PT at d/c

## 2020-01-17 NOTE — PT/OT/SLP EVAL
Physical Therapy Evaluation and Treatment    Patient Name:  Elina Mei   MRN:  [id number]    Recommendations:     Discharge Recommendations:  home health PT, home health OT   Discharge Equipment Recommendations: none   Barriers to discharge: None    Assessment:     Elina Mei is a 79 y.o. female admitted with a medical diagnosis of Hypertensive urgency.  She presents with the following impairments/functional limitations:  impaired endurance, impaired functional mobilty, gait instability, impaired balance, pain. Pt with no noted LE strength deficits and no c/o dizziness during session but reporting feeling fatigued. Pt ambulated 10 ft within room with no AD and CGA. Recommending HH PT/OT upon d/c.    Rehab Prognosis: Good; patient would benefit from acute skilled PT services to address these deficits and reach maximum level of function.    Recent Surgery: * No surgery found *      Plan:     During this hospitalization, patient to be seen 5 x/week to address the identified rehab impairments via gait training, therapeutic activities, therapeutic exercises, neuromuscular re-education and progress toward the following goals:    · Plan of Care Expires:  02/17/20    Subjective     Chief Complaint: posterior neck pain at start of session but reports no pain at end of session  Patient/Family Comments/goals: pt reporting no dizziness during session but reports feeling tired  Pain/Comfort:  · Pain Rating 1: 5/10  · Location - Orientation 1: posterior  · Location 1: neck  · Pain Addressed 1: Reposition, Distraction  · Pain Rating Post-Intervention 1: 0/10    Patients cultural, spiritual, Mu-ism conflicts given the current situation: no    Living Environment:  Pt lives with her son and daughter in a [facility] with 1 DAISY and tub/shower combo with TTB.  Prior to admission, patients level of function was supervision with ambulation without AD, did not require assist for feeding, toileting, and occasionally cooks. Requires assist for  bathing and occasional assist for dressing 2/2 being slow.  Equipment used at home: cane, straight, bath bench, rollator.  DME owned (not currently used): single point cane and rollator.  Upon discharge, patient will have assistance from her family- reports someone is home with her at all times. Family reporting her daughter is sick.    Objective:     Communicated with nurse Mabel in ED then nurse Yudith once pt transferred to her room prior to session.  Patient found HOB elevated with blood pressure cuff, pulse ox (continuous), telemetry  upon PT entry to room.    General Precautions: Standard, fall   Orthopedic Precautions:N/A   Braces: N/A     Exams:  · R facial droop - baseline: history of L MCA stroke  · Cognitive Exam:  Patient is oriented to Person, Place, Time and Situation  · Gross Motor Coordination:  WFL  · Postural Exam:  Patient presented with the following abnormalities:    · -       Rounded shoulders  · Sensation:    · -       Impaired  numbness/tingling from B ankles to feet - pt reporting recent cramps in B calf  · Skin Integrity/Edema:      · -       Skin integrity: Visible skin intact  · RLE ROM: WFL  · RLE Strength: WFL  · LLE ROM: WFL  · LLE Strength: WFL    Functional Mobility:  · Bed Mobility:     · Scooting: stand by assistance  · Supine to Sit: contact guard assistance  · Sit to Supine: stand by assistance  · Transfers:     · Sit to Stand:  contact guard assistance with hand-held assist  · Toilet Transfer: contact guard assistance with  no AD  using  Stand Pivot  · Gait: 10 ft within room with no AD and CGA - no overt LOB or instability - further gait training limited by pt's reprots of fatigue.      Therapeutic Activities and Exercises:  Pt transferred from ED stretched to hospital bed. BP assessed at 170s/70s - nurse cleared for BSC transfer.  Sat to rest several minutes then transferred to BSC.  Completed hygiene with set-up then ambulated to sink to complete hand hygiene.  Ambulated  back to bed and returned to supine with HOB elevated.     AM-PAC 6 CLICK MOBILITY  Total Score:17     Patient left HOB elevated with all lines intact, call button in reach, bed alarm on, nurse notified and pt's family present.    GOALS:   Multidisciplinary Problems     Physical Therapy Goals        Problem: Physical Therapy Goal    Goal Priority Disciplines Outcome Goal Variances Interventions   Physical Therapy Goal     PT, PT/OT Ongoing, Progressing     Description:  Goals to be met by: 20     Patient will increase functional independence with mobility by performin. Supine <> sit with Stand-by Assistance  2. Sit to stand transfer with Stand-by Assistance  3. Bed to chair transfer with Stand-by Assistance   4. Gait  x 100 feet with Stand-by Assistance                       History:     Past Medical History:   Diagnosis Date    Acute right-sided weakness 2018    Arthritis     Diabetes mellitus     Hypertension     Malignant neoplasm of upper-outer quadrant of right breast in female, estrogen receptor positive 2017    Status post mastectomy, right 2018    Stroke 2018       Past Surgical History:   Procedure Laterality Date    BREAST BIOPSY      COLONOSCOPY N/A 2018    Procedure: COLONOSCOPY;  Surgeon: Ahsan Enciso MD;  Location: [facility];  Service: Endoscopy;  Laterality: N/A;    COLONOSCOPY N/A 2019    Procedure: COLONOSCOPY-with polypectomy;  Surgeon: Ahsan Enciso MD;  Location: [facility];  Service: Endoscopy;  Laterality: N/A;    ESOPHAGOGASTRODUODENOSCOPY N/A 2018    Procedure: EGD (ESOPHAGOGASTRODUODENOSCOPY);  Surgeon: Ahsan Enciso MD;  Location: [facility];  Service: Endoscopy;  Laterality: N/A;    EYE SURGERY      HYSTERECTOMY      MASTECTOMY      MYRINGOTOMY WITH INSERTION OF VENTILATION TUBE Bilateral 2018    Procedure: MYRINGOTOMY, WITH TYMPANOSTOMY TUBE INSERTION;  Surgeon: Ventura Whitt MD;  Location: [facility] OR;   Service: ENT;  Laterality: Bilateral;       Time Tracking:     PT Received On: 01/17/20  PT Start Time: 1317     PT Stop Time: 1327    [id number]  PT Total Time (min): 10 min + 26 min (with OT)    Billable Minutes: Evaluation 10 and Gait Training 13      Dorita Lehman, PT  01/17/2020

## 2020-01-17 NOTE — PT/OT/SLP EVAL
Occupational Therapy   Evaluation    Name: Elina Mei  MRN: [id number]  Admitting Diagnosis:  Hypertensive urgency      Recommendations:     Discharge Recommendations: home health PT, home health OT  Discharge Equipment Recommendations:  none  Barriers to discharge:  None    Assessment:     Elina Mei is a 79 y.o. female with a medical diagnosis of Hypertensive urgency.  She presents with deconditioning. Performance deficits affecting function: impaired balance, impaired self care skills, weakness, impaired endurance, impaired functional mobilty, gait instability, pain, impaired cardiopulmonary response to activity.      Pt would benefit from cont OT services in order to maximize functional independence. Recommending HHOT/PT at d/c     Rehab Prognosis: Good; patient would benefit from acute skilled OT services to address these deficits and reach maximum level of function.       Plan:     Patient to be seen 5 x/week to address the above listed problems via self-care/home management, therapeutic activities, therapeutic exercises  · Plan of Care Expires:    · Plan of Care Reviewed with: patient, daughter    Subjective     Chief Complaint: weakness; fatigue   Patient/Family Comments/goals: return to PLOF     Occupational Profile:  Living Environment: with dghtr and son, SSH, 2 step to enter, t/s combo  Previous level of function: mod I ambulation and toileting; assist with bathing and LBD; pt reports she assists with cooking around the house   Equipment Used at Home:  cane, straight, bath bench, rollator  Assistance upon Discharge: reports dedehtr is currently ill and limited in her ability to assist; states her granddghtr is able to assist at times     Pain/Comfort:  · Pain Rating 1: 5/10  · Location - Orientation 1: posterior  · Location 1: neck  · Pain Addressed 1: Reposition, Distraction  · Pain Rating Post-Intervention 1: 0/10    Patients cultural, spiritual, Zoroastrian conflicts given the current situation:       Objective:     Communicated with: nsg prior to session.      General Precautions: Standard, fall   Orthopedic Precautions:N/A   Braces: N/A     Occupational Performance:    Bed Mobility:    · Patient completed Scooting/Bridging with contact guard assistance  · Patient completed Supine to Sit with contact guard assistance  · Patient completed Sit to Supine with stand by assistance    Functional Mobility/Transfers:  · Patient completed Sit <> Stand Transfer with contact guard assistance  with  hand-held assist   · Patient completed Toilet Transfer Step Transfer technique with contact guard assistance with  hand-held assist  · Functional Mobility: CGA with out AD; HHA initially with movement and then just requiring CGA     Activities of Daily Living:  · Grooming: contact guard assistance standing at sink   · Lower Body Dressing: maximal assistance    · Toileting: minimum assistance mod a for clothing management     Cognitive/Visual Perceptual:  Cognitive/Psychosocial Skills:     -       Oriented to: Person, Place, Time and Situation   -       Follows Commands/attention:Follows multistep  commands  -       Communication: slightly difficult to understand at times   -       Memory: Impaired LTM  -       Safety awareness/insight to disability: impaired   -       Mood/Affect/Coping skills/emotional control: Appropriate to situation    Physical Exam:  Balance:    -       WFL sitting balance; CGA standing   Postural examination/scapula alignment:    -       Rounded shoulders  -       Forward head  Skin integrity: Visible skin intact  Edema:  None noted  Sensation:    -       Intact  Dominant hand:    -       right  Upper Extremity Range of Motion:   BUE ROM WFL for pt's needs   Upper Extremity Strength:  Grossly 4- to 4/5    Strength:  Good   Fine Motor Coordination:    -       Intact    AMPAC 6 Click ADL:  AMPAC Total Score: 19    Treatment & Education:  Pt seen over two sessions 2/2 being moved to the floor   T/f  stretcher to bed CGA with HHA of 2   T/f to BSC CGA   Toileting Min/mod a for clothing management ; stood to perform hygiene   Ambulated to sink with CGA to perform G&H axs CGA standing   Stood multiple times CGA from EOB and lateral steps to HOB CGA   Education:    Patient left supine with all lines intact, call button in reach, bed alarm on, nsg notified and family  present    GOALS:   Multidisciplinary Problems     Occupational Therapy Goals        Problem: Occupational Therapy Goal    Goal Priority Disciplines Outcome Interventions   Occupational Therapy Goal     OT, PT/OT Ongoing, Progressing    Description:  Goals to be met by: 2/17/20     Patient will increase functional independence with ADLs by performing:    LE Dressing with Supervision.  Grooming while standing with Supervision.  Toileting from toilet with Supervision for hygiene and clothing management.   Supine to sit with Supervision.  Step transfer with Supervision  Toilet transfer to toilet with Supervision.  Increased functional strength to WFL for self care skills and functional mobility.  Upper extremity exercise program x10 reps per handout, with independence.                      History:     Past Medical History:   Diagnosis Date    Acute right-sided weakness 4/26/2018    Arthritis     Diabetes mellitus     Hypertension     Malignant neoplasm of upper-outer quadrant of right breast in female, estrogen receptor positive 12/5/2017    Status post mastectomy, right 2018    Stroke 4/26/2018       Past Surgical History:   Procedure Laterality Date    BREAST BIOPSY      COLONOSCOPY N/A 11/8/2018    Procedure: COLONOSCOPY;  Surgeon: Ahsan Enciso MD;  Location: [facility];  Service: Endoscopy;  Laterality: N/A;    COLONOSCOPY N/A 1/17/2019    Procedure: COLONOSCOPY-with polypectomy;  Surgeon: Ahsan Enciso MD;  Location: [facility];  Service: Endoscopy;  Laterality: N/A;    ESOPHAGOGASTRODUODENOSCOPY N/A 11/8/2018    Procedure: EGD  (ESOPHAGOGASTRODUODENOSCOPY);  Surgeon: Ahsan Enciso MD;  Location: [facility] ENDO;  Service: Endoscopy;  Laterality: N/A;    EYE SURGERY      HYSTERECTOMY      MASTECTOMY      MYRINGOTOMY WITH INSERTION OF VENTILATION TUBE Bilateral 7/24/2018    Procedure: MYRINGOTOMY, WITH TYMPANOSTOMY TUBE INSERTION;  Surgeon: Ventura Whitt MD;  Location: [facility] OR;  Service: ENT;  Laterality: Bilateral;       Time Tracking:     OT Date of Treatment: 01/17/20  OT Start Time: 1317(1330)  OT Stop Time: 1327(1356)  OT Total Time (min): 10 min + 26 min     Billable Minutes:Evaluation 10   Self care 13 co treatment with PT   Total 23 min     Marcela Culp, OT  1/17/2020

## 2020-01-17 NOTE — PLAN OF CARE
Problem: Physical Therapy Goal  Goal: Physical Therapy Goal  Description  Goals to be met by: 20     Patient will increase functional independence with mobility by performin. Supine <> sit with Stand-by Assistance  2. Sit to stand transfer with Stand-by Assistance  3. Bed to chair transfer with Stand-by Assistance   4. Gait  x 100 feet with Stand-by Assistance      Outcome: Ongoing, Progressing     PT evaluation completed, note to follow. Pt with no noted LE strength deficits and no c/o dizziness during session but reporting feeling fatigued. Pt ambulated 10 ft within room with no AD and CGA. Recommending  PT/OT upon d/c.

## 2020-01-17 NOTE — ED PROVIDER NOTES
Encounter Date: 1/17/2020    SCRIBE #1 NOTE: I, Alanna Fran, am scribing for, and in the presence of,  Dr. Nayak. I have scribed the entire note.       History     Chief Complaint   Patient presents with    Dizziness     Pt. c/o dizziness that began last night while lying in bed Denies N/V/D. Pt.s dtr took her BP at that time and it was elevated.     Elina eMi is a 79 y.o. female who  has a past medical history of Acute right-sided weakness (4/26/2018), Arthritis, Diabetes mellitus, Hypertension, Malignant neoplasm of upper-outer quadrant of right breast in female, estrogen receptor positive (12/5/2017), Status post mastectomy, right (2018), and Stroke (4/26/2018).    The patient presents to the ED due to feeling dizzy beginning around 2200 today. She reports laying in bed when symptoms began and that when she got up to go to the bathroom her symptoms subsided. Her symptoms were worse PTA and she states she feels better now. She denies any CP, SOB, nausea, vomiting or diarrhea. Her past medical history includes a similar episode in 10/2019. She reports taking Losartan at 2130 tonight. Of note, she also reports back pain last night.     The history is provided by the patient and a relative.     Review of patient's allergies indicates:  No Known Allergies  Past Medical History:   Diagnosis Date    Acute right-sided weakness 4/26/2018    Arthritis     Diabetes mellitus     Hypertension     Malignant neoplasm of upper-outer quadrant of right breast in female, estrogen receptor positive 12/5/2017    Status post mastectomy, right 2018    Stroke 4/26/2018     Past Surgical History:   Procedure Laterality Date    BREAST BIOPSY      COLONOSCOPY N/A 11/8/2018    Procedure: COLONOSCOPY;  Surgeon: Ahsan Enciso MD;  Location: [facility] ENDO;  Service: Endoscopy;  Laterality: N/A;    COLONOSCOPY N/A 1/17/2019    Procedure: COLONOSCOPY-with polypectomy;  Surgeon: Ahsan Enciso MD;  Location: [facility] ENDO;  Service:  Endoscopy;  Laterality: N/A;    ESOPHAGOGASTRODUODENOSCOPY N/A 11/8/2018    Procedure: EGD (ESOPHAGOGASTRODUODENOSCOPY);  Surgeon: Ahsan Enciso MD;  Location: [facility];  Service: Endoscopy;  Laterality: N/A;    EYE SURGERY      HYSTERECTOMY      MASTECTOMY      MYRINGOTOMY WITH INSERTION OF VENTILATION TUBE Bilateral 7/24/2018    Procedure: MYRINGOTOMY, WITH TYMPANOSTOMY TUBE INSERTION;  Surgeon: Ventura Whitt MD;  Location: [facility] OR;  Service: ENT;  Laterality: Bilateral;     Family History   Problem Relation Age of Onset    Cancer Sister 75        Colon CA    Breast cancer Sister 55    Cancer Brother     Hypertension Mother      Social History     Tobacco Use    Smoking status: Never Smoker    Smokeless tobacco: Current User     Types: Snuff   Substance Use Topics    Alcohol use: No    Drug use: No     Review of Systems   Constitutional: Negative for chills and fever.   HENT: Negative for rhinorrhea, sore throat and trouble swallowing.    Eyes: Negative for visual disturbance.   Respiratory: Negative for cough and shortness of breath.    Cardiovascular: Negative for chest pain.   Gastrointestinal: Negative for abdominal pain, diarrhea and vomiting.   Genitourinary: Negative for dysuria and hematuria.   Musculoskeletal: Negative for back pain.   Skin: Negative for rash.   Neurological: Positive for dizziness. Negative for numbness and headaches.   Hematological: Negative for adenopathy.       Physical Exam     Initial Vitals   BP Pulse Resp Temp SpO2   01/17/20 0143 01/17/20 0143 01/17/20 0154 01/17/20 0143 01/17/20 0143   (!) 168/73 66 18 98.3 °F (36.8 °C) 95 %      MAP       --                Physical Exam    Constitutional:  Non-toxic appearance. No distress.   HENT:   Head: Normocephalic and atraumatic.   Eyes: Conjunctivae and EOM are normal. Pupils are equal, round, and reactive to light. Right eye exhibits no nystagmus. Left eye exhibits no nystagmus.   Neck: Neck supple.    Cardiovascular: Regular rhythm, S1 normal and S2 normal.   No murmur heard.  Pulmonary/Chest: Breath sounds normal. No respiratory distress. She has no wheezes. She has no rales.   Abdominal: Soft. She exhibits no distension. There is no tenderness.   Neurological: She is alert. No cranial nerve deficit. GCS eye subscore is 4. GCS verbal subscore is 5. GCS motor subscore is 6.   CN 2-12 intact  Finger to nose within normal limits  Negative romberg  Equal strength to bilateral upper extremities, SILT  Equal strength to bilateral lower extremities, SILT     Skin: Skin is warm. No rash noted.   Psychiatric: She has a normal mood and affect. Her behavior is normal.         ED Course   Procedures  Labs Reviewed   CBC W/ AUTO DIFFERENTIAL - Abnormal; Notable for the following components:       Result Value    RBC 3.81 (*)     Hemoglobin 10.7 (*)     Hematocrit 35.5 (*)     Mean Corpuscular Hemoglobin Conc 30.1 (*)     All other components within normal limits   TROPONIN I - Abnormal; Notable for the following components:    Troponin I 0.037 (*)     All other components within normal limits   COMPREHENSIVE METABOLIC PANEL   B-TYPE NATRIURETIC PEPTIDE   URINALYSIS, REFLEX TO URINE CULTURE          Imaging Results    None          Medical Decision Making:   Differential Diagnosis:   Differential Diagnosis includes, but is not limited to:  Peripheral vertigo (labyrinthitis, vestibular neuritis, BPPV, Meniere's disease), cerebellar stroke/CVA, TIA, SAH, carotid artery dissection, intracranial mass, medication reaction/noncompliance, substance abuse, depression, electrolyte abnormality, anemia, hemorrhage, renal failure, hepatic failure, sepsis/infection, UTI, viral illness, arrhythmia, CHF, thyroid disease, dehydration, depression, chronic disease.    Clinical Tests:   Lab Tests: Ordered and Reviewed  Radiological Study: Ordered and Reviewed  Medical Tests: Ordered and Reviewed                                 Clinical  Impression:       ICD-10-CM ICD-9-CM   1. Dizziness R42 780.4              Scribe attestation***    Portions of this note were dictated using voice recognition software and may contain dictation related errors in spelling/grammar/syntax not found on text review

## 2020-01-17 NOTE — NURSING
Plan of care reviewed with patient. Voiced understanding. NSR on monitor with no red alarms noted. No acute distress noted. Side rails x3, bed low, call bell within reach. Maintain bed alarm for patient safety. Patient will be monitored overnight.

## 2020-01-17 NOTE — ASSESSMENT & PLAN NOTE
-residual right sided facial weakness  -CT head: neg for acute hemorrhage, can not rule out ischemia  MRI brain pending

## 2020-01-17 NOTE — ED PROVIDER NOTES
Encounter Date: 1/17/2020    SCRIBE #1 NOTE: I, Alanna Peters, am scribing for, and in the presence of,  Dr. Nayak. I have scribed the entire note.       History     Chief Complaint   Patient presents with    Dizziness     Pt. c/o dizziness that began last night while lying in bed Denies N/V/D. Pt.s dtr took her BP at that time and it was elevated.     Elina Mei is a 79 y.o. female who  has a past medical history of Acute right-sided weakness (4/26/2018), Arthritis, Diabetes mellitus, Hypertension, Malignant neoplasm of upper-outer quadrant of right breast in female, estrogen receptor positive (12/5/2017), Status post mastectomy, right (2018), and Stroke (4/26/2018).    The patient presents to the ED due to feeling dizzy beginning around 2200 today. She reports laying in bed when symptoms began and that when she got up to go to the bathroom her symptoms subsided. Her symptoms were worse PTA and she states she feels better now. Her past medical history includes a similar episode in 10/2019. She denies any CP, SOB, nausea, vomiting or diarrhea. She reports taking Losartan at 2130 tonight. Of note, she also reports back pain last night.  She denies numbness or weakness tingling vision changes or headache. She has a history of stroke with right-sided facial weakness    The history is provided by the patient and a relative.     Review of patient's allergies indicates:  No Known Allergies  Past Medical History:   Diagnosis Date    Acute right-sided weakness 4/26/2018    Arthritis     Diabetes mellitus     Hypertension     Malignant neoplasm of upper-outer quadrant of right breast in female, estrogen receptor positive 12/5/2017    Status post mastectomy, right 2018    Stroke 4/26/2018     Past Surgical History:   Procedure Laterality Date    BREAST BIOPSY      COLONOSCOPY N/A 11/8/2018    Procedure: COLONOSCOPY;  Surgeon: Ahsan Enciso MD;  Location: [facility];  Service: Endoscopy;  Laterality: N/A;     COLONOSCOPY N/A 1/17/2019    Procedure: COLONOSCOPY-with polypectomy;  Surgeon: Ahsan Enciso MD;  Location: [facility] ENDO;  Service: Endoscopy;  Laterality: N/A;    ESOPHAGOGASTRODUODENOSCOPY N/A 11/8/2018    Procedure: EGD (ESOPHAGOGASTRODUODENOSCOPY);  Surgeon: Ahsan Enciso MD;  Location: [facility] ENDO;  Service: Endoscopy;  Laterality: N/A;    EYE SURGERY      HYSTERECTOMY      MASTECTOMY      MYRINGOTOMY WITH INSERTION OF VENTILATION TUBE Bilateral 7/24/2018    Procedure: MYRINGOTOMY, WITH TYMPANOSTOMY TUBE INSERTION;  Surgeon: Ventura Whitt MD;  Location: [facility] OR;  Service: ENT;  Laterality: Bilateral;     Family History   Problem Relation Age of Onset    Cancer Sister 75        Colon CA    Breast cancer Sister 55    Cancer Brother     Hypertension Mother      Social History     Tobacco Use    Smoking status: Never Smoker    Smokeless tobacco: Current User     Types: Snuff   Substance Use Topics    Alcohol use: No    Drug use: No     Review of Systems   Constitutional: Negative for chills and fever.   HENT: Negative for rhinorrhea, sore throat and trouble swallowing.    Eyes: Negative for visual disturbance.   Respiratory: Negative for cough and shortness of breath.    Cardiovascular: Negative for chest pain.   Gastrointestinal: Negative for abdominal pain, diarrhea and vomiting.   Genitourinary: Negative for dysuria and hematuria.   Musculoskeletal: Negative for back pain.   Skin: Negative for rash.   Neurological: Positive for dizziness. Negative for numbness and headaches.   Hematological: Negative for adenopathy.       Physical Exam     Initial Vitals   BP Pulse Resp Temp SpO2   01/17/20 0143 01/17/20 0143 01/17/20 0154 01/17/20 0143 01/17/20 0143   (!) 168/73 66 18 98.3 °F (36.8 °C) 95 %      MAP       --                Physical Exam    Nursing note and vitals reviewed.  Constitutional:  Non-toxic appearance. No distress.   HENT:   Head: Normocephalic and atraumatic.   Eyes:  Conjunctivae and EOM are normal. Pupils are equal, round, and reactive to light. Right eye exhibits no nystagmus. Left eye exhibits no nystagmus.   Neck: Neck supple.   Cardiovascular: Regular rhythm, S1 normal and S2 normal.   No murmur heard.  Pulmonary/Chest: Breath sounds normal. No respiratory distress. She has no wheezes. She has no rales.   Abdominal: Soft. She exhibits no distension. There is no tenderness.   Neurological: She is alert. No cranial nerve deficit. GCS eye subscore is 4. GCS verbal subscore is 5. GCS motor subscore is 6.   Right-sided facial droop, CN otherwise 2-12 intact  Finger to nose within normal limits  Negative romberg  Equal strength to bilateral upper extremities, SILT  Equal strength to bilateral lower extremities, SILT  No nystagmus elicited   Skin: Skin is warm. No rash noted.   Psychiatric: She has a normal mood and affect. Her behavior is normal.         ED Course   Procedures  Labs Reviewed   CBC W/ AUTO DIFFERENTIAL - Abnormal; Notable for the following components:       Result Value    RBC 3.81 (*)     Hemoglobin 10.7 (*)     Hematocrit 35.5 (*)     Mean Corpuscular Hemoglobin Conc 30.1 (*)     All other components within normal limits   COMPREHENSIVE METABOLIC PANEL   B-TYPE NATRIURETIC PEPTIDE   TROPONIN I   URINALYSIS, REFLEX TO URINE CULTURE     EKG Readings: (Independently Interpreted)   Normal sinus rhythm of 67 bpm  1st degree AV block  Right bundle branch block  No STEMI       Imaging Results          NM Lung Scan Ventilation Perfusion (Final result)  Result time 01/17/20 12:45:01    Final result by Laura Moncada MD (01/17/20 12:45:01)                 Impression:      Normal VQ scan.    Mild radiotracer retention on the ventilation phase can be seen with the small airway disease.      Electronically signed by: Laura Moncada MD  Date:    01/17/2020  Time:    12:45             Narrative:    EXAMINATION:  NM LUNG VENTILATION AND PERFUSION IMAGING    CLINICAL  HISTORY:  Chest pain, acute, PE suspected, intermed prob, positive D-dimer;    TECHNIQUE:  Fifteen mCi of xenon-133 was administered for the ventilation portion of the study, and 5 mCi of technetium 99m MAA was administered for the perfusion portion of the study.  Perfusion images  were obtained in multiple projections.    COMPARISON:  None.    FINDINGS:  On the perfusion study, no perfusion abnormalities.  The ventilation study reveals mild radio tracer retention which may indicate small airway disease.  No radiograph performed recently.                               MRI Brain Without Contrast (Final result)  Result time 01/17/20 09:18:38    Final result by Quentin Brock MD (01/17/20 09:18:38)                 Impression:      No acute intracranial abnormality.    Remote chronic changes as above.    Maxillary sinus disease.      Electronically signed by: Quentin Brock  Date:    01/17/2020  Time:    09:18             Narrative:    EXAMINATION:  MRI BRAIN WITHOUT CONTRAST    CLINICAL HISTORY:  Neuro deficit(s), subacute;    TECHNIQUE:  Multiplanar multisequence MR imaging of the brain was performed without contrast.    COMPARISON:  MRI brain dated 10/10/2019 and CT head without contrast dated 01/17/2020 and MRI brain dated 04/26/2018    FINDINGS:  No abnormal focus of diffusion restriction.  No midline shift or mass effect.  T2 major vascular flow voids are maintained.  Small regions of hypoattenuation involving the left parietal lobe with associated T2 FLAIR hyperintensity and few foci susceptibility, similar, likely in keeping with sequela of prior left MCA infarct seen on previous MRIs.  Bone marrow signal is maintained.  Circumferential mucosal thickening of the right maxillary sinus with mild thickening of the left maxillary floor.  There is scattered periventricular and deep white matter T2 FLAIR hyperintensity most in keeping with chronic microangiopathic change.                               CT Head  Without Contrast (Final result)  Result time 01/17/20 04:14:01    Final result by Constanza Montejo MD (01/17/20 04:14:01)                 Impression:      1. No CT evidence of acute intracranial abnormality.  There is clinical concern for acute ischemia, further evaluation with MRI is recommended if there are no clinical contraindications.  2. Generalized cerebral volume loss, findings suggestive of chronic microvascular ischemic changes small regions of encephalomalacia in the left MCA territory distribution in keeping with sequela of prior small infarcts.  3. Paranasal sinus disease as above.      Electronically signed by: Constanza Montejo MD  Date:    01/17/2020  Time:    04:14             Narrative:    EXAMINATION:  CT HEAD WITHOUT CONTRAST    CLINICAL HISTORY:  Focal neuro deficit, new, fixed or worsening, <6 hours;dizziness since 10pm;    TECHNIQUE:  Low dose axial images were obtained through the head.  Coronal and sagittal reformations were also performed. Contrast was not administered.    COMPARISON:  Head CT and MRI brain 10/10/2019    FINDINGS:  There is no acute intracranial hemorrhage, hydrocephalus, midline shift or mass effect.  There is generalized cerebral volume loss, similar to prior examination.  There scattered foci of hypoattenuation in the supratentorial white matter which are nonspecific but likely reflect sequela of chronic microvascular ischemic change.  Additional small regions of hypoattenuation involving the left parietal lobe likely reflect regions of prior left MCA infarction seen on previously performed MRI examinations.  Gray-white matter differentiation otherwise appears maintained.  The basilar cisterns are patent.  The mastoid air cells are essentially clear.  There is mucosal thickening of the maxillary antra and mild mucosal thickening of the ethmoid air cells.  The calvarium is intact.                                 Medical Decision Making:   Differential Diagnosis:   Differential  Diagnosis includes, but is not limited to:  Peripheral vertigo (labyrinthitis, vestibular neuritis, BPPV, Meniere's disease), cerebellar stroke/CVA, TIA, SAH, carotid artery dissection, intracranial mass, medication reaction/noncompliance, substance abuse, depression, electrolyte abnormality, anemia, hemorrhage, renal failure, hepatic failure, sepsis/infection, UTI, viral illness, arrhythmia, CHF, thyroid disease, dehydration, depression, chronic disease.    Clinical Tests:   Lab Tests: Ordered and Reviewed  Radiological Study: Ordered and Reviewed  Medical Tests: Ordered and Reviewed  ED Management:  Patient will be admitted to [facility] to trend troponin.  Patient did feel weak and fatigued with her dizziness.  No acute EKG changes.  Dizziness is improved.  Patient was transferred without acute event.                   ED Course as of Jan 18 0311 Fri Jan 17, 2020   0416 Patient reports feeling better. Denies dizziness at this time but is having a muscle cramp. Informed of lab results and recommendation for admission. Patient and family agree with plan.     [RN]      ED Course User Index  [RN] Moses Nayak Jr., MD                Clinical Impression:       ICD-10-CM ICD-9-CM   1. Dizziness R42 780.4   2. Hypertensive emergency I16.1 401.9   3. Elevated troponin R79.89 790.6   4. Prolonged Q-T interval on ECG R94.31 794.31         Disposition:   Disposition: Placed in Observation  Condition: Fair      Scribe attestation I, Moses Nayak,  personally performed the services described in this documentation. All medical record entries made by the scribe were at my direction and in my presence.  I have reviewed the chart and agree that the record reflects my personal performance and is accurate and complete. Moses Nayak M.D. 3:14 AM01/18/2020      Portions of this note were dictated using voice recognition software and may contain dictation related errors in spelling/grammar/syntax not found on text  review                 Moses Nayak Jr., MD  01/18/20 [id number]

## 2020-01-17 NOTE — ASSESSMENT & PLAN NOTE
-currently asymptomatic  -given meclizine in ED  -can not rule out other etiologies of dizziness  -will monitor

## 2020-01-17 NOTE — ED NOTES
Pt. Repositioned in bed and placed in a hospital gown. Pt.s Gown and home medications placed in pt. belongings bag and are at the bedside.

## 2020-01-17 NOTE — ASSESSMENT & PLAN NOTE
Highest bp in /87  Trop 0.037  Cr: 1.4, baseline unclear previous Cr 1.0-1.4  CT head neg for acute hemorrhage  F/U MRI brain  BP has improved without medications  Will trend trop and EKG Q6H  Continue home antihypertensives (Norvasc 10 and losartan 50).

## 2020-01-17 NOTE — ED TRIAGE NOTES
Pt. To the ER via [facility] with c/o dizziness that began while pt. Moved in bed this morning. Pt. Denies c/o shortness of breath, chest pain or discomfort. Skin is PWD. Pt. Placed on cardiac, BP and continuous pulse oximetry monitors. Pt. Has a history of right sided weakness from a previous stroke and uses a walker for ambulation. Pt. Also had a right breast mastectomy two years ago. Bed in the low position, side rails elevated and call light at the bedside.

## 2020-01-18 NOTE — NURSING
Patient being D/C to home, Patient AAOx3, able to voice concerns, shows no acute signs of distress, patient and daughter verbally understand plan of care, IV removed, telemetry box removed, patient left by w./c one assist and daughter.

## 2020-01-18 NOTE — ASSESSMENT & PLAN NOTE
Highest bp in /87  Trop 0.037, second trop 0.018. 3rd 0.026  Cr: 1.4, baseline unclear previous Cr 1.0-1.4, now 1.1  CT head neg for acute hemorrhage  MRI brain negative  BP has improved without medications  Will trend trop and EKG Q6H  Continue home antihypertensives (Norvasc 10 and losartan 50), increased losartan to 100mg as pt still hypertensive

## 2020-01-18 NOTE — SUBJECTIVE & OBJECTIVE
Interval History: NAEO. Pt hypertensive this am, will increase ARB. Discharge today pending f/u cxr and PT recs. No complaints this morning.      Review of Systems   Constitutional: Negative for chills, fatigue and fever.   Respiratory: Negative for cough, choking, chest tightness, shortness of breath, wheezing and stridor.    Cardiovascular: Negative for chest pain, palpitations and leg swelling.   Gastrointestinal: Negative for abdominal distention, abdominal pain, blood in stool, constipation, diarrhea, nausea and vomiting.   Genitourinary: Negative for dysuria, flank pain and hematuria.   Musculoskeletal: Positive for back pain.   Skin: Negative for color change, pallor and rash.   Neurological: Positive for facial asymmetry (baseline R sided droop). Negative for dizziness, speech difficulty, weakness, light-headedness, numbness and headaches.   Psychiatric/Behavioral: Negative for agitation and behavioral problems.     Objective:     Vital Signs (Most Recent):  Temp: 98.5 °F (36.9 °C) (01/18/20 0737)  Pulse: 74 (01/18/20 0838)  Resp: 18 (01/18/20 0838)  BP: (!) 179/84 (01/18/20 0737)  SpO2: (!) 92 % (01/18/20 0838) Vital Signs (24h Range):  Temp:  [97.6 °F (36.4 °C)-98.6 °F (37 °C)] 98.5 °F (36.9 °C)  Pulse:  [] 74  Resp:  [13-19] 18  SpO2:  [91 %-99 %] 92 %  BP: (158-191)/(57-98) 179/84     Weight: 104.8 kg (231 lb 0.7 oz)  Body mass index is 35.13 kg/m².    Intake/Output Summary (Last 24 hours) at 1/18/2020 0919  Last data filed at 1/18/2020 0600  Gross per 24 hour   Intake 75 ml   Output 1750 ml   Net -1675 ml      Physical Exam   Constitutional: She is oriented to person, place, and time. She appears well-developed and well-nourished. No distress.   HENT:   Head: Normocephalic and atraumatic.   Eyes: Pupils are equal, round, and reactive to light. EOM are normal.   Neck: Normal range of motion. Neck supple.   Cardiovascular: Normal rate, regular rhythm, normal heart sounds and intact distal pulses.  Exam reveals no gallop and no friction rub.   No murmur heard.  Pulmonary/Chest: Effort normal and breath sounds normal. No stridor. No respiratory distress. She has no wheezes. She has no rales. She exhibits no tenderness.   Abdominal: Soft. Bowel sounds are normal. She exhibits no distension. There is no tenderness. There is no guarding.   Musculoskeletal: Normal range of motion.   Neurological: She is alert and oriented to person, place, and time. She has normal strength. No cranial nerve deficit or sensory deficit.   Right sided facial droop at baseline s/p cva   Skin: Skin is warm and dry. Capillary refill takes less than 2 seconds. No rash noted. She is not diaphoretic. No erythema. No pallor.   Psychiatric: She has a normal mood and affect. Her behavior is normal.   Nursing note and vitals reviewed.      Significant Labs:   CBC:   Recent Labs   Lab 01/17/20  0240 01/18/20  0449   WBC 7.11 5.56   HGB 10.7* 10.7*   HCT 35.5* 35.4*    295     CMP:   Recent Labs   Lab 01/17/20  0240 01/18/20  0449    143   K 3.7 3.5    106   CO2 26 29   * 126*   BUN 18 14   CREATININE 1.4 1.1   CALCIUM 8.9 8.9   PROT 7.2 7.1   ALBUMIN 3.2* 3.1*   BILITOT 0.3 0.4   ALKPHOS 79 79   AST 12 14   ALT 6* 6*   ANIONGAP 9 8   EGFRNONAA 35* 47*       Significant Imaging: I have reviewed all pertinent imaging results/findings within the past 24 hours.

## 2020-01-18 NOTE — ASSESSMENT & PLAN NOTE
-residual right sided facial weakness  -CT head: neg for acute hemorrhage, can not rule out ischemia  MRI brain negative

## 2020-01-18 NOTE — PLAN OF CARE
Problem: Physical Therapy Goal  Goal: Physical Therapy Goal  Description  Goals to be met by: 20     Patient will increase functional independence with mobility by performin. Supine <> sit with Stand-by Assistance  2. Sit to stand transfer with Stand-by Assistance  3. Bed to chair transfer with Stand-by Assistance   4. Gait  x 100 feet with Stand-by Assistance      Outcome: Ongoing, Progressing   Pt continues to work and progress toward all goals.

## 2020-01-18 NOTE — NURSING
VN cued in pt room to provide discharge education, pt states that she has contacted her daughters to secure a ride and wants to wait until her family is present to go over discharge paperwork.  1334 Pt daughter Rochelle contacted, states that Joann will be picking pt up and bringing a change of clothes.

## 2020-01-18 NOTE — PLAN OF CARE
Pt AAOx4,Rocephin given for UTI. PT Sr on the monitor. No true red alarms noted. No hydralazine needed SBP below 180. PT. Code status changed from DNR to FULL CODE by daughters.No c/o of pain. Peripheral IV dry, clean, patient, & intact. L sided face drooping from prior stroke. LBM on 1/17/2020.blood glucose monitored closely. Safety precautions enforced, bed at lowest level, call light in reach, side rails up x2, & bed alarm on.

## 2020-01-18 NOTE — NURSING
VN cued into room for rounding. Plan of care reviewed with patient pt notified of discharge orders,states daughter Rochelle will be able to provide ride home. Pt informed of fall risk and fall precautions, call light within reach, 3x bed rails, nurse Deepti at bedside to perform orthostatic BP. Patient notified to ask staff for assistance and pt verbalized complete understanding. Will continue to monitor as needed.

## 2020-01-18 NOTE — HOSPITAL COURSE
78 yo female hx of HTN, T2DM on insulin, HLD, CVA with residual right sided facial droop, breast ca s/p right sided mastectomy who presented with dizziness found to have hypertensive urgency with /87 and positive troponin with stable EKG. She was also found to have UTI. CT head and MRI were negative. Was started on rocephin for UTI. Troponin trended down. D dimer was slightly elevated, V/Q done which ruled out PE. Home BP medications were restarted and BP was better controlled but still hypertensive. Patient was feeling much improved overnight. Repeat CXR was stable. Pt agreeing for discharge. Will send out on cipro for UTI and increase home losartan from 50 to 100mg daily. Pt to follow up with PCP.

## 2020-01-18 NOTE — PROGRESS NOTES
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 She had taken her nightly meds ( valsartan, insulin and laxative before bed). She was trying to lay in bed but felt the room was spinning. She felt worse while looking at the fan. She states her daughter had checked her bp and said it was a little high but she is unsure of actual pressure. Denies LOC, fall, N/V, CP, SOB. She reports a similar episode in October and states she has been scared since then that it would happen again. She did have blurry vision that has since resolved and back pain. She also believes her "eyes went to god". She denies dysuria, urinary changes or hematuria. She does have constipation with last bm yesterday. She has been using chewing tobacco her whole life, denies other tobacco or drug use. She drinks ETOH occasionally. She reports compliance with all medications.     After discussion with patient, she wishes to be DNR/DNI.     In the ED, highest bp 207/87, trop elevated 0.037, EKG showed RBB which is seen on previous EKG but possible new T wave inversions, .   U/A positive for nitrites.     Overview/Hospital Course:  No notes on file    Interval History: NAEO. Pt hypertensive this am, will increase ARB. Discharge today pending f/u cxr and PT recs. No complaints this morning.      Review of Systems   Constitutional: Negative for chills, fatigue and fever.   Respiratory: Negative for cough, choking, chest tightness, shortness of breath, wheezing and " stridor.    Cardiovascular: Negative for chest pain, palpitations and leg swelling.   Gastrointestinal: Negative for abdominal distention, abdominal pain, blood in stool, constipation, diarrhea, nausea and vomiting.   Genitourinary: Negative for dysuria, flank pain and hematuria.   Musculoskeletal: Positive for back pain.   Skin: Negative for color change, pallor and rash.   Neurological: Positive for facial asymmetry (baseline R sided droop). Negative for dizziness, speech difficulty, weakness, light-headedness, numbness and headaches.   Psychiatric/Behavioral: Negative for agitation and behavioral problems.     Objective:     Vital Signs (Most Recent):  Temp: 98.5 °F (36.9 °C) (01/18/20 0737)  Pulse: 74 (01/18/20 0838)  Resp: 18 (01/18/20 0838)  BP: (!) 179/84 (01/18/20 0737)  SpO2: (!) 92 % (01/18/20 0838) Vital Signs (24h Range):  Temp:  [97.6 °F (36.4 °C)-98.6 °F (37 °C)] 98.5 °F (36.9 °C)  Pulse:  [] 74  Resp:  [13-19] 18  SpO2:  [91 %-99 %] 92 %  BP: (158-191)/(57-98) 179/84     Weight: 104.8 kg (231 lb 0.7 oz)  Body mass index is 35.13 kg/m².    Intake/Output Summary (Last 24 hours) at 1/18/2020 0919  Last data filed at 1/18/2020 0600  Gross per 24 hour   Intake 75 ml   Output 1750 ml   Net -1675 ml      Physical Exam   Constitutional: She is oriented to person, place, and time. She appears well-developed and well-nourished. No distress.   HENT:   Head: Normocephalic and atraumatic.   Eyes: Pupils are equal, round, and reactive to light. EOM are normal.   Neck: Normal range of motion. Neck supple.   Cardiovascular: Normal rate, regular rhythm, normal heart sounds and intact distal pulses. Exam reveals no gallop and no friction rub.   No murmur heard.  Pulmonary/Chest: Effort normal and breath sounds normal. No stridor. No respiratory distress. She has no wheezes. She has no rales. She exhibits no tenderness.   Abdominal: Soft. Bowel sounds are normal. She exhibits no distension. There is no tenderness.  There is no guarding.   Musculoskeletal: Normal range of motion.   Neurological: She is alert and oriented to person, place, and time. She has normal strength. No cranial nerve deficit or sensory deficit.   Right sided facial droop at baseline s/p cva   Skin: Skin is warm and dry. Capillary refill takes less than 2 seconds. No rash noted. She is not diaphoretic. No erythema. No pallor.   Psychiatric: She has a normal mood and affect. Her behavior is normal.   Nursing note and vitals reviewed.      Significant Labs:   CBC:   Recent Labs   Lab 01/17/20  0240 01/18/20  0449   WBC 7.11 5.56   HGB 10.7* 10.7*   HCT 35.5* 35.4*    295     CMP:   Recent Labs   Lab 01/17/20 0240 01/18/20  0449    143   K 3.7 3.5    106   CO2 26 29   * 126*   BUN 18 14   CREATININE 1.4 1.1   CALCIUM 8.9 8.9   PROT 7.2 7.1   ALBUMIN 3.2* 3.1*   BILITOT 0.3 0.4   ALKPHOS 79 79   AST 12 14   ALT 6* 6*   ANIONGAP 9 8   EGFRNONAA 35* 47*       Significant Imaging: I have reviewed all pertinent imaging results/findings within the past 24 hours.      Assessment/Plan:      DISPO: Plan for d/c today pending follow up cxr; increased losartan to 100mg, will send out on abx for UTI    * Hypertensive urgency  Highest bp in /87  Trop 0.037, second trop 0.018. 3rd 0.026  Cr: 1.4, baseline unclear previous Cr 1.0-1.4, now 1.1  CT head neg for acute hemorrhage  MRI brain negative  BP has improved without medications  Will trend trop and EKG Q6H  Continue home antihypertensives (Norvasc 10 and losartan 50), increased losartan to 100mg as pt still hypertensive        Dizziness  Hx of vertigo  -resolved  -possible etiologies CVA, HTN, hypoglycemia, vertigo  MRI brain pending   Will monitor.     Urinary tract infection without hematuria  -nonspecific back pain  -U/A nitrites positive  -UCx pending   -WBC 5.6, on rocephin        Vertigo  -currently asymptomatic  -given meclizine in ED  -can not rule out other etiologies of  dizziness  -will monitor, improving       Embolic stroke involving left middle cerebral artery  -residual right sided facial weakness  -CT head: neg for acute hemorrhage, can not rule out ischemia  MRI brain negative      DM (diabetes mellitus)  -on long term insulin, lantus 35units nightly at home  -hold home metformin  -hypoglycemia could be cause of dizziness  -A1C pending  -Will monitor BG  -Will hold home insulin  -Moderate SSI        VTE Risk Mitigation (From admission, onward)         Ordered     heparin (porcine) injection 5,000 Units  Every 8 hours      01/17/20 2133     IP VTE HIGH RISK PATIENT  Once      01/17/20 1430     Place sequential compression device  Until discontinued      01/17/20 1430                      Zeyad Gonzalez MD  Department of Hospital Medicine   [facility]

## 2020-01-18 NOTE — NURSING
VN went over discharge instructions and education, pt and family verbalized understanding of discharge instructions. Pt will keep up will follow up appointments. Prescribed medications sent to pt preferred pharmacy. Pt and family instructed to refer to clinical reference for education, no further questions at this time. Wheelchair transport requested to bring patient to lobby.

## 2020-01-18 NOTE — DISCHARGE SUMMARY
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 She had taken her nightly meds ( valsartan, insulin and laxative before bed). She was trying to lay in bed but felt the room was spinning. She felt worse while looking at the fan. She states her daughter had checked her bp and said it was a little high but she is unsure of actual pressure. Denies LOC, fall, N/V, CP, SOB. She reports a similar episode in October and states she has been scared since then that it would happen again. She did have blurry vision that has since resolved and back pain. She also believes her "eyes went to god". She denies dysuria, urinary changes or hematuria. She does have constipation with last bm yesterday. She has been using chewing tobacco her whole life, denies other tobacco or drug use. She drinks ETOH occasionally. She reports compliance with all medications.     After discussion with patient, she wishes to be DNR/DNI.     In the ED, highest bp 207/87, trop elevated 0.037, EKG showed RBB which is seen on previous EKG but possible new T wave inversions, .   U/A positive for nitrites.     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 She was also found to have UTI. CT head and MRI were negative. Was started " on rocephin for UTI. Troponin trended down. D dimer was slightly elevated, V/Q done which ruled out PE. Home BP medications were restarted and BP was better controlled but still hypertensive. Patient was feeling much improved overnight. Repeat CXR was stable. Pt agreeing for discharge. Will send out on cipro for UTI and increase home losartan from 50 to 100mg daily. Pt to follow up with PCP.      Consults:   Consults (From admission, onward)        Status Ordering Provider     Case Management  Once     Provider:  (Not yet assigned)    Acknowledged MIRNA THAYER          No new Assessment & Plan notes have been filed under this hospital service since the last note was generated.  Service: Hospital Medicine    Final Active Diagnoses:    Diagnosis Date Noted POA    PRINCIPAL PROBLEM:  Hypertensive urgency [I16.0] 01/17/2020 Yes    Urinary tract infection without hematuria [N39.0] 01/17/2020 Yes    Dizziness [R42] 01/17/2020 Yes    Vertigo [R42] 10/10/2019 Yes    Embolic stroke involving left middle cerebral artery [I63.412] 04/26/2018 Yes    DM (diabetes mellitus) [E11.9] 04/01/2013 Yes      Problems Resolved During this Admission:       Discharged Condition: stable    Disposition: Home or Self Care    Follow Up:    Patient Instructions:      Ambulatory referral to Home Health   Referral Priority: Routine Referral Type: Home Health   Referral Reason: Specialty Services Required   Requested Specialty: Home Health Services   Number of Visits Requested: 1     Diet Cardiac     Diet diabetic     Notify your health care provider if you experience any of the following:  temperature >100.4     Notify your health care provider if you experience any of the following:  persistent nausea and vomiting or diarrhea     Notify your health care provider if you experience any of the following:  severe uncontrolled pain     Notify your health care provider if you experience any of the following:  redness, tenderness, or signs of  infection (pain, swelling, redness, odor or green/yellow discharge around incision site)     Notify your health care provider if you experience any of the following:  difficulty breathing or increased cough     Notify your health care provider if you experience any of the following:  severe persistent headache     Notify your health care provider if you experience any of the following:  worsening rash     Notify your health care provider if you experience any of the following:  persistent dizziness, light-headedness, or visual disturbances     Notify your health care provider if you experience any of the following:  increased confusion or weakness     Notify your health care provider if you experience any of the following:     Activity as tolerated       Significant Diagnostic Studies: Labs:   CMP   Recent Labs   Lab 01/17/20  0240 01/18/20  0449    143   K 3.7 3.5    106   CO2 26 29   * 126*   BUN 18 14   CREATININE 1.4 1.1   CALCIUM 8.9 8.9   PROT 7.2 7.1   ALBUMIN 3.2* 3.1*   BILITOT 0.3 0.4   ALKPHOS 79 79   AST 12 14   ALT 6* 6*   ANIONGAP 9 8   ESTGFRAFRICA 41* 54*   EGFRNONAA 35* 47*    and CBC   Recent Labs   Lab 01/17/20  0240 01/18/20  0449   WBC 7.11 5.56   HGB 10.7* 10.7*   HCT 35.5* 35.4*    295       Pending Diagnostic Studies:     None         Medications:  Reconciled Home Medications:      Medication List      START taking these medications    ciprofloxacin HCl 500 MG tablet  Commonly known as:  CIPRO  Take 1 tablet (500 mg total) by mouth once daily. for 4 days        CHANGE how you take these medications    losartan 100 MG tablet  Commonly known as:  COZAAR  Take 1 tablet (100 mg total) by mouth once daily.  What changed:    · medication strength  · how much to take  · when to take this        CONTINUE taking these medications    * ACCU-CHEK SMARTVIEW TEST STRIP MISC     * Accu-Chek SmartView Test Strip Strp  Generic drug:  blood sugar diagnostic  USE TO TEST BLOOD SUGAR  "TWICE A DAY     amLODIPine 10 MG tablet  Commonly known as:  NORVASC  Take 10 mg by mouth once daily.     aspirin 81 MG EC tablet  Commonly known as:  ECOTRIN  Take 1 tablet (81 mg total) by mouth once daily.     atorvastatin 40 MG tablet  Commonly known as:  LIPITOR  Take 1 tablet (40 mg total) by mouth once daily.     blood-glucose meter Misc  accu-chek    kit franky     CENTRUM SILVER ORAL  Take 1 Dose by mouth once daily.     CENTRUM SILVER ULTRA WOMEN'S ORAL  Take 1 tablet by mouth once daily.     clopidogrel 75 mg tablet  Commonly known as:  PLAVIX  Take 1 tablet (75 mg total) by mouth once daily.     docusate sodium 100 MG capsule  Commonly known as:  COLACE  Take 1 capsule (100 mg total) by mouth 2 (two) times daily.     * dorzolamide-timolol 2-0.5% 22.3-6.8 mg/mL ophthalmic solution  Commonly known as:  COSOPT  dorzol/timol sol 22.3-6.8     * dorzolamide-timolol 2-0.5% 22.3-6.8 mg/mL ophthalmic solution  Commonly known as:  COSOPT  Place 1 drop into both eyes 2 (two) times daily.     fluticasone propionate 50 mcg/actuation nasal spray  Commonly known as:  FLONASE  1 spray by Each Nare route 2 (two) times daily as needed (sinus congestion).     furosemide 40 MG tablet  Commonly known as:  LASIX  Take 40 mg by mouth once daily.     Lantus Solostar U-100 Insulin glargine 100 units/mL (3mL) SubQ pen  Generic drug:  insulin  INJECT 35 UNIT(S) EVERY EVENING BY SUBCUTANEOUS ROUTE.     metFORMIN 500 MG tablet  Commonly known as:  GLUCOPHAGE  Take 500 mg by mouth 2 (two) times daily with meals.     ondansetron 8 MG Tbdl  Commonly known as:  ZOFRAN-ODT  Take 1 tablet (8 mg total) by mouth every 8 (eight) hours as needed.     pantoprazole 40 MG tablet  Commonly known as:  PROTONIX  Take 40 mg by mouth once daily.     pen needle, diabetic 32 gauge x 3/16" Ndle  by Misc.(Non-Drug; Combo Route) route.     potassium chloride 10 MEQ Tbsr  Commonly known as:  KLOR-CON  Take 10 mEq by mouth once daily.     travoprost " (benzalkonium) 0.004 % ophthalmic solution  Commonly known as:  TRAVATAN  Place 1 drop into both eyes nightly.         * This list has 4 medication(s) that are the same as other medications prescribed for you. Read the directions carefully, and ask your doctor or other care provider to review them with you.            STOP taking these medications    nitroGLYCERIN 0.4 MG SL tablet  Commonly known as:  NITROSTAT            Indwelling Lines/Drains at time of discharge:   Lines/Drains/Airways     None                 Time spent on the discharge of patient: 40 minutes  Patient was seen and examined on the date of discharge and determined to be suitable for discharge.         Zeyad Gonzalez MD  Department of Hospital Medicine  [facility]

## 2020-01-18 NOTE — PLAN OF CARE
Attempted to complete admission questions. Bedside nurse notified Monitor offline and requested to reset.

## 2020-01-18 NOTE — PT/OT/SLP PROGRESS
"Physical Therapy Treatment    Patient Name:  Elina Mei   MRN:  [id number]    Recommendations:     Discharge Recommendations:  home health PT, home health OT   Discharge Equipment Recommendations: none   Barriers to discharge: None    Assessment:     Elina Mei is a 79 y.o. female admitted with a medical diagnosis of Hypertensive urgency.  She presents with the following impairments/functional limitations:  weakness, impaired endurance, impaired functional mobilty, gait instability, impaired balance, decreased coordination, decreased upper extremity function, decreased lower extremity function. Pt able to increase ambulation distance this session. Performed ambulation training ~100 ft with CGA and RW. Pt requested to use RW this session for ambulation. Recommending home health with home health PT/OT upon discharge.    Rehab Prognosis: Good; patient would benefit from acute skilled PT services to address these deficits and reach maximum level of function.    Recent Surgery: * No surgery found *      Plan:     During this hospitalization, patient to be seen 5 x/week to address the identified rehab impairments via gait training, therapeutic activities, therapeutic exercises, neuromuscular re-education and progress toward the following goals:    · Plan of Care Expires:  02/17/20    Subjective     Chief Complaint: None Expressed  Patient/Family Comments/goals: "I am going home today".  Pain/Comfort:  · Pain Rating 1: (Did not state any pain)  · Pain Rating Post-Intervention 1: 0/10      Objective:     Communicated with  nurse prior to session.  Patient found supine with peripheral IV, telemetry upon PT entry to room.     General Precautions: Standard, fall   Orthopedic Precautions:N/A   Braces: N/A     Functional Mobility:  · Bed Mobility:     · Rolling Left:  modified independence  · Scooting: supervision  · Supine to Sit: stand by assistance  · Transfers:     · Sit to Stand:  stand by assistance and contact guard assistance " with hand-held assist  · Gait:  ~100 ft with RW (at pt's request to use) and CGA/SBA      AM-PAC 6 CLICK MOBILITY  Turning over in bed (including adjusting bedclothes, sheets and blankets)?: 3  Sitting down on and standing up from a chair with arms (e.g., wheelchair, bedside commode, etc.): 3  Moving from lying on back to sitting on the side of the bed?: 3  Moving to and from a bed to a chair (including a wheelchair)?: 3  Need to walk in hospital room?: 3  Climbing 3-5 steps with a railing?: 2  Basic Mobility Total Score: 17       Therapeutic Activities and Exercises:   Nurse (Jesi) in room and pt and nurse stated pt was to be discharged today. Pt did require extra time for all bed mobility. Performed ambulation training ~100 ft with RW (Per pt's request) and requiring CGA/SBA. Demonstrates a slow martha throughout with a narrow base of support which pt corrected with verbal cueing. Set up in B/S chair to eat lunch.    Patient left up in chair with all lines intact, call button in reach, chair alarm on and  nurse notified..    GOALS:   Multidisciplinary Problems     Physical Therapy Goals        Problem: Physical Therapy Goal    Goal Priority Disciplines Outcome Goal Variances Interventions   Physical Therapy Goal     PT, PT/OT Ongoing, Progressing     Description:  Goals to be met by: 20     Patient will increase functional independence with mobility by performin. Supine <> sit with Stand-by Assistance  2. Sit to stand transfer with Stand-by Assistance  3. Bed to chair transfer with Stand-by Assistance   4. Gait  x 100 feet with Stand-by Assistance                       Time Tracking:     PT Received On: 20  PT Start Time: 1137     PT Stop Time: 1202  PT Total Time (min): 25 min     Billable Minutes: Gait Training  15 and Therapeutic Activity 10    Treatment Type: Treatment  PT/PTA: PTA     PTA Visit Number: Annika Coleman, PTA  2020

## 2020-01-18 NOTE — ASSESSMENT & PLAN NOTE
-currently asymptomatic  -given meclizine in ED  -can not rule out other etiologies of dizziness  -will monitor, improving

## 2020-01-18 NOTE — DISCHARGE INSTRUCTIONS
High Blood Pressure (Hypertension), Discharge Instructions (English)      Urinary Tract Infections (UTIs), Understanding (English)      Ciprofloxacin tablets (English)      Losartan tablets (English)      Diabetes, Diet (English)

## 2020-01-18 NOTE — PLAN OF CARE
HH orders sent and accepted by [facility], no DME ordered.  nurse will visit pt on Monday, 1/20/20 instead of 1/19/20 d/t staffing issues    Pt's nurse will go over medications/signs and symptoms prior to discharge       01/18/20 1143   Final Note   Assessment Type Final Discharge Note   Anticipated Discharge Disposition Home-Health  ([facility])   What phone number can be called within the next 1-3 days to see how you are doing after discharge? [phone]   Hospital Follow Up  Appt(s) scheduled? No  (Offices closed for Weekend. Patient to schedule own follow up appointment.  )   Right Care Referral Info   Referral Type [facility]   Facility Name [facility]     Rajwinder Palumbo, RN Transitional Navigator  [phone]

## 2020-04-05 PROBLEM — J96.01 ACUTE RESPIRATORY FAILURE WITH HYPOXIA: Status: ACTIVE | Noted: 2020-01-01

## 2020-04-05 PROBLEM — J06.9 ACUTE RESPIRATORY DISEASE DUE TO COVID-19 VIRUS: Status: ACTIVE | Noted: 2020-01-01

## 2020-04-05 PROBLEM — U07.1 ACUTE RESPIRATORY DISEASE DUE TO COVID-19 VIRUS: Status: ACTIVE | Noted: 2020-01-01

## 2020-04-05 PROBLEM — Z20.822 SUSPECTED COVID-19 VIRUS INFECTION: Status: ACTIVE | Noted: 2020-01-01

## 2020-04-05 PROBLEM — N17.9 AKI (ACUTE KIDNEY INJURY): Status: ACTIVE | Noted: 2020-01-01

## 2020-04-05 PROBLEM — I10 ESSENTIAL HYPERTENSION: Status: ACTIVE | Noted: 2020-01-01

## 2020-04-06 PROBLEM — E63.9 INADEQUATE DIETARY ENERGY INTAKE: Status: ACTIVE | Noted: 2020-01-01

## 2020-04-07 PROBLEM — Z71.89 ADVANCE CARE PLANNING: Status: ACTIVE | Noted: 2020-01-01

## 2020-04-07 PROBLEM — R42 VERTIGO: Status: RESOLVED | Noted: 2019-01-01 | Resolved: 2020-01-01

## 2020-04-07 PROBLEM — R53.1 ACUTE RIGHT-SIDED WEAKNESS: Status: RESOLVED | Noted: 2018-04-26 | Resolved: 2020-01-01

## 2020-04-07 PROBLEM — I16.0 HYPERTENSIVE URGENCY: Status: RESOLVED | Noted: 2020-01-01 | Resolved: 2020-01-01

## 2020-04-07 PROBLEM — N39.0 URINARY TRACT INFECTION WITHOUT HEMATURIA: Status: RESOLVED | Noted: 2020-01-01 | Resolved: 2020-01-01

## 2020-04-07 PROBLEM — K62.5 RECTAL BLEEDING: Status: RESOLVED | Noted: 2018-11-06 | Resolved: 2020-01-01

## 2020-04-07 PROBLEM — K92.2 GI BLEED: Status: RESOLVED | Noted: 2018-11-06 | Resolved: 2020-01-01

## 2020-04-08 PROBLEM — J15.211 STAPHYLOCOCCUS AUREUS PNEUMONIA: Status: ACTIVE | Noted: 2020-01-01

## 2020-04-08 PROBLEM — B95.61 STAPHYLOCOCCUS AUREUS BACTEREMIA: Status: ACTIVE | Noted: 2020-01-01

## 2020-04-08 PROBLEM — R78.81 STAPHYLOCOCCUS AUREUS BACTEREMIA: Status: ACTIVE | Noted: 2020-01-01

## 2020-04-09 PROBLEM — R63.39 FEEDING INTOLERANCE: Status: ACTIVE | Noted: 2020-01-01

## 2020-04-12 PROBLEM — D72.829 LEUKOCYTOSIS: Status: ACTIVE | Noted: 2020-01-01

## 2020-04-21 PROBLEM — E83.39 HYPERPHOSPHATEMIA: Status: ACTIVE | Noted: 2020-01-01

## 2020-04-21 PROBLEM — D72.829 LEUKOCYTOSIS: Status: RESOLVED | Noted: 2020-01-01 | Resolved: 2020-01-01

## 2020-04-23 PROBLEM — D72.829 LEUKOCYTOSIS: Status: ACTIVE | Noted: 2020-01-01

## 2020-04-23 PROBLEM — R33.9 URINE RETENTION: Status: ACTIVE | Noted: 2020-01-01

## 2020-04-26 PROBLEM — D72.829 LEUKOCYTOSIS: Status: RESOLVED | Noted: 2020-01-01 | Resolved: 2020-01-01

## 2020-04-26 PROBLEM — R42 DIZZINESS: Status: RESOLVED | Noted: 2020-01-01 | Resolved: 2020-01-01

## 2020-04-28 PROBLEM — N93.9 VAGINAL BLEEDING: Status: ACTIVE | Noted: 2020-01-01

## 2020-05-01 PROBLEM — G93.41 ACUTE METABOLIC ENCEPHALOPATHY: Status: ACTIVE | Noted: 2020-01-01

## 2020-05-02 PROBLEM — U07.1 COVID-19 VIRUS DETECTED: Status: ACTIVE | Noted: 2020-01-01

## 2020-05-03 PROBLEM — I48.91 ATRIAL FIBRILLATION WITH RVR: Status: ACTIVE | Noted: 2020-01-01

## 2020-05-04 PROBLEM — N17.9 ACUTE RENAL FAILURE: Status: ACTIVE | Noted: 2020-01-01

## 2020-05-04 PROBLEM — R65.20 SEVERE SEPSIS: Status: ACTIVE | Noted: 2020-01-01

## 2020-05-04 PROBLEM — A41.9 SEVERE SEPSIS: Status: ACTIVE | Noted: 2020-01-01

## 2020-05-04 NOTE — PLAN OF CARE
Outside Transfer Acceptance Note        Patients name/MRN:      Elina Mei MRN: [id number]     Referring Physician or Mid-Level provider giving report:    Dr. Cipriano Pastor MD     Referral Facility: [facility]U - Original admit date of 4/5/20     Date/Time of Acceptance:     5/4/20 AT 1:20PM     Accepting Physician for admission to hospital: JANICE Chester MD ()     Accepting facility:     [facility]     Consulting Physicians from [facility] involved in case:   Dr. Ludwin Mathews, Nephrology at [facility]  Dr. Hermes Moran, MICU staff at [facility]     Reason for transfer request:   COVID POSITIVE - requesting CRRT  HPI: Ms. Mei is an 80-year-old female with a known past medical history of hypertension, hyperlipidemia, diabetes mellitus type 2, CVA, breast cancer who presented to the ER on 4/5 with cough, weakness, and confusion that started last night and progressively worsen. The patient was emergently intubated in the ED after the family found her nearly unresponsive, confused and coughing.     Chest x-ray showed bilateral ground-glass opacities concerning for viral pneumonia.  She was ultimately found to be COVID-19 positive.  Labs were remarkable for an JUDI (Baseline Cr 1.1 -> 2.55 on admission), and troponin elevation to 0.121.  EKG showed sinus rhythm with first-degree AV block but no acute ischemic changes.   She initially was on BiPAP, then intubated, then finally extubated about 3 days ago.    Unfortunately, her Cr has progressively worsened throughout her hospital stay [2.55 (4/5) -> 4.3 (5/2)-> 6.5 (5/3) -> 8.24 (5/4)].  Nephrology at [place] was consulted.  She quickly became anuric and required a peritoneal dialysis catheter placement on 4/7/20, and she began PD on that same day.  Ultimately CCPD was discontinued due to inability to obtain proper outflow from dysfunctional PD cath.  She subsequently had a right femoral H.D cath placed on 4/10/20  "and began HD on 4/11.    Her Blood CXS from 4/5 grew out Strep Viridans and MSSA.  She completed 2 weeks of Rocephin for this.  Blood cultures from 4/14 and 5/3 have remained NGTD.       Yesterday (5/3), she developed a fever to 101.0F and an episode of atrial fibrillation with hypotension to SBP 80s.  She was begun empirically on Vancomycin and Cefepime.  At 10pm last night she converted back to NSR.  This morning during HD she became bradycardic and developed respiratory distress.  She again had to be re-intubated, and Levophed was initiated due to low BP.  Nephrology there noted, Patient developed bradycardia and hypotension on dialysis today, subsequently with respiratory distress and need for intubation, session had to be rinsed back prematurely.  She remains on levophed. Recommend transfer to outside facility with CRRT capabilities.       Her CT head showed NAF, only generalized cerebral volume loss, findings suggestive of chronic microvascular ischemic change and small regions of prior left MCA territory distribution infarction similar to prior exams.  CXR today shows interval placement of endotracheal tube which terminates just below the level of the clavicles.  Nasogastric tube is in place.  Creasing patchy opacity in the right base with suspected dense consolidation in the left retrocardiac location.  Heart is enlarged.  Her ECHO on 4/26 showed an EF of 60-65% with PA pressure of 39 mmHg.    Infusions:  Levophed 21.7 ml/hr  Precedex 3.9 ml/hr    Vent settings:  AC 20 RR, , PEEP 10, FIO2 80%    To Do List upon arrival:     Consult ID, Nephrology  Will need CRRT   On empiric Vanco/Cefepime since yesterday due to new fever    Vital signs:  BP (!) 102/48   Pulse 78   Temp 97.7 °F (36.5 °C) (Axillary)   Resp (!) 25   Ht 5' 7.99" (1.727 m)   Wt 77 kg (169 lb 12.1 oz)   LMP  (LMP Unknown)   SpO2 100%   Breastfeeding? No   BMI 25.82 kg/m²    Temp:  [97.7 °F (36.5 °C)-100.2 °F (37.9 °C)] 97.7 °F " (36.5 °C)  Pulse:  [] 78  Resp:  [25-44] 25  SpO2:  [89 %-100 %] 100 %  BP: ()/(44-64) 102/48     Past Medical History:   Diagnosis Date    Acute right-sided weakness 4/26/2018    Arthritis     Diabetes mellitus     Hypertension     Malignant neoplasm of upper-outer quadrant of right breast in female, estrogen receptor positive 12/5/2017    Status post mastectomy, right 2018    Stroke 4/26/2018     Current Facility-Administered Medications:     0.9%  NaCl infusion (for blood administration), , Intravenous, Q24H PRN, TITI AndrewP-C    0.9%  NaCl infusion, , Intravenous, PRN, Carley Coe FNP-C    acetaminophen tablet 650 mg, 650 mg, Oral, Q4H PRN     albumin human 25% bottle 25 g, 25 g, Intravenous, Every Mon, Wed, Fri     aspirin chewable tablet 81 mg, 81 mg, Oral, Daily, Cipriano Pastor MD, 81 mg at 05/04/20 0905    atorvastatin tablet 40 mg, 40 mg, Oral, Daily, Cipriano Pastor MD, 40 mg at 05/04/20 0905    cefepime in dextrose 5 % 1 gram/50 mL IVPB 1 g, 1 g, Intravenous, Q24H     chlorhexidine 0.12 % solution 15 mL, 15 mL, Mouth/Throat, BID, Cipriano Pastor MD    clopidogreL tablet 75 mg, 75 mg, Oral, Daily, Cipriano Pastor MD, 75 mg at 05/04/20 0904    dexmedetomidine (PRECEDEX) 400mcg/100mL 0.2 mcg/kg/hr, IV, Continuous     dextrose 10% (D10W) Bolus, 12.5 g, Intravenous, PRN, Cipriano Pastor MD    dextrose 10% (D10W) Bolus, 25 g, Intravenous, PRN, Cipriano Pastor MD    dextrose 50 % in water (D50W) injection 12.5 g, 12.5 g, Intravenous, PRN, Mihir Carson MD    docusate 50 mg/5 mL liquid 100 mg, 100 mg, Oral, BID, Cipriano Pastor MD     famotidine (PF) injection 20 mg, 20 mg, Intravenous, Daily     glucagon (human recombinant) injection 1 mg, 1 mg, Intramuscular, PRN     guaifenesin 100 mg/5 ml syrup 200 mg, 200 mg, Oral, Q4H PRN     heparin (porcine) injection 2,000 Units, 2,000 Units, Intravenous, Every Mon, Wed, Fri     heparin (porcine)  injection 4,500 Units, 4,500 Units, Intra-Catheter, PRN     hydrALAZINE injection 10 mg, 10 mg, Intravenous, Q8H PRN, Cipriano Pastor MD    insulin aspart U-100 pen 1-10 Units, 1-10 Units, Subcutaneous, Q4H PRN     insulin detemir U-100 pen 10 Units, 10 Units, Subcutaneous, QHS     lactulose 20 gram/30 mL solution Soln 20 g, 20 g, Oral, BID     metoclopramide HCl injection 5 mg, 5 mg, Intravenous, Q6H PRN, Pallavi Sunkara, MD    NORepinephrine bitartrate 8 mg in dextrose 5% 250 mL infusion, 0.02 mcg/kg/min, IV, Continuous     ondansetron injection 4 mg, 4 mg, Intravenous, Q8H PRN, Cipriano Pastor MD    pneumoc 13-israel conj-dip cr(PF) (PREVNAR 13 (PF)) 0.5 mL, 0.5 mL, Intramuscular     racepinephrine 2.25 % nebulizer solution 0.5 mL, 0.5 mL, Nebulization, Q4H PRN     sodium citrate-citric acid 500-334 mg/5 ml solution 30 mL, 30 mL, Oral, BID     vancomycin 500 mg in dextrose 5 % 100 mL IVPB (ready to mix system), 500 mg, IV     Facility-Administered Medications Ordered in Other Encounters:     0.9%  NaCl infusion, , Intravenous, Continuous, Ahsan Enciso MD, Stopped at 01/17/19 1328    sodium chloride 0.9% flush 3 mL, 3 mL, Intravenous, PRN, Ahsan Enciso MD    LABS:  Recent Results (from the past 24 hour(s))   POCT glucose    Collection Time: 05/03/20  4:58 PM   Result Value Ref Range    POCT Glucose 280 (H) 70 - 110 mg/dL   Troponin I    Collection Time: 05/03/20  8:00 PM   Result Value Ref Range    Troponin I 0.129 (H) 0.012 - 0.034 ng/mL   POCT glucose    Collection Time: 05/04/20  1:20 AM   Result Value Ref Range    POCT Glucose 481 (HH) 70 - 110 mg/dL   Troponin I    Collection Time: 05/04/20  1:25 AM   Result Value Ref Range    Troponin I 0.149 (H) 0.012 - 0.034 ng/mL   POCT glucose    Collection Time: 05/04/20  1:26 AM   Result Value Ref Range    POCT Glucose 376 (H) 70 - 110 mg/dL   CBC auto differential    Collection Time: 05/04/20  5:10 AM   Result Value Ref Range    WBC 18.48 (H)  3.90 - 12.70 K/uL    RBC 2.93 (L) 4.00 - 5.40 M/uL    Hemoglobin 8.6 (L) 12.0 - 16.0 g/dL    Hematocrit 27.6 (L) 37.0 - 48.5 %    Mean Corpuscular Volume 94 82 - 98 fL    Mean Corpuscular Hemoglobin 29.4 27.0 - 31.0 pg    Mean Corpuscular Hemoglobin Conc 31.2 (L) 32.0 - 36.0 g/dL    RDW 16.8 (H) 11.5 - 14.5 %    Platelets 282 150 - 350 K/uL    MPV 10.6 9.2 - 12.9 fL    Immature Granulocytes 0.8 (H) 0.0 - 0.5 %    Gran # (ANC) 16.5 (H) 1.8 - 7.7 K/uL    Immature Grans (Abs) 0.14 (H) 0.00 - 0.04 K/uL    Lymph # 0.6 (L) 1.0 - 4.8 K/uL    Mono # 1.2 (H) 0.3 - 1.0 K/uL    Eos # 0.0 0.0 - 0.5 K/uL    Baso # 0.03 0.00 - 0.20 K/uL    nRBC 0 0 /100 WBC    Gran% 89.4 (H) 38.0 - 73.0 %    Lymph% 3.1 (L) 18.0 - 48.0 %    Mono% 6.5 4.0 - 15.0 %    Eosinophil% 0.0 0.0 - 8.0 %    Basophil% 0.2 0.0 - 1.9 %    Differential Method Automated    Comprehensive metabolic panel    Collection Time: 05/04/20  5:10 AM   Result Value Ref Range    Sodium 143 136 - 145 mmol/L    Potassium 4.9 3.5 - 5.1 mmol/L    Chloride 99 95 - 110 mmol/L    CO2 22 (L) 23 - 29 mmol/L    Glucose 309 (H) 70 - 110 mg/dL    BUN, Bld 140 (H) 7 - 17 mg/dL    Creatinine 8.24 (H) 0.50 - 1.40 mg/dL    Calcium 10.4 8.7 - 10.5 mg/dL    Total Protein 7.8 6.0 - 8.4 g/dL    Albumin 3.9 3.5 - 5.2 g/dL    Total Bilirubin 0.6 0.1 - 1.0 mg/dL    Alkaline Phosphatase 101 38 - 126 U/L    AST 29 15 - 46 U/L    ALT 16 10 - 44 U/L    Anion Gap 22 (H) 8 - 16 mmol/L    eGFR if African American 4.7 (A) >60 mL/min/1.73 m^2    eGFR if non  4.1 (A) >60 mL/min/1.73 m^2   Magnesium    Collection Time: 05/04/20  5:10 AM   Result Value Ref Range    Magnesium 2.7 (H) 1.6 - 2.6 mg/dL   Phosphorus    Collection Time: 05/04/20  5:10 AM   Result Value Ref Range    Phosphorus 7.7 (H) 2.7 - 4.5 mg/dL   Vancomycin, random    Collection Time: 05/04/20  5:10 AM   Result Value Ref Range    Vancomycin, Random 21.7 Not established ug/mL   PTH, intact    Collection Time: 05/04/20  5:10 AM    Result Value Ref Range    PTH, Intact 334.0 (H) 9.0 - 77.0 pg/mL   POCT glucose    Collection Time: 05/04/20  5:13 AM   Result Value Ref Range    POCT Glucose 282 (H) 70 - 110 mg/dL   POCT glucose    Collection Time: 05/04/20 12:16 PM   Result Value Ref Range    POCT Glucose 470 (HH) 70 - 110 mg/dL   ISTAT PROCEDURE    Collection Time: 05/04/20  1:25 PM   Result Value Ref Range    POC PH 7.258 (LL) 7.35 - 7.45    POC PCO2 46.9 (H) 35 - 45 mmHg    POC PO2 80 80 - 100 mmHg    POC HCO3 20.9 (L) 24 - 28 mmol/L    POC BE -6 -2 to 2 mmol/L    POC SATURATED O2 94 (L) 95 - 100 %    POC TCO2 22 (L) 23 - 27 mmol/L    Rate 20     Sample ARTERIAL     Site RR     Allens Test Pass     DelSys Adult Vent     Mode AC/PRVC     Vt 450     PEEP 10     PiP 30     FiO2 75     Min Vol 13     Sp02 100    POCT glucose    Collection Time: 05/04/20  1:31 PM   Result Value Ref Range    POCT Glucose 384 (H) 70 - 110 mg/dL        JANICE Chester MD  Department of Hospital Medicine  [facility]/   [phone]

## 2020-05-05 PROBLEM — K72.00 SHOCK LIVER: Status: ACTIVE | Noted: 2020-01-01

## 2020-05-05 NOTE — CONSULTS
Palliative Care Acknowledgement of Consult - .date    Consult received. Palliative Care Provider:_Dr. Purcell_________ will touch base with team prior to seeing patient. Full consult to follow.    Thank you for allowing us to be a part of the care of this patient.          Annette Santillan LCSW, ACHP-SW

## 2020-05-05 NOTE — ASSESSMENT & PLAN NOTE
Acute respiratory disease due to COVID-19 virus  Positive COVID - 19 test, extubated and re-intubated. Contact, droplet, airborne precautions  S/p 5 days of Azithromycin and Plaquenil. Completed 2 weeks of Rocephin for S.aureus pneumonia and Streptococcal bacteremia (strep viridans and MSSA in blood). Repeat Cx from 4/6 and 4/9 -ve.   Mechanical ventilation : CXR, ABG  5/3 - GPCs resembling staph in 1/2 cultures at [facility], Repeating Cultures  Started empiric vanc and cefepime at [facility], ID consulted  Repeat Echo  Vasopressors - Levo and vasopressin

## 2020-05-05 NOTE — ASSESSMENT & PLAN NOTE
Patient testing positive again for GPC  Nurse did not chet where blood was taken, no likely taken from tunnel      -However, would recommend removing/replacing all lines and running abx through tunnel cath     -ECHO     -Abx per primary

## 2020-05-05 NOTE — CONSULTS
[facility]  Nephrology  Consult Note    Patient Name: Elina Mei  MRN: [id number]  Admission Date: 5/4/2020  Hospital Length of Stay: 1 days  Attending Provider: Hermes Moran*   Primary Care Physician: SHORTY Duke MD  Principal Problem:<principal problem not specified>    Consults  Subjective:     HPI: 80-year-old -American woman with hypertension, hyperlipidemia, diabetes mellitus type 2, CVA, breast cancer, chronic kidney disease stage 3B/A3 (baseline sCr 1-1.4, GFR 36-53), transferred to [facility] for higher level of care and CRRT needs on 5/4/2020 after being admitted at OSH on 4/5 for SOB, respiratory failure, COVID-19+, and acute kidney injury.  She originally presented to ED with altered mental status, extreme weakness, and cough, sats 50% at room air requiring mechanical ventilation.  Positive blood cultures 4/5/2020 for Staph. aureus and S Strep. viridans, and sputum also positive for Staph. aureus.  On 4/7/2020 her renal function worsened requiring acute start of PD.  Catheter malfunctioned on 4/8 (multiple adhesions per surgery) and catheter was not replaced, yet still maintained urine output with furosemide gtt.   By 4/10 kidney function continued to decline requiring HD treatment via femoral line placed, with intermittent vasopressor support with HDs.  By 4/24 was extubated (still was dialysis dependent).  On 4/28 surgery placed rt femoral tunneled cath for continued treatments.  On 5/3 developed Afib with RVR, hypotension, started on amio gtt and norepi to maintani MAP >65.  Converted to NSR by 5/4.  Yet, hemodialysis that day patient became unresponsive, bradycardic, and had to be reintubated, and decision to transfer to CRRT capable facility, thus transferred to [facility].      Nephrology consulted for continued management of JUDI requiring RRt.    Past Medical History:   Diagnosis Date    Acute right-sided weakness 4/26/2018    Arthritis     Diabetes mellitus      Hypertension     Malignant neoplasm of upper-outer quadrant of right breast in female, estrogen receptor positive 12/5/2017    Status post mastectomy, right 2018    Stroke 4/26/2018       Past Surgical History:   Procedure Laterality Date    BREAST BIOPSY      COLONOSCOPY N/A 11/8/2018    Procedure: COLONOSCOPY;  Surgeon: Ahsan Enciso MD;  Location: [facility] ENDO;  Service: Endoscopy;  Laterality: N/A;    COLONOSCOPY N/A 1/17/2019    Procedure: COLONOSCOPY-with polypectomy;  Surgeon: Ahsan Enciso MD;  Location: [facility] ENDO;  Service: Endoscopy;  Laterality: N/A;    ESOPHAGOGASTRODUODENOSCOPY N/A 11/8/2018    Procedure: EGD (ESOPHAGOGASTRODUODENOSCOPY);  Surgeon: Ahsan Enciso MD;  Location: [facility] ENDO;  Service: Endoscopy;  Laterality: N/A;    EYE SURGERY      HYSTERECTOMY      MASTECTOMY      MYRINGOTOMY WITH INSERTION OF VENTILATION TUBE Bilateral 7/24/2018    Procedure: MYRINGOTOMY, WITH TYMPANOSTOMY TUBE INSERTION;  Surgeon: Ventura Whitt MD;  Location: [facility] OR;  Service: ENT;  Laterality: Bilateral;       Review of patient's allergies indicates:  No Known Allergies  Current Facility-Administered Medications   Medication Frequency    ceFEPIme injection 1 g Q24H    chlorhexidine 0.12 % solution 15 mL BID    dexmedetomidine (PRECEDEX) 400mcg/100mL 0.9% NaCL infusion Continuous    dextrose 10% (D10W) Bolus PRN    famotidine (PF) injection 20 mg Daily    glucagon (human recombinant) injection 1 mg PRN    heparin (porcine) injection 5,000 Units Q8H    insulin aspart U-100 pen 0-5 Units Q6H PRN    insulin detemir U-100 pen 20 Units QHS    norepinephrine 4 mg in dextrose 5% 250 mL infusion (premix) (titrating) Continuous    sodium chloride 0.9% flush 10 mL PRN    vancomycin - pharmacy to dose pharmacy to manage frequency    vasopressin (PITRESSIN) 0.2 Units/mL in dextrose 5 % 100 mL infusion Continuous     Facility-Administered Medications Ordered in Other Encounters    Medication Frequency    0.9%  NaCl infusion Continuous    sodium chloride 0.9% flush 3 mL PRN     Family History     Problem Relation (Age of Onset)    Breast cancer Sister (55)    Cancer Sister (75), Brother    Hypertension Mother        Tobacco Use    Smoking status: Never Smoker    Smokeless tobacco: Current User     Types: Snuff   Substance and Sexual Activity    Alcohol use: No    Drug use: No    Sexual activity: Not Currently     Review of Systems    Intubated       Objective:     Vital Signs (Most Recent):  Temp: 97.7 °F (36.5 °C) (05/04/20 2315)  Pulse: 66 (05/05/20 0727)  Resp: (!) 31 (05/05/20 0727)  BP: (!) 124/59 (05/05/20 0727)  SpO2: 100 % (05/05/20 0727)  O2 Device (Oxygen Therapy): ventilator (05/05/20 0727) Vital Signs (24h Range):  Temp:  [94.6 °F (34.8 °C)-97.8 °F (36.6 °C)] 97.7 °F (36.5 °C)  Pulse:  [61-90] 66  Resp:  [0-69] 31  SpO2:  [48 %-100 %] 100 %  BP: ()/(44-68) 124/59     Weight: 77 kg (169 lb 12.1 oz) (05/05/20 0314)  Body mass index is 26.59 kg/m².  Body surface area is 1.91 meters squared.    I/O last 3 completed shifts:  In: 1119.3 [I.V.:1119.3]  Out: -     Physical Exam  COVID +  Intubated  Tachypneic   Mildly bradycardic    Recent Results (from the past 24 hour(s))   POCT glucose    Collection Time: 05/04/20 12:16 PM   Result Value Ref Range    POCT Glucose 470 (HH) 70 - 110 mg/dL   ISTAT PROCEDURE    Collection Time: 05/04/20  1:25 PM   Result Value Ref Range    POC PH 7.258 (LL) 7.35 - 7.45    POC PCO2 46.9 (H) 35 - 45 mmHg    POC PO2 80 80 - 100 mmHg    POC HCO3 20.9 (L) 24 - 28 mmol/L    POC BE -6 -2 to 2 mmol/L    POC SATURATED O2 94 (L) 95 - 100 %    POC TCO2 22 (L) 23 - 27 mmol/L    Rate 20     Sample ARTERIAL     Site RR     Allens Test Pass     DelSys Adult Vent     Mode AC/PRVC     Vt 450     PEEP 10     PiP 30     FiO2 75     Min Vol 13     Sp02 100    POCT glucose    Collection Time: 05/04/20  1:31 PM   Result Value Ref Range    POCT Glucose 384 (H) 70 -  110 mg/dL   Lactic acid, plasma    Collection Time: 05/04/20  1:58 PM   Result Value Ref Range    Lactate (Lactic Acid) 3.6 (HH) 0.5 - 2.2 mmol/L   POCT glucose    Collection Time: 05/04/20  3:48 PM   Result Value Ref Range    POCT Glucose 364 (H) 70 - 110 mg/dL   Blood culture    Collection Time: 05/04/20  8:15 PM   Result Value Ref Range    Blood Culture, Routine No Growth to date    POCT glucose    Collection Time: 05/04/20  8:21 PM   Result Value Ref Range    POCT Glucose 300 (H) 70 - 110 mg/dL   Comprehensive metabolic panel    Collection Time: 05/04/20  8:31 PM   Result Value Ref Range    Sodium 117 (LL) 136 - 145 mmol/L    Potassium 3.9 3.5 - 5.1 mmol/L    Chloride 82 (L) 95 - 110 mmol/L    CO2 15 (L) 23 - 29 mmol/L    Glucose 832 (HH) 70 - 110 mg/dL    BUN, Bld 68 (H) 8 - 23 mg/dL    Creatinine 5.0 (H) 0.5 - 1.4 mg/dL    Calcium 8.0 (L) 8.7 - 10.5 mg/dL    Total Protein 7.3 6.0 - 8.4 g/dL    Albumin 2.9 (L) 3.5 - 5.2 g/dL    Total Bilirubin 0.5 0.1 - 1.0 mg/dL    Alkaline Phosphatase 110 55 - 135 U/L     (H) 10 - 40 U/L     (H) 10 - 44 U/L    Anion Gap 20 (H) 8 - 16 mmol/L    eGFR if African American 8.7 (A) >60 mL/min/1.73 m^2    eGFR if non African American 7.5 (A) >60 mL/min/1.73 m^2   Magnesium    Collection Time: 05/04/20  8:31 PM   Result Value Ref Range    Magnesium 1.9 1.6 - 2.6 mg/dL   Phosphorus    Collection Time: 05/04/20  8:31 PM   Result Value Ref Range    Phosphorus 5.8 (H) 2.7 - 4.5 mg/dL   CBC auto differential    Collection Time: 05/04/20  8:31 PM   Result Value Ref Range    WBC 21.49 (H) 3.90 - 12.70 K/uL    RBC 2.69 (L) 4.00 - 5.40 M/uL    Hemoglobin 7.9 (L) 12.0 - 16.0 g/dL    Hematocrit 26.6 (L) 37.0 - 48.5 %    Mean Corpuscular Volume 99 (H) 82 - 98 fL    Mean Corpuscular Hemoglobin 29.4 27.0 - 31.0 pg    Mean Corpuscular Hemoglobin Conc 29.7 (L) 32.0 - 36.0 g/dL    RDW 17.5 (H) 11.5 - 14.5 %    Platelets 213 150 - 350 K/uL    MPV 11.2 9.2 - 12.9 fL    Immature  Granulocytes 3.9 (H) 0.0 - 0.5 %    Gran # (ANC) 18.4 (H) 1.8 - 7.7 K/uL    Immature Grans (Abs) 0.83 (H) 0.00 - 0.04 K/uL    Lymph # 0.4 (L) 1.0 - 4.8 K/uL    Mono # 1.8 (H) 0.3 - 1.0 K/uL    Eos # 0.0 0.0 - 0.5 K/uL    Baso # 0.04 0.00 - 0.20 K/uL    nRBC 2 (A) 0 /100 WBC    Gran% 85.6 (H) 38.0 - 73.0 %    Lymph% 1.9 (L) 18.0 - 48.0 %    Mono% 8.4 4.0 - 15.0 %    Eosinophil% 0.0 0.0 - 8.0 %    Basophil% 0.2 0.0 - 1.9 %    Platelet Estimate Appears normal     Aniso Slight     Poik Slight     Poly Occasional     Hypo Occasional     Ovalocytes Occasional     Tear Drop Cells Occasional     [place] Cells Occasional     Differential Method Automated    Lactic acid, plasma    Collection Time: 05/04/20  8:31 PM   Result Value Ref Range    Lactate (Lactic Acid) 1.5 0.5 - 2.2 mmol/L   ISTAT PROCEDURE    Collection Time: 05/04/20  9:00 PM   Result Value Ref Range    POC PH 7.322 (L) 7.35 - 7.45    POC PCO2 41.8 35 - 45 mmHg    POC PO2 93 80 - 100 mmHg    POC HCO3 21.6 (L) 24 - 28 mmol/L    POC BE -4 -2 to 2 mmol/L    POC SATURATED O2 97 95 - 100 %    POC TCO2 23 23 - 27 mmol/L    Rate 22     Sample ARTERIAL     Site LB     Allens Test Pass     DelSys Adult Vent     Mode AC/PRVC     Vt 450     PEEP 10     FiO2 75    Blood culture    Collection Time: 05/04/20  9:45 PM   Result Value Ref Range    Blood Culture, Routine No Growth to date    Basic metabolic panel    Collection Time: 05/04/20 11:54 PM   Result Value Ref Range    Sodium 113 (LL) 136 - 145 mmol/L    Potassium 4.0 3.5 - 5.1 mmol/L    Chloride 79 (L) 95 - 110 mmol/L    CO2 16 (L) 23 - 29 mmol/L    Glucose 768 (HH) 70 - 110 mg/dL    BUN, Bld 89 (H) 8 - 23 mg/dL    Creatinine 4.3 (H) 0.5 - 1.4 mg/dL    Calcium 8.3 (L) 8.7 - 10.5 mg/dL    Anion Gap 18 (H) 8 - 16 mmol/L    eGFR if African American 10.4 (A) >60 mL/min/1.73 m^2    eGFR if non African American 9.0 (A) >60 mL/min/1.73 m^2   Comprehensive metabolic panel    Collection Time: 05/05/20  1:23 AM   Result Value Ref  Range    Sodium 131 (L) 136 - 145 mmol/L    Potassium 4.9 3.5 - 5.1 mmol/L    Chloride 90 (L) 95 - 110 mmol/L    CO2 17 (L) 23 - 29 mmol/L    Glucose 395 (H) 70 - 110 mg/dL    BUN, Bld 92 (H) 8 - 23 mg/dL    Creatinine 4.1 (H) 0.5 - 1.4 mg/dL    Calcium 9.8 8.7 - 10.5 mg/dL    Total Protein 8.1 6.0 - 8.4 g/dL    Albumin 3.2 (L) 3.5 - 5.2 g/dL    Total Bilirubin 0.7 0.1 - 1.0 mg/dL    Alkaline Phosphatase 110 55 - 135 U/L     (H) 10 - 40 U/L     (H) 10 - 44 U/L    Anion Gap 24 (H) 8 - 16 mmol/L    eGFR if African American 11.0 (A) >60 mL/min/1.73 m^2    eGFR if non African American 9.6 (A) >60 mL/min/1.73 m^2   Magnesium    Collection Time: 05/05/20  1:23 AM   Result Value Ref Range    Magnesium 2.0 1.6 - 2.6 mg/dL   Phosphorus    Collection Time: 05/05/20  1:23 AM   Result Value Ref Range    Phosphorus 7.2 (H) 2.7 - 4.5 mg/dL   Culture, Respiratory with Gram Stain    Collection Time: 05/05/20  1:24 AM   Result Value Ref Range    Gram Stain (Respiratory) <10 epithelial cells per low power field.     Gram Stain (Respiratory) Few WBC's     Gram Stain (Respiratory) Rare Gram positive cocci     Gram Stain (Respiratory) Rare Gram negative rods    CBC auto differential    Collection Time: 05/05/20  3:30 AM   Result Value Ref Range    WBC 21.64 (H) 3.90 - 12.70 K/uL    RBC 2.86 (L) 4.00 - 5.40 M/uL    Hemoglobin 8.2 (L) 12.0 - 16.0 g/dL    Hematocrit 27.8 (L) 37.0 - 48.5 %    Mean Corpuscular Volume 97 82 - 98 fL    Mean Corpuscular Hemoglobin 28.7 27.0 - 31.0 pg    Mean Corpuscular Hemoglobin Conc 29.5 (L) 32.0 - 36.0 g/dL    RDW 16.9 (H) 11.5 - 14.5 %    Platelets 251 150 - 350 K/uL    MPV 11.0 9.2 - 12.9 fL   ISTAT PROCEDURE    Collection Time: 05/05/20  4:43 AM   Result Value Ref Range    POC PH 7.311 (L) 7.35 - 7.45    POC PCO2 42.7 35 - 45 mmHg    POC PO2 144 (H) 80 - 100 mmHg    POC HCO3 21.5 (L) 24 - 28 mmol/L    POC BE -5 -2 to 2 mmol/L    POC SATURATED O2 99 95 - 100 %    POC TCO2 23 23 - 27 mmol/L     Rate 26     Sample ARTERIAL     Site LB     Allens Test Pass     DelSys Adult Vent     Mode AC/PRVC     Vt 370     PEEP 10     FiO2 65      Microbiology Results (last 7 days)     Procedure Component Value Units Date/Time    Culture, Respiratory with Gram Stain [[id number]] Collected:  05/05/20 0124    Order Status:  Completed Specimen:  Respiratory from Sputum Updated:  05/05/20 0620     Gram Stain (Respiratory) <10 epithelial cells per low power field.     Gram Stain (Respiratory) Few WBC's     Gram Stain (Respiratory) Rare Gram positive cocci     Gram Stain (Respiratory) Rare Gram negative rods    Blood culture [[id number]] Collected:  05/04/20 2145    Order Status:  Completed Specimen:  Blood from Peripheral, Upper Arm, Left Updated:  05/05/20 0515     Blood Culture, Routine No Growth to date    Blood culture [[id number]] Collected:  05/04/20 2015    Order Status:  Completed Specimen:  Blood from Peripheral, Hand, Left Updated:  05/05/20 0345     Blood Culture, Routine No Growth to date                Assessment/Plan:     Acute renal failure  Originally with  iATN sec to COVID infection, and now with pre-renal ischemia and multi-organ dysfxn given HoTN with liver shock now  S/p P.D cath placement on 4/7 and started on peritoneal dialysis on 4/7- P.D d/jody on 4/8 as had difficulty continuing it mostly sec to cath malfunction sec to adhesions.  Has tunnel femoral HD line - 4/28    Another episode of hypotension in setting of Sepsis and AFib on 5/4, causing shock liver and requiring levo.                -Will run CRRT for clearance                - Mild anion gap acidosis, likely in part to sepsis. Bicarb 17, will increase hco3 bath                -see attending attestation for further recommendations or changes    Severe sepsis  Patient testing positive again for GPC  Nurse did not indicate where blood was taken, no likely taken from tunnel      -However, would recommend removing/replacing all lines and running abx  through tunnel cath. Replace asap.      -ECHO     -Abx per primary       Shock liver  Shock liver Likely from sepsis/afib HoTn  -MAP goal >65      If possible, please concentrate gtt's (was getting rate of 2L/24h of norepi)      Thank you for your consult. I will follow-up with patient. Please contact us if you have any additional questions.    Levy Elmore MD  Nephrology  [facility]

## 2020-05-05 NOTE — ASSESSMENT & PLAN NOTE
Originally with  iATN sec to COVID infection, and now with pre-renal ischemia and multi-organ dysfxn given HoTN with liver shock now  S/p P.D cath placement on 4/7 and started on peritoneal dialysis on 4/7- P.D d/jody on 4/8 as had difficulty continuing it mostly sec to cath malfunction sec to adhesions.  Has tunnel femoral HD line - 4/28    Another episode of hypotension in setting of Sepsis and AFib on 5/4, causing shock liver and requiring levo.  -Will run CRRT for clearanc, see attending attestation

## 2020-05-05 NOTE — SUBJECTIVE & OBJECTIVE
Past Medical History:   Diagnosis Date    Acute right-sided weakness 4/26/2018    Arthritis     Diabetes mellitus     Hypertension     Malignant neoplasm of upper-outer quadrant of right breast in female, estrogen receptor positive 12/5/2017    Status post mastectomy, right 2018    Stroke 4/26/2018       Past Surgical History:   Procedure Laterality Date    BREAST BIOPSY      COLONOSCOPY N/A 11/8/2018    Procedure: COLONOSCOPY;  Surgeon: Ahsan Enciso MD;  Location: [facility] END[facility];  Service: Endoscopy;  Laterality: N/A;    COLONOSCOPY N/A 1/17/2019    Procedure: COLONOSCOPY-with polypectomy;  Surgeon: Ahsan Enciso MD;  Location: [facility] END[facility];  Service: Endoscopy;  Laterality: N/A;    ESOPHAGOGASTRODUODENOSCOPY N/A 11/8/2018    Procedure: EGD (ESOPHAGOGASTRODUODENOSCOPY);  Surgeon: Ahsan Enciso MD;  Location: [facility];  Service: Endoscopy;  Laterality: N/A;    EYE SURGERY      HYSTERECTOMY      MASTECTOMY      MYRINGOTOMY WITH INSERTION OF VENTILATION TUBE Bilateral 7/24/2018    Procedure: MYRINGOTOMY, WITH TYMPANOSTOMY TUBE INSERTION;  Surgeon: Ventura Whitt MD;  [facility]: [facility];  Service: ENT;  Laterality: Bilateral;       Review of patient's allergies indicates:  No Known Allergies  Current Facility-Administered Medications   Medication Frequency    ceFEPIme injection 1 g Q24H    chlorhexidine 0.12 % solution 15 mL BID    dexmedetomidine (PRECEDEX) 400mcg/100mL 0.9% NaCL infusion Continuous    dextrose 10% (D10W) Bolus PRN    famotidine (PF) injection 20 mg Daily    glucagon (human recombinant) injection 1 mg PRN    heparin (porcine) injection 5,000 Units Q8H    insulin aspart U-100 pen 0-5 Units Q6H PRN    insulin detemir U-100 pen 20 Units QHS    norepinephrine 4 mg in dextrose 5% 250 mL infusion (premix) (titrating) Continuous    sodium chloride 0.9% flush 10 mL PRN    vancomycin - pharmacy to dose pharmacy to manage frequency    vasopressin (PITRESSIN) 0.2  Units/mL in dextrose 5 % 100 mL infusion Continuous     Facility-Administered Medications Ordered in Other Encounters   Medication Frequency    0.9%  NaCl infusion Continuous    sodium chloride 0.9% flush 3 mL PRN     Family History     Problem Relation (Age of Onset)    Breast cancer Sister (55)    Cancer Sister (75), Brother    Hypertension Mother        Tobacco Use    Smoking status: Never Smoker    Smokeless tobacco: Current User     Types: Snuff   Substance and Sexual Activity    Alcohol use: No    Drug use: No    Sexual activity: Not Currently     Review of Systems  Objective:     Vital Signs (Most Recent):  Temp: 97.7 °F (36.5 °C) (05/04/20 2315)  Pulse: 66 (05/05/20 0727)  Resp: (!) 31 (05/05/20 0727)  BP: (!) 124/59 (05/05/20 0727)  SpO2: 100 % (05/05/20 0727)  O2 Device (Oxygen Therapy): ventilator (05/05/20 0727) Vital Signs (24h Range):  Temp:  [94.6 °F (34.8 °C)-97.8 °F (36.6 °C)] 97.7 °F (36.5 °C)  Pulse:  [61-90] 66  Resp:  [0-69] 31  SpO2:  [48 %-100 %] 100 %  BP: ()/(44-68) 124/59     Weight: 77 kg (169 lb 12.1 oz) (05/05/20 0314)  Body mass index is 26.59 kg/m².  Body surface area is 1.91 meters squared.    I/O last 3 completed shifts:  In: 1119.3 [I.V.:1119.3]  Out: -     Physical Exam    Recent Results (from the past 24 hour(s))   POCT glucose    Collection Time: 05/04/20 12:16 PM   Result Value Ref Range    POCT Glucose 470 (HH) 70 - 110 mg/dL   ISTAT PROCEDURE    Collection Time: 05/04/20  1:25 PM   Result Value Ref Range    POC PH 7.258 (LL) 7.35 - 7.45    POC PCO2 46.9 (H) 35 - 45 mmHg    POC PO2 80 80 - 100 mmHg    POC HCO3 20.9 (L) 24 - 28 mmol/L    POC BE -6 -2 to 2 mmol/L    POC SATURATED O2 94 (L) 95 - 100 %    POC TCO2 22 (L) 23 - 27 mmol/L    Rate 20     Sample ARTERIAL     Site RR     Allens Test Pass     DelSys Adult Vent     Mode AC/PRVC     Vt 450     PEEP 10     PiP 30     FiO2 75     Min Vol 13     Sp02 100    POCT glucose    Collection Time: 05/04/20  1:31 PM    Result Value Ref Range    POCT Glucose 384 (H) 70 - 110 mg/dL   Lactic acid, plasma    Collection Time: 05/04/20  1:58 PM   Result Value Ref Range    Lactate (Lactic Acid) 3.6 (HH) 0.5 - 2.2 mmol/L   POCT glucose    Collection Time: 05/04/20  3:48 PM   Result Value Ref Range    POCT Glucose 364 (H) 70 - 110 mg/dL   Blood culture    Collection Time: 05/04/20  8:15 PM   Result Value Ref Range    Blood Culture, Routine No Growth to date    POCT glucose    Collection Time: 05/04/20  8:21 PM   Result Value Ref Range    POCT Glucose 300 (H) 70 - 110 mg/dL   Comprehensive metabolic panel    Collection Time: 05/04/20  8:31 PM   Result Value Ref Range    Sodium 117 (LL) 136 - 145 mmol/L    Potassium 3.9 3.5 - 5.1 mmol/L    Chloride 82 (L) 95 - 110 mmol/L    CO2 15 (L) 23 - 29 mmol/L    Glucose 832 (HH) 70 - 110 mg/dL    BUN, Bld 68 (H) 8 - 23 mg/dL    Creatinine 5.0 (H) 0.5 - 1.4 mg/dL    Calcium 8.0 (L) 8.7 - 10.5 mg/dL    Total Protein 7.3 6.0 - 8.4 g/dL    Albumin 2.9 (L) 3.5 - 5.2 g/dL    Total Bilirubin 0.5 0.1 - 1.0 mg/dL    Alkaline Phosphatase 110 55 - 135 U/L     (H) 10 - 40 U/L     (H) 10 - 44 U/L    Anion Gap 20 (H) 8 - 16 mmol/L    eGFR if African American 8.7 (A) >60 mL/min/1.73 m^2    eGFR if non African American 7.5 (A) >60 mL/min/1.73 m^2   Magnesium    Collection Time: 05/04/20  8:31 PM   Result Value Ref Range    Magnesium 1.9 1.6 - 2.6 mg/dL   Phosphorus    Collection Time: 05/04/20  8:31 PM   Result Value Ref Range    Phosphorus 5.8 (H) 2.7 - 4.5 mg/dL   CBC auto differential    Collection Time: 05/04/20  8:31 PM   Result Value Ref Range    WBC 21.49 (H) 3.90 - 12.70 K/uL    RBC 2.69 (L) 4.00 - 5.40 M/uL    Hemoglobin 7.9 (L) 12.0 - 16.0 g/dL    Hematocrit 26.6 (L) 37.0 - 48.5 %    Mean Corpuscular Volume 99 (H) 82 - 98 fL    Mean Corpuscular Hemoglobin 29.4 27.0 - 31.0 pg    Mean Corpuscular Hemoglobin Conc 29.7 (L) 32.0 - 36.0 g/dL    RDW 17.5 (H) 11.5 - 14.5 %    Platelets 213 150 - 350  K/uL    MPV 11.2 9.2 - 12.9 fL    Immature Granulocytes 3.9 (H) 0.0 - 0.5 %    Gran # (ANC) 18.4 (H) 1.8 - 7.7 K/uL    Immature Grans (Abs) 0.83 (H) 0.00 - 0.04 K/uL    Lymph # 0.4 (L) 1.0 - 4.8 K/uL    Mono # 1.8 (H) 0.3 - 1.0 K/uL    Eos # 0.0 0.0 - 0.5 K/uL    Baso # 0.04 0.00 - 0.20 K/uL    nRBC 2 (A) 0 /100 WBC    Gran% 85.6 (H) 38.0 - 73.0 %    Lymph% 1.9 (L) 18.0 - 48.0 %    Mono% 8.4 4.0 - 15.0 %    Eosinophil% 0.0 0.0 - 8.0 %    Basophil% 0.2 0.0 - 1.9 %    Platelet Estimate Appears normal     Aniso Slight     Poik Slight     Poly Occasional     Hypo Occasional     Ovalocytes Occasional     Tear Drop Cells Occasional     Sharif Cells Occasional     Differential Method Automated    Lactic acid, plasma    Collection Time: 05/04/20  8:31 PM   Result Value Ref Range    Lactate (Lactic Acid) 1.5 0.5 - 2.2 mmol/L   ISTAT PROCEDURE    Collection Time: 05/04/20  9:00 PM   Result Value Ref Range    POC PH 7.322 (L) 7.35 - 7.45    POC PCO2 41.8 35 - 45 mmHg    POC PO2 93 80 - 100 mmHg    POC HCO3 21.6 (L) 24 - 28 mmol/L    POC BE -4 -2 to 2 mmol/L    POC SATURATED O2 97 95 - 100 %    POC TCO2 23 23 - 27 mmol/L    Rate 22     Sample ARTERIAL     Site LB     Allens Test Pass     DelSys Adult Vent     Mode AC/PRVC     Vt 450     PEEP 10     FiO2 75    Blood culture    Collection Time: 05/04/20  9:45 PM   Result Value Ref Range    Blood Culture, Routine No Growth to date    Basic metabolic panel    Collection Time: 05/04/20 11:54 PM   Result Value Ref Range    Sodium 113 (LL) 136 - 145 mmol/L    Potassium 4.0 3.5 - 5.1 mmol/L    Chloride 79 (L) 95 - 110 mmol/L    CO2 16 (L) 23 - 29 mmol/L    Glucose 768 (HH) 70 - 110 mg/dL    BUN, Bld 89 (H) 8 - 23 mg/dL    Creatinine 4.3 (H) 0.5 - 1.4 mg/dL    Calcium 8.3 (L) 8.7 - 10.5 mg/dL    Anion Gap 18 (H) 8 - 16 mmol/L    eGFR if African American 10.4 (A) >60 mL/min/1.73 m^2    eGFR if non African American 9.0 (A) >60 mL/min/1.73 m^2   Comprehensive metabolic panel    Collection  Time: 05/05/20  1:23 AM   Result Value Ref Range    Sodium 131 (L) 136 - 145 mmol/L    Potassium 4.9 3.5 - 5.1 mmol/L    Chloride 90 (L) 95 - 110 mmol/L    CO2 17 (L) 23 - 29 mmol/L    Glucose 395 (H) 70 - 110 mg/dL    BUN, Bld 92 (H) 8 - 23 mg/dL    Creatinine 4.1 (H) 0.5 - 1.4 mg/dL    Calcium 9.8 8.7 - 10.5 mg/dL    Total Protein 8.1 6.0 - 8.4 g/dL    Albumin 3.2 (L) 3.5 - 5.2 g/dL    Total Bilirubin 0.7 0.1 - 1.0 mg/dL    Alkaline Phosphatase 110 55 - 135 U/L     (H) 10 - 40 U/L     (H) 10 - 44 U/L    Anion Gap 24 (H) 8 - 16 mmol/L    eGFR if African American 11.0 (A) >60 mL/min/1.73 m^2    eGFR if non African American 9.6 (A) >60 mL/min/1.73 m^2   Magnesium    Collection Time: 05/05/20  1:23 AM   Result Value Ref Range    Magnesium 2.0 1.6 - 2.6 mg/dL   Phosphorus    Collection Time: 05/05/20  1:23 AM   Result Value Ref Range    Phosphorus 7.2 (H) 2.7 - 4.5 mg/dL   Culture, Respiratory with Gram Stain    Collection Time: 05/05/20  1:24 AM   Result Value Ref Range    Gram Stain (Respiratory) <10 epithelial cells per low power field.     Gram Stain (Respiratory) Few WBC's     Gram Stain (Respiratory) Rare Gram positive cocci     Gram Stain (Respiratory) Rare Gram negative rods    CBC auto differential    Collection Time: 05/05/20  3:30 AM   Result Value Ref Range    WBC 21.64 (H) 3.90 - 12.70 K/uL    RBC 2.86 (L) 4.00 - 5.40 M/uL    Hemoglobin 8.2 (L) 12.0 - 16.0 g/dL    Hematocrit 27.8 (L) 37.0 - 48.5 %    Mean Corpuscular Volume 97 82 - 98 fL    Mean Corpuscular Hemoglobin 28.7 27.0 - 31.0 pg    Mean Corpuscular Hemoglobin Conc 29.5 (L) 32.0 - 36.0 g/dL    RDW 16.9 (H) 11.5 - 14.5 %    Platelets 251 150 - 350 K/uL    MPV 11.0 9.2 - 12.9 fL   ISTAT PROCEDURE    Collection Time: 05/05/20  4:43 AM   Result Value Ref Range    POC PH 7.311 (L) 7.35 - 7.45    POC PCO2 42.7 35 - 45 mmHg    POC PO2 144 (H) 80 - 100 mmHg    POC HCO3 21.5 (L) 24 - 28 mmol/L    POC BE -5 -2 to 2 mmol/L    POC SATURATED O2 99  95 - 100 %    POC TCO2 23 23 - 27 mmol/L    Rate 26     Sample ARTERIAL     Site LB     Allens Test Pass     DelSys Adult Vent     Mode AC/PRVC     Vt 370     PEEP 10     FiO2 65      Microbiology Results (last 7 days)     Procedure Component Value Units Date/Time    Culture, Respiratory with Gram Stain [[id number]] Collected:  05/05/20 0124    Order Status:  Completed Specimen:  Respiratory from Sputum Updated:  05/05/20 0620     Gram Stain (Respiratory) <10 epithelial cells per low power field.     Gram Stain (Respiratory) Few WBC's     Gram Stain (Respiratory) Rare Gram positive cocci     Gram Stain (Respiratory) Rare Gram negative rods    Blood culture [[id number]] Collected:  05/04/20 2145    Order Status:  Completed Specimen:  Blood from Peripheral, Upper Arm, Left Updated:  05/05/20 0515     Blood Culture, Routine No Growth to date    Blood culture [[id number]] Collected:  05/04/20 2015    Order Status:  Completed Specimen:  Blood from Peripheral, Hand, Left Updated:  05/05/20 0345     Blood Culture, Routine No Growth to date

## 2020-05-05 NOTE — HPI
Elina Mei is a 80 y.o. female with past medical history of hypertension, hyperlipidemia, diabetes mellitus type 2, CVA, breast cancer who originally presented to ER 4/5/20 with cough, weakness, and confusion, was found to be minimally responsive and was emergently intubated. Chest x-ray showed bilateral ground-glass opacities concerning for viral pneumonia. She was found to be COVID-19 positive.  She completed course of plaquenil, rocephin, and azithromycin. Labs were remarkable for an JUDI (Baseline Cr 1.1 -> 2.55 on admission), and troponin elevation to 0.121.  EKG showed sinus rhythm with first-degree AV block but no acute ischemic changes. Her Cr has progressively worsened throughout her hospital stay [2.55 (4/5) -> 4.3 (5/2)-> 6.5 (5/3) -> 8.24 (5/4)].  Nephrology at [facility] was consulted and she quickly became anuric and required a peritoneal dialysis catheter placement on 4/7/20, and she began PD on that same day. Ultimately CCPD was discontinued due to inability to obtain proper outflow from dysfunctional PD cath. She subsequently had a right femoral H.D cath placed on 4/10/20 and began HD on 4/11. Her Blood CXS from 4/5 grew out Strep Viridans and MSSA and she completed 2 weeks of Rocephin for this. She was extubated 4/24/20 and tunneled HD line placed 4/28/20.  Yesterday (5/3), she developed a fever to 101.0F and an episode of atrial fibrillation with hypotension to SBP 80s.  She was begun empirically on Vancomycin and Cefepime and started on amiodarone.  At 10pm last night she converted back to NSR.  This am during HD she became bradycardic and developed respiratory distress.  She had to be re-intubated, and Levophed was started for hypotension  Nephrology there noted, Patient developed bradycardia and hypotension on dialysis today, subsequently with respiratory distress and need for intubation, session had to be rinsed back prematurely.  Recommend transfer to outside facility with CRRT  "capabilities."   Her CT head showed NAF, only generalized cerebral volume loss, findings suggestive of chronic microvascular ischemic change and small regions of prior left MCA territory distribution infarction similar to prior exams.  CXR today shows interval placement of endotracheal tube which terminates just below the level of the clavicles. Creasing patchy opacity in the right base with suspected dense consolidation in the left retrocardiac location.   "

## 2020-05-05 NOTE — NURSING
EMS arrived on unit with pt on stretcher connected to portable vent (functioning); port tele (SR with block noted); bp per cuff (no brigido) was 70's/30's and EMS stated they had Levop up to .28  ([facility] transferring RN approx 1700 as .25) and Precedex was at .6 as reported (was turned off ASAP by this RN upon arrival and BP continued to be low-and pt nonresponsive.)  CC[facility] MD was called per my request by ARLENE Carranza and requested MD come to bedside to assess pt/address low BP and possible need to add another agent.   Levo was increased as charted.  Pt was connected to vent by PM RT.  Report was given to PM RN, Sonali and was assisted by pm ARLENE Alberts.  Family number (Rochelle) was shared with Sonali (per report from [facility] RN, Marquille, [facility] sup and Dr Pastor had spoken with Rochelle and made her aware that  pt was being transferred to main campus.)  Pt was left in care of Sonali and Lexus.  CXR being obtiained.

## 2020-05-05 NOTE — ASSESSMENT & PLAN NOTE
Likely iATN sec to COVID infection  S/p P.D cath placement on 4/7 and started on peritoneal dialysis on 4/7- P.D d/jody on 4/8 as had difficulty continuing it mostly sec to cath malfunction sec to adhesions.  Has tunnel femoral HD line - 4/28  Nephrology consulted, appreciate assistance  Strict I/Os  Avoid nephrotoxic agents

## 2020-05-05 NOTE — PLAN OF CARE
Recommendations    1. Recommend TF of Peptamen AF advancing as tolerated to goal rate of 50 mL/hr to provide 1440 kcal, 91 gm protein, and 972 mL fluid free fluid.     2. If able to extubate, ADAT to diabetic with texture per SLP.     3. RD following.     Goals: receive nutrition by RD follow-up  Nutrition Goal Status: new

## 2020-05-05 NOTE — HPI
80-year-old -American woman with hypertension, hyperlipidemia, diabetes mellitus type 2, CVA, breast cancer, chronic kidney disease stage 3B/A3 (baseline sCr 1-1.4, GFR 36-53), transferred to [facility] for higher level of care and CRRT needs on 5/4/2020 after being admitted at OSH on 4/5 for SOB, respiratory failure, COVID-19+, and acute kidney injury.  She originally presented to ED with altered mental status, extreme weakness, and cough, sats 50% at room air requiring mechanical ventilation.  Positive blood cultures 4/5/2020 for Staph. aureus and S Strep. viridans, and sputum also positive for Staph. aureus.  On 4/7/2020 her renal function worsened requiring acute start of PD.  Catheter malfunctioned on 4/8 (multiple adhesions per surgery) and catheter was not replaced, yet still maintained urine output with furosemide gtt.   By 4/10 kidney function continued to decline requiring HD treatment via femoral line placed, with intermittent vasopressor support with HDs.  By 4/24 was extubated (still was dialysis dependent).  On 4/28 surgery placed rt femoral tunneled cath for continued treatments.  On 5/3 developed Afib with RVR, hypotension, started on amio gtt and norepi to maintani MAP >65.  Converted to NSR by 5/4.  Yet, hemodialysis that day patient became unresponsive, bradycardic, and had to be reintubated, and decision to transfer to CRRT capable facility, thus transferred to [facility].      Nephrology consulted for continued management of JUDI requiring RRt.

## 2020-05-05 NOTE — SUBJECTIVE & OBJECTIVE
Past Medical History:   Diagnosis Date    Acute right-sided weakness 4/26/2018    Arthritis     Diabetes mellitus     Hypertension     Malignant neoplasm of upper-outer quadrant of right breast in female, estrogen receptor positive 12/5/2017    Status post mastectomy, right 2018    Stroke 4/26/2018       Past Surgical History:   Procedure Laterality Date    BREAST BIOPSY      COLONOSCOPY N/A 11/8/2018    Procedure: COLONOSCOPY;  Surgeon: Ahsan Ecniso MD;  Location: [facility] ENDO;  Service: Endoscopy;  Laterality: N/A;    COLONOSCOPY N/A 1/17/2019    Procedure: COLONOSCOPY-with polypectomy;  Surgeon: Ahsan Enciso MD;  Location: [facility] ENDO;  Service: Endoscopy;  Laterality: N/A;    ESOPHAGOGASTRODUODENOSCOPY N/A 11/8/2018    Procedure: EGD (ESOPHAGOGASTRODUODENOSCOPY);  Surgeon: Ahsan Enciso MD;  [facility]: [facility];  Service: Endoscopy;  Laterality: N/A;    EYE SURGERY      HYSTERECTOMY      MASTECTOMY      MYRINGOTOMY WITH INSERTION OF VENTILATION TUBE Bilateral 7/24/2018    Procedure: MYRINGOTOMY, WITH TYMPANOSTOMY TUBE INSERTION;  Surgeon: Ventura Whitt MD;  Location: [facility] OR;  Service: ENT;  Laterality: Bilateral;       Review of patient's allergies indicates:  No Known Allergies    Family History     Problem Relation (Age of Onset)    Breast cancer Sister (55)    Cancer Sister (75), Brother    Hypertension Mother        Tobacco Use    Smoking status: Never Smoker    Smokeless tobacco: Current User     Types: Snuff   Substance and Sexual Activity    Alcohol use: No    Drug use: No    Sexual activity: Not Currently      Review of Systems   Unable to perform ROS: Intubated     Objective:     Vital Signs (Most Recent):  Temp: (!) 94.6 °F (34.8 °C) (05/04/20 1945)  Pulse: 85 (05/04/20 1945)  Resp: (!) 29 (05/04/20 1945)  BP: (!) 109/55 (05/04/20 1945)  SpO2: 100 % (05/04/20 1945) Vital Signs (24h Range):  Temp:  [94.6 °F (34.8 °C)-98.3 °F (36.8 °C)] 94.6 °F (34.8 °C)  Pulse:   [] 85  Resp:  [0-69] 29  SpO2:  [48 %-100 %] 100 %  BP: ()/(44-64) 109/55   Weight: 77 kg (169 lb 12.1 oz)  Body mass index is 25.82 kg/m².    No intake or output data in the 24 hours ending 05/04/20 2039    Physical Exam   Constitutional: She appears well-developed and well-nourished.   HENT:   Head: Normocephalic and atraumatic.   Cardiovascular: Normal rate and regular rhythm.   No murmur heard.  Pulmonary/Chest: Effort normal. No respiratory distress.   Abdominal: Soft. There is no tenderness.   Musculoskeletal: She exhibits no edema.   R femoral CVC   Neurological:   Sedated   Skin: Skin is warm and dry.       Vents:  Vent Mode: A/C (05/04/20 1915)  Set Rate: 22 BPM (05/04/20 1915)  Vt Set: 450 mL (05/04/20 1915)  PEEP/CPAP: 10 cmH20 (05/04/20 1915)  Peak Airway Pressure: 25 cmH2O (05/04/20 1915)  Total Ve: 13.8 mL (05/04/20 1915)  F/VT Ratio<105 (RSBI): (!) 71.26 (05/04/20 1915)  Lines/Drains/Airways     Peripherally Inserted Central Catheter Line            PICC Triple Lumen 04/11/20 1950 right basilic 23 days          Central Venous Catheter Line            Tunneled Central Line Insertion/Assessment - Double Lumen  04/28/20 1030 right femoral vein;right femoral 6 days          Drain                 Urethral Catheter 04/23/20 1400 16 Fr. 11 days         NG/OG Tube 05/04/20 1235 Gage sump 18 Fr. Center mouth less than 1 day          Airway                 Airway - Non-Surgical 05/04/20 1230 Endotracheal Tube less than 1 day              Significant Labs:    CBC/Anemia Profile:  Recent Labs   Lab 05/03/20  0555 05/04/20  0510   WBC 21.30* 18.48*   HGB 9.6* 8.6*   HCT 30.1* 27.6*    282   MCV 92 94   RDW 16.5* 16.8*        Chemistries:  Recent Labs   Lab 05/03/20  0555 05/04/20  0510    143   K 4.3 4.9    99   CO2 21* 22*   * 140*   CREATININE 6.51* 8.24*   CALCIUM 11.0* 10.4   ALBUMIN 4.4 3.9   PROT 8.7* 7.8   BILITOT 0.8 0.6   ALKPHOS 104 101   ALT 17 16   AST 22 29   MG  2.4 2.7*   PHOS 5.7* 7.7*

## 2020-05-05 NOTE — PLAN OF CARE
Due to COVID 19 restrictions limiting patient contact and visits, Discharge Planning Assessment completed via medical record. Patient is currently intubated.        05/05/20 1535   Discharge Assessment   Assessment Type Discharge Planning Assessment   Assessment information obtained from? Medical Record   Expected Length of Stay (days) 5   Communicated expected length of stay with patient/caregiver no   Prior to hospitilization cognitive status: Alert/Oriented   Prior to hospitalization functional status: Independent   Current cognitive status: Coma/Sedated/Intubated   Current Functional Status: Completely Dependent  (due to intubation)   Facility Arrived From: [facility]    Lives With child(paramjit), adult  (daughter)   Able to Return to Prior Arrangements other (see comments)  (TBD)   Is patient able to care for self after discharge? Unable to determine at this time (comments)  (TBD)   Who are your caregiver(s) and their phone number(s)? Claribel Mei [phone]   Patient's perception of discharge disposition other (comments)  (VIRIDIANA)   Readmission Within the Last 30 Days no previous admission in last 30 days   Patient currently being followed by outpatient case management? No   Patient currently receives any other outside agency services? No   Equipment Currently Used at Home other (see comments)  (VIRIDIANA)   Do you have any problems affording any of your prescribed medications? No   Is the patient taking medications as prescribed? yes   Does the patient have transportation home?   (VIRIDIANA)   Does the patient receive services at the [facility]? No   Discharge Plan A Long-term acute care facility (LTAC)   Discharge Plan B Skilled Nursing Facility   DME Needed Upon Discharge  other (see comments)  (TBD)   Patient/Family in Agreement with Plan unable to assess           PCP:  SHORTY Duke MD        Pharmacy:    [facility]/pharmacy #[id number] - [place] - [address] [facility]  [address] [place] [address]  Phone:  [phone] Fax: [phone]        Emergency Contacts:  Extended Emergency Contact Information  Primary Emergency Contact: Rochelle Mei   [facility]  Home Phone: [phone]  Relation: Daughter  Secondary Emergency Contact: Radhames Padilla   [place]  Mobile Phone: [phone]  Relation: Daughter      Insurance:    Payor: [employer] MANAGED MEDICARE / Plan: [employer] (SPECIAL NEEDS PLAN) / Product Type: Medicare Advantage /       05/05/2020  3:38 PM      Lynn Tovar RN, CM   Ext: [phone]

## 2020-05-05 NOTE — H&P
[facility]  Critical Care Medicine  History & Physical    Patient Name: Elina Mei  MRN: [id number]  Admission Date: 5/4/2020  Hospital Length of Stay: 0 days  Code Status: Full Code  Attending Physician: Hermes Moran*   Primary Care Provider: SHORTY Duke MD   Principal Problem: <principal problem not specified>    Subjective:     HPI:  Elina Mei is a 80 y.o. female with past medical history of hypertension, hyperlipidemia, diabetes mellitus type 2, CVA, breast cancer who originally presented to ER 4/5/20 with cough, weakness, and confusion, was found to be minimally responsive and was emergently intubated. Chest x-ray showed bilateral ground-glass opacities concerning for viral pneumonia. She was found to be COVID-19 positive.  She completed course of plaquenil, rocephin, and azithromycin. Labs were remarkable for an JUDI (Baseline Cr 1.1 -> 2.55 on admission), and troponin elevation to 0.121.  EKG showed sinus rhythm with first-degree AV block but no acute ischemic changes. Her Cr has progressively worsened throughout her hospital stay [2.55 (4/5) -> 4.3 (5/2)-> 6.5 (5/3) -> 8.24 (5/4)].  Nephrology at [facility] was consulted and she quickly became anuric and required a peritoneal dialysis catheter placement on 4/7/20, and she began PD on that same day. Ultimately CCPD was discontinued due to inability to obtain proper outflow from dysfunctional PD cath. She subsequently had a right femoral H.D cath placed on 4/10/20 and began HD on 4/11. Her Blood CXS from 4/5 grew out Strep Viridans and MSSA and she completed 2 weeks of Rocephin for this. She was extubated 4/24/20 and tunneled HD line placed 4/28/20.  Yesterday (5/3), she developed a fever to 101.0F and an episode of atrial fibrillation with hypotension to SBP 80s.  She was begun empirically on Vancomycin and Cefepime and started on amiodarone.  At 10pm last night she converted back to NSR.  This  am during HD she became  "bradycardic and developed respiratory distress.  She had to be re-intubated, and Levophed was started for hypotension  Nephrology there noted, Patient developed bradycardia and hypotension on dialysis today, subsequently with respiratory distress and need for intubation, session had to be rinsed back prematurely.  Recommend transfer to outside facility with CRRT capabilities."   Her CT head showed NAF, only generalized cerebral volume loss, findings suggestive of chronic microvascular ischemic change and small regions of prior left MCA territory distribution infarction similar to prior exams.  CXR today shows interval placement of endotracheal tube which terminates just below the level of the clavicles. Creasing patchy opacity in the right base with suspected dense consolidation in the left retrocardiac location.     Hospital/ICU Course:  No notes on file     Past Medical History:   Diagnosis Date    Acute right-sided weakness 4/26/2018    Arthritis     Diabetes mellitus     Hypertension     Malignant neoplasm of upper-outer quadrant of right breast in female, estrogen receptor positive 12/5/2017    Status post mastectomy, right 2018    Stroke 4/26/2018       Past Surgical History:   Procedure Laterality Date    BREAST BIOPSY      COLONOSCOPY N/A 11/8/2018    Procedure: COLONOSCOPY;  Surgeon: Ahsan Enciso MD;  Location: [facility];  Service: Endoscopy;  Laterality: N/A;    COLONOSCOPY N/A 1/17/2019    Procedure: COLONOSCOPY-with polypectomy;  Surgeon: Ahsan Enciso MD;  Location: [facility];  Service: Endoscopy;  Laterality: N/A;    ESOPHAGOGASTRODUODENOSCOPY N/A 11/8/2018    Procedure: EGD (ESOPHAGOGASTRODUODENOSCOPY);  Surgeon: Ahsan Enciso MD;  Location: [facility];  Service: Endoscopy;  Laterality: N/A;    EYE SURGERY      HYSTERECTOMY      MASTECTOMY      MYRINGOTOMY WITH INSERTION OF VENTILATION TUBE Bilateral 7/24/2018    Procedure: MYRINGOTOMY, WITH TYMPANOSTOMY TUBE INSERTION;  " Surgeon: Ventura Whitt MD;  Location: [facility];  Service: ENT;  Laterality: Bilateral;       Review of patient's allergies indicates:  No Known Allergies    Family History     Problem Relation (Age of Onset)    Breast cancer Sister (55)    Cancer Sister (75), Brother    Hypertension Mother        Tobacco Use    Smoking status: Never Smoker    Smokeless tobacco: Current User     Types: Snuff   Substance and Sexual Activity    Alcohol use: No    Drug use: No    Sexual activity: Not Currently      Review of Systems   Unable to perform ROS: Intubated     Objective:     Vital Signs (Most Recent):  Temp: (!) 94.6 °F (34.8 °C) (05/04/20 1945)  Pulse: 85 (05/04/20 1945)  Resp: (!) 29 (05/04/20 1945)  BP: (!) 109/55 (05/04/20 1945)  SpO2: 100 % (05/04/20 1945) Vital Signs (24h Range):  Temp:  [94.6 °F (34.8 °C)-98.3 °F (36.8 °C)] 94.6 °F (34.8 °C)  Pulse:  [] 85  Resp:  [0-69] 29  SpO2:  [48 %-100 %] 100 %  BP: ()/(44-64) 109/55   Weight: 77 kg (169 lb 12.1 oz)  Body mass index is 25.82 kg/m².    No intake or output data in the 24 hours ending 05/04/20 2039    Physical Exam   Constitutional: She appears well-developed and well-nourished.   HENT:   Head: Normocephalic and atraumatic.   Cardiovascular: Normal rate and regular rhythm.   No murmur heard.  Pulmonary/Chest: Effort normal. No respiratory distress.   Abdominal: Soft. There is no tenderness.   Musculoskeletal: She exhibits no edema.   R femoral CVC   Neurological:   Sedated   Skin: Skin is warm and dry.       Vents:  Vent Mode: A/C (05/04/20 1915)  Set Rate: 22 BPM (05/04/20 1915)  Vt Set: 450 mL (05/04/20 1915)  PEEP/CPAP: 10 cmH20 (05/04/20 1915)  Peak Airway Pressure: 25 cmH2O (05/04/20 1915)  Total Ve: 13.8 mL (05/04/20 1915)  F/VT Ratio<105 (RSBI): (!) 71.26 (05/04/20 1915)  Lines/Drains/Airways     Peripherally Inserted Central Catheter Line            PICC Triple Lumen 04/11/20 1950 right basilic 23 days          Central Venous  Catheter Line            Tunneled Central Line Insertion/Assessment - Double Lumen  04/28/20 1030 right femoral vein;right femoral 6 days          Drain                 Urethral Catheter 04/23/20 1400 16 Fr. 11 days         NG/OG Tube 05/04/20 1235 Rutledge sump 18 Fr. Center mouth less than 1 day          Airway                 Airway - Non-Surgical 05/04/20 1230 Endotracheal Tube less than 1 day              Significant Labs:    CBC/Anemia Profile:  Recent Labs   Lab 05/03/20  0555 05/04/20  0510   WBC 21.30* 18.48*   HGB 9.6* 8.6*   HCT 30.1* 27.6*    282   MCV 92 94   RDW 16.5* 16.8*        Chemistries:  Recent Labs   Lab 05/03/20  0555 05/04/20  0510    143   K 4.3 4.9    99   CO2 21* 22*   * 140*   CREATININE 6.51* 8.24*   CALCIUM 11.0* 10.4   ALBUMIN 4.4 3.9   PROT 8.7* 7.8   BILITOT 0.8 0.6   ALKPHOS 104 101   ALT 17 16   AST 22 29   MG 2.4 2.7*   PHOS 5.7* 7.7*         Assessment/Plan:     Pulmonary  Acute respiratory failure with hypoxia  Acute respiratory disease due to COVID-19 virus  Positive COVID - 19 test, extubated and re-intubated. Contact, droplet, airborne precautions  S/p 5 days of Azithromycin and Plaquenil. Completed 2 weeks of Rocephin for S.aureus pneumonia and Streptococcal bacteremia (strep viridans and MSSA in blood). Repeat Cx from 4/6 and 4/9 -ve.   Mechanical ventilation : CXR, ABG  5/3 - GPCs resembling staph in 1/2 cultures at [facility], Repeating Cultures  Started empiric vanc and cefepime at [facility], ID consulted  Repeat Echo  Vasopressors - Levo and vasopressin    Renal/  Acute renal failure  Likely iATN sec to COVID infection  S/p P.D cath placement on 4/7 and started on peritoneal dialysis on 4/7- P.D d/jody on 4/8 as had difficulty continuing it mostly sec to cath malfunction sec to adhesions.  Has tunnel femoral HD line - 4/28  Nephrology consulted, appreciate assistance  Strict I/Os  Avoid nephrotoxic agents    ID  Severe sepsis  See respiratory  failure    Staphylococcus aureus bacteremia  See severe sepsis    Endocrine  DM (diabetes mellitus)  Last A1c 7.3  Continue accuchecks and LDSSI for now      Critical Care Daily Checklist:    A: Awake: RASS Goal/Actual Goal: RASS Goal: 0-->alert and calm  Actual:     B: Spontaneous Breathing Trial Performed?     C: SAT & SBT Coordinated?  no                      D: Delirium: CAM-ICU     E: Early Mobility Performed? Yes   F: Feeding Goal:    Status:     Current Diet Order   Procedures    Diet NPO      AS: Analgesia/Sedation precedex   T: Thromboembolic Prophylaxis heparin   H: HOB > 300 Yes   U: Stress Ulcer Prophylaxis (if needed) pepcid   G: Glucose Control SSI   B: Bowel Function     I: Indwelling Catheter (Lines & Gamino) Necessity Fem CVC   D: De-escalation of Antimicrobials/Pharmacotherapies vanc cefepime    Plan for the day/ETD     Code Status:  Family/Goals of Care: Full Code       Critical secondary to Patient has a condition that poses threat to life and bodily function: Acute Renal Failure     Critical care was time spent personally by me on the following activities: development of treatment plan with patient or surrogate and bedside caregivers, discussions with consultants, evaluation of patient's response to treatment, examination of patient, ordering and performing treatments and interventions, ordering and review of laboratory studies, ordering and review of radiographic studies, pulse oximetry, re-evaluation of patient's condition. This critical care time did not overlap with that of any other provider or involve time for any procedures.     Seven Diego MD  Critical Care Medicine  [facility]

## 2020-05-05 NOTE — HPI
Elina Mei is an 80-year-old female with a past medical history of hypertension, hyperlipidemia, diabetes mellitus type 2, CVA, breast cancer who was admitted to [facility] ICU on 4/5 with acute respiratory failure secondary to COVID-19. She was also found to have MSSA/Strep viridans bacteremia, MSSA pneumonia and an JUDI. She had a prolonged ICU stay requiring mechanical ventilation and pressure support. Completed a course of CAP treatment (azithro/ceftriaxone) and plaquenil. Her bacteremia was treated with Vancomycin/Ceftriaxone followed by ceftriaxone monotherapy for two weeks. Blood cx cleared 4/6. Kidney function worsened and she was started on hemodialysis via right femoral tunneled catheter on 4/11. Her oxygen requirements improved, sedation weaned and she was extubated on 4/24. A tunneled HD line was placed 4/28/20.    On 5/2 patient's WBC count trended up to 28K. On 5/3 patient developed a-fib with RVR, hypotension and fevers. Chest xray showed left perihilar and bibasilar opacities slightly worse as compared to prior exam. Vanc/Cefepime started. Blood cultures drawn 5/3 are showing GPCs in clusters resembling Staph (4/4 bottles). Blood cx 5/4 are NGTD. Resp cx is pending.  This  am during HD she became bradycardic and developed respiratory distress.  She had to be re-intubated and Levophed was started for hypotension. She was transferred to [facility] for treatment. Repeat echo ordered. US BLE ordered to r/o DVTs.     Lines:  RUE PICC (basilic) - 4/11  Right femoral tunneled cath - 4/28  Gamino - 4/23  Rectal tube - 5/4  NG tube - 5/4

## 2020-05-05 NOTE — PROGRESS NOTES
Pharmacokinetic Assessment Follow Up: IV Vancomycin    Vancomycin serum concentration assessment(s):     · Patient was transferred from [facility].  · She is receiving Vancomycin pulse dosing   · Patient on HD * (schedule MWF)  · Vancomycin current random is therapeutic.   · Will continue with the same regimen    Vancomycin Regimen Plan:  Re-dose with 500 mg IV x 1 after HD on Wednesday 05/06   Vancomycin next level to be drawn with a.m labs on 05/07  Re-dose when random level is less than 20 mcg/mL.       Drug levels (last 3 results):  Recent Labs   Lab Result Units 05/04/20  0510   Vancomycin, Random ug/mL 21.7       Pharmacy will continue to follow and monitor vancomycin.    Please contact pharmacy at extension [phone] for questions regarding this assessment.    Thank you for the consult,   Carie Guzman       Patient brief summary:  Elina Mei is a 80 y.o. female initiated on antimicrobial therapy with IV Vancomycin for treatment of bacteremia    The patient's current regimen is pulse dosing.  Drug Allergies:   Review of patient's allergies indicates:  No Known Allergies    Actual Body Weight:   77 Kg.     Renal Function:   Estimated Creatinine Clearance: 5.7 mL/min (A) (based on SCr of 8.24 mg/dL (H)).,     Dialysis Method (if applicable):  intermittent HD  MWF .     CBC (last 72 hours):  Recent Labs   Lab Result Units 05/02/20  0641 05/03/20  0555 05/04/20  0510   WBC K/uL 28.41* 21.30* 18.48*   Hemoglobin g/dL 9.4* 9.6* 8.6*   Hematocrit % 30.2* 30.1* 27.6*   Platelets K/uL 277 293 282   Gran% % 86.0* 88.0* 89.4*   Lymph% % 4.0* 3.0* 3.1*   Mono% % 3.0* 7.5 6.5   Eosinophil% % 0.0 0.6 0.0   Basophil% % 0.0 0.2 0.2   Differential Method  Manual Automated Automated       Metabolic Panel (last 72 hours):  Recent Labs   Lab Result Units 05/02/20  0641 05/03/20  0555 05/04/20  0510   Sodium mmol/L 140 141 143   Potassium mmol/L 4.1 4.3 4.9   Chloride mmol/L 99 100 99   CO2 mmol/L 23 21* 22*    Glucose mg/dL 296* 287* 309*   BUN, Bld mg/dL 72* 110* 140*   Creatinine mg/dL 4.30* 6.51* 8.24*   Albumin g/dL 4.6 4.4 3.9   Total Bilirubin mg/dL 0.9 0.8 0.6   Alkaline Phosphatase U/L 96 104 101   AST U/L 28 22 29   ALT U/L 20 17 16   Magnesium mg/dL 2.0 2.4 2.7*   Phosphorus mg/dL 4.2 5.7* 7.7*       Vancomycin Administrations:  vancomycin given in the last 96 hours                   vancomycin 500 mg in dextrose 5 % 100 mL IVPB (ready to mix system) (mg) 500 mg New Bag 05/04/20 1317    vancomycin 1500 mg in 0.9% sodium chloride 250 mL IVPB (mg) 1,500 mg New Bag 05/03/20 1521                Microbiologic Results:  Microbiology Results (last 7 days)     Procedure Component Value Units Date/Time    Culture, Respiratory with Gram Stain [[id number]]     Order Status:  No result Specimen:  Respiratory     Blood culture [[id number]]     Order Status:  No result Specimen:  Blood     Blood culture [[id number]]     Order Status:  No result Specimen:  Blood

## 2020-05-05 NOTE — CONSULTS
Consult acknowledged.  Refer to full note for assessment and plan.    Shemar Gregory MD  Nephrology  [facility]

## 2020-05-05 NOTE — CONSULTS
[facility]  Infectious Disease  Consult Note    Patient Name: Elina Mei  MRN: [id number]  Admission Date: 5/4/2020  Hospital Length of Stay: 1 days  Attending Physician: Hermes Moran*  Primary Care Provider: SHORTY Duke MD     Isolation Status: Airborne and Contact and Droplet    Patient information was obtained from past medical records.      Inpatient consult to Infectious Diseases  Consult performed by: Maty Stearns PA-C  Consult ordered by: Seven Diego MD        Assessment/Plan:     Staphylococcus aureus bacteremia     80-year-old female admitted to [facility] ICU on 4/5 with acute respiratory failure secondary to COVID-19. She was also found to have MSSA/Strep viridans bacteremia, MSSA pneumonia and an JUDI. She had a prolonged ICU stay requiring mechanical ventilation and pressure support. Her MSSA bacteremia/PNA was treated with Vancomycin/Ceftriaxone followed by ceftriaxone monotherapy for two weeks. Blood cx cleared 4/6. She was extubated on 4/24. Kidney function worsened and she is now on HD via right femoral tunneled catheter placed 4/28/20.     On 5/2 patient's WBC count trended up to 28K. On 5/3 patient developed a-fib with RVR, hypotension and fevers.  Vanc/Cefepime started. Blood cultures drawn 5/3 are showing Staph aureus. Resp cx is pending. She developed respiratory distress this a.m. and had to be re-intubated. ID consulted for abx recs.      Plan  - Continue IV Vancomycin. Vanc trough goal 15-20.  - Recommend deescalating Cefepime to Cefazolin 1 g IV q 24 hours for prior MSSA  - Follow repeat blood cultures and susceptibilities  - Would remove/exchange all lines if possible for line holiday (PICC, tunneled HD catheter)   - Will ultimately likely need DINESH to assess for endocarditis if 2D echo is unrevealing given recurrent Staph aureus bacteremia   - ID will follow.        Thank you for the consult. Please call for any questions.  Maty Stearns  [place]  Phone: [phone]  Pager: [phone]    Subjective:     Principal Problem: <principal problem not specified>    HPI: Elina Mei is an 80-year-old female with a past medical history of hypertension, hyperlipidemia, diabetes mellitus type 2, CVA, breast cancer who was admitted to [facility] ICU on 4/5 with acute respiratory failure secondary to COVID-19. She was also found to have MSSA/Strep viridans bacteremia, MSSA pneumonia and an JUDI. She had a prolonged ICU stay requiring mechanical ventilation and pressure support. Completed a course of CAP treatment (azithro/ceftriaxone) and plaquenil. Her bacteremia was treated with Vancomycin/Ceftriaxone followed by ceftriaxone monotherapy for two weeks. Blood cx cleared 4/6. Kidney function worsened and she was started on hemodialysis via right femoral tunneled catheter on 4/11. Her oxygen requirements improved, sedation weaned and she was extubated on 4/24. A tunneled HD line was placed 4/28/20.    On 5/2 patient's WBC count trended up to 28K. On 5/3 patient developed a-fib with RVR, hypotension and fevers. Chest xray showed left perihilar and bibasilar opacities slightly worse as compared to prior exam. Vanc/Cefepime started. Blood cultures drawn 5/3 are showing GPCs in clusters resembling Staph (4/4 bottles). Blood cx 5/4 are NGTD. Resp cx is pending.  This  am during HD she became bradycardic and developed respiratory distress.  She had to be re-intubated and Levophed was started for hypotension. She was transferred to [facility] for treatment. Repeat echo ordered. US BLE ordered to r/o DVTs.     Lines:  RUE PICC (basilic) - 4/11  Right femoral tunneled cath - 4/28  Gamino - 4/23  Rectal tube - 5/4  NG tube - 5/4      Past Medical History:   Diagnosis Date    Acute right-sided weakness 4/26/2018    Arthritis     Diabetes mellitus     Hypertension     Malignant neoplasm of upper-outer quadrant of right breast in female, estrogen receptor positive 12/5/2017     Status post mastectomy, right 2018    Stroke 4/26/2018       Past Surgical History:   Procedure Laterality Date    BREAST BIOPSY      COLONOSCOPY N/A 11/8/2018    Procedure: COLONOSCOPY;  Surgeon: Ahsan Enciso MD;  Location: [facility] ENDO;  Service: Endoscopy;  Laterality: N/A;    COLONOSCOPY N/A 1/17/2019    Procedure: COLONOSCOPY-with polypectomy;  Surgeon: Ahsan Enciso MD;  Location: [facility] ENDO;  Service: Endoscopy;  Laterality: N/A;    ESOPHAGOGASTRODUODENOSCOPY N/A 11/8/2018    Procedure: EGD (ESOPHAGOGASTRODUODENOSCOPY);  Surgeon: Ahsan Enciso MD;  Location: [facility] ENDO;  Service: Endoscopy;  Laterality: N/A;    EYE SURGERY      HYSTERECTOMY      MASTECTOMY      MYRINGOTOMY WITH INSERTION OF VENTILATION TUBE Bilateral 7/24/2018    Procedure: MYRINGOTOMY, WITH TYMPANOSTOMY TUBE INSERTION;  Surgeon: Ventura Whitt MD;  Location: [facility] OR;  Service: ENT;  Laterality: Bilateral;       Review of patient's allergies indicates:  No Known Allergies    Medications:  Medications Prior to Admission   Medication Sig    ACCU-CHEK SMARTVIEW TEST STRIP Strp USE TO TEST BLOOD SUGAR TWICE A DAY    amLODIPine (NORVASC) 10 MG tablet Take 1 tablet (10 mg total) by mouth once daily.    aspirin (ECOTRIN) 81 MG EC tablet Take 1 tablet (81 mg total) by mouth once daily.    atorvastatin (LIPITOR) 40 MG tablet Take 1 tablet (40 mg total) by mouth once daily.    blood sugar diagnostic (ACCU-CHEK SMARTVIEW TEST STRIP MISC)     blood-glucose meter Misc accu-chek    kit franky    clopidogrel (PLAVIX) 75 mg tablet Take 1 tablet (75 mg total) by mouth once daily.    docusate sodium (COLACE) 100 MG capsule Take 1 capsule (100 mg total) by mouth 2 (two) times daily.    dorzolamide-timolol 2-0.5% (COSOPT) 22.3-6.8 mg/mL ophthalmic solution Place 1 drop into both eyes 2 (two) times daily.    dorzolamide-timolol 2-0.5% (COSOPT) 22.3-6.8 mg/mL ophthalmic solution dorzol/timol sol 22.3-6.8    fluticasone (FLONASE)  "50 mcg/actuation nasal spray 1 spray by Each Nare route 2 (two) times daily as needed (sinus congestion).    folic acid/multivit-min/lutein (CENTRUM SILVER ORAL) Take 1 Dose by mouth once daily.    furosemide (LASIX) 40 MG tablet Take 40 mg by mouth once daily.     LANTUS SOLOSTAR 100 unit/mL (3 mL) InPn pen INJECT 35 UNIT(S) EVERY EVENING BY SUBCUTANEOUS ROUTE.    losartan (COZAAR) 100 MG tablet Take 1 tablet (100 mg total) by mouth once daily.    metformin (GLUCOPHAGE) 500 MG tablet Take 500 mg by mouth 2 (two) times daily with meals.    multivit,iron,minerals/lutein (CENTRUM SILVER ULTRA WOMEN'S ORAL) Take 1 tablet by mouth once daily.    ondansetron (ZOFRAN-ODT) 8 MG TbDL Take 1 tablet (8 mg total) by mouth every 8 (eight) hours as needed.    pantoprazole (PROTONIX) 40 MG tablet Take 40 mg by mouth once daily.    pen needle, diabetic 32 gauge x 3/16" Ndle by Misc.(Non-Drug; Combo Route) route.    potassium chloride (KLOR-CON) 10 MEQ TbSR Take 10 mEq by mouth once daily.    travoprost, benzalkonium, (TRAVATAN) 0.004 % ophthalmic solution Place 1 drop into both eyes nightly.      Antibiotics (From admission, onward)    Start     Stop Route Frequency Ordered    05/04/20 2045  ceFEPIme injection 1 g      -- IV Every 24 hours (non-standard times) 05/04/20 1940 05/04/20 2038  vancomycin - pharmacy to dose  (vancomycin IVPB)      -- IV pharmacy to manage frequency 05/04/20 1940        Antifungals (From admission, onward)    None        Antivirals (From admission, onward)    None           There is no immunization history for the selected administration types on file for this patient.    Family History     Problem Relation (Age of Onset)    Breast cancer Sister (55)    Cancer Sister (75), Brother    Hypertension Mother        Social History     Socioeconomic History    Marital status:      Spouse name: Not on file    Number of children: Not on file    Years of education: Not on file    Highest " education level: Not on file   Occupational History    Not on file   Social Needs    Financial resource strain: Not on file    Food insecurity:     Worry: Not on file     Inability: Not on file    Transportation needs:     Medical: Not on file     Non-medical: Not on file   Tobacco Use    Smoking status: Never Smoker    Smokeless tobacco: Current User     Types: Snuff   Substance and Sexual Activity    Alcohol use: No    Drug use: No    Sexual activity: Not Currently   Lifestyle    Physical activity:     Days per week: Not on file     Minutes per session: Not on file    Stress: Not on file   Relationships    Social connections:     Talks on phone: Not on file     Gets together: Not on file     Attends Mormonism service: Not on file     Active member of club or organization: Not on file     Attends meetings of clubs or organizations: Not on file     Relationship status: Not on file   Other Topics Concern    Not on file   Social History Narrative    Not on file     Review of Systems   Unable to perform ROS: Intubated     Objective:     Vital Signs (Most Recent):  Temp: 98.3 °F (36.8 °C) (05/05/20 0900)  Pulse: 70 (05/05/20 1100)  Resp: (!) 31 (05/05/20 1100)  BP: (!) 103/38 (05/05/20 1100)  SpO2: 99 % (05/05/20 1100) Vital Signs (24h Range):  Temp:  [94.6 °F (34.8 °C)-98.3 °F (36.8 °C)] 98.3 °F (36.8 °C)  Pulse:  [61-90] 70  Resp:  [0-69] 31  SpO2:  [48 %-100 %] 99 %  BP: ()/(38-68) 103/38     Weight: 77 kg (169 lb 12.1 oz)  Body mass index is 26.59 kg/m².    Estimated Creatinine Clearance: 11.3 mL/min (A) (based on SCr of 4.1 mg/dL (H)).    Physical Exam   Constitutional: She appears well-developed and well-nourished. No distress. She is sedated and intubated.   HENT:   Head: Normocephalic and atraumatic.   Pulmonary/Chest: She is intubated.   Skin: Skin is warm and dry. She is not diaphoretic.   R femoral trialysis catheter  RUE PICC   Nursing note and vitals reviewed.    Patient evaluated from  outside of the room. A full-contact physical exam was deferred to primary team due to covid 19 infection and the necessity to minimize exposure. Physical exam of the primary team, interim events/ changes in labs/ medications if any/ serial labs, I/O data, pertinent components from medical chart related to oxygen demands were reviewed.      Vent Mode: Spont  Oxygen Concentration (%):  [55-75] 55  Resp Rate Total:  [20 br/min-31 br/min] 30 br/min  Vt Set:  [370 mL-450 mL] 370 mL  PEEP/CPAP:  [10 cmH20] 10 cmH20  Pressure Support:  [5 cmH20] 5 cmH20  Mean Airway Pressure:  [11 asV48-47 cmH20] 12 cmH20      Significant Labs:   Blood Culture:   Recent Labs   Lab 04/14/20  1532 05/03/20  1315 05/03/20  1322 05/04/20 2015 05/04/20  2145   LABBLOO No Growth after 4 days.  Gram stain aer bottle: Gram positive cocci in clusters resembling Staph   Results called to and read back by: Sonali Andrade RN  05/04/2020    23:36   Gram stain vanda bottle: Gram positive cocci in clusters resembling Staph   Positive results previously called 05/04/2020  23:36  STAPHYLOCOCCUS AUREUS  Susceptibility pending  [facility] consult required at [facility].  * Gram stain aer bottle: Gram positive cocci in clusters resembling Staph   Results called to and read back by: Mary Peres RN  05/04/2020    16:52  Gram stain vanda bottle: Gram positive cocci in clusters resembling Staph   Positive results previously called 05/04/2020  23:37  STAPHYLOCOCCUS AUREUS  [facility]  For susceptibility see order # [id number]  * No Growth to date No Growth to date     CBC:   Recent Labs   Lab 05/04/20  0510 05/04/20  2031 05/05/20  0330   WBC 18.48* 21.49* 21.64*   HGB 8.6* 7.9* 8.2*   HCT 27.6* 26.6* 27.8*    213 251     CMP:   Recent Labs   Lab 05/04/20  0510 05/04/20  2031 05/04/20  2354 05/05/20  0123    117* 113* 131*   K 4.9 3.9 4.0 4.9   CL 99 82* 79*  90*   CO2 22* 15* 16* 17*   * 832* 768* 395*   * 68* 89* 92*   CREATININE 8.24* 5.0* 4.3* 4.1*   CALCIUM 10.4 8.0* 8.3* 9.8   PROT 7.8 7.3  --  8.1   ALBUMIN 3.9 2.9*  --  3.2*   BILITOT 0.6 0.5  --  0.7   ALKPHOS 101 110  --  110   AST 29 212*  --  522*   ALT 16 114*  --  238*   ANIONGAP 22* 20* 18* 24*   EGFRNONAA 4.1* 7.5* 9.0* 9.6*     HIV Rapid: No results for input(s): HIVRAPID in the last 48 hours.  Lactic Acid:   Recent Labs   Lab 05/04/20  1358 05/04/20  2031   LACTATE 3.6* 1.5     Lipase: No results for input(s): LIPASE in the last 48 hours.  Procalcitonin: No results for input(s): PROCAL in the last 48 hours.  Quantiferon: No results for input(s): NIL, TBAG, TBAGNIL, MITOGENNIL, TBGOLD in the last 48 hours.  Respiratory Culture:   Recent Labs   Lab 04/06/20  1600 05/05/20  0124   GSRESP <10 epithelial cells per low power field.  Many WBC's  Rare Gram positive cocci <10 epithelial cells per low power field.  Few WBC's  Rare Gram positive cocci  Rare Gram negative rods   RESPIRATORYC No Pseudomonas isolated.  STAPHYLOCOCCUS AUREUS  Many  *  --      Urine Culture:   Recent Labs   Lab 01/17/20  0800   LABURIN ESCHERICHIA COLI  >100,000 cfu/ml  *     Urine Studies:   Recent Labs   Lab 02/04/20  1510 04/05/20  1410   COLORU Yellow Yellow   APPEARANCEUA Clear Cloudy*   PHUR 6.0 6.0   SPECGRAV 1.015 1.025   PROTEINUA 2+* 3+*   GLUCUA Negative Negative   KETONESU Negative Negative   BILIRUBINUA Negative Negative   OCCULTUA Negative 1+*   NITRITE Negative Negative   UROBILINOGEN Negative Negative   LEUKOCYTESUR Negative Negative   RBCUA 1 0   WBCUA 3 0   BACTERIA Rare Moderate*   SQUAMEPITHEL 3  --    HYALINECASTS 0 0     Wound Culture: No results for input(s): LABAERO in the last 4320 hours.  All pertinent labs within the past 24 hours have been reviewed.    Significant Imaging: I have reviewed all pertinent imaging results/findings within the past 24 hours.

## 2020-05-05 NOTE — SUBJECTIVE & OBJECTIVE
Past Medical History:   Diagnosis Date    Acute right-sided weakness 4/26/2018    Arthritis     Diabetes mellitus     Hypertension     Malignant neoplasm of upper-outer quadrant of right breast in female, estrogen receptor positive 12/5/2017    Status post mastectomy, right 2018    Stroke 4/26/2018       Past Surgical History:   Procedure Laterality Date    BREAST BIOPSY      COLONOSCOPY N/A 11/8/2018    Procedure: COLONOSCOPY;  Surgeon: Ahsan Enciso MD;  Location: [facility] ENDO;  Service: Endoscopy;  Laterality: N/A;    COLONOSCOPY N/A 1/17/2019    Procedure: COLONOSCOPY-with polypectomy;  Surgeon: Ahsan Enciso MD;  Location: [facility] ENDO;  Service: Endoscopy;  Laterality: N/A;    ESOPHAGOGASTRODUODENOSCOPY N/A 11/8/2018    Procedure: EGD (ESOPHAGOGASTRODUODENOSCOPY);  Surgeon: Ahsan Enciso MD;  Location: [facility];  Service: Endoscopy;  Laterality: N/A;    EYE SURGERY      HYSTERECTOMY      MASTECTOMY      MYRINGOTOMY WITH INSERTION OF VENTILATION TUBE Bilateral 7/24/2018    Procedure: MYRINGOTOMY, WITH TYMPANOSTOMY TUBE INSERTION;  Surgeon: Ventura Whitt MD;  Location: [facility] OR;  Service: ENT;  Laterality: Bilateral;       Review of patient's allergies indicates:  No Known Allergies    Medications:  Medications Prior to Admission   Medication Sig    ACCU-CHEK SMARTVIEW TEST STRIP Strp USE TO TEST BLOOD SUGAR TWICE A DAY    amLODIPine (NORVASC) 10 MG tablet Take 1 tablet (10 mg total) by mouth once daily.    aspirin (ECOTRIN) 81 MG EC tablet Take 1 tablet (81 mg total) by mouth once daily.    atorvastatin (LIPITOR) 40 MG tablet Take 1 tablet (40 mg total) by mouth once daily.    blood sugar diagnostic (ACCU-CHEK SMARTVIEW TEST STRIP MISC)     blood-glucose meter Misc accu-chek    kit franky    clopidogrel (PLAVIX) 75 mg tablet Take 1 tablet (75 mg total) by mouth once daily.    docusate sodium (COLACE) 100 MG capsule Take 1 capsule (100 mg total) by mouth 2 (two) times daily.  "   dorzolamide-timolol 2-0.5% (COSOPT) 22.3-6.8 mg/mL ophthalmic solution Place 1 drop into both eyes 2 (two) times daily.    dorzolamide-timolol 2-0.5% (COSOPT) 22.3-6.8 mg/mL ophthalmic solution dorzol/timol sol 22.3-6.8    fluticasone (FLONASE) 50 mcg/actuation nasal spray 1 spray by Each Nare route 2 (two) times daily as needed (sinus congestion).    folic acid/multivit-min/lutein (CENTRUM SILVER ORAL) Take 1 Dose by mouth once daily.    furosemide (LASIX) 40 MG tablet Take 40 mg by mouth once daily.     LANTUS SOLOSTAR 100 unit/mL (3 mL) InPn pen INJECT 35 UNIT(S) EVERY EVENING BY SUBCUTANEOUS ROUTE.    losartan (COZAAR) 100 MG tablet Take 1 tablet (100 mg total) by mouth once daily.    metformin (GLUCOPHAGE) 500 MG tablet Take 500 mg by mouth 2 (two) times daily with meals.    multivit,iron,minerals/lutein (CENTRUM SILVER ULTRA WOMEN'S ORAL) Take 1 tablet by mouth once daily.    ondansetron (ZOFRAN-ODT) 8 MG TbDL Take 1 tablet (8 mg total) by mouth every 8 (eight) hours as needed.    pantoprazole (PROTONIX) 40 MG tablet Take 40 mg by mouth once daily.    pen needle, diabetic 32 gauge x 3/16" Ndle by Misc.(Non-Drug; Combo Route) route.    potassium chloride (KLOR-CON) 10 MEQ TbSR Take 10 mEq by mouth once daily.    travoprost, benzalkonium, (TRAVATAN) 0.004 % ophthalmic solution Place 1 drop into both eyes nightly.      Antibiotics (From admission, onward)    Start     Stop Route Frequency Ordered    05/04/20 2045  ceFEPIme injection 1 g      -- IV Every 24 hours (non-standard times) 05/04/20 1940 05/04/20 2038  vancomycin - pharmacy to dose  (vancomycin IVPB)      -- IV pharmacy to manage frequency 05/04/20 1940        Antifungals (From admission, onward)    None        Antivirals (From admission, onward)    None           There is no immunization history for the selected administration types on file for this patient.    Family History     Problem Relation (Age of Onset)    Breast cancer " Sister (55)    Cancer Sister (75), Brother    Hypertension Mother        Social History     Socioeconomic History    Marital status:      Spouse name: Not on file    Number of children: Not on file    Years of education: Not on file    Highest education level: Not on file   Occupational History    Not on file   Social Needs    Financial resource strain: Not on file    Food insecurity:     Worry: Not on file     Inability: Not on file    Transportation needs:     Medical: Not on file     Non-medical: Not on file   Tobacco Use    Smoking status: Never Smoker    Smokeless tobacco: Current User     Types: Snuff   Substance and Sexual Activity    Alcohol use: No    Drug use: No    Sexual activity: Not Currently   Lifestyle    Physical activity:     Days per week: Not on file     Minutes per session: Not on file    Stress: Not on file   Relationships    Social connections:     Talks on phone: Not on file     Gets together: Not on file     Attends Sikhism service: Not on file     Active member of club or organization: Not on file     Attends meetings of clubs or organizations: Not on file     Relationship status: Not on file   Other Topics Concern    Not on file   Social History Narrative    Not on file     Review of Systems   Unable to perform ROS: Intubated     Objective:     Vital Signs (Most Recent):  Temp: 98.3 °F (36.8 °C) (05/05/20 0900)  Pulse: 70 (05/05/20 1100)  Resp: (!) 31 (05/05/20 1100)  BP: (!) 103/38 (05/05/20 1100)  SpO2: 99 % (05/05/20 1100) Vital Signs (24h Range):  Temp:  [94.6 °F (34.8 °C)-98.3 °F (36.8 °C)] 98.3 °F (36.8 °C)  Pulse:  [61-90] 70  Resp:  [0-69] 31  SpO2:  [48 %-100 %] 99 %  BP: ()/(38-68) 103/38     Weight: 77 kg (169 lb 12.1 oz)  Body mass index is 26.59 kg/m².    Estimated Creatinine Clearance: 11.3 mL/min (A) (based on SCr of 4.1 mg/dL (H)).    Physical Exam   Constitutional: She appears well-developed and well-nourished. No distress. She is sedated  and intubated.   HENT:   Head: Normocephalic and atraumatic.   Pulmonary/Chest: She is intubated.   Skin: Skin is warm and dry. She is not diaphoretic.   R femoral trialysis catheter  RUE PICC   Nursing note and vitals reviewed.    Patient evaluated from outside of the room. A full-contact physical exam was deferred to primary team due to covid 19 infection and the necessity to minimize exposure. Physical exam of the primary team, interim events/ changes in labs/ medications if any/ serial labs, I/O data, pertinent components from medical chart related to oxygen demands were reviewed.      Vent Mode: Spont  Oxygen Concentration (%):  [55-75] 55  Resp Rate Total:  [20 br/min-31 br/min] 30 br/min  Vt Set:  [370 mL-450 mL] 370 mL  PEEP/CPAP:  [10 cmH20] 10 cmH20  Pressure Support:  [5 cmH20] 5 cmH20  Mean Airway Pressure:  [11 xuV00-48 cmH20] 12 cmH20      Significant Labs:   Blood Culture:   Recent Labs   Lab 04/14/20  1532 05/03/20  1315 05/03/20  1322 05/04/20 2015 05/04/20  2145   LABBLOO No Growth after 4 days.  Gram stain aer bottle: Gram positive cocci in clusters resembling Staph   Results called to and read back by: Sonali Andrade RN  05/04/2020    23:36   Gram stain vanda bottle: Gram positive cocci in clusters resembling Staph   Positive results previously called 05/04/2020  23:36  STAPHYLOCOCCUS AUREUS  Susceptibility pending  [facility]  * Gram stain aer bottle: Gram positive cocci in clusters resembling Staph   Results called to and read back by: Mary Peres RN  05/04/2020    16:52  Gram stain vanda bottle: Gram positive cocci in clusters resembling Staph   Positive results previously called 05/04/2020  23:37  STAPHYLOCOCCUS AUREUS  [facility]  For susceptibility see order # [id number]  * No Growth to date No Growth to date     CBC:   Recent Labs   Lab 05/04/20  0510 05/04/20 2031  05/05/20  0330   WBC 18.48* 21.49* 21.64*   HGB 8.6* 7.9* 8.2*   HCT 27.6* 26.6* 27.8*    213 251     CMP:   Recent Labs   Lab 05/04/20  0510 05/04/20  2031 05/04/20  2354 05/05/20  0123    117* 113* 131*   K 4.9 3.9 4.0 4.9   CL 99 82* 79* 90*   CO2 22* 15* 16* 17*   * 832* 768* 395*   * 68* 89* 92*   CREATININE 8.24* 5.0* 4.3* 4.1*   CALCIUM 10.4 8.0* 8.3* 9.8   PROT 7.8 7.3  --  8.1   ALBUMIN 3.9 2.9*  --  3.2*   BILITOT 0.6 0.5  --  0.7   ALKPHOS 101 110  --  110   AST 29 212*  --  522*   ALT 16 114*  --  238*   ANIONGAP 22* 20* 18* 24*   EGFRNONAA 4.1* 7.5* 9.0* 9.6*     HIV Rapid: No results for input(s): HIVRAPID in the last 48 hours.  Lactic Acid:   Recent Labs   Lab 05/04/20  1358 05/04/20 2031   LACTATE 3.6* 1.5     Lipase: No results for input(s): LIPASE in the last 48 hours.  Procalcitonin: No results for input(s): PROCAL in the last 48 hours.  Quantiferon: No results for input(s): NIL, TBAG, TBAGNIL, MITOGENNIL, TBGOLD in the last 48 hours.  Respiratory Culture:   Recent Labs   Lab 04/06/20  1600 05/05/20  0124   GSRESP <10 epithelial cells per low power field.  Many WBC's  Rare Gram positive cocci <10 epithelial cells per low power field.  Few WBC's  Rare Gram positive cocci  Rare Gram negative rods   RESPIRATORYC No Pseudomonas isolated.  STAPHYLOCOCCUS AUREUS  Many  *  --      Urine Culture:   Recent Labs   Lab 01/17/20  0800   LABURIN ESCHERICHIA COLI  >100,000 cfu/ml  *     Urine Studies:   Recent Labs   Lab 02/04/20  1510 04/05/20  1410   COLORU Yellow Yellow   APPEARANCEUA Clear Cloudy*   PHUR 6.0 6.0   SPECGRAV 1.015 1.025   PROTEINUA 2+* 3+*   GLUCUA Negative Negative   KETONESU Negative Negative   BILIRUBINUA Negative Negative   OCCULTUA Negative 1+*   NITRITE Negative Negative   UROBILINOGEN Negative Negative   LEUKOCYTESUR Negative Negative   RBCUA 1 0   WBCUA 3 0   BACTERIA Rare Moderate*   SQUAMEPITHEL 3  --    HYALINECASTS 0 0     Wound Culture: No  results for input(s): LABAERO in the last 4320 hours.  All pertinent labs within the past 24 hours have been reviewed.    Significant Imaging: I have reviewed all pertinent imaging results/findings within the past 24 hours.

## 2020-05-05 NOTE — CONSULTS
"[facility]  Adult Nutrition  Consult Note    SUMMARY       Recommendations    1. Recommend TF of Peptamen AF advancing as tolerated to goal rate of 50 mL/hr to provide 1440 kcal, 91 gm protein, and 972 mL fluid free fluid.     2. If able to extubate, ADAT to diabetic with texture per SLP.     3. RD following.     Goals: receive nutrition by RD follow-up  Nutrition Goal Status: new  Communication of RD Recs: (POC)    Reason for Assessment    Reason For Assessment: consult  Diagnosis: (ARF with hypoxia)  Relevant Medical History: HTN, HLD, T2DM, CVA, breast cancer   Interdisciplinary Rounds: did not attend  General Information Comments: Remote access. Pt intubated. NPO, no TF ordered at this time. Noted wt loss, but unable to verify. Unable to assess appetite and UBW PTA. Due to recent hospital wide restrictions to limit the transfer of COVID-19, we are not performing any physical exams at this time. NFPE to be performed at a future date. Pt at high risk for acute malnutrition.   Nutrition Discharge Planning: unable to determine at this time    Nutrition Risk Screen    Nutrition Risk Screen: no indicators present    Nutrition/Diet History    Factors Affecting Nutritional Intake: NPO, on mechanical ventilation    Anthropometrics    Temp: 97.7 °F (36.5 °C)  Height Method: Estimated  Height: 5' 7" (170.2 cm)  Height (inches): 67 in  Weight Method: Bed Scale  Weight: 77 kg (169 lb 12.1 oz)  Weight (lb): 169.76 lb  Ideal Body Weight (IBW), Female: 135 lb  % Ideal Body Weight, Female (lb): 125.75 %  BMI (Calculated): 26.6  BMI Grade: 25 - 29.9 - overweight     Lab/Procedures/Meds    Pertinent Labs Reviewed: reviewed  Pertinent Labs Comments: Na 131, BUN 92, Cr 4.1, GFR 11, glucose 395, phos 7.2, ,   Pertinent Medications Reviewed: reviewed    Estimated/Assessed Needs    Weight Used For Calorie Calculations: 77 kg (169 lb 12.1 oz)  Energy Calorie Requirements (kcal): 1440 kcal/day  Energy " Need Method: [facility]  Protein Requirements:  gm/day(1.2-1.5 gm/kg)  Weight Used For Protein Calculations: 77 kg (169 lb 12.1 oz)  Fluid Requirements (mL): 1 mL/kcal or per MD     RDA Method (mL): 1440     Nutrition Prescription Ordered    Current Diet Order: NPO    Evaluation of Received Nutrient/Fluid Intake    I/O: +1.119L since admit  Comments: LBM 5/4  % Intake of Estimated Energy Needs: 0 - 25 %  % Meal Intake: NPO    Nutrition Risk    Level of Risk/Frequency of Follow-up: (f/u 2 x wk)     Assessment and Plan    Nutrition Problem  Inadequate Energy Intake    Related to (etiology):   Inability to consume sufficient energy     Signs and Symptoms (as evidenced by):   Intubated, NPO with no alternative means of nutrition      Interventions (treatment strategy):  Collaboration of nutrition care with other providers    Nutrition Diagnosis Status:   New     Monitor and Evaluation    Food and Nutrient Intake: energy intake  Food and Nutrient Adminstration: diet order, enteral and parenteral nutrition administration  Anthropometric Measurements: weight, weight change, body mass index  Biochemical Data, Medical Tests and Procedures: electrolyte and renal panel, gastrointestinal profile, glucose/endocrine profile, inflammatory profile, lipid profile  Nutrition-Focused Physical Findings: overall appearance     Nutrition Follow-Up    RD Follow-up?: Yes

## 2020-05-05 NOTE — PROGRESS NOTES
Pharmacokinetic Assessment Follow Up: IV Vancomycin    Vancomycin Regimen Assessment & Plan:  - Vancomycin level pre-HD yesterday was 21.7 ug/mL, then given 500 mg IV x1 dose post-HD. Level likely remains therapeutic through today.  - Transfered to ICU and now on vasopressors. Will transition from post-HD dosing to pulse dosing. Collect vancomycin level with morning labs tomorrow. Will re-dose as needed depending on level and RRT plans.    Drug levels (last 3 results):  Recent Labs   Lab Result Units 05/04/20  0510   Vancomycin, Random ug/mL 21.7     Pharmacy will continue to follow and monitor vancomycin.    Please contact pharmacy at extension [phone] for questions regarding this assessment.    Thank you for the consult,   Pako Lehman     Patient brief summary:  Elina Mei is a 80 y.o. female initiated on antimicrobial therapy with IV Vancomycin for treatment of bacteremia    The patient's current regimen is pulse dosing.    Drug Allergies:   Review of patient's allergies indicates:  No Known Allergies    Actual Body Weight:   77 kg    Renal Function:   Estimated Creatinine Clearance: 11.3 mL/min (A) (based on SCr of 4.1 mg/dL (H)).,     Dialysis Method (if applicable):  Previously iHD

## 2020-05-05 NOTE — ASSESSMENT & PLAN NOTE
80-year-old female admitted to [facility] ICU on 4/5 with acute respiratory failure secondary to COVID-19. She was also found to have MSSA/Strep viridans bacteremia, MSSA pneumonia and an JUDI. She had a prolonged ICU stay requiring mechanical ventilation and pressure support. Her MSSA bacteremia/PNA was treated with Vancomycin/Ceftriaxone followed by ceftriaxone monotherapy for two weeks. Blood cx cleared 4/6. She was extubated on 4/24. Kidney function worsened and she is now on HD via right femoral tunneled catheter placed 4/28/20.     On 5/2 patient's WBC count trended up to 28K. On 5/3 patient developed a-fib with RVR, hypotension and fevers.  Vanc/Cefepime started. Blood cultures drawn 5/3 are showing Staph aureus. Resp cx is pending. She developed respiratory distress this a.m. and had to be re-intubated. ID consulted for abx recs.      Plan  - Continue IV Vancomycin. Vanc trough goal 15-20.  - Recommend deescalating Cefepime to Cefazolin 1 g IV q 24 hours for prior MSSA  - Follow repeat blood cultures and susceptibilities  - Would remove/exchange all lines if possible for line holiday (PICC, tunneled HD catheter)   - Will ultimately likely need DINESH to assess for endocarditis if 2D echo is unrevealing given recurrent Staph aureus bacteremia   - ID will follow.

## 2020-05-05 NOTE — HOSPITAL COURSE
Patient admitted to critical care medicine. Infectious disease consulted for Staph bacteremia and nephrology consulted for inititation of CRRT. Per ID, antibiotics changed to Vanc and Cefazolin due to suspicion of MSSA. 2D Echo was obtained with no vegetation. DINESH ordered. Her PICC line that was placed at osh was removed due to concern for colonization and PIVs were placed. Overnight patient was noted to have precipitous drop in blood pressure with increasing vasopressor requirements. She was noted to be hypothermic. She was rewarmed and repeat labs consistent with worsening lactic acidosis. Antibiotics were broadened and vasopressin and stress dose steroids were added. Her bicarb bath was increased on CRRT and vent adjusted in attempt to correct for worsening acidosis. Patient's daughter was called (Rochelle Mei) and patient's worsening clinical status was discussed. It was agreed that patient would not benefit from chest compressions and that we would do everything we could until that point. Patient continued to worsen and ultimately  20 due to septic shock.

## 2020-05-06 NOTE — SIGNIFICANT EVENT
Critical Care Medicine                                                                                       Death Note      Called to bedside by patient's nurse. Nursing supervisor notified. Asystole noted on monitor.    Patient is not responding to verbal or tactile stimuli. Patient does not have a pupillary reflex. Her pupils are fixed and dilated. No heart or breath sounds on auscultation. No respirations. No palpable pulses.     Family called and notified.    Time of death: 0430    Cause of Death: Septic Shock    Seven Diego MD

## 2020-05-06 NOTE — PROCEDURES
"Elina Mei is a 80 y.o. female patient.    Temp: 98.2 °F (36.8 °C) (05/05/20 2015)  Pulse: (!) 33 (05/05/20 2300)  Resp: (!) 30 (05/05/20 2300)  BP: (!) 111/54 (05/05/20 2130)  SpO2: (!) 78 % (05/05/20 2300)  Weight: 77.1 kg (170 lb) (05/05/20 1252)  Height: 5' 7" (170.2 cm) (05/05/20 1252)       Insert peripheral IV  Date/Time: 5/5/2020 11:50 PM  Performed by: Marge Belcher NP  Authorized by: Mrage Belcher NP   Consent: The procedure was performed in an emergent situation.  Procedure consent: procedure consent matches procedure scheduled  Imaging studies: imaging studies available  Patient identity confirmed: anonymous protocol, patient vented/unresponsive  Local anesthesia used: no    Anesthesia:  Local anesthesia used: no    Sedation:  Patient sedated: yes  Sedation: precedex.    Patient tolerance: Patient tolerated the procedure well with no immediate complications  Comments: Nursing staff unable to obtain adequate PIV access. Need to remove PICC due to bacteremia.   20g PIV placed into RAC under ultrasound guidance using seldinger technique. + blood return, flushes easily.           Marge Belcher  5/5/2020  "

## 2020-05-06 NOTE — PLAN OF CARE
Patient is .      20 1113   Final Note   Assessment Type Final Discharge Note   Anticipated Discharge Disposition    Lynn Tovar RN, CM   Ext: [phone]

## 2020-05-06 NOTE — PLAN OF CARE
At the bedside for insertion of peripheral IVs in order to remove PICC line due to bacteremia. While doing this, patient's blood pressures precipitously dropped. Levophed was increased from 0.04 up to 0.35. Vasopressin was added. Patient's temp was 92 degrees and BMP from 10:30 pm resulted with bicarb down to 12. ABG drawn showin.108/35/77/11/90%/BE of -18. RR on vent increased to 30 and vent settings changed to pressure control with Pi of 15. Bicarb bath on CRRT increased to 40 mEq. Cefazolin broadened to Zosyn. Vanc random ordered.  BCx came back (+) for gram positive cocci in clusters. Called and spoke to daughter, Rochelle Mei, and discussed patient's worsening clinical status. Agreed that we will do everything we can to treat her, but if her heart stops, we will let her go peacefully without chest compressions.    Within the hour, levophed requirements went up to 2.8. Repeat AB.13/41/63/14/84%/BE -15. Lactic acid is >12. Vent changed to VC with TV of 500 and RR of 30. Clinical status discussed with daughter - asked her to call her siblings and explained that patient is actively dying. Hydrocortisone 100 mg IV pushed.    Gracie Luciano MD  [employer] PCCM Fellow

## 2020-05-06 NOTE — DISCHARGE SUMMARY
[facility]  Critical Care Medicine  Discharge Summary      Patient Name: Elina Mei  MRN: [id number]  Admission Date: 5/4/2020  Hospital Length of Stay: 2 days  Discharge Date and Time:  05/06/2020 5:06 AM  Attending Physician: Hermes Moran*   Discharging Provider: Seven Diego MD  Primary Care Provider: SHORTY Duke MD  Reason for Admission: Sepsis    HPI:   Elina Mei is a 80 y.o. female with past medical history of hypertension, hyperlipidemia, diabetes mellitus type 2, CVA, breast cancer who originally presented to ER 4/5/20 with cough, weakness, and confusion, was found to be minimally responsive and was emergently intubated. Chest x-ray showed bilateral ground-glass opacities concerning for viral pneumonia. She was found to be COVID-19 positive.  She completed course of plaquenil, rocephin, and azithromycin. Labs were remarkable for an JUDI (Baseline Cr 1.1 -> 2.55 on admission), and troponin elevation to 0.121.  EKG showed sinus rhythm with first-degree AV block but no acute ischemic changes. Her Cr has progressively worsened throughout her hospital stay [2.55 (4/5) -> 4.3 (5/2)-> 6.5 (5/3) -> 8.24 (5/4)].  Nephrology at [place] was consulted and she quickly became anuric and required a peritoneal dialysis catheter placement on 4/7/20, and she began PD on that same day. Ultimately CCPD was discontinued due to inability to obtain proper outflow from dysfunctional PD cath. She subsequently had a right femoral H.D cath placed on 4/10/20 and began HD on 4/11. Her Blood CXS from 4/5 grew out Strep Viridans and MSSA and she completed 2 weeks of Rocephin for this. She was extubated 4/24/20 and tunneled HD line placed 4/28/20.  Yesterday (5/3), she developed a fever to 101.0F and an episode of atrial fibrillation with hypotension to SBP 80s.  She was begun empirically on Vancomycin and Cefepime and started on amiodarone.  At 10pm last night she converted back to NSR.  This   "am during HD she became bradycardic and developed respiratory distress.  She had to be re-intubated, and Levophed was started for hypotension  Nephrology there noted, Patient developed bradycardia and hypotension on dialysis today, subsequently with respiratory distress and need for intubation, session had to be rinsed back prematurely.  Recommend transfer to outside facility with CRRT capabilities."   Her CT head showed NAF, only generalized cerebral volume loss, findings suggestive of chronic microvascular ischemic change and small regions of prior left MCA territory distribution infarction similar to prior exams.  CXR today shows interval placement of endotracheal tube which terminates just below the level of the clavicles. Creasing patchy opacity in the right base with suspected dense consolidation in the left retrocardiac location.     * No surgery found *    Indwelling Lines/Drains at Time of Discharge:   Lines/Drains/Airways     Peripherally Inserted Central Catheter Line            PICC Triple Lumen 04/11/20 1950 right basilic 24 days          Central Venous Catheter Line            Tunneled Central Line Insertion/Assessment - Double Lumen  04/28/20 1030 right femoral vein;right femoral 7 days          Drain                 Urethral Catheter 04/23/20 1400 16 Fr. 12 days         Rectal Tube 05/04/20 fecal management system 2 days         NG/OG Tube 05/04/20 1235 [place] sump 18 Fr. Center mouth 1 day          Airway                 Airway - Non-Surgical 05/04/20 1230 Endotracheal Tube 1 day              Hospital Course:   Patient admitted to critical care medicine. Infectious disease consulted for Staph bacteremia and nephrology consulted for inititation of CRRT. Per ID, antibiotics changed to Vanc and Cefazolin due to suspicion of MSSA. 2D Echo was obtained with no vegetation. DINESH ordered. Her PICC line that was placed at osh was removed due to concern for colonization and PIVs were placed. Overnight patient " was noted to have precipitous drop in blood pressure with increasing vasopressor requirements. She was noted to be hypothermic. She was rewarmed and repeat labs consistent with worsening lactic acidosis. Antibiotics were broadened and vasopressin and stress dose steroids were added. Her bicarb bath was increased on CRRT and vent adjusted in attempt to correct for worsening acidosis. Patient's daughter was called (Rochelle Mei) and patient's worsening clinical status was discussed. It was agreed that patient would not benefit from chest compressions and that we would do everything we could until that point. Patient continued to worsen and ultimately  20 due to septic shock.     Consults (From admission, onward)        Status Ordering Provider     Inpatient consult to Infectious Diseases  Once     Provider:  (Not yet assigned)    Completed KAYLEE CRUZ     Inpatient consult to Midline team  Once     Provider:  (Not yet assigned)    Acknowledged PHILIPP SWIFT     Inpatient consult to Nephrology  Once     Provider:  (Not yet assigned)    Completed KAYLEE CRUZ     Inpatient consult to Palliative Care  Once     Provider:  (Not yet assigned)    Completed KAYLEE CRUZ     Inpatient consult to Registered Dietitian/Nutritionist  Once     Provider:  (Not yet assigned)    Completed KAYLEE CRUZ     Pharmacy to dose Vancomycin consult  Once     Provider:  (Not yet assigned)    Acknowledged KAYLEE CRUZ     Pharmacy to dose Vancomycin consult  Once     Provider:  (Not yet assigned)    Acknowledged SOLIS LITTLEJOHN          Pending Diagnostic Studies:     Procedure Component Value Units Date/Time    Comprehensive metabolic panel [[id number]] Collected:  20    Order Status:  Sent Lab Status:  In process Updated:  20    Specimen:  Blood     Magnesium [[id number]] Collected:  20    Order Status:  Sent Lab  Status:  In process Updated:  202    Specimen:  Blood     Magnesium [[id number]] Collected:  20    Order Status:  Sent Lab Status:  No result     Specimen:  Blood     Phosphorus [[id number]] Collected:  20    Order Status:  Sent Lab Status:  In process Updated:  202    Specimen:  Blood     Renal function panel [[id number]] Collected:  20    Order Status:  Sent Lab Status:  In process Updated:  20    Specimen:  Blood     Renal function panel [[id number]] Collected:  20    Order Status:  Sent Lab Status:  No result     Specimen:  Blood     Transesophageal echo (DINESH) [[id number]]     Order Status:  Sent Lab Status:  No result         Final Active Diagnoses:    Diagnosis Date Noted POA    PRINCIPAL PROBLEM:  Acute respiratory failure with hypoxia [J96.01] 2020 Yes    Septic shock [A41.9, R65.21]  Yes    Shock liver [K72.00] 2020 No    Acute renal failure [N17.9] 2020 Yes    Severe sepsis [A41.9, R65.20] 2020 Yes    Atrial fibrillation with RVR [I48.91] 2020 Yes    Staphylococcus aureus bacteremia [R78.81] 2020 Yes    DM (diabetes mellitus) [E11.9] 2013 Yes      Problems Resolved During this Admission:       Discharged Condition:      Seven Diego MD  Critical Care Medicine  [facility]

## 2020-05-06 NOTE — PROGRESS NOTES
Around 0130am RT was called to bedside due to patient having a drop in pressure. A stat ABG with lites and a lactate was ordered.  Dr Acosta made changes on the ventilator per ABG. Charge nurse notified therapist that pt is now a DNR and no compressions will be given.

## 2020-05-06 NOTE — SIGNIFICANT EVENT
Significant Event  Critical Care Medicine    CC: Acute respiratory failure with hypoxia  Date: 05/06/2020  Admit Date: 5/4/2020  Hospital Length of Stay: 2    Code Status: Partial Code     SUBJECTIVE:     Significant Events: Called urgently to the bedside by nurse to evaluate patient for increasing pressor requirements.     Pt with increasing pressor requirements throughout the am. PICC line previously removed after obtaining 2 PIVs. Abx broadened to zosyn. Random vanc in therapeutic range. Stress dose steroids added. Adjustments made to CRRT bath in attempt to compensate for severe metabolic acidosis (bicarb currently 8 on 40 meq bicarb bath). K continues to rise despite lowering K bath on CRRT; now shifting K with insulin.     Family updated multiple times by CCM team. They are aware that the patient is actively dying.         OBJECTIVE:     Physical Exam:  Last Vitals:  Vitals:    05/06/20 0237   BP: 99/61   Pulse: 79   Resp: (!) 28   Temp:      GA: ill appearing  HEENT: No scleral icterus or JVD.   Pulmonary: mechanically ventilated Vt 500 R 28 eppe 10 FiO2 70   Cardiac: Sinus rhythm with occasional runs of bigeminy on tele   Abdominal: soft  Neuro:  --GCS: E4 V1T M4  --Mental Status:  unresponsive     ASSESSMENT AND PLAN/INTERVENTIONS:     1) septic shock with multi organ failure secondary to GPC bacteremia  Plan/Interventions:  --worsening septic shock despite picc removal in attempt to obtain source control and broadening abx  --Thought given to attempting to placing alternative RRT access and removing tunneled HD line, however this would require interrupting CRRT (which patient is still profoundly acidotic on despite max bicarb bath and would likely not tolerate being off for extended period of time) and would still be placing central line into a bacteremic pt which would also get colonized.   --continuing levo/vaso; stress dose steroids added     --after several discussions with family, decision made to not  escalate care (no 3rd pressor, no compressions when cardiac arrest occurs); family not yet ready to withdraw care, so will continue maximum medical therapy  --family has decided not to visit pt in hospital due to risk associated with Covid-19; will continue to update family via phone     Uninterrupted Critical Care/Counseling Time (not including procedures): 35 mins    Marge Belcher NP  Critical Care Medicine

## 2020-05-06 NOTE — PROGRESS NOTES
SLED daily checks. Machine conductivity issue resolved. Orders verified. See flow sheet for details.

## 2020-05-06 NOTE — PROCEDURES
"Elina Mei is a 80 y.o. female patient.    Temp: 98.2 °F (36.8 °C) (05/05/20 2015)  Pulse: 68 (05/06/20 0028)  Resp: (!) 38 (05/06/20 0028)  BP: (!) 99/52 (05/06/20 0028)  SpO2: (!) 78 % (05/05/20 2300)  Weight: 77.1 kg (170 lb) (05/05/20 1252)  Height: 5' 7" (170.2 cm) (05/05/20 1252)       Arterial Line  Date/Time: 5/6/2020 12:53 AM  Location procedure was performed: [facility] CARDIAC ICU  Performed by: Marge Belcher NP  Authorized by: Marge Belcher NP   Pre-op Diagnosis: septic shock  Post-operative diagnosis: septic shock  Consent Done: Emergent Situation  Preparation: Patient was prepped and draped in the usual sterile fashion.  Indications: hemodynamic monitoring  Location: right radial  Patient sedated: yes  Sedation: precedex.  Olu's test normal: yes  Needle gauge: 20  Seldinger technique: Seldinger technique used  Number of attempts: 1  Complications: No  Estimated blood loss (mL): 1  Specimens: No  Implants: No  Post-procedure: line sutured and dressing applied  Post-procedure CMS: unchanged  Patient tolerance: Patient tolerated the procedure well with no immediate complications          Marge Belcher  5/6/2020  "

## 2020-05-06 NOTE — NURSING
Pronounced at 0430. Hypotension refractory to vasopressors. Family notified and aware of decline and death. All post mortem paper work done except family have not decided on the  home. Belongings are here and ready to return to family members upon arrival. All appropriate phone calls made according to policy. No organ donation or autopsy required.

## 2020-05-07 LAB
BACTERIA BLD CULT: ABNORMAL
BACTERIA SPEC AEROBE CULT: ABNORMAL
BACTERIA SPEC AEROBE CULT: ABNORMAL
GRAM STN SPEC: ABNORMAL

## 2020-05-08 LAB — BACTERIA BLD CULT: NORMAL

## 2022-04-11 NOTE — PLAN OF CARE
Pt received on RA.  SPO2  92%.  Pt in no apparent respiratory distress.  Will continue to monitor.     English

## 2023-11-08 NOTE — TELEPHONE ENCOUNTER
11/08/23 1632   Encounter Summary   Encounter Overview/Reason  Spiritual/Emotional Needs   Service Provided For: Patient   Referral/Consult From: [address] Family members   Last Encounter  11/08/23   Complexity of Encounter Moderate   Begin Time 1622   End Time  1632   Total Time Calculated 10 min   Spiritual/Emotional needs   Type Spiritual Support   Assessment/Intervention/Outcome   Assessment Coping   Intervention Nurtured Hope;Prayer (assurance of)/[place];Sustaining Presence/Ministry of presence   Outcome Comfort     Assessment: In my encounter with the 65 yr old patient, while rounding  the unit 7K,  I provided spiritual care to patient through conversation, I also came to assess the patient's spiritual needs present. Interventions:  I provided prayer, emotional support and words of comfort.  provided a listening presence and encouraged pt to share their beliefs and how they support them during their hospitalization. Outcomes: The patient was encouraged and didn't share any further spiritual needs at this time. Plan:  Chaplains will follow-up at a later time for assessment of any spiritual care needs present. Called and spoke with patient and assisted with scheduling her mammogram
